# Patient Record
Sex: FEMALE | Race: WHITE | NOT HISPANIC OR LATINO | Employment: OTHER | URBAN - METROPOLITAN AREA
[De-identification: names, ages, dates, MRNs, and addresses within clinical notes are randomized per-mention and may not be internally consistent; named-entity substitution may affect disease eponyms.]

---

## 2017-01-27 ENCOUNTER — COMMUNICATION - HEALTHEAST (OUTPATIENT)
Dept: INTERNAL MEDICINE | Facility: CLINIC | Age: 77
End: 2017-01-27

## 2017-01-27 DIAGNOSIS — I10 UNSPECIFIED ESSENTIAL HYPERTENSION: ICD-10-CM

## 2017-02-20 ENCOUNTER — OFFICE VISIT - HEALTHEAST (OUTPATIENT)
Dept: INTERNAL MEDICINE | Facility: CLINIC | Age: 77
End: 2017-02-20

## 2017-02-20 DIAGNOSIS — J01.90 ACUTE NON-RECURRENT SINUSITIS, UNSPECIFIED LOCATION: ICD-10-CM

## 2017-02-20 DIAGNOSIS — I10 ESSENTIAL HYPERTENSION WITH GOAL BLOOD PRESSURE LESS THAN 130/80: ICD-10-CM

## 2017-02-20 ASSESSMENT — MIFFLIN-ST. JEOR: SCORE: 1267.86

## 2017-02-24 ENCOUNTER — OFFICE VISIT - HEALTHEAST (OUTPATIENT)
Dept: FAMILY MEDICINE | Facility: CLINIC | Age: 77
End: 2017-02-24

## 2017-02-24 ENCOUNTER — COMMUNICATION - HEALTHEAST (OUTPATIENT)
Dept: SCHEDULING | Facility: CLINIC | Age: 77
End: 2017-02-24

## 2017-02-24 DIAGNOSIS — J40 BRONCHITIS: ICD-10-CM

## 2017-02-24 DIAGNOSIS — R05.9 COUGH: ICD-10-CM

## 2017-02-24 DIAGNOSIS — R06.2 WHEEZING: ICD-10-CM

## 2017-05-25 ENCOUNTER — OFFICE VISIT - HEALTHEAST (OUTPATIENT)
Dept: INTERNAL MEDICINE | Facility: CLINIC | Age: 77
End: 2017-05-25

## 2017-05-25 DIAGNOSIS — I10 UNSPECIFIED ESSENTIAL HYPERTENSION: ICD-10-CM

## 2017-05-25 DIAGNOSIS — F51.02 INSOMNIA DUE TO STRESS: ICD-10-CM

## 2017-05-25 DIAGNOSIS — E78.5 HYPERLIPIDEMIA: ICD-10-CM

## 2017-05-25 DIAGNOSIS — Z00.00 ROUTINE GENERAL MEDICAL EXAMINATION AT A HEALTH CARE FACILITY: ICD-10-CM

## 2017-05-25 DIAGNOSIS — M81.0 OSTEOPOROSIS: ICD-10-CM

## 2017-05-25 LAB
CHOLEST SERPL-MCNC: 256 MG/DL
FASTING STATUS PATIENT QL REPORTED: YES
HDLC SERPL-MCNC: 98 MG/DL
LDLC SERPL CALC-MCNC: 145 MG/DL
TRIGL SERPL-MCNC: 65 MG/DL

## 2017-05-25 ASSESSMENT — MIFFLIN-ST. JEOR: SCORE: 1261.06

## 2017-05-26 ENCOUNTER — COMMUNICATION - HEALTHEAST (OUTPATIENT)
Dept: INTERNAL MEDICINE | Facility: CLINIC | Age: 77
End: 2017-05-26

## 2017-05-26 ENCOUNTER — COMMUNICATION - HEALTHEAST (OUTPATIENT)
Dept: SCHEDULING | Facility: CLINIC | Age: 77
End: 2017-05-26

## 2017-05-31 ENCOUNTER — HOSPITAL ENCOUNTER (OUTPATIENT)
Dept: MAMMOGRAPHY | Facility: CLINIC | Age: 77
Discharge: HOME | End: 2017-05-31
Attending: INTERNAL MEDICINE

## 2017-05-31 DIAGNOSIS — Z12.31 VISIT FOR SCREENING MAMMOGRAM: ICD-10-CM

## 2017-06-06 ENCOUNTER — COMMUNICATION - HEALTHEAST (OUTPATIENT)
Dept: INTERNAL MEDICINE | Facility: CLINIC | Age: 77
End: 2017-06-06

## 2017-06-06 DIAGNOSIS — I10 UNSPECIFIED ESSENTIAL HYPERTENSION: ICD-10-CM

## 2017-07-10 ENCOUNTER — OFFICE VISIT - HEALTHEAST (OUTPATIENT)
Dept: SLEEP MEDICINE | Facility: CLINIC | Age: 77
End: 2017-07-10

## 2017-07-10 DIAGNOSIS — R06.83 SNORING: ICD-10-CM

## 2017-07-10 DIAGNOSIS — G47.8 SLEEP DYSFUNCTION WITH SLEEP STAGE DISTURBANCE: ICD-10-CM

## 2017-07-10 DIAGNOSIS — G47.10 HYPERSOMNIA, UNSPECIFIED: ICD-10-CM

## 2017-07-10 DIAGNOSIS — R29.818 SUSPECTED SLEEP APNEA: ICD-10-CM

## 2017-07-10 ASSESSMENT — MIFFLIN-ST. JEOR: SCORE: 1280.79

## 2017-08-09 ENCOUNTER — RECORDS - HEALTHEAST (OUTPATIENT)
Dept: SLEEP MEDICINE | Facility: CLINIC | Age: 77
End: 2017-08-09

## 2017-08-09 DIAGNOSIS — G47.30 SLEEP APNEA, UNSPECIFIED: ICD-10-CM

## 2017-08-09 DIAGNOSIS — G47.8 OTHER SLEEP DISORDERS: ICD-10-CM

## 2017-08-09 DIAGNOSIS — R06.83 SNORING: ICD-10-CM

## 2017-08-09 DIAGNOSIS — G47.10 HYPERSOMNIA, UNSPECIFIED: ICD-10-CM

## 2017-08-14 ENCOUNTER — COMMUNICATION - HEALTHEAST (OUTPATIENT)
Dept: SLEEP MEDICINE | Facility: CLINIC | Age: 77
End: 2017-08-14

## 2017-08-24 ENCOUNTER — OFFICE VISIT - HEALTHEAST (OUTPATIENT)
Dept: SLEEP MEDICINE | Facility: CLINIC | Age: 77
End: 2017-08-24

## 2017-08-24 ENCOUNTER — AMBULATORY - HEALTHEAST (OUTPATIENT)
Dept: SLEEP MEDICINE | Facility: CLINIC | Age: 77
End: 2017-08-24

## 2017-08-24 DIAGNOSIS — G47.8 SLEEP DYSFUNCTION WITH SLEEP STAGE DISTURBANCE: ICD-10-CM

## 2017-08-24 DIAGNOSIS — G47.00 PERSISTENT INSOMNIA: ICD-10-CM

## 2017-08-24 DIAGNOSIS — G47.10 HYPERSOMNIA, UNSPECIFIED: ICD-10-CM

## 2017-08-24 DIAGNOSIS — G47.33 OSA (OBSTRUCTIVE SLEEP APNEA): ICD-10-CM

## 2017-08-24 ASSESSMENT — MIFFLIN-ST. JEOR: SCORE: 1281.24

## 2017-08-31 ENCOUNTER — AMBULATORY - HEALTHEAST (OUTPATIENT)
Dept: SLEEP MEDICINE | Facility: CLINIC | Age: 77
End: 2017-08-31

## 2017-09-12 ENCOUNTER — OFFICE VISIT - HEALTHEAST (OUTPATIENT)
Dept: RHEUMATOLOGY | Facility: CLINIC | Age: 77
End: 2017-09-12

## 2017-09-12 DIAGNOSIS — M25.50 POLYARTHRALGIA: ICD-10-CM

## 2017-09-12 DIAGNOSIS — M76.892 ENTHESOPATHY OF HIP REGION ON BOTH SIDES: ICD-10-CM

## 2017-09-12 DIAGNOSIS — M51.379 DEGENERATION OF LUMBAR OR LUMBOSACRAL INTERVERTEBRAL DISC: ICD-10-CM

## 2017-09-12 DIAGNOSIS — M76.891 ENTHESOPATHY OF HIP REGION ON BOTH SIDES: ICD-10-CM

## 2017-09-12 LAB
ALT SERPL W P-5'-P-CCNC: 19 U/L (ref 0–45)
CREAT SERPL-MCNC: 0.73 MG/DL (ref 0.6–1.1)
GFR SERPL CREATININE-BSD FRML MDRD: >60 ML/MIN/1.73M2

## 2017-09-12 ASSESSMENT — MIFFLIN-ST. JEOR: SCORE: 1285.33

## 2017-09-13 LAB — HCV AB SERPL QL IA: NEGATIVE

## 2017-09-14 LAB — ANA SER QL: 0.2 U

## 2017-09-25 ENCOUNTER — COMMUNICATION - HEALTHEAST (OUTPATIENT)
Dept: SLEEP MEDICINE | Facility: CLINIC | Age: 77
End: 2017-09-25

## 2017-10-16 ENCOUNTER — OFFICE VISIT - HEALTHEAST (OUTPATIENT)
Dept: SLEEP MEDICINE | Facility: CLINIC | Age: 77
End: 2017-10-16

## 2017-10-16 DIAGNOSIS — G47.8 SLEEP DYSFUNCTION WITH SLEEP STAGE DISTURBANCE: ICD-10-CM

## 2017-10-16 DIAGNOSIS — G47.33 OSA ON CPAP: ICD-10-CM

## 2017-10-16 DIAGNOSIS — R53.83 FATIGUE, UNSPECIFIED TYPE: ICD-10-CM

## 2017-10-16 ASSESSMENT — MIFFLIN-ST. JEOR: SCORE: 1290.32

## 2017-11-02 ENCOUNTER — OFFICE VISIT - HEALTHEAST (OUTPATIENT)
Dept: INTERNAL MEDICINE | Facility: CLINIC | Age: 77
End: 2017-11-02

## 2017-11-02 ENCOUNTER — COMMUNICATION - HEALTHEAST (OUTPATIENT)
Dept: INTERNAL MEDICINE | Facility: CLINIC | Age: 77
End: 2017-11-02

## 2017-11-02 DIAGNOSIS — I10 ESSENTIAL HYPERTENSION: ICD-10-CM

## 2017-11-02 DIAGNOSIS — Z01.818 PRE-OPERATIVE EXAM: ICD-10-CM

## 2017-11-02 DIAGNOSIS — M19.071 OSTEOARTHRITIS OF RIGHT FOOT: ICD-10-CM

## 2017-11-02 DIAGNOSIS — E78.5 HYPERLIPIDEMIA: ICD-10-CM

## 2017-11-02 LAB
ATRIAL RATE - MUSE: 79 BPM
DIASTOLIC BLOOD PRESSURE - MUSE: NORMAL MMHG
INTERPRETATION ECG - MUSE: NORMAL
P AXIS - MUSE: 62 DEGREES
PR INTERVAL - MUSE: 174 MS
QRS DURATION - MUSE: 92 MS
QT - MUSE: 356 MS
QTC - MUSE: 408 MS
R AXIS - MUSE: 36 DEGREES
SYSTOLIC BLOOD PRESSURE - MUSE: NORMAL MMHG
T AXIS - MUSE: 32 DEGREES
VENTRICULAR RATE- MUSE: 79 BPM

## 2017-11-02 ASSESSMENT — MIFFLIN-ST. JEOR: SCORE: 1287.14

## 2017-11-24 ENCOUNTER — RECORDS - HEALTHEAST (OUTPATIENT)
Dept: ADMINISTRATIVE | Facility: OTHER | Age: 77
End: 2017-11-24

## 2017-12-06 ENCOUNTER — COMMUNICATION - HEALTHEAST (OUTPATIENT)
Dept: INTERNAL MEDICINE | Facility: CLINIC | Age: 77
End: 2017-12-06

## 2017-12-06 ENCOUNTER — RECORDS - HEALTHEAST (OUTPATIENT)
Dept: ADMINISTRATIVE | Facility: OTHER | Age: 77
End: 2017-12-06

## 2017-12-06 DIAGNOSIS — F51.02 INSOMNIA DUE TO STRESS: ICD-10-CM

## 2017-12-07 ENCOUNTER — RECORDS - HEALTHEAST (OUTPATIENT)
Dept: BONE DENSITY | Facility: CLINIC | Age: 77
End: 2017-12-07

## 2017-12-07 ENCOUNTER — OFFICE VISIT - HEALTHEAST (OUTPATIENT)
Dept: INTERNAL MEDICINE | Facility: CLINIC | Age: 77
End: 2017-12-07

## 2017-12-07 ENCOUNTER — RECORDS - HEALTHEAST (OUTPATIENT)
Dept: ADMINISTRATIVE | Facility: OTHER | Age: 77
End: 2017-12-07

## 2017-12-07 DIAGNOSIS — M81.0 OSTEOPOROSIS: ICD-10-CM

## 2017-12-07 DIAGNOSIS — M81.0 AGE-RELATED OSTEOPOROSIS WITHOUT CURRENT PATHOLOGICAL FRACTURE: ICD-10-CM

## 2017-12-08 ENCOUNTER — RECORDS - HEALTHEAST (OUTPATIENT)
Dept: ADMINISTRATIVE | Facility: OTHER | Age: 77
End: 2017-12-08

## 2018-01-04 ENCOUNTER — RECORDS - HEALTHEAST (OUTPATIENT)
Dept: ADMINISTRATIVE | Facility: OTHER | Age: 78
End: 2018-01-04

## 2018-01-16 ENCOUNTER — COMMUNICATION - HEALTHEAST (OUTPATIENT)
Dept: INTERNAL MEDICINE | Facility: CLINIC | Age: 78
End: 2018-01-16

## 2018-01-26 ENCOUNTER — RECORDS - HEALTHEAST (OUTPATIENT)
Dept: ADMINISTRATIVE | Facility: OTHER | Age: 78
End: 2018-01-26

## 2018-02-01 ENCOUNTER — RECORDS - HEALTHEAST (OUTPATIENT)
Dept: ADMINISTRATIVE | Facility: OTHER | Age: 78
End: 2018-02-01

## 2018-02-05 ENCOUNTER — OFFICE VISIT - HEALTHEAST (OUTPATIENT)
Dept: INTERNAL MEDICINE | Facility: CLINIC | Age: 78
End: 2018-02-05

## 2018-02-05 DIAGNOSIS — I10 ESSENTIAL HYPERTENSION: ICD-10-CM

## 2018-02-05 DIAGNOSIS — Z01.818 PREOP EXAM FOR INTERNAL MEDICINE: ICD-10-CM

## 2018-02-05 DIAGNOSIS — M87.00 AVN (AVASCULAR NECROSIS OF BONE) (H): ICD-10-CM

## 2018-02-05 LAB
ANION GAP SERPL CALCULATED.3IONS-SCNC: 11 MMOL/L (ref 5–18)
BUN SERPL-MCNC: 15 MG/DL (ref 8–28)
CALCIUM SERPL-MCNC: 9.6 MG/DL (ref 8.5–10.5)
CHLORIDE BLD-SCNC: 100 MMOL/L (ref 98–107)
CO2 SERPL-SCNC: 25 MMOL/L (ref 22–31)
CREAT SERPL-MCNC: 0.66 MG/DL (ref 0.6–1.1)
ERYTHROCYTE [DISTWIDTH] IN BLOOD BY AUTOMATED COUNT: 12.8 % (ref 11–14.5)
GFR SERPL CREATININE-BSD FRML MDRD: >60 ML/MIN/1.73M2
GLUCOSE BLD-MCNC: 90 MG/DL (ref 70–125)
HCT VFR BLD AUTO: 38.8 % (ref 35–47)
HGB BLD-MCNC: 13.1 G/DL (ref 12–16)
MCH RBC QN AUTO: 29.7 PG (ref 27–34)
MCHC RBC AUTO-ENTMCNC: 33.6 G/DL (ref 32–36)
MCV RBC AUTO: 88 FL (ref 80–100)
PLATELET # BLD AUTO: 278 THOU/UL (ref 140–440)
PMV BLD AUTO: 7.7 FL (ref 7–10)
POTASSIUM BLD-SCNC: 3.9 MMOL/L (ref 3.5–5)
RBC # BLD AUTO: 4.39 MILL/UL (ref 3.8–5.4)
SODIUM SERPL-SCNC: 136 MMOL/L (ref 136–145)
WBC: 9.3 THOU/UL (ref 4–11)

## 2018-02-05 ASSESSMENT — MIFFLIN-ST. JEOR: SCORE: 1291.68

## 2018-02-07 ENCOUNTER — SURGERY - HEALTHEAST (OUTPATIENT)
Dept: SURGERY | Facility: CLINIC | Age: 78
End: 2018-02-07

## 2018-02-07 ENCOUNTER — ANESTHESIA - HEALTHEAST (OUTPATIENT)
Dept: SURGERY | Facility: CLINIC | Age: 78
End: 2018-02-07

## 2018-02-07 ASSESSMENT — MIFFLIN-ST. JEOR: SCORE: 1274.95

## 2018-02-09 ENCOUNTER — HOME CARE/HOSPICE - HEALTHEAST (OUTPATIENT)
Dept: HOME HEALTH SERVICES | Facility: HOME HEALTH | Age: 78
End: 2018-02-09

## 2018-02-12 ENCOUNTER — HOME CARE/HOSPICE - HEALTHEAST (OUTPATIENT)
Dept: HOME HEALTH SERVICES | Facility: HOME HEALTH | Age: 78
End: 2018-02-12

## 2018-02-14 ENCOUNTER — HOME CARE/HOSPICE - HEALTHEAST (OUTPATIENT)
Dept: HOME HEALTH SERVICES | Facility: HOME HEALTH | Age: 78
End: 2018-02-14

## 2018-02-16 ENCOUNTER — HOME CARE/HOSPICE - HEALTHEAST (OUTPATIENT)
Dept: HOME HEALTH SERVICES | Facility: HOME HEALTH | Age: 78
End: 2018-02-16

## 2018-02-19 ENCOUNTER — HOME CARE/HOSPICE - HEALTHEAST (OUTPATIENT)
Dept: HOME HEALTH SERVICES | Facility: HOME HEALTH | Age: 78
End: 2018-02-19

## 2018-02-22 ENCOUNTER — HOME CARE/HOSPICE - HEALTHEAST (OUTPATIENT)
Dept: HOME HEALTH SERVICES | Facility: HOME HEALTH | Age: 78
End: 2018-02-22

## 2018-02-23 ENCOUNTER — RECORDS - HEALTHEAST (OUTPATIENT)
Dept: ADMINISTRATIVE | Facility: OTHER | Age: 78
End: 2018-02-23

## 2018-02-26 ENCOUNTER — HOME CARE/HOSPICE - HEALTHEAST (OUTPATIENT)
Dept: HOME HEALTH SERVICES | Facility: HOME HEALTH | Age: 78
End: 2018-02-26

## 2018-03-01 ENCOUNTER — HOME CARE/HOSPICE - HEALTHEAST (OUTPATIENT)
Dept: HOME HEALTH SERVICES | Facility: HOME HEALTH | Age: 78
End: 2018-03-01

## 2018-03-05 ENCOUNTER — HOME CARE/HOSPICE - HEALTHEAST (OUTPATIENT)
Dept: HOME HEALTH SERVICES | Facility: HOME HEALTH | Age: 78
End: 2018-03-05

## 2018-03-08 ENCOUNTER — HOME CARE/HOSPICE - HEALTHEAST (OUTPATIENT)
Dept: HOME HEALTH SERVICES | Facility: HOME HEALTH | Age: 78
End: 2018-03-08

## 2018-03-15 ENCOUNTER — RECORDS - HEALTHEAST (OUTPATIENT)
Dept: ADMINISTRATIVE | Facility: OTHER | Age: 78
End: 2018-03-15

## 2018-03-27 ENCOUNTER — RECORDS - HEALTHEAST (OUTPATIENT)
Dept: ADMINISTRATIVE | Facility: OTHER | Age: 78
End: 2018-03-27

## 2018-03-28 ENCOUNTER — ANESTHESIA - HEALTHEAST (OUTPATIENT)
Dept: SURGERY | Facility: CLINIC | Age: 78
End: 2018-03-28

## 2018-03-28 ENCOUNTER — SURGERY - HEALTHEAST (OUTPATIENT)
Dept: SURGERY | Facility: CLINIC | Age: 78
End: 2018-03-28

## 2018-03-28 ASSESSMENT — MIFFLIN-ST. JEOR: SCORE: 1265.15

## 2018-03-30 ENCOUNTER — HOME CARE/HOSPICE - HEALTHEAST (OUTPATIENT)
Dept: HOME HEALTH SERVICES | Facility: HOME HEALTH | Age: 78
End: 2018-03-30

## 2018-04-10 ENCOUNTER — RECORDS - HEALTHEAST (OUTPATIENT)
Dept: ADMINISTRATIVE | Facility: OTHER | Age: 78
End: 2018-04-10

## 2018-05-03 ENCOUNTER — AMBULATORY - HEALTHEAST (OUTPATIENT)
Dept: INTERNAL MEDICINE | Facility: CLINIC | Age: 78
End: 2018-05-03

## 2018-05-03 DIAGNOSIS — M81.0 OSTEOPOROSIS: ICD-10-CM

## 2018-05-15 ENCOUNTER — RECORDS - HEALTHEAST (OUTPATIENT)
Dept: ADMINISTRATIVE | Facility: OTHER | Age: 78
End: 2018-05-15

## 2018-05-29 ENCOUNTER — COMMUNICATION - HEALTHEAST (OUTPATIENT)
Dept: INTERNAL MEDICINE | Facility: CLINIC | Age: 78
End: 2018-05-29

## 2018-05-30 ENCOUNTER — OFFICE VISIT - HEALTHEAST (OUTPATIENT)
Dept: INTERNAL MEDICINE | Facility: CLINIC | Age: 78
End: 2018-05-30

## 2018-05-30 DIAGNOSIS — E78.5 HYPERLIPIDEMIA: ICD-10-CM

## 2018-05-30 DIAGNOSIS — D17.1 LIPOMA OF TORSO: ICD-10-CM

## 2018-05-30 DIAGNOSIS — Z00.00 ROUTINE GENERAL MEDICAL EXAMINATION AT A HEALTH CARE FACILITY: ICD-10-CM

## 2018-05-30 DIAGNOSIS — S73.005A HIP DISLOCATION, LEFT (H): ICD-10-CM

## 2018-05-30 DIAGNOSIS — G47.33 OSA ON CPAP: ICD-10-CM

## 2018-05-30 DIAGNOSIS — I10 ESSENTIAL HYPERTENSION: ICD-10-CM

## 2018-05-30 DIAGNOSIS — Z96.642 STATUS POST LEFT HIP REPLACEMENT: ICD-10-CM

## 2018-05-30 DIAGNOSIS — M81.0 OSTEOPOROSIS: ICD-10-CM

## 2018-05-30 LAB
ALBUMIN SERPL-MCNC: 4 G/DL (ref 3.5–5)
ALBUMIN UR-MCNC: NEGATIVE MG/DL
ALP SERPL-CCNC: 73 U/L (ref 45–120)
ALT SERPL W P-5'-P-CCNC: 17 U/L (ref 0–45)
ANION GAP SERPL CALCULATED.3IONS-SCNC: 12 MMOL/L (ref 5–18)
APPEARANCE UR: CLEAR
AST SERPL W P-5'-P-CCNC: 18 U/L (ref 0–40)
BILIRUB DIRECT SERPL-MCNC: 0.2 MG/DL
BILIRUB SERPL-MCNC: 0.6 MG/DL (ref 0–1)
BILIRUB UR QL STRIP: NEGATIVE
BUN SERPL-MCNC: 16 MG/DL (ref 8–28)
CALCIUM SERPL-MCNC: 10.1 MG/DL (ref 8.5–10.5)
CHLORIDE BLD-SCNC: 100 MMOL/L (ref 98–107)
CHOLEST SERPL-MCNC: 266 MG/DL
CO2 SERPL-SCNC: 24 MMOL/L (ref 22–31)
COLOR UR AUTO: YELLOW
CREAT SERPL-MCNC: 0.71 MG/DL (ref 0.6–1.1)
ERYTHROCYTE [DISTWIDTH] IN BLOOD BY AUTOMATED COUNT: 12.5 % (ref 11–14.5)
FASTING STATUS PATIENT QL REPORTED: YES
GFR SERPL CREATININE-BSD FRML MDRD: >60 ML/MIN/1.73M2
GLUCOSE BLD-MCNC: 95 MG/DL (ref 70–125)
GLUCOSE UR STRIP-MCNC: NEGATIVE MG/DL
HCT VFR BLD AUTO: 41.6 % (ref 35–47)
HDLC SERPL-MCNC: 107 MG/DL
HGB BLD-MCNC: 13.7 G/DL (ref 12–16)
HGB UR QL STRIP: NEGATIVE
KETONES UR STRIP-MCNC: NEGATIVE MG/DL
LDLC SERPL CALC-MCNC: 146 MG/DL
LEUKOCYTE ESTERASE UR QL STRIP: NEGATIVE
MCH RBC QN AUTO: 28.9 PG (ref 27–34)
MCHC RBC AUTO-ENTMCNC: 33 G/DL (ref 32–36)
MCV RBC AUTO: 88 FL (ref 80–100)
NITRATE UR QL: NEGATIVE
PH UR STRIP: 7 [PH] (ref 5–8)
PLATELET # BLD AUTO: 241 THOU/UL (ref 140–440)
PMV BLD AUTO: 8.7 FL (ref 7–10)
POTASSIUM BLD-SCNC: 3.9 MMOL/L (ref 3.5–5)
PROT SERPL-MCNC: 7.3 G/DL (ref 6–8)
RBC # BLD AUTO: 4.75 MILL/UL (ref 3.8–5.4)
SODIUM SERPL-SCNC: 136 MMOL/L (ref 136–145)
SP GR UR STRIP: 1.01 (ref 1–1.03)
TRIGL SERPL-MCNC: 66 MG/DL
TSH SERPL DL<=0.005 MIU/L-ACNC: 1.03 UIU/ML (ref 0.3–5)
UROBILINOGEN UR STRIP-ACNC: NORMAL
WBC: 6.3 THOU/UL (ref 4–11)

## 2018-05-30 ASSESSMENT — MIFFLIN-ST. JEOR: SCORE: 1286.01

## 2018-05-31 ENCOUNTER — COMMUNICATION - HEALTHEAST (OUTPATIENT)
Dept: INTERNAL MEDICINE | Facility: CLINIC | Age: 78
End: 2018-05-31

## 2018-05-31 LAB — 25(OH)D3 SERPL-MCNC: 49.2 NG/ML (ref 30–80)

## 2018-06-14 ENCOUNTER — HOSPITAL ENCOUNTER (OUTPATIENT)
Dept: MAMMOGRAPHY | Facility: CLINIC | Age: 78
Discharge: HOME | End: 2018-06-14
Attending: INTERNAL MEDICINE

## 2018-06-14 DIAGNOSIS — Z12.31 VISIT FOR SCREENING MAMMOGRAM: ICD-10-CM

## 2018-06-15 ENCOUNTER — AMBULATORY - HEALTHEAST (OUTPATIENT)
Dept: NURSING | Facility: CLINIC | Age: 78
End: 2018-06-15

## 2018-06-26 ENCOUNTER — RECORDS - HEALTHEAST (OUTPATIENT)
Dept: ADMINISTRATIVE | Facility: OTHER | Age: 78
End: 2018-06-26

## 2018-07-17 ENCOUNTER — COMMUNICATION - HEALTHEAST (OUTPATIENT)
Dept: INTERNAL MEDICINE | Facility: CLINIC | Age: 78
End: 2018-07-17

## 2018-07-17 DIAGNOSIS — I10 ESSENTIAL HYPERTENSION: ICD-10-CM

## 2018-10-02 ENCOUNTER — OFFICE VISIT - HEALTHEAST (OUTPATIENT)
Dept: RHEUMATOLOGY | Facility: CLINIC | Age: 78
End: 2018-10-02

## 2018-10-02 ENCOUNTER — COMMUNICATION - HEALTHEAST (OUTPATIENT)
Dept: ADMINISTRATIVE | Facility: CLINIC | Age: 78
End: 2018-10-02

## 2018-10-02 DIAGNOSIS — G89.29 CHRONIC PAIN OF BOTH SHOULDERS: ICD-10-CM

## 2018-10-02 DIAGNOSIS — M25.511 CHRONIC PAIN OF BOTH SHOULDERS: ICD-10-CM

## 2018-10-02 DIAGNOSIS — M25.512 CHRONIC PAIN OF BOTH SHOULDERS: ICD-10-CM

## 2018-10-02 DIAGNOSIS — M25.50 POLYARTHRALGIA: ICD-10-CM

## 2018-10-02 LAB
C REACTIVE PROTEIN LHE: 1.9 MG/DL (ref 0–0.8)
CK SERPL-CCNC: 22 U/L (ref 30–190)
ERYTHROCYTE [SEDIMENTATION RATE] IN BLOOD BY WESTERGREN METHOD: 16 MM/HR (ref 0–20)
RHEUMATOID FACT SERPL-ACNC: <15 IU/ML (ref 0–30)
URATE SERPL-MCNC: 4.9 MG/DL (ref 2–7.5)

## 2018-10-09 ENCOUNTER — OFFICE VISIT - HEALTHEAST (OUTPATIENT)
Dept: INTERNAL MEDICINE | Facility: CLINIC | Age: 78
End: 2018-10-09

## 2018-10-09 DIAGNOSIS — M15.0 PRIMARY OSTEOARTHRITIS INVOLVING MULTIPLE JOINTS: ICD-10-CM

## 2018-10-09 DIAGNOSIS — M25.50 POLYARTHRALGIA: ICD-10-CM

## 2018-10-09 DIAGNOSIS — Z23 FLU VACCINE NEED: ICD-10-CM

## 2018-10-09 DIAGNOSIS — I10 ESSENTIAL HYPERTENSION: ICD-10-CM

## 2018-10-09 ASSESSMENT — MIFFLIN-ST. JEOR: SCORE: 1326.83

## 2018-10-11 ENCOUNTER — COMMUNICATION - HEALTHEAST (OUTPATIENT)
Dept: INTERNAL MEDICINE | Facility: CLINIC | Age: 78
End: 2018-10-11

## 2018-10-11 LAB
B BURGDOR IGG+IGM SER QL: 0.15 INDEX VALUE
TTG IGA SER-ACNC: 1 U/ML
TTG IGG SER-ACNC: <0.6 U/ML

## 2018-10-23 ENCOUNTER — OFFICE VISIT - HEALTHEAST (OUTPATIENT)
Dept: INTERNAL MEDICINE | Facility: CLINIC | Age: 78
End: 2018-10-23

## 2018-10-23 DIAGNOSIS — I10 ESSENTIAL HYPERTENSION: ICD-10-CM

## 2018-10-23 DIAGNOSIS — E78.5 HYPERLIPIDEMIA: ICD-10-CM

## 2018-10-23 DIAGNOSIS — M25.50 POLYARTHRALGIA: ICD-10-CM

## 2018-10-23 ASSESSMENT — MIFFLIN-ST. JEOR: SCORE: 1326.83

## 2018-10-25 ENCOUNTER — RECORDS - HEALTHEAST (OUTPATIENT)
Dept: ADMINISTRATIVE | Facility: OTHER | Age: 78
End: 2018-10-25

## 2018-11-01 ENCOUNTER — COMMUNICATION - HEALTHEAST (OUTPATIENT)
Dept: RHEUMATOLOGY | Facility: CLINIC | Age: 78
End: 2018-11-01

## 2018-11-12 ENCOUNTER — RECORDS - HEALTHEAST (OUTPATIENT)
Dept: ADMINISTRATIVE | Facility: OTHER | Age: 78
End: 2018-11-12

## 2018-12-18 ENCOUNTER — OFFICE VISIT - HEALTHEAST (OUTPATIENT)
Dept: INTERNAL MEDICINE | Facility: CLINIC | Age: 78
End: 2018-12-18

## 2018-12-18 DIAGNOSIS — M81.0 OSTEOPOROSIS: ICD-10-CM

## 2018-12-18 DIAGNOSIS — M25.50 POLYARTHRALGIA: ICD-10-CM

## 2018-12-19 ENCOUNTER — OFFICE VISIT - HEALTHEAST (OUTPATIENT)
Dept: INTERNAL MEDICINE | Facility: CLINIC | Age: 78
End: 2018-12-19

## 2018-12-19 DIAGNOSIS — M15.0 PRIMARY OSTEOARTHRITIS INVOLVING MULTIPLE JOINTS: ICD-10-CM

## 2018-12-19 DIAGNOSIS — I10 ESSENTIAL HYPERTENSION: ICD-10-CM

## 2018-12-19 DIAGNOSIS — F51.01 PRIMARY INSOMNIA: ICD-10-CM

## 2018-12-19 DIAGNOSIS — M35.3 PMR (POLYMYALGIA RHEUMATICA) (H): ICD-10-CM

## 2018-12-19 DIAGNOSIS — E78.2 MIXED HYPERLIPIDEMIA: ICD-10-CM

## 2018-12-19 DIAGNOSIS — M25.50 POLYARTHRALGIA: ICD-10-CM

## 2018-12-19 DIAGNOSIS — M81.0 AGE-RELATED OSTEOPOROSIS WITHOUT CURRENT PATHOLOGICAL FRACTURE: ICD-10-CM

## 2018-12-19 LAB
ALBUMIN SERPL-MCNC: 3.8 G/DL (ref 3.5–5)
ALP SERPL-CCNC: 64 U/L (ref 45–120)
ALT SERPL W P-5'-P-CCNC: 10 U/L (ref 0–45)
ANION GAP SERPL CALCULATED.3IONS-SCNC: 13 MMOL/L (ref 5–18)
AST SERPL W P-5'-P-CCNC: 16 U/L (ref 0–40)
BILIRUB DIRECT SERPL-MCNC: 0.2 MG/DL
BILIRUB SERPL-MCNC: 0.5 MG/DL (ref 0–1)
BUN SERPL-MCNC: 18 MG/DL (ref 8–28)
C REACTIVE PROTEIN LHE: 1.3 MG/DL (ref 0–0.8)
CALCIUM SERPL-MCNC: 10 MG/DL (ref 8.5–10.5)
CHLORIDE BLD-SCNC: 97 MMOL/L (ref 98–107)
CHOLEST SERPL-MCNC: 247 MG/DL
CO2 SERPL-SCNC: 26 MMOL/L (ref 22–31)
CREAT SERPL-MCNC: 0.71 MG/DL (ref 0.6–1.1)
ERYTHROCYTE [DISTWIDTH] IN BLOOD BY AUTOMATED COUNT: 11.8 % (ref 11–14.5)
ERYTHROCYTE [SEDIMENTATION RATE] IN BLOOD BY WESTERGREN METHOD: 10 MM/HR (ref 0–20)
FASTING STATUS PATIENT QL REPORTED: YES
GFR SERPL CREATININE-BSD FRML MDRD: >60 ML/MIN/1.73M2
GLUCOSE BLD-MCNC: 96 MG/DL (ref 70–125)
HCT VFR BLD AUTO: 42.5 % (ref 35–47)
HDLC SERPL-MCNC: 110 MG/DL
HGB BLD-MCNC: 14.3 G/DL (ref 12–16)
LDLC SERPL CALC-MCNC: 127 MG/DL
MCH RBC QN AUTO: 29.5 PG (ref 27–34)
MCHC RBC AUTO-ENTMCNC: 33.5 G/DL (ref 32–36)
MCV RBC AUTO: 88 FL (ref 80–100)
PLATELET # BLD AUTO: 287 THOU/UL (ref 140–440)
PMV BLD AUTO: 8.8 FL (ref 7–10)
POTASSIUM BLD-SCNC: 4.1 MMOL/L (ref 3.5–5)
PROT SERPL-MCNC: 6.9 G/DL (ref 6–8)
RBC # BLD AUTO: 4.84 MILL/UL (ref 3.8–5.4)
SODIUM SERPL-SCNC: 136 MMOL/L (ref 136–145)
TRIGL SERPL-MCNC: 52 MG/DL
WBC: 9 THOU/UL (ref 4–11)

## 2018-12-19 ASSESSMENT — MIFFLIN-ST. JEOR: SCORE: 1317.76

## 2018-12-20 ENCOUNTER — COMMUNICATION - HEALTHEAST (OUTPATIENT)
Dept: INTERNAL MEDICINE | Facility: CLINIC | Age: 78
End: 2018-12-20

## 2018-12-31 ENCOUNTER — COMMUNICATION - HEALTHEAST (OUTPATIENT)
Dept: RHEUMATOLOGY | Facility: CLINIC | Age: 78
End: 2018-12-31

## 2019-01-09 ENCOUNTER — OFFICE VISIT - HEALTHEAST (OUTPATIENT)
Dept: RHEUMATOLOGY | Facility: CLINIC | Age: 79
End: 2019-01-09

## 2019-01-09 DIAGNOSIS — M25.50 POLYARTHRALGIA: ICD-10-CM

## 2019-01-09 DIAGNOSIS — M35.3 POLYMYALGIA (H): ICD-10-CM

## 2019-01-09 DIAGNOSIS — M15.0 PRIMARY OSTEOARTHRITIS INVOLVING MULTIPLE JOINTS: ICD-10-CM

## 2019-01-16 ENCOUNTER — OFFICE VISIT - HEALTHEAST (OUTPATIENT)
Dept: INTERNAL MEDICINE | Facility: CLINIC | Age: 79
End: 2019-01-16

## 2019-01-16 DIAGNOSIS — M81.0 AGE-RELATED OSTEOPOROSIS WITHOUT CURRENT PATHOLOGICAL FRACTURE: ICD-10-CM

## 2019-02-14 ENCOUNTER — OFFICE VISIT - HEALTHEAST (OUTPATIENT)
Dept: RHEUMATOLOGY | Facility: CLINIC | Age: 79
End: 2019-02-14

## 2019-02-14 DIAGNOSIS — M25.50 POLYARTHRALGIA: ICD-10-CM

## 2019-02-14 DIAGNOSIS — M15.0 PRIMARY OSTEOARTHRITIS INVOLVING MULTIPLE JOINTS: ICD-10-CM

## 2019-02-14 DIAGNOSIS — M11.20 CHONDROCALCINOSIS: ICD-10-CM

## 2019-02-19 ENCOUNTER — COMMUNICATION - HEALTHEAST (OUTPATIENT)
Dept: ADMINISTRATIVE | Facility: CLINIC | Age: 79
End: 2019-02-19

## 2019-02-21 ENCOUNTER — COMMUNICATION - HEALTHEAST (OUTPATIENT)
Dept: ADMINISTRATIVE | Facility: CLINIC | Age: 79
End: 2019-02-21

## 2019-04-10 ENCOUNTER — COMMUNICATION - HEALTHEAST (OUTPATIENT)
Dept: RHEUMATOLOGY | Facility: CLINIC | Age: 79
End: 2019-04-10

## 2019-04-18 ENCOUNTER — COMMUNICATION - HEALTHEAST (OUTPATIENT)
Dept: INTERNAL MEDICINE | Facility: CLINIC | Age: 79
End: 2019-04-18

## 2019-04-18 DIAGNOSIS — F51.01 PRIMARY INSOMNIA: ICD-10-CM

## 2019-05-03 ENCOUNTER — COMMUNICATION - HEALTHEAST (OUTPATIENT)
Dept: RHEUMATOLOGY | Facility: CLINIC | Age: 79
End: 2019-05-03

## 2019-05-14 ENCOUNTER — OFFICE VISIT - HEALTHEAST (OUTPATIENT)
Dept: RHEUMATOLOGY | Facility: CLINIC | Age: 79
End: 2019-05-14

## 2019-05-14 DIAGNOSIS — M47.817 LUMBOSACRAL SPONDYLOSIS WITHOUT MYELOPATHY: ICD-10-CM

## 2019-05-14 DIAGNOSIS — M11.20 CHONDROCALCINOSIS: ICD-10-CM

## 2019-05-14 DIAGNOSIS — M15.0 PRIMARY OSTEOARTHRITIS INVOLVING MULTIPLE JOINTS: ICD-10-CM

## 2019-06-10 ENCOUNTER — COMMUNICATION - HEALTHEAST (OUTPATIENT)
Dept: INTERNAL MEDICINE | Facility: CLINIC | Age: 79
End: 2019-06-10

## 2019-06-10 DIAGNOSIS — I10 ESSENTIAL HYPERTENSION: ICD-10-CM

## 2019-06-21 ENCOUNTER — HOSPITAL ENCOUNTER (OUTPATIENT)
Dept: MAMMOGRAPHY | Facility: CLINIC | Age: 79
Discharge: HOME | End: 2019-06-21
Attending: INTERNAL MEDICINE

## 2019-06-21 ENCOUNTER — COMMUNICATION - HEALTHEAST (OUTPATIENT)
Dept: INTERNAL MEDICINE | Facility: CLINIC | Age: 79
End: 2019-06-21

## 2019-06-21 DIAGNOSIS — Z12.31 VISIT FOR SCREENING MAMMOGRAM: ICD-10-CM

## 2019-07-16 ENCOUNTER — OFFICE VISIT - HEALTHEAST (OUTPATIENT)
Dept: INTERNAL MEDICINE | Facility: CLINIC | Age: 79
End: 2019-07-16

## 2019-07-16 DIAGNOSIS — M81.0 AGE-RELATED OSTEOPOROSIS WITHOUT CURRENT PATHOLOGICAL FRACTURE: ICD-10-CM

## 2019-07-24 ENCOUNTER — OFFICE VISIT - HEALTHEAST (OUTPATIENT)
Dept: INTERNAL MEDICINE | Facility: CLINIC | Age: 79
End: 2019-07-24

## 2019-07-24 ENCOUNTER — OFFICE VISIT - HEALTHEAST (OUTPATIENT)
Dept: SLEEP MEDICINE | Facility: CLINIC | Age: 79
End: 2019-07-24

## 2019-07-24 DIAGNOSIS — M25.50 POLYARTHRALGIA: ICD-10-CM

## 2019-07-24 DIAGNOSIS — R20.2 NUMBNESS AND TINGLING OF FOOT: ICD-10-CM

## 2019-07-24 DIAGNOSIS — R79.1 ABNORMAL COAGULATION PROFILE: ICD-10-CM

## 2019-07-24 DIAGNOSIS — R20.0 NUMBNESS AND TINGLING OF FOOT: ICD-10-CM

## 2019-07-24 DIAGNOSIS — I10 ESSENTIAL HYPERTENSION: ICD-10-CM

## 2019-07-24 DIAGNOSIS — G47.8 SLEEP DYSFUNCTION WITH SLEEP STAGE DISTURBANCE: ICD-10-CM

## 2019-07-24 DIAGNOSIS — M81.0 AGE-RELATED OSTEOPOROSIS WITHOUT CURRENT PATHOLOGICAL FRACTURE: ICD-10-CM

## 2019-07-24 DIAGNOSIS — E78.2 MIXED HYPERLIPIDEMIA: ICD-10-CM

## 2019-07-24 DIAGNOSIS — R23.3 ABNORMAL BRUISING: ICD-10-CM

## 2019-07-24 DIAGNOSIS — M15.0 PRIMARY OSTEOARTHRITIS INVOLVING MULTIPLE JOINTS: ICD-10-CM

## 2019-07-24 DIAGNOSIS — G47.33 OBSTRUCTIVE SLEEP APNEA: ICD-10-CM

## 2019-07-24 DIAGNOSIS — Z00.00 ROUTINE GENERAL MEDICAL EXAMINATION AT A HEALTH CARE FACILITY: ICD-10-CM

## 2019-07-24 DIAGNOSIS — G47.00 INSOMNIA, PERSISTENT: ICD-10-CM

## 2019-07-24 LAB
ALBUMIN SERPL-MCNC: 4 G/DL (ref 3.5–5)
ALBUMIN UR-MCNC: NEGATIVE MG/DL
ALP SERPL-CCNC: 63 U/L (ref 45–120)
ALT SERPL W P-5'-P-CCNC: 16 U/L (ref 0–45)
ANION GAP SERPL CALCULATED.3IONS-SCNC: 10 MMOL/L (ref 5–18)
APPEARANCE UR: CLEAR
APTT PPP: 27 SECONDS (ref 24–37)
AST SERPL W P-5'-P-CCNC: 17 U/L (ref 0–40)
BILIRUB DIRECT SERPL-MCNC: 0.2 MG/DL
BILIRUB SERPL-MCNC: 0.4 MG/DL (ref 0–1)
BILIRUB UR QL STRIP: NEGATIVE
BUN SERPL-MCNC: 12 MG/DL (ref 8–28)
CALCIUM SERPL-MCNC: 9.7 MG/DL (ref 8.5–10.5)
CHLORIDE BLD-SCNC: 100 MMOL/L (ref 98–107)
CHOLEST SERPL-MCNC: 265 MG/DL
CO2 SERPL-SCNC: 26 MMOL/L (ref 22–31)
COLOR UR AUTO: YELLOW
CREAT SERPL-MCNC: 0.7 MG/DL (ref 0.6–1.1)
ERYTHROCYTE [DISTWIDTH] IN BLOOD BY AUTOMATED COUNT: 11.9 % (ref 11–14.5)
FASTING STATUS PATIENT QL REPORTED: YES
GFR SERPL CREATININE-BSD FRML MDRD: >60 ML/MIN/1.73M2
GLUCOSE BLD-MCNC: 96 MG/DL (ref 70–125)
GLUCOSE UR STRIP-MCNC: NEGATIVE MG/DL
HCT VFR BLD AUTO: 43.9 % (ref 35–47)
HDLC SERPL-MCNC: 115 MG/DL
HGB BLD-MCNC: 14.7 G/DL (ref 12–16)
HGB UR QL STRIP: NEGATIVE
INR PPP: 0.96 (ref 0.9–1.1)
KETONES UR STRIP-MCNC: NEGATIVE MG/DL
LDLC SERPL CALC-MCNC: 141 MG/DL
LEUKOCYTE ESTERASE UR QL STRIP: NEGATIVE
MCH RBC QN AUTO: 29.9 PG (ref 27–34)
MCHC RBC AUTO-ENTMCNC: 33.5 G/DL (ref 32–36)
MCV RBC AUTO: 89 FL (ref 80–100)
NITRATE UR QL: NEGATIVE
PH UR STRIP: 7 [PH] (ref 5–8)
PLATELET # BLD AUTO: 274 THOU/UL (ref 140–440)
PMV BLD AUTO: 8.8 FL (ref 7–10)
POTASSIUM BLD-SCNC: 4 MMOL/L (ref 3.5–5)
PROT SERPL-MCNC: 6.7 G/DL (ref 6–8)
RBC # BLD AUTO: 4.92 MILL/UL (ref 3.8–5.4)
SODIUM SERPL-SCNC: 136 MMOL/L (ref 136–145)
SP GR UR STRIP: 1.01 (ref 1–1.03)
TRIGL SERPL-MCNC: 43 MG/DL
TSH SERPL DL<=0.005 MIU/L-ACNC: 0.74 UIU/ML (ref 0.3–5)
UROBILINOGEN UR STRIP-ACNC: NORMAL
WBC: 7.5 THOU/UL (ref 4–11)

## 2019-07-24 ASSESSMENT — MIFFLIN-ST. JEOR
SCORE: 1304.72
SCORE: 1295.65

## 2019-07-25 ENCOUNTER — COMMUNICATION - HEALTHEAST (OUTPATIENT)
Dept: INTERNAL MEDICINE | Facility: CLINIC | Age: 79
End: 2019-07-25

## 2019-07-25 LAB — 25(OH)D3 SERPL-MCNC: 44 NG/ML (ref 30–80)

## 2019-07-29 ENCOUNTER — COMMUNICATION - HEALTHEAST (OUTPATIENT)
Dept: INTERNAL MEDICINE | Facility: CLINIC | Age: 79
End: 2019-07-29

## 2019-07-29 DIAGNOSIS — I10 ESSENTIAL HYPERTENSION: ICD-10-CM

## 2019-08-07 ENCOUNTER — OFFICE VISIT - HEALTHEAST (OUTPATIENT)
Dept: PODIATRY | Facility: CLINIC | Age: 79
End: 2019-08-07

## 2019-08-07 DIAGNOSIS — G57.53 TARSAL TUNNEL SYNDROME OF BOTH LOWER EXTREMITIES: ICD-10-CM

## 2019-08-07 DIAGNOSIS — G58.9 COMPRESSION NEUROPATHY: ICD-10-CM

## 2019-08-30 ENCOUNTER — HOSPITAL ENCOUNTER (OUTPATIENT)
Dept: PHYSICAL MEDICINE AND REHAB | Facility: CLINIC | Age: 79
Discharge: HOME | End: 2019-08-30
Attending: PODIATRIST

## 2019-08-30 DIAGNOSIS — G57.53 TARSAL TUNNEL SYNDROME OF BOTH LOWER EXTREMITIES: ICD-10-CM

## 2019-08-30 DIAGNOSIS — G58.9 COMPRESSION NEUROPATHY: ICD-10-CM

## 2019-10-08 ENCOUNTER — OFFICE VISIT - HEALTHEAST (OUTPATIENT)
Dept: PODIATRY | Facility: CLINIC | Age: 79
End: 2019-10-08

## 2019-10-08 DIAGNOSIS — G57.53 TARSAL TUNNEL SYNDROME OF BOTH LOWER EXTREMITIES: ICD-10-CM

## 2019-10-08 DIAGNOSIS — G58.9 COMPRESSION NEUROPATHY: ICD-10-CM

## 2019-10-08 ASSESSMENT — MIFFLIN-ST. JEOR: SCORE: 1286.57

## 2019-10-28 ENCOUNTER — OFFICE VISIT - HEALTHEAST (OUTPATIENT)
Dept: INTERNAL MEDICINE | Facility: CLINIC | Age: 79
End: 2019-10-28

## 2019-10-28 DIAGNOSIS — I10 ESSENTIAL HYPERTENSION: ICD-10-CM

## 2019-10-28 DIAGNOSIS — H26.492 AFTER-CATARACT OF LEFT EYE WITH VISION OBSCURED: ICD-10-CM

## 2019-10-28 DIAGNOSIS — Z01.818 PREOPERATIVE EXAMINATION: ICD-10-CM

## 2019-10-28 ASSESSMENT — MIFFLIN-ST. JEOR: SCORE: 1321.16

## 2019-10-29 ENCOUNTER — COMMUNICATION - HEALTHEAST (OUTPATIENT)
Dept: INTERNAL MEDICINE | Facility: CLINIC | Age: 79
End: 2019-10-29

## 2019-10-29 DIAGNOSIS — I10 ESSENTIAL HYPERTENSION: ICD-10-CM

## 2019-12-09 ENCOUNTER — RECORDS - HEALTHEAST (OUTPATIENT)
Dept: BONE DENSITY | Facility: CLINIC | Age: 79
End: 2019-12-09

## 2019-12-09 ENCOUNTER — RECORDS - HEALTHEAST (OUTPATIENT)
Dept: ADMINISTRATIVE | Facility: OTHER | Age: 79
End: 2019-12-09

## 2019-12-09 DIAGNOSIS — M81.0 AGE-RELATED OSTEOPOROSIS WITHOUT CURRENT PATHOLOGICAL FRACTURE: ICD-10-CM

## 2019-12-10 ENCOUNTER — COMMUNICATION - HEALTHEAST (OUTPATIENT)
Dept: INTERNAL MEDICINE | Facility: CLINIC | Age: 79
End: 2019-12-10

## 2019-12-10 DIAGNOSIS — Z92.29 PERSONAL HISTORY OF OTHER DRUG THERAPY: ICD-10-CM

## 2019-12-10 DIAGNOSIS — M81.0 AGE-RELATED OSTEOPOROSIS WITHOUT CURRENT PATHOLOGICAL FRACTURE: ICD-10-CM

## 2020-01-16 ENCOUNTER — AMBULATORY - HEALTHEAST (OUTPATIENT)
Dept: NURSING | Facility: CLINIC | Age: 80
End: 2020-01-16

## 2020-02-07 ENCOUNTER — COMMUNICATION - HEALTHEAST (OUTPATIENT)
Dept: INTERNAL MEDICINE | Facility: CLINIC | Age: 80
End: 2020-02-07

## 2020-02-07 DIAGNOSIS — F51.01 PRIMARY INSOMNIA: ICD-10-CM

## 2020-05-18 ENCOUNTER — COMMUNICATION - HEALTHEAST (OUTPATIENT)
Dept: INTERNAL MEDICINE | Facility: CLINIC | Age: 80
End: 2020-05-18

## 2020-05-18 DIAGNOSIS — I10 ESSENTIAL HYPERTENSION: ICD-10-CM

## 2020-06-02 ENCOUNTER — COMMUNICATION - HEALTHEAST (OUTPATIENT)
Dept: INTERNAL MEDICINE | Facility: CLINIC | Age: 80
End: 2020-06-02

## 2020-06-02 ENCOUNTER — OFFICE VISIT - HEALTHEAST (OUTPATIENT)
Dept: INTERNAL MEDICINE | Facility: CLINIC | Age: 80
End: 2020-06-02

## 2020-06-02 DIAGNOSIS — M79.671 RIGHT FOOT PAIN: ICD-10-CM

## 2020-06-03 ENCOUNTER — HOSPITAL ENCOUNTER (OUTPATIENT)
Dept: RADIOLOGY | Facility: CLINIC | Age: 80
Discharge: HOME | End: 2020-06-03
Attending: INTERNAL MEDICINE

## 2020-06-03 DIAGNOSIS — M79.671 RIGHT FOOT PAIN: ICD-10-CM

## 2020-06-04 ENCOUNTER — COMMUNICATION - HEALTHEAST (OUTPATIENT)
Dept: INTERNAL MEDICINE | Facility: CLINIC | Age: 80
End: 2020-06-04

## 2020-06-04 DIAGNOSIS — M79.671 RIGHT FOOT PAIN: ICD-10-CM

## 2020-06-11 ENCOUNTER — RECORDS - HEALTHEAST (OUTPATIENT)
Dept: ADMINISTRATIVE | Facility: OTHER | Age: 80
End: 2020-06-11

## 2020-06-11 ENCOUNTER — RECORDS - HEALTHEAST (OUTPATIENT)
Dept: LAB | Facility: HOSPITAL | Age: 80
End: 2020-06-11

## 2020-06-11 LAB
C REACTIVE PROTEIN LHE: 0.3 MG/DL (ref 0–0.8)
ERYTHROCYTE [DISTWIDTH] IN BLOOD BY AUTOMATED COUNT: 13 % (ref 11–14.5)
ERYTHROCYTE [SEDIMENTATION RATE] IN BLOOD BY WESTERGREN METHOD: 3 MM/HR (ref 0–20)
HCT VFR BLD AUTO: 43.5 % (ref 35–47)
HGB BLD-MCNC: 14.4 G/DL (ref 12–16)
MCH RBC QN AUTO: 29.5 PG (ref 27–34)
MCHC RBC AUTO-ENTMCNC: 33.1 G/DL (ref 32–36)
MCV RBC AUTO: 89 FL (ref 80–100)
PLATELET # BLD AUTO: 256 THOU/UL (ref 140–440)
PMV BLD AUTO: 9.8 FL (ref 8.5–12.5)
RBC # BLD AUTO: 4.88 MILL/UL (ref 3.8–5.4)
URATE SERPL-MCNC: 5.7 MG/DL (ref 2–7.5)
WBC: 10.3 THOU/UL (ref 4–11)

## 2020-06-24 ENCOUNTER — RECORDS - HEALTHEAST (OUTPATIENT)
Dept: ADMINISTRATIVE | Facility: OTHER | Age: 80
End: 2020-06-24

## 2020-07-14 ENCOUNTER — AMBULATORY - HEALTHEAST (OUTPATIENT)
Dept: INTERNAL MEDICINE | Facility: CLINIC | Age: 80
End: 2020-07-14

## 2020-07-14 DIAGNOSIS — M81.0 AGE-RELATED OSTEOPOROSIS WITHOUT CURRENT PATHOLOGICAL FRACTURE: ICD-10-CM

## 2020-07-14 DIAGNOSIS — Z92.29 PERSONAL HISTORY OF OTHER DRUG THERAPY: ICD-10-CM

## 2020-07-15 ENCOUNTER — COMMUNICATION - HEALTHEAST (OUTPATIENT)
Dept: INTERNAL MEDICINE | Facility: CLINIC | Age: 80
End: 2020-07-15

## 2020-07-16 ENCOUNTER — OFFICE VISIT - HEALTHEAST (OUTPATIENT)
Dept: INTERNAL MEDICINE | Facility: CLINIC | Age: 80
End: 2020-07-16

## 2020-07-16 DIAGNOSIS — M81.0 AGE-RELATED OSTEOPOROSIS WITHOUT CURRENT PATHOLOGICAL FRACTURE: ICD-10-CM

## 2020-07-22 ENCOUNTER — HOSPITAL ENCOUNTER (OUTPATIENT)
Dept: MAMMOGRAPHY | Facility: CLINIC | Age: 80
Discharge: HOME | End: 2020-07-22
Attending: INTERNAL MEDICINE

## 2020-07-22 DIAGNOSIS — Z12.31 VISIT FOR SCREENING MAMMOGRAM: ICD-10-CM

## 2020-07-28 ENCOUNTER — HOSPITAL ENCOUNTER (OUTPATIENT)
Dept: ULTRASOUND IMAGING | Facility: CLINIC | Age: 80
Discharge: HOME | End: 2020-07-28
Attending: INTERNAL MEDICINE

## 2020-07-28 DIAGNOSIS — N63.0 BREAST MASS: ICD-10-CM

## 2020-07-28 DIAGNOSIS — R92.8 ABNORMAL MAMMOGRAM OF LEFT BREAST: ICD-10-CM

## 2020-09-02 ENCOUNTER — COMMUNICATION - HEALTHEAST (OUTPATIENT)
Dept: INTERNAL MEDICINE | Facility: CLINIC | Age: 80
End: 2020-09-02

## 2020-09-02 DIAGNOSIS — I10 ESSENTIAL HYPERTENSION: ICD-10-CM

## 2020-09-08 RX ORDER — HYDROCHLOROTHIAZIDE 50 MG/1
TABLET ORAL
Qty: 45 TABLET | Refills: 6 | Status: SHIPPED | OUTPATIENT
Start: 2020-09-08 | End: 2021-10-01

## 2020-10-07 ENCOUNTER — COMMUNICATION - HEALTHEAST (OUTPATIENT)
Dept: INTERNAL MEDICINE | Facility: CLINIC | Age: 80
End: 2020-10-07

## 2020-10-07 DIAGNOSIS — I10 ESSENTIAL HYPERTENSION: ICD-10-CM

## 2020-10-09 RX ORDER — LISINOPRIL 20 MG/1
TABLET ORAL
Qty: 90 TABLET | Refills: 3 | Status: SHIPPED | OUTPATIENT
Start: 2020-10-09 | End: 2021-10-05

## 2020-11-10 ENCOUNTER — COMMUNICATION - HEALTHEAST (OUTPATIENT)
Dept: INTERNAL MEDICINE | Facility: CLINIC | Age: 80
End: 2020-11-10

## 2020-11-10 DIAGNOSIS — F51.01 PRIMARY INSOMNIA: ICD-10-CM

## 2021-01-05 ENCOUNTER — COMMUNICATION - HEALTHEAST (OUTPATIENT)
Dept: INTERNAL MEDICINE | Facility: CLINIC | Age: 81
End: 2021-01-05

## 2021-01-18 ENCOUNTER — AMBULATORY - HEALTHEAST (OUTPATIENT)
Dept: NURSING | Facility: CLINIC | Age: 81
End: 2021-01-18

## 2021-05-19 ENCOUNTER — COMMUNICATION - HEALTHEAST (OUTPATIENT)
Dept: INTERNAL MEDICINE | Facility: CLINIC | Age: 81
End: 2021-05-19

## 2021-05-19 DIAGNOSIS — F51.01 PRIMARY INSOMNIA: ICD-10-CM

## 2021-05-25 ENCOUNTER — OFFICE VISIT - HEALTHEAST (OUTPATIENT)
Dept: INTERNAL MEDICINE | Facility: CLINIC | Age: 81
End: 2021-05-25

## 2021-05-25 DIAGNOSIS — M81.0 AGE-RELATED OSTEOPOROSIS WITHOUT CURRENT PATHOLOGICAL FRACTURE: ICD-10-CM

## 2021-05-25 DIAGNOSIS — M79.671 RIGHT FOOT PAIN: ICD-10-CM

## 2021-05-25 DIAGNOSIS — F51.01 PRIMARY INSOMNIA: ICD-10-CM

## 2021-05-25 DIAGNOSIS — G60.9 IDIOPATHIC PERIPHERAL NEUROPATHY: ICD-10-CM

## 2021-05-25 DIAGNOSIS — K58.0 IRRITABLE BOWEL SYNDROME WITH DIARRHEA: ICD-10-CM

## 2021-05-25 LAB
C REACTIVE PROTEIN LHE: 0.2 MG/DL (ref 0–0.8)
ERYTHROCYTE [SEDIMENTATION RATE] IN BLOOD BY WESTERGREN METHOD: 4 MM/HR (ref 0–20)
URATE SERPL-MCNC: 5.5 MG/DL (ref 2–7.5)

## 2021-05-25 RX ORDER — ZOLPIDEM TARTRATE 5 MG/1
5 TABLET ORAL
Qty: 60 TABLET | Refills: 0 | Status: SHIPPED | OUTPATIENT
Start: 2021-05-25 | End: 2021-08-17

## 2021-05-25 ASSESSMENT — MIFFLIN-ST. JEOR: SCORE: 1311.63

## 2021-05-27 ENCOUNTER — COMMUNICATION - HEALTHEAST (OUTPATIENT)
Dept: INTERNAL MEDICINE | Facility: CLINIC | Age: 81
End: 2021-05-27

## 2021-05-27 NOTE — TELEPHONE ENCOUNTER
Controlled Substance Refill Request  Medication:   Requested Prescriptions     Pending Prescriptions Disp Refills     zolpidem (AMBIEN) 5 MG tablet [Pharmacy Med Name: Zolpidem Tartrate Oral Tablet 5 MG] 30 tablet 0     Sig: Take 1 tablet (5 mg total) by mouth at bedtime as needed for sleep     Date Last Fill: 12/19/18  Pharmacy: [facility]  Controlled Substance Agreement on File:   Encounter-Level CSA Scan Date:    There are no encounter-level csa scan date.       Last office visit: Last office visit pertaining to requested medication was 12/19/18

## 2021-05-28 ENCOUNTER — RECORDS - HEALTHEAST (OUTPATIENT)
Dept: ADMINISTRATIVE | Facility: CLINIC | Age: 81
End: 2021-05-28

## 2021-05-28 LAB
GLIADIN IGA SER-ACNC: 1.4 U/ML
GLIADIN IGG SER-ACNC: <0.4 U/ML
IGA SERPL-MCNC: 235 MG/DL (ref 65–400)
TTG IGA SER-ACNC: 0.9 U/ML
TTG IGG SER-ACNC: 0.8 U/ML

## 2021-05-28 NOTE — PROGRESS NOTES
ASSESSMENT AND PLAN:  Sheryl Trotter 78 y.o. female is seen here on 05/14/19 for follow-up of polyarthralgias, background of osteoarthritis, chondrocalcinosis, past history of PMR.  She found corticosteroid injection was quite helpful if I had any joint symptoms.  She also was quite happy as for as the pain controlled with duloxetine goes however she has had significant side effects.  She is inclined to discontinue.  She is prepared to try a smaller dose such as 20 mg on alternate days.  She had questions around some of the lab data and x-ray findings which we discussed at length today.  Should she not be able to tolerate duloxetine, she will then go back to her primary physician for pain management of her osteoarthritis.  Asked her to follow-up here in 3 months.          Diagnoses and all orders for this visit:    Chondrocalcinosis    Osteoarthritis of the Knee  -     DULoxetine (CYMBALTA) 20 MG capsule; Take 1 capsule (20 mg total) by mouth daily.  Dispense: 30 capsule; Refill: 1    Lumbar Spondylosis    Primary osteoarthritis involving multiple joints      HISTORY OF PRESENTING ILLNESS:  Sheryl Trotter, 78 y.o., female is here for worsening pain.  She was seen here recently with this.  She has osteoarthritis.  She has noted very significant improvement with corticosteroid injection in her knee on both sides done in February.  She has had chondrocalcinosis.  She has been having cramping and headaches with the duloxetine while it has helped her joint pains quite a lot.  She is somewhat ambivalent about using this.  In the past she has not had any significant response with Celebrex.  She had a normal sed rate, minimal elevation of C-reactive protein.  In the past she carries a diagnosis of PMR when prednisone was very effective quickly.  She has taken prednisone 10 mg for 30 days.  It produced perhaps no more than 10-20% improvement if that.  Today she noted that the worst of her pains are in the knees.  Not only  these are bothersome on activity but at rest especially at nighttime.  She feels as if there is fluid in them, feels as if these are tight.  She is not sure if there is any swelling.  The next worst areas are at the bases of the thumbs and then the shoulders.  She has stiffness in the morning lasting 20-30 minutes.  She is describing no fever weight loss blurry vision eye redness mouth ulcer nausea cough there is no rash.  She has been taking diclofenac for the symptoms.  She has no personal or family history of psoriasis ulcerative colitis Crohn's disease.  There is no family history in the immediate group of relatives with lupus or rheumatoid arthritis.  Further historical information and ADL limitations as noted in the multidimensional health assessment questionnaire attached in the EMR. ALLERGIES:Duloxetine and Gabapentin    PAST MEDICAL/ACTIVE PROBLEMS/MEDICATION/ FAMILY HISTORY/SOCIAL DATA:  The patient has a family history of  Past Medical History:   Diagnosis Date     Arthritis      AVN (avascular necrosis of bone) (H)     let hip     DDD (degenerative disc disease), lumbar      GERD (gastroesophageal reflux disease)      HLD (hyperlipidemia)      Hypertension      Insomnia      Osteoporosis      PONV (postoperative nausea and vomiting)      Sleep apnea     cpap     Social History     Tobacco Use   Smoking Status Former Smoker     Years: 4.00     Types: Cigarettes   Smokeless Tobacco Never Used   Tobacco Comment    smoking in college-social     Patient Active Problem List   Diagnosis     Osteoporosis     Hyperlipidemia     Essential hypertension     Primary insomnia     Trochanteric Bursitis     Lumbar Spondylosis     Lumbar Disc Degeneration     Headache     Osteoarthritis of the Knee     Polyarthralgia     Status post left hip replacement     Hip dislocation, left (H)     Bilateral shoulder pain     Polymyalgia (H)     Primary osteoarthritis involving multiple joints     Chondrocalcinosis     Current  Outpatient Medications   Medication Sig Dispense Refill     calcium carbonate-vitamin D3 600 mg (1,500 mg)-800 unit Chew Chew 1 tablet daily.       cholecalciferol, vitamin D3, 4,000 unit Tab Take 4,000 Units by mouth daily.        DULoxetine (CYMBALTA) 30 MG capsule TAKE ONE CAPSULE BY MOUTH ONE TIME DAILY  30 capsule 1     hydroCHLOROthiazide (HYDRODIURIL) 50 MG tablet TAKE 1/2 TABLET EVERY DAY (SUBSTITUTED FOR  HYDRODIURIL) 45 tablet 6     lisinopril (PRINIVIL,ZESTRIL) 20 MG tablet TAKE 1 TABLET EVERY DAY 90 tablet 3     METHYLCELLULOSE (CITRUCEL ORAL) Take 1 tablet by mouth daily.       zolpidem (AMBIEN) 5 MG tablet Take 1 tablet (5 mg total) by mouth at bedtime as needed for sleep  30 tablet 0     Current Facility-Administered Medications   Medication Dose Route Frequency Provider Last Rate Last Dose     denosumab 60 mg (PROLIA 60 mg/ml)  60 mg Subcutaneous Q6 Months Denita Cottrell MD   60 mg at 01/16/19 1625     DETAILED EXAMINATION  05/14/19  :  Vitals:    05/14/19 1613   BP: 122/80   Patient Site: Right Arm   Patient Position: Sitting   Cuff Size: Adult Large   Pulse: 92   Weight: 180 lb (81.6 kg)     Alert oriented. Head including the face is examined for malar rash, heliotropes, scarring, lupus pernio. Eyes examined for redness such as in episcleritis/scleritis, periorbital lesions.   Neck/ Face examined for parotid gland swelling, range of motion of neck.  Left upper and lower and right upper and lower extremities examined for tenderness, swelling, warmth of the appendicular joints, range of motion, edema, rash.  Some of the important findings included:she does not have evidence of synovitis in any of the palpable joints of the upper extremities.  No significant deformities of the digits.  No JLT effusion or warmth of the knees.        LAB / IMAGING DATA:  ALT   Date Value Ref Range Status   12/19/2018 10 0 - 45 U/L Final   05/30/2018 17 0 - 45 U/L Final   09/12/2017 19 0 - 45 U/L Final     Albumin    Date Value Ref Range Status   12/19/2018 3.8 3.5 - 5.0 g/dL Final   05/30/2018 4.0 3.5 - 5.0 g/dL Final   09/12/2017 3.7 3.5 - 5.0 g/dL Final     Creatinine   Date Value Ref Range Status   12/19/2018 0.71 0.60 - 1.10 mg/dL Final   05/30/2018 0.71 0.60 - 1.10 mg/dL Final   03/28/2018 0.63 0.60 - 1.10 mg/dL Final       WBC   Date Value Ref Range Status   12/19/2018 9.0 4.0 - 11.0 thou/uL Final   05/30/2018 6.3 4.0 - 11.0 thou/uL Final   05/01/2015 6.5 4.0 - 11.0 thou/uL Final     Hemoglobin   Date Value Ref Range Status   12/19/2018 14.3 12.0 - 16.0 g/dL Final   05/30/2018 13.7 12.0 - 16.0 g/dL Final   03/30/2018 10.5 (L) 12.0 - 16.0 g/dL Final     Platelets   Date Value Ref Range Status   12/19/2018 287 140 - 440 thou/uL Final   05/30/2018 241 140 - 440 thou/uL Final   03/28/2018 304 140 - 440 thou/uL Final       Lab Results   Component Value Date    RF <15.0 10/02/2018    SEDRATE 10 12/19/2018

## 2021-05-29 ENCOUNTER — RECORDS - HEALTHEAST (OUTPATIENT)
Dept: ADMINISTRATIVE | Facility: CLINIC | Age: 81
End: 2021-05-29

## 2021-05-29 NOTE — TELEPHONE ENCOUNTER
Refill Approved    Rx renewed per Medication Renewal Policy. Medication was last renewed on 7/19/2018.       Sonido Scott, [facility] Connection Triage/Med Refill 6/10/2019     Requested Prescriptions   Pending Prescriptions Disp Refills     lisinopril (PRINIVIL,ZESTRIL) 20 MG tablet [Pharmacy Med Name: LISINOPRIL 20 MG Tablet] 90 tablet 3     Sig: TAKE 1 TABLET EVERY DAY       [employer] Inhibitors Refill Protocol Passed - 6/10/2019  1:49 PM        Passed - PCP or prescribing provider visit in past 12 months       Last office visit with prescriber/PCP: 12/19/2018 Luis Enrique Coyle MD OR same dept: 12/19/2018 Luis Enrique Coyle MD OR same specialty: 1/16/2019 Denita Cottrell MD  Last physical: 5/30/2018 Last MTM visit: Visit date not found   Next visit within 3 mo: Visit date not found  Next physical within 3 mo: Visit date not found  Prescriber OR PCP: Luis Enrique Coyle MD  Last diagnosis associated with med order: There are no diagnoses linked to this encounter.  If protocol passes may refill for 12 months if within 3 months of last provider visit (or a total of 15 months).             Passed - Serum Potassium in past 12 months     Lab Results   Component Value Date    Potassium 4.1 12/19/2018             Passed - Blood pressure filed in past 12 months     BP Readings from Last 1 Encounters:   05/14/19 122/80             Passed - Serum Creatinine in past 12 months     Creatinine   Date Value Ref Range Status   12/19/2018 0.71 0.60 - 1.10 mg/dL Final

## 2021-05-30 ENCOUNTER — RECORDS - HEALTHEAST (OUTPATIENT)
Dept: ADMINISTRATIVE | Facility: CLINIC | Age: 81
End: 2021-05-30

## 2021-05-30 VITALS — WEIGHT: 170 LBS | HEIGHT: 66 IN | BODY MASS INDEX: 27.32 KG/M2

## 2021-05-30 VITALS — WEIGHT: 169.3 LBS | BODY MASS INDEX: 27.33 KG/M2

## 2021-05-30 NOTE — PROGRESS NOTES
Dear Dr. Coyle, Luis Enrique LARES MD  [address] [place] [address],    Thank you for the opportunity to participate in the care of Sheryl Trotter.     She is a 79 y.o. y/o female patient who comes to the sleep medicine clinic for follow up.  The patient states that during the winter seasons, she was more prone to have flareups of insomnia.  She is actually sleeping much better nowadays during the summer.  She states that the current pressure setting is comfortable.    Compliance Download data for 30 days:  Pressure setting: APAP 6-10 CWP  Residual AHI: 2 events per hour  Leak: Minimal  Compliance: 23%  Mask Tolerance: Good  Skin irritation: None    Past Medical History:   Diagnosis Date     Arthritis      AVN (avascular necrosis of bone) (H)     let hip     DDD (degenerative disc disease), lumbar      GERD (gastroesophageal reflux disease)      HLD (hyperlipidemia)      Hypertension      Insomnia      Osteoporosis      PONV (postoperative nausea and vomiting)      Sleep apnea     cpap       Past Surgical History:   Procedure Laterality Date     APPENDECTOMY       CHOLECYSTECTOMY       HIP PINNING Left 3/28/2018    Procedure: OPEN REDUCTION OF DISLOCATED LEFT TOTAL HIP;  Surgeon: Daron Rincon MD;  Location: [facility] OR;  Service:      HYSTERECTOMY  Mid 1990's     OOPHORECTOMY  Mid 1990's     UT TOTAL HIP ARTHROPLASTY Left 2/7/2018    Procedure: LEFT TOTAL HIP ARTHROPLASTY;  Surgeon: Daron Rincon MD;  Location: [facility];  Service: Orthopedics     right foot surgery         Social History     Socioeconomic History     Marital status:      Spouse name: Not on file     Number of children: Not on file     Years of education: Not on file     Highest education level: Not on file   Occupational History     Not on file   Social Needs     Financial resource strain: Not on file     Food insecurity:     Worry: Not on file     Inability: Not on file     Transportation needs:      Medical: Not on file     Non-medical: Not on file   Tobacco Use     Smoking status: Former Smoker     Years: 4.00     Types: Cigarettes     Smokeless tobacco: Never Used     Tobacco comment: smoking in college-social   Substance and Sexual Activity     Alcohol use: Yes     Alcohol/week: 2.0 oz     Types: 4 Standard drinks or equivalent per week     Frequency: 2-4 times a month     Comment: occasional glass of wine     Drug use: No     Sexual activity: Not on file   Lifestyle     Physical activity:     Days per week: Not on file     Minutes per session: Not on file     Stress: Not on file   Relationships     Social connections:     Talks on phone: Not on file     Gets together: Not on file     Attends Mandaen service: Not on file     Active member of club or organization: Not on file     Attends meetings of clubs or organizations: Not on file     Relationship status: Not on file     Intimate partner violence:     Fear of current or ex partner: Not on file     Emotionally abused: Not on file     Physically abused: Not on file     Forced sexual activity: Not on file   Other Topics Concern     Not on file   Social History Narrative    . 3 grown children, 2 daughters and 1 son. 7 grandchildren. Did speech path.  Nonsmoker. Socially drinks alcohol.           Current Outpatient Medications   Medication Sig Dispense Refill     calcium carbonate-vitamin D3 600 mg (1,500 mg)-800 unit Chew Chew 1 tablet daily.       cholecalciferol, vitamin D3, 4,000 unit Tab Take 4,000 Units by mouth daily.        hydroCHLOROthiazide (HYDRODIURIL) 50 MG tablet TAKE 1/2 TABLET EVERY DAY (SUBSTITUTED FOR  HYDRODIURIL) 45 tablet 6     lisinopril (PRINIVIL,ZESTRIL) 20 MG tablet TAKE 1 TABLET EVERY DAY 90 tablet 1     METHYLCELLULOSE (CITRUCEL ORAL) Take 1 tablet by mouth daily.       zolpidem (AMBIEN) 5 MG tablet Take 1 tablet (5 mg total) by mouth at bedtime as needed for sleep  30 tablet 0     DULoxetine (CYMBALTA) 20 MG capsule Take 1  "capsule (20 mg total) by mouth daily. 30 capsule 1     Current Facility-Administered Medications   Medication Dose Route Frequency Provider Last Rate Last Dose     denosumab 60 mg (PROLIA 60 mg/ml)  60 mg Subcutaneous Q6 Months Denita Cottrell MD   60 mg at 07/16/19 1601       Allergies   Allergen Reactions     Duloxetine Headache     Nausea, difficult sleeping ankles cramps, loose bowel      Gabapentin Anxiety       Epworths Sleepiness Scale 7/10/2017 7/24/2019   Sitting and reading 1 0   Watching TV 1 0   Sitting, inactive in a public place (e.g. a theatre or a meeting) 0 0   As a passenger in a car for an hour without a break 1 0   Lying down to rest in the afternoon when circumstances permit 3 2   Sitting and talking to someone 0 0   Sitting quietly after a lunch without alcohol 0 0   In a car, while stopped for a few minutes in traffic 0 0   Total score 6 2       Physical Exam:  /74 (Patient Site: Right Arm, Patient Position: Sitting, Cuff Size: Adult Regular)   Pulse 92   Ht 5' 5.75\" (1.67 m)   Wt 179 lb (81.2 kg)   SpO2 99%   BMI 29.11 kg/m    BMI:Body mass index is 29.11 kg/m .   GEN: NAD,   Psych: normal mood, normal affect    Labs/Studies:    I reviewed the efficacy and compliance report from his device. Data summarized on the HPI and the PAP compliance flow sheet.         Assessment and Plan:  In summary Sheryl Trotter is a 79 y.o. year old female who is here for follow-up.    1.  Obstructive sleep apnea  I will keep the patient on same pressure setting since she is comfortable at this time.  2.  Persistent Insomnia  This is resolving.  If her symptoms flares up again, I strongly recommend the patient call us back to schedule for an earlier follow-up visit.  3.  Other sleep disturbance     Patient verbalized understanding of these issues, agrees with the plan and all questions were answered today. Patient was given an opportuntity to voice any other symptoms or concerns not listed above. " Patient did not have any other symptoms or concerns.        Carlo Brown DO  Board Certified in Internal Medicine and Sleep Medicine  [facility].    (Note created with Dragon voice recognition and unintended spelling errors and word substitutions may occur)

## 2021-05-30 NOTE — PROGRESS NOTES
1. Age-related osteoporosis without current pathological fracture       Patient was educated on safety of Prolia utilizing Patient Counseling Chart for Healthcare Providers, as outlined by the Prolia REMS progam.     Return in about 6 months (around 1/16/2020) for Recheck, Prolia injection.    Patient Instructions   Prolia 7th today.  Prolia 8th in 6 months with my nurse. I will see you in 1 year.    DXA in Dec 2019 .   Phone number to schedule [phone].    Daily calcium need is 5348-5223 mg a day from the diet and supplements.  Calcium citrate is easier to digest.  Vitamin D 2000 IU daily recommended.      Chief Complaint   Patient presents with     Osteoporosis Follow Up     Osteo F/U       /71   Pulse 77   Wt 180 lb (81.6 kg)   SpO2 97%   BMI 29.05 kg/m        Did you experience any problems with previous Prolia injection? no  Any medication change in the last 6 months? no  Did you take prednisone or other immunosupressant drugs in the last 6 months   (chemo, transplant, rheum, dermatology conditions)? no  Did you have any serious infection in the last 6 months?no  Any recent hospitalizations?no  Do you plan any dental work in the next 2-3 months?no  How much calcium do you take daily from the diet and supplements?1200 mg  How much vit D do you take daily? 2000 IU  Last DXA? 12/2017, Reviewed and discussed      Patient is here today for the 7th Prolia injection. Patient tolerated previous injections well.   We discussed calcium and vit D daily needs today.   Next Prolia injection will be in 6 months.     15 minutes spent with the patient and more then 50 % of the time in counseling.  This note has been dictated using voice recognition software. Any grammatical or context distortions are unintentional and inherent to the software      Patient Active Problem List   Diagnosis     Osteoporosis     Hyperlipidemia     Essential hypertension     Primary insomnia     Trochanteric Bursitis     Lumbar  Spondylosis     Lumbar Disc Degeneration     Headache     Osteoarthritis of the Knee     Polyarthralgia     Status post left hip replacement     Hip dislocation, left (H)     Bilateral shoulder pain     Polymyalgia (H)     Primary osteoarthritis involving multiple joints     Chondrocalcinosis       Current Outpatient Medications on File Prior to Visit   Medication Sig Dispense Refill     calcium carbonate-vitamin D3 600 mg (1,500 mg)-800 unit Chew Chew 1 tablet daily.       cholecalciferol, vitamin D3, 4,000 unit Tab Take 4,000 Units by mouth daily.        hydroCHLOROthiazide (HYDRODIURIL) 50 MG tablet TAKE 1/2 TABLET EVERY DAY (SUBSTITUTED FOR  HYDRODIURIL) 45 tablet 6     lisinopril (PRINIVIL,ZESTRIL) 20 MG tablet TAKE 1 TABLET EVERY DAY 90 tablet 1     METHYLCELLULOSE (CITRUCEL ORAL) Take 1 tablet by mouth daily.       zolpidem (AMBIEN) 5 MG tablet Take 1 tablet (5 mg total) by mouth at bedtime as needed for sleep  30 tablet 0     DULoxetine (CYMBALTA) 20 MG capsule Take 1 capsule (20 mg total) by mouth daily. 30 capsule 1     Current Facility-Administered Medications on File Prior to Visit   Medication Dose Route Frequency Provider Last Rate Last Dose     denosumab 60 mg (PROLIA 60 mg/ml)  60 mg Subcutaneous Q6 Months Denita Cottrell MD   60 mg at 01/16/19 6019

## 2021-05-30 NOTE — PROGRESS NOTES
Assessment and Plan:     1. Routine general medical examination at a health care facility  Advised  her comprehensive physical exam is normal.    2. Essential hypertension  Controlled.  Continue hydrochlorothiazide and lisinopril.  Check urinalysis and basic metabolic panel.  - Urinalysis-UC if Indicated  - Basic Metabolic Panel    3. Mixed hyperlipidemia  Controlled without need for medication.  Continue low-fat diet..  Last lipids were good, , , triglyceride 52 and total cholesterol 247 on 12/19/2018.  Check TSH, liver function and lipids.  - Lipid Cascade  - Thyroid Stimulating Hormone (TSH)  - Hepatic Profile    4. Polyarthralgia  Has aches and pains due to polyarthralgia.  At one time diagnosed with PMR.  Followed by rheumatology, Dr. Moody.    5. Primary osteoarthritis involving multiple joints  Has aches and pains due to osteoarthritis involving several joints at this time involving her knees for which she underwent cortisone injection to her knees and afforded relief of her pains.    6. Abnormal bruising  Has ecchymosis and bruising on her legs and arm.  Does not take aspirin.  Check the following.  - HM2(CBC w/o Differential)  - APTT(PTT)  - INR    7. Abnormal coagulation profile   Because of her abnormal or easy  bruising I will check her APTT and INR.  - APTT(PTT)  - INR    8. Numbness and tingling of foot  Complains of numbness and tingling of her feet, localized more on the distal third of the feet.  Pinprick or monofilament test showed she does not have any sensation on the distal third of her feet both ventral and dorsal surfaces.  Will refer her to podiatry for her foot symptoms.  - Ambulatory referral to Podiatry    9. Age-related osteoporosis without current pathological fracture  Gets Prolia every 6 months.  Followed by Dr. Cottrell.  Will get a follow-up bone density in 2 years.  Aside from Prolia she takes calcium with vitamin D and extra vitamin D.  Check vitamin D.  - Vitamin  D, Total (25-Hydroxy)     The patient's current medical problems were reviewed.    The following high BMI interventions were performed this visit: encouragement to exercise and weight monitoring for her almost mild obesity, having BMI of 29.1.  The following health maintenance schedule was reviewed with the patient and provided in printed form in the after visit summary:   Health Maintenance   Topic Date Due     TD 18+ HE  11/01/2017     MEDICARE ANNUAL WELLNESS VISIT  05/30/2019     INFLUENZA VACCINE RULE BASED (1) 08/01/2019     ZOSTER VACCINES (3 of 3) 09/10/2019     DXA SCAN  12/07/2019     FALL RISK ASSESSMENT  07/16/2020     ADVANCE CARE PLANNING  05/30/2023     PNEUMOCOCCAL POLYSACCHARIDE VACCINE AGE 65 AND OVER  Completed     PNEUMOCOCCAL CONJUGATE VACCINE FOR ADULTS (PCV13 OR PREVNAR)  Completed        Subjective:   Chief Complaint: Sheryl Trotter is an 79 y.o. female here for an Annual Wellness visit.   HPI: 79-year-old female for her comprehensive physical exam under annual wellness visit.  Complains of recurring aches and pains both from polyarthralgia and osteoarthritis.  Followed by rheumatology.  Got cortisone injection to both knees which afforded relief for her knee pains.  Still gets some aches and pains but these are now much improved than before.  Has osteoporosis.  Now on Prolia every 6 months and takes daily calcium and vitamin D.  Has hypertension controlled by hydrochlorothiazide lisinopril.  Has hyperlipidemia but now controlled without need for medication.  Complains of numbness and tingling in the distal third of her feet.  Does not have diabetes.  Due for eye exam.  Denies vision and hearing problems.  Denies chest pains and shortness of breath.  Denies urinary and bowel symptoms.  Sleeps well.  Overall, doing and feeling well.    Review of Systems: Except for those mentioned in history of present illness, the rest of the review of systems are negative for all systems.    Patient Care  Team:  Luis Enrique Coyle MD as PCP - General  Willa Moody MBBS as Physician (Rheumatology)  Anshul Martinez MD as Physician (Orthopedic Surgery)  Daron Rincon MD as Physician (Orthopedic Surgery)  Babita Rojas MD as Physician (Ophthalmology)  Denita Cottrell MD as Physician (Internal Medicine)     Patient Active Problem List   Diagnosis     Osteoporosis     Hyperlipidemia     Essential hypertension     Primary insomnia     Trochanteric Bursitis     Lumbar Spondylosis     Lumbar Disc Degeneration     Headache     Osteoarthritis of the Knee     Polyarthralgia     Status post left hip replacement     Hip dislocation, left (H)     Bilateral shoulder pain     Polymyalgia (H)     Primary osteoarthritis involving multiple joints     Chondrocalcinosis     Past Medical History:   Diagnosis Date     Arthritis      AVN (avascular necrosis of bone) (H)     let hip     DDD (degenerative disc disease), lumbar      GERD (gastroesophageal reflux disease)      HLD (hyperlipidemia)      Hypertension      Insomnia      Osteoporosis      PONV (postoperative nausea and vomiting)      Sleep apnea     cpap      Past Surgical History:   Procedure Laterality Date     APPENDECTOMY       CHOLECYSTECTOMY       HIP PINNING Left 3/28/2018    Procedure: OPEN REDUCTION OF DISLOCATED LEFT TOTAL HIP;  Surgeon: Daron Rincon MD;  Location: [facility] OR;  Service:      HYSTERECTOMY  Mid 1990's     OOPHORECTOMY  Mid 1990's     RI TOTAL HIP ARTHROPLASTY Left 2/7/2018    Procedure: LEFT TOTAL HIP ARTHROPLASTY;  Surgeon: Daron Rincon MD;  Location: [facility] OR;  Service: Orthopedics     right foot surgery        Family History   Problem Relation Age of Onset     Lymphoma Father 28        Hodgkins     Colon cancer Maternal Grandmother      Vaginal cancer Maternal Aunt      Sleep apnea Son       Social History     Socioeconomic History     Marital status:      Spouse name: Not on file     Number of children: Not on file      Years of education: Not on file     Highest education level: Not on file   Occupational History     Not on file   Social Needs     Financial resource strain: Not on file     Food insecurity:     Worry: Not on file     Inability: Not on file     Transportation needs:     Medical: Not on file     Non-medical: Not on file   Tobacco Use     Smoking status: Former Smoker     Years: 4.00     Types: Cigarettes     Smokeless tobacco: Never Used     Tobacco comment: smoking in college-social   Substance and Sexual Activity     Alcohol use: Yes     Alcohol/week: 2.0 oz     Types: 4 Standard drinks or equivalent per week     Frequency: 2-4 times a month     Comment: occasional glass of wine     Drug use: No     Sexual activity: Not on file   Lifestyle     Physical activity:     Days per week: Not on file     Minutes per session: Not on file     Stress: Not on file   Relationships     Social connections:     Talks on phone: Not on file     Gets together: Not on file     Attends [employer] service: Not on file     Active member of club or organization: Not on file     Attends meetings of clubs or organizations: Not on file     Relationship status: Not on file     Intimate partner violence:     Fear of current or ex partner: Not on file     Emotionally abused: Not on file     Physically abused: Not on file     Forced sexual activity: Not on file   Other Topics Concern     Not on file   Social History Narrative    . 3 grown children, 2 daughters and 1 son. 7 grandchildren. Did speech path.  Nonsmoker. Socially drinks alcohol.        Current Outpatient Medications   Medication Sig Dispense Refill     calcium carbonate-vitamin D3 600 mg (1,500 mg)-800 unit Chew Chew 1 tablet daily.       cholecalciferol, vitamin D3, 4,000 unit Tab Take 4,000 Units by mouth daily.        hydroCHLOROthiazide (HYDRODIURIL) 50 MG tablet TAKE 1/2 TABLET EVERY DAY (SUBSTITUTED FOR  HYDRODIURIL) 45 tablet 6     lisinopril (PRINIVIL,ZESTRIL) 20 MG  "tablet TAKE 1 TABLET EVERY DAY 90 tablet 1     METHYLCELLULOSE (CITRUCEL ORAL) Take 1 tablet by mouth daily.       zolpidem (AMBIEN) 5 MG tablet Take 1 tablet (5 mg total) by mouth at bedtime as needed for sleep  30 tablet 0     DULoxetine (CYMBALTA) 20 MG capsule Take 1 capsule (20 mg total) by mouth daily. 30 capsule 1     Current Facility-Administered Medications   Medication Dose Route Frequency Provider Last Rate Last Dose     denosumab 60 mg (PROLIA 60 mg/ml)  60 mg Subcutaneous Q6 Months Denita Cottrell MD   60 mg at 07/16/19 1601      Objective:   Vital Signs:   Visit Vitals  /84 (Patient Site: Left Arm)   Pulse 72   Ht 5' 5.75\" (1.67 m)   Wt 177 lb (80.3 kg)   SpO2 100%   BMI 28.79 kg/m         VisionScreening:  No exam data present     PHYSICAL EXAM  /80   Pulse 72   Ht 5' 5.75\" (1.67 m)   Wt 177 lb (80.3 kg)   SpO2 100%   BMI 28.79 kg/m      General Appearance:    Alert, cooperative, no distress, appears stated age, pleasant   Head:    Normocephalic, without obvious abnormality, atraumatic   Eyes:    PERRL, conjunctiva/corneas clear, EOM's intact, fundi     benign, both eyes   Ears:    Normal TM's and external ear canals, both ears   Nose:   Nares normal, septum midline, mucosa normal, no drainage     or sinus tenderness   Throat:   Lips, mucosa, and tongue normal; teeth and gums normal   Neck:   Supple, symmetrical, trachea midline, no adenopathy;     thyroid:  no enlargement/tenderness/nodules; no carotid    bruit or JVD   Back:     Symmetric, no curvature, ROM normal, no CVA tenderness   Lungs:     Clear to auscultation bilaterally, respirations unlabored   Chest Wall:    No tenderness or deformity    Heart:    Regular rate and rhythm, S1 and S2 normal, no murmur, rub    or gallop   Breast Exam:    No tenderness, masses, or nipple abnormality, symmetrical   Abdomen:     Soft, non-tender, bowel sounds active all four quadrants,     no masses, no organomegaly   Genitalia:    Deferred "   Rectal:    Not done   Extremities:   Extremities normal, atraumatic, no cyanosis or edema   Pulses:   2+ and symmetric all extremities   Skin:   Skin color, texture, turgor normal, no rashes or lesions   Lymph nodes:   Cervical, supraclavicular, and axillary nodes normal   Neurologic:   CNII-XII intact, normal strength, sensation and reflexes     throughout       Assessment Results 7/24/2019   Activities of Daily Living No help needed   Instrumental Activities of Daily Living No help needed   Get Up and Go Score Less than 12 seconds   Mini Cog Total Score 5   Some recent data might be hidden     A Mini-Cog score of 0-2 suggests the possibility of dementia, score of 3-5 suggests no dementia    Identified Health Risks:     She is at risk for lack of exercise and has been provided with information to increase physical activity for the benefit of her well-being.  Patient's advanced directive was discussed and I am comfortable with the patient's wishes.

## 2021-05-30 NOTE — PATIENT INSTRUCTIONS - HE
Prolia 7th today.  Prolia 8th in 6 months with my nurse. I will see you in 1 year.    DXA in Dec 2019 .   Phone number to schedule [phone].    Daily calcium need is 4020-8773 mg a day from the diet and supplements.  Calcium citrate is easier to digest.  Vitamin D 2000 IU daily recommended.

## 2021-05-31 VITALS — WEIGHT: 176 LBS | BODY MASS INDEX: 28.28 KG/M2 | HEIGHT: 66 IN

## 2021-05-31 VITALS — HEIGHT: 66 IN | WEIGHT: 170.25 LBS | BODY MASS INDEX: 27.36 KG/M2

## 2021-05-31 VITALS — HEIGHT: 66 IN | BODY MASS INDEX: 28.08 KG/M2 | WEIGHT: 174.7 LBS

## 2021-05-31 VITALS — BODY MASS INDEX: 28.22 KG/M2 | WEIGHT: 175.6 LBS | HEIGHT: 66 IN

## 2021-05-31 VITALS — BODY MASS INDEX: 28.4 KG/M2 | HEIGHT: 66 IN | WEIGHT: 176.7 LBS

## 2021-05-31 VITALS — BODY MASS INDEX: 28.06 KG/M2 | WEIGHT: 174.6 LBS | HEIGHT: 66 IN

## 2021-05-31 VITALS — BODY MASS INDEX: 28.19 KG/M2 | WEIGHT: 172 LBS

## 2021-05-31 NOTE — PROGRESS NOTES
FOOT AND ANKLE SURGERY/PODIATRY CONSULT NOTE         ASSESSMENT:   Impression neuropathy  Tarsal tunnel      TREATMENT:  Recommend EMG and nerve conduction studies        HPI: I was asked to see Sheryl Trotter today to evaluate and treat numbness and tingling involving both feet.  The patient indicated she has had the symptoms for several months.  She feels as though the symptoms have progressed.  She cannot stand for prolonged periods of time.  She has symptoms even while resting.  She indicated that her right foot feels as though there is a tight band wrapped around her foot.  There are no factors which relieve her symptoms.  She has tried gabapentin in the past with no relief.  The patient was seen in consultation at the request of Luis Enrique Coyle MD for evaluation and treatment of bilateral foot pain.     Past Medical History:   Diagnosis Date     Arthritis      AVN (avascular necrosis of bone) (H)     let hip     DDD (degenerative disc disease), lumbar      GERD (gastroesophageal reflux disease)      HLD (hyperlipidemia)      Hypertension      Insomnia      Osteoporosis      PONV (postoperative nausea and vomiting)      Sleep apnea     cpap       Past Surgical History:   Procedure Laterality Date     APPENDECTOMY       CHOLECYSTECTOMY       FOOT FUSION Right 2017     HIP PINNING Left 3/28/2018    Procedure: OPEN REDUCTION OF DISLOCATED LEFT TOTAL HIP;  Surgeon: Daron Rincon MD;  Location: [facility];  Service:      HYSTERECTOMY  Mid 1990's     OOPHORECTOMY  Mid 1990's     MA TOTAL HIP ARTHROPLASTY Left 2/7/2018    Procedure: LEFT TOTAL HIP ARTHROPLASTY;  Surgeon: Daron Rincon MD;  Location: [facility];  Service: Orthopedics       Allergies   Allergen Reactions     Duloxetine Headache     Nausea, difficult sleeping ankles cramps, loose bowel      Gabapentin Anxiety         Current Outpatient Medications:      calcium carbonate-vitamin D3 600 mg (1,500 mg)-800 unit Chew, Chew 1 tablet  daily., Disp: , Rfl:      cholecalciferol, vitamin D3, 4,000 unit Tab, Take 4,000 Units by mouth daily. , Disp: , Rfl:      hydroCHLOROthiazide (HYDRODIURIL) 50 MG tablet, TAKE 1/2 TABLET EVERY DAY (SUBSTITUTED FOR  HYDRODIURIL), Disp: 45 tablet, Rfl: 3     lisinopril (PRINIVIL,ZESTRIL) 20 MG tablet, TAKE 1 TABLET EVERY DAY, Disp: 90 tablet, Rfl: 1     METHYLCELLULOSE (CITRUCEL ORAL), Take 1 tablet by mouth daily., Disp: , Rfl:      zolpidem (AMBIEN) 5 MG tablet, Take 1 tablet (5 mg total) by mouth at bedtime as needed for sleep , Disp: 30 tablet, Rfl: 0     DULoxetine (CYMBALTA) 20 MG capsule, Take 1 capsule (20 mg total) by mouth daily., Disp: 30 capsule, Rfl: 1    Current Facility-Administered Medications:      denosumab 60 mg (PROLIA 60 mg/ml), 60 mg, Subcutaneous, Q6 Months, Denita Cottrell MD, 60 mg at 07/16/19 1601    Family History   Problem Relation Age of Onset     Lymphoma Father 28        Hodgkins     Colon cancer Maternal Grandmother      Vaginal cancer Maternal Aunt      Sleep apnea Son        Social History     Socioeconomic History     Marital status:      Spouse name: Not on file     Number of children: Not on file     Years of education: Not on file     Highest education level: Not on file   Occupational History     Not on file   Social Needs     Financial resource strain: Not on file     Food insecurity:     Worry: Not on file     Inability: Not on file     Transportation needs:     Medical: Not on file     Non-medical: Not on file   Tobacco Use     Smoking status: Former Smoker     Years: 4.00     Types: Cigarettes     Smokeless tobacco: Never Used     Tobacco comment: smoking in college-social   Substance and Sexual Activity     Alcohol use: Yes     Alcohol/week: 2.0 oz     Types: 4 Standard drinks or equivalent per week     Frequency: 2-4 times a month     Comment: occasional glass of wine     Drug use: No     Sexual activity: Not on file   Lifestyle     Physical activity:     Days per  week: Not on file     Minutes per session: Not on file     Stress: Not on file   Relationships     Social connections:     Talks on phone: Not on file     Gets together: Not on file     Attends [employer] service: Not on file     Active member of club or organization: Not on file     Attends meetings of clubs or organizations: Not on file     Relationship status: Not on file     Intimate partner violence:     Fear of current or ex partner: Not on file     Emotionally abused: Not on file     Physically abused: Not on file     Forced sexual activity: Not on file   Other Topics Concern     Not on file   Social History Narrative    . 3 grown children, 2 daughters and 1 son. 7 grandchildren. Did speech path.  Nonsmoker. Socially drinks alcohol.         Review of Systems - Patient denies fever, chills, rash, wound, stiffness, limping, numbness, weakness, heart burn, blood in stool, chest pain with activity, calf pain when walking, shortness of breath with activity, chronic cough, easy bleeding/bruising, swelling of ankles, excessive thirst, fatigue, depression, anxiety.  Patient admits to tingling both feet.      OBJECTIVE:  Appearance: alert, well appearing, and in no distress.    Vitals:    08/07/19 1048   BP: 131/82   Pulse: 81   Resp: 16   Temp: 97.7  F (36.5  C)       BMI= There is no height or weight on file to calculate BMI.    General appearance: Patient is alert and fully cooperative with history & exam.  No sign of distress is noted during the visit.  Psychiatric: Affect is pleasant & appropriate.  Patient appears motivated to improve health.  Respiratory: Breathing is regular & unlabored while sitting.  HEENT: Hearing is intact to spoken word.  Speech is clear.  No gross evidence of visual impairment that would impact ambulation.    Vascular: Dorsalis pedis and posterior tibial pulses are palpable. There is no pedal hair growth bilaterally.  CFT < 3 sec from anterior tibial surface to distal digits  bilaterally. There is no appreciable edema noted.  Dermatologic: Turgor and texture are within normal limits. No coloration or temperature changes. No primary or secondary lesions noted.  Neurologic: All epicritic and proprioceptive sensations are   Diminished bilaterally.  Negative Tinel sign when percussing the tarsal tunnel bilaterally  Musculoskeletal: All active and passive ankle, subtalar, midtarsal, and 1st MPJ range of motion are grossly intact without pain or crepitus, with the exception of none. Manual muscle strength is within normal limits bilaterally. All dorsiflexors, plantarflexors, invertors, evertors are intact bilaterally.  Mild tenderness present to bilateral feet on palpation.  Mild tenderness to right foot with range of motion. Calf is soft/non-tender without warmth/induration    Imaging:     No results found.       TREATMENT:  The patient has been referred to neurology for EMGs and nerve conduction studies.  I have asked the patient to return to the clinic in 1 week to discuss the findings of her EMG.    Vinayak Berman; JETHRO  [facility] Surgery/Podiatry

## 2021-05-31 NOTE — PROGRESS NOTES
Patient presents at the request of Dr. Berman for bilateral lower extremity EMG.  She has numbness and tingling in the toes up to the ankles of both feet worsening over the past year.  Her feet feel cold.  Right equal to left in severity    EMG/NCS  results: Please see scanned full report.    Comment NCS: Essentially normal study  1.  Borderline bilateral lower extremity SNAPs amplitudes.  Normal for patient's age.  2.  Normal bilateral lower extremity CMAPs.  3.  Normal right radial SNAP and ulnar CMAP.    Comment EMG: Normal study  1.  Normal needle EMG bilateral lower extremities.    Interpretation: Essentially normal study: There is electrodiagnostic evidence of:    1.  Borderline bilateral sensory amplitudes.  This is normal for the patient's age is very likely normal finding.  I cannot, however, completely exclude a very mild sensory or sensorimotor peripheral polyneuropathy.      2 There is no electrodiagnostic evidence of lumbosacral radiculopathy, lumbosacral plexopathy, or focal neuropathy in the bilateral lower extremities.  Specifically, there is no electrodiagnostic evidence of tibial neuropathy at the tarsal tunnel in the bilateral lower extremities.    Note: Electrodiagnostic testing is not diagnostic for small fiber polyneuropathy.  Common causes a small fiber peripheral neuropathy include diabetes, toxins (especially alcohol), drug/medications, hypertriglyceridemia, hereditary, and idiopathic.  If clinically indicated or there are progression of symptoms, further workup can be considered including evaluation of common causes of axonal neuropathy such as B12 deficiency, thyroid disease, and diabetes.   Repeat EMG can be considered if symptoms persist or worsen over the next 6-12 months.    The testing was completed in its entirety by the physician.      It was our pleasure caring for your patient today, if there any questions or concerns please do not hesitate to contact us.

## 2021-05-31 NOTE — PATIENT INSTRUCTIONS - HE
Thank you for choosing the [facility] for your EMG testing.    The ordering provider will receive your final EMG results within the next few days.  Please follow up with your provider for the results and further treatment recommendations.

## 2021-06-01 VITALS — WEIGHT: 172.9 LBS | HEIGHT: 65 IN | BODY MASS INDEX: 28.81 KG/M2

## 2021-06-01 VITALS — BODY MASS INDEX: 27.85 KG/M2 | HEIGHT: 66 IN | WEIGHT: 173.31 LBS

## 2021-06-01 VITALS — HEIGHT: 66 IN | BODY MASS INDEX: 28.45 KG/M2 | WEIGHT: 177 LBS

## 2021-06-01 VITALS — BODY MASS INDEX: 27.97 KG/M2 | WEIGHT: 174 LBS | HEIGHT: 66 IN

## 2021-06-02 VITALS — WEIGHT: 180 LBS | BODY MASS INDEX: 29.05 KG/M2

## 2021-06-02 VITALS — WEIGHT: 183.3 LBS | BODY MASS INDEX: 29.59 KG/M2

## 2021-06-02 VITALS — BODY MASS INDEX: 29.09 KG/M2 | HEIGHT: 66 IN | WEIGHT: 181 LBS

## 2021-06-02 VITALS — BODY MASS INDEX: 29.05 KG/M2 | WEIGHT: 180 LBS

## 2021-06-02 VITALS — BODY MASS INDEX: 29.41 KG/M2 | WEIGHT: 183 LBS | HEIGHT: 66 IN

## 2021-06-02 VITALS — WEIGHT: 182.5 LBS | BODY MASS INDEX: 29.46 KG/M2

## 2021-06-02 VITALS — BODY MASS INDEX: 29.41 KG/M2 | HEIGHT: 66 IN | WEIGHT: 183 LBS

## 2021-06-02 VITALS — BODY MASS INDEX: 29.21 KG/M2 | WEIGHT: 181 LBS

## 2021-06-02 NOTE — PROGRESS NOTES
Subjective findings: The patient return to the clinic today to discuss the findings of her EMG.  I informed the patient that her EMGs indicated that she essentially had a normal study but there was some electrodiagnostic evidence of borderline bilateral sensory amplitudes.  The indicated that this could be normal for the patient's age.  Sensory neuropathy could not be excluded however.  I informed the patient that when I correlate her EMGs with her clinical findings I believe she would benefit from a nerve decompression with external neural lysis of the common peroneal nerve bilaterally as well as a tarsal tunnel release bilaterally.  I informed the patient that these procedures are performed on an outpatient basis under general anesthesia.  The patient stated she would contemplate having these procedures performed at a later date.  If she decides to move forward with the surgical treatment plan she will contact the clinic at the appropriate time.

## 2021-06-02 NOTE — TELEPHONE ENCOUNTER
Refill Approved    Rx renewed per Medication Renewal Policy. Medication was last renewed on 6/10/19  OV 10/28/19.    Jeannine Alvarez, [facility] Connection Triage/Med Refill 10/29/2019     Requested Prescriptions   Pending Prescriptions Disp Refills     lisinopril (PRINIVIL,ZESTRIL) 20 MG tablet [Pharmacy Med Name: LISINOPRIL 20 MG Tablet] 90 tablet 1     Sig: TAKE 1 TABLET EVERY DAY       [employer] Refill Protocol Passed - 10/29/2019  1:45 PM        Passed - PCP or prescribing provider visit in past 12 months       Last office visit with prescriber/PCP: 12/19/2018 Luis Enrique Coyle MD OR same dept: 12/19/2018 Luis Enrique Coyle MD OR same specialty: 7/16/2019 Denita Cottrell MD  Last physical: 10/28/2019 Last MTM visit: Visit date not found   Next visit within 3 mo: Visit date not found  Next physical within 3 mo: Visit date not found  Prescriber OR PCP: Luis Enrique Coyle MD  Last diagnosis associated with med order: 1. Essential hypertension  - lisinopril (PRINIVIL,ZESTRIL) 20 MG tablet [Pharmacy Med Name: LISINOPRIL 20 MG Tablet]; TAKE 1 TABLET EVERY DAY  Dispense: 90 tablet; Refill: 1    If protocol passes may refill for 12 months if within 3 months of last provider visit (or a total of 15 months).             Passed - Serum Potassium in past 12 months     Lab Results   Component Value Date    Potassium 4.0 07/24/2019             Passed - Blood pressure filed in past 12 months     BP Readings from Last 1 Encounters:   10/28/19 114/70             Passed - Serum Creatinine in past 12 months     Creatinine   Date Value Ref Range Status   07/24/2019 0.70 0.60 - 1.10 mg/dL Final

## 2021-06-02 NOTE — PROGRESS NOTES
Preoperative Exam    Scheduled Procedure: Yag Capsulotomy- L eye   Surgery Date:  11/11/19  Surgery Location: [facility], [place], fax [phone]    Surgeon:  Dr. Milan    Assessment/Plan:     1. Preoperative examination  Okay to go ahead with.  Her YAG capsulotomy of the left eye no contraindications.    2. 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  Describes when she reads or sees something there is some kind of film blacking her feel vision.  Was seen by her  ophthalmologist, Dr. Milan and was found she had cloudy left lens due to her previous cataract surgery more than 20 years ago.  Was advised to have YAG capsulotomy procedure.    3. Essential hypertension  Controlled.  Advised to take her lisinopril and hydrochlorothiazide with sips of water on the morning of her procedure.    Surgical Procedure Risk: Low (reported cardiac risk generally < 1%)  Have you had prior anesthesia?: Yes  Have you or any family members had a previous anesthesia reaction:  No  Do you or any family members have a history of a clotting or bleeding disorder?: No  Cardiac Risk Assessment: no increased risk for major cardiac complications    APPROVAL GIVEN to proceed with proposed procedure, without further diagnostic evaluation    Functional Status: Independent  Patient plans to recover at home with family.     Subjective:      Sheryl Trotter is a 79 y.o. female who presents for a preoperative consultation.      79-year-old female here for preoperative history and physical requested by her ophthalmologist, Dr. Milan prior to a YAG capsulotomy because of after cataract surgery blurred vision.  Has normal right eye vision.  Does not have chest pain and shortness of breath.  Does not have urinary and bowel symptoms.  Does not have dizziness and lightheadedness.  Doing well  All other systems reviewed and are negative, other  than those listed in the HPI.    Pertinent History  Do you have difficulty breathing or chest pain after walking up a flight of stairs: No  History of obstructive sleep apnea: No  Steroid use in the last 6 months: No  Frequent Aspirin/NSAID use: No  Prior Blood Transfusion: No  Prior Blood Transfusion Reaction: No  If for some reason prior to, during or after the procedure, if it is medically indicated, would you be willing to have a blood transfusion?:  There is no transfusion refusal.    Current Outpatient Medications   Medication Sig Dispense Refill     calcium carbonate-vitamin D3 600 mg (1,500 mg)-800 unit Chew Chew 1 tablet daily.       hydroCHLOROthiazide (HYDRODIURIL) 50 MG tablet TAKE 1/2 TABLET EVERY DAY (SUBSTITUTED FOR  HYDRODIURIL) 45 tablet 3     lisinopril (PRINIVIL,ZESTRIL) 20 MG tablet TAKE 1 TABLET EVERY DAY 90 tablet 1     METHYLCELLULOSE (CITRUCEL ORAL) Take 1 tablet by mouth daily.       zolpidem (AMBIEN) 5 MG tablet Take 1 tablet (5 mg total) by mouth at bedtime as needed for sleep  30 tablet 0     DULoxetine (CYMBALTA) 20 MG capsule Take 1 capsule (20 mg total) by mouth daily. 30 capsule 1     Current Facility-Administered Medications   Medication Dose Route Frequency Provider Last Rate Last Dose     denosumab 60 mg (PROLIA 60 mg/ml)  60 mg Subcutaneous Q6 Months Denita Cottrell MD   60 mg at 07/16/19 1601        Allergies   Allergen Reactions     Duloxetine Headache     Nausea, difficult sleeping ankles cramps, loose bowel      Gabapentin Anxiety       Patient Active Problem List   Diagnosis     Osteoporosis     Hyperlipidemia     Essential hypertension     Primary insomnia     Trochanteric Bursitis     Lumbar Spondylosis     Lumbar Disc Degeneration     Headache     Osteoarthritis of the Knee     Polyarthralgia     Status post left hip replacement     Hip dislocation, left (H)     Bilateral shoulder pain     Polymyalgia (H)     Primary osteoarthritis involving multiple joints      Chondrocalcinosis       Past Medical History:   Diagnosis Date     Arthritis      AVN (avascular necrosis of bone) (H)     let hip     DDD (degenerative disc disease), lumbar      GERD (gastroesophageal reflux disease)      HLD (hyperlipidemia)      Hypertension      Insomnia      Osteoporosis      PONV (postoperative nausea and vomiting)      Sleep apnea     cpap       Past Surgical History:   Procedure Laterality Date     APPENDECTOMY       CHOLECYSTECTOMY       FOOT FUSION Right 2017     HIP PINNING Left 3/28/2018    Procedure: OPEN REDUCTION OF DISLOCATED LEFT TOTAL HIP;  Surgeon: Daron Rincon MD;  Location: [facility];  Service:      HYSTERECTOMY  Mid 1990's     OOPHORECTOMY  Mid 1990's     MA TOTAL HIP ARTHROPLASTY Left 2/7/2018    Procedure: LEFT TOTAL HIP ARTHROPLASTY;  Surgeon: Daron Rincon MD;  Location: [facility];  Service: Orthopedics       Social History     Socioeconomic History     Marital status:      Spouse name: Not on file     Number of children: Not on file     Years of education: Not on file     Highest education level: Not on file   Occupational History     Not on file   Social Needs     Financial resource strain: Not on file     Food insecurity:     Worry: Not on file     Inability: Not on file     Transportation needs:     Medical: Not on file     Non-medical: Not on file   Tobacco Use     Smoking status: Former Smoker     Years: 4.00     Types: Cigarettes     Smokeless tobacco: Never Used     Tobacco comment: smoking in college-social   Substance and Sexual Activity     Alcohol use: Yes     Alcohol/week: 3.3 standard drinks     Types: 4 Standard drinks or equivalent per week     Frequency: 2-4 times a month     Comment: occasional glass of wine     Drug use: No     Sexual activity: Not on file   Lifestyle     Physical activity:     Days per week: Not on file     Minutes per session: Not on file     Stress: Not on file   Relationships     Social connections:      "Talks on phone: Not on file     Gets together: Not on file     Attends Congregational service: Not on file     Active member of club or organization: Not on file     Attends meetings of clubs or organizations: Not on file     Relationship status: Not on file     Intimate partner violence:     Fear of current or ex partner: Not on file     Emotionally abused: Not on file     Physically abused: Not on file     Forced sexual activity: Not on file   Other Topics Concern     Not on file   Social History Narrative    . 3 grown children, 2 daughters and 1 son. 7 grandchildren. Did speech path.  Nonsmoker. Socially drinks alcohol.         Patient Care Team:  Luis Enrique Coyle MD as PCP - Willa Erazo MBBS as Physician (Rheumatology)  Anshul Martinez MD as Physician (Orthopedic Surgery)  Daron Rincon MD as Physician (Orthopedic Surgery)  Babita Rojas MD as Physician (Ophthalmology)  Denita Cottrell MD as Physician (Internal Medicine)  Luis Enrique Coyle MD as Assigned PCP          Objective:     Vitals:    10/28/19 1344   BP: 114/70   Pulse: 76   SpO2: 97%   Weight: 180 lb (81.6 kg)   Height: 5' 6.5\" (1.689 m)         Physical Exam:      General Appearance:    Alert, cooperative, no distress, appears stated age, pleasant   Head:    Normocephalic, without obvious abnormality, atraumatic   Eyes:    PERRL, conjunctiva/corneas clear, EOM's intact, decreased    left eye vision compared to right eye   Ears:    Normal TM's and external ear canals, both ears   Nose:   Nares normal, septum midline, mucosa normal, no drainage     or sinus tenderness   Throat:   Lips, mucosa, and tongue normal; teeth and gums normal   Neck:   Supple, symmetrical, trachea midline, no adenopathy;     thyroid:  no enlargement/tenderness/nodules; no carotid    bruit or JVD   Back:     Symmetric, no curvature, ROM normal, no CVA tenderness   Lungs:     Clear to auscultation bilaterally, respirations unlabored   Chest Wall:    No tenderness " or deformity    Heart:    Regular rate and rhythm, S1 and S2 normal, no murmur, rub    or gallop   Abdomen:     Soft, non-tender, bowel sounds active all four quadrants,     no masses, no organomegaly   Extremities:   Extremities normal, atraumatic, no cyanosis or edema   Pulses:   2+ and symmetric all extremities   Skin:   Skin color, texture, turgor normal, no rashes or lesions   Lymph nodes:   Cervical, supraclavicular, and axillary nodes normal   Neurologic:   CNII-XII intact, normal strength, sensation and reflexes     throughout       There are no Patient Instructions on file for this visit.    EKG: Not indicated.    Labs:  No labs were ordered during this visit    Immunization History   Administered Date(s) Administered     DT (pediatric) 02/01/1997     Hep A, historic 11/01/2007, 12/04/2008     Hep B, historic 11/01/2007, 01/10/2008, 06/17/2008     IPV 11/29/2007     Influenza [id number], 01/18/2010     Influenza high dose,seasonal,PF, 65+ yrs 11/21/2016, 10/16/2017, 10/09/2018     Influenza, inj, historic,unspecified 11/01/2007, 11/19/2008     Influenza, seasonal,quad inj 6-35 mos 10/17/2013     Pneumo Conj 13-V (2010&after) 10/13/2015     Pneumo Polysac 23-V 10/03/2007     Td,adult,historic,unspecified 11/01/2007     Tdap 11/01/2007     Typhoid, Inj, Inactive 11/29/2007     ZOSTER, LIVE 11/19/2008           Electronically signed by Luis Enrique Coyle MD 10/28/19 1:46 PM

## 2021-06-03 VITALS
HEIGHT: 66 IN | BODY MASS INDEX: 28.12 KG/M2 | RESPIRATION RATE: 20 BRPM | TEMPERATURE: 97.7 F | WEIGHT: 175 LBS | SYSTOLIC BLOOD PRESSURE: 120 MMHG | DIASTOLIC BLOOD PRESSURE: 80 MMHG | HEART RATE: 76 BPM

## 2021-06-03 VITALS
DIASTOLIC BLOOD PRESSURE: 70 MMHG | SYSTOLIC BLOOD PRESSURE: 114 MMHG | HEIGHT: 67 IN | OXYGEN SATURATION: 97 % | WEIGHT: 180 LBS | BODY MASS INDEX: 28.25 KG/M2 | HEART RATE: 76 BPM

## 2021-06-03 VITALS — BODY MASS INDEX: 29.05 KG/M2 | WEIGHT: 180 LBS

## 2021-06-03 VITALS — HEIGHT: 66 IN | BODY MASS INDEX: 28.45 KG/M2 | WEIGHT: 177 LBS

## 2021-06-03 VITALS — HEIGHT: 66 IN | WEIGHT: 179 LBS | BODY MASS INDEX: 28.77 KG/M2

## 2021-06-05 NOTE — PROGRESS NOTES
"The following steps were completed to comply with the REMS program for Prolia:  1. Ordering provider has previously reviewed information in the Medication Guide and Patient Counseling Chart, including the serious risks of Prolia  and the symptoms of each risk and have been advised to seek prompt medical attention if they have signs or symptoms of any of the serious risks.  2. Provided each patient a copy of the Medication Guide and Patient Brochure.  See MAR for administration details.   Indication: Prolia  (denosumab) is a prescription medicine used to treat osteoporosis in patients who:   Are at high risk for fracture, meaning patients who have had a fracture related to osteoporosis, or who have multiple risk factors for fracture; Cannot use another osteoporosis medicine or other osteoporosis medicines did not work well.   The timeline for early/late injections would be 4 weeks early and any time after the 6 month francine. If a patient receives their injection late, then the subsequent injection would be 6 months from the date that they actually received the injection    Have the screening questions been asked prior to this administration? Yes   Prolia Injection Phone Screen      Screening questions have been asked 2-3 days prior to administration visit for Prolia. If any questions are answered with \"Yes,\" this phone encounter were will routed to ordering provider for further evaluation.     1.  When was the last injection?  7/16/19    2.  Has insurance for this injection been verified?  Yes    3.  Did you experience any new onset achiness or rashes that lasted for over a month with your previous Prolia injection?   No    4.  Do you have a fever over 101?F or a new deep cough that is unusual for you today? No    5.  Have you started any new medications in the last 6 months that you were told could affect your immune system? These may have been prescribed by oncologist, transplant, rheumatology, or dermatology. "   No    6.  In the last 6 months have you have gastric bypass or parathyroid surgery?   No    7.  Do you plan dental work requiring drilling into the bone such as implants/extractions or oral surgery in the next 2-3 months?   No    8. Do you have new insurance since the last injection? No    Patient informed if symptoms discussed above present prior to their administration appointment, they are to notify clinic immediately.     Lu Carrillo

## 2021-06-05 NOTE — TELEPHONE ENCOUNTER
Controlled Substance Refill Request  Medication Name:   Requested Prescriptions     Pending Prescriptions Disp Refills     zolpidem (AMBIEN) 5 MG tablet 30 tablet 0     Sig: Take 1 tablet (5 mg total) by mouth at bedtime as needed for sleep.   Date Last Fill: 04/18/19  [facility]ed [facility]: [id number]  Submit electronically to pharmacy  Controlled Substance Agreement on file:   Encounter-Level CSA Scan Date:    There are no encounter-level csa scan date.        Last office visit:  Unknown

## 2021-06-08 NOTE — TELEPHONE ENCOUNTER
Spoke with the patient and relayed message below from Dr. Coyle.  Patient verbalized understanding and has scheduled a video visit with Dr. Coyle for this afternoon to discuss.  She had no further questions at this time.  Beba BASHIR CMA/CMT....................12:53 PM

## 2021-06-08 NOTE — PROGRESS NOTES
"Sheryl Trotter is a 79 y.o. female who is being evaluated via a billable video visit.      The patient has been notified of following:     \"This video visit will be conducted via a call between you and your physician/provider. We have found that certain health care needs can be provided without the need for an in-person physical exam.  This service lets us provide the care you need with a video conversation.  If a prescription is necessary we can send it directly to your pharmacy.  If lab work is needed we can place an order for that and you can then stop by our lab to have the test done at a later time.    Video visits are billed at different rates depending on your insurance coverage. Please reach out to your insurance provider with any questions.    If during the course of the call the physician/provider feels a video visit is not appropriate, you will not be charged for this service.\"    Patient has given verbal consent to a Video visit? Yes    Patient would like to receive their AVS by AVS Preference: Ian.    Patient would like the video invitation sent by: Text to cell phone: [phone]    Will anyone else be joining your video visit? No        Video Start Time: 2:04 PM    Additional provider notes: Video visit with Chrissy because of right foot pains.  Started more than a month ago.  No injury or trauma.  Pains are localized on the distal third of the right foot plantar surface.  Apparently her neighbor who is a physical therapist did some therapy on her right foot which helped.  But  her pains still continue.  Today she noticed her right foot is swollen and tender to touch.  Reports she is able to wiggle or move her toes but with some pains.  Overall, she is doing and feeling well.    1. Right foot pain  Suspect possible gout as the cause of her right foot pains.  But will do x-ray, uric acid sed rate and CRP.  After which I  will refer her to orthopedics if this is not gout.  - XR Foot Right 3 or More " VWS; Future  - Uric Acid; Future  - Erythrocyte Sedimentation Rate; Future  - C-Reactive Protein(CRP); Future      Video-Visit Details    Type of service:  Video Visit    Video End Time (time video stopped): 2:16 PM  Originating Location (pt. Location): Home    Distant Location (provider location):  [facility] INTER[facility] MEDIC[facility]     Platform used for Video Visit: Karine Coyle MD

## 2021-06-08 NOTE — TELEPHONE ENCOUNTER
Called patient and advised Xray of foot was negative, and order was placed for orthopedics.     Marianna Alvarado LPN

## 2021-06-08 NOTE — TELEPHONE ENCOUNTER
Refill Approved    Rx renewed per Medication Renewal Policy. Medication was last renewed on 7/29/19.    Sheryl Vigil, [facility] Connection Triage/Med Refill 5/20/2020     Requested Prescriptions   Pending Prescriptions Disp Refills     hydroCHLOROthiazide (HYDRODIURIL) 50 MG tablet [Pharmacy Med Name: HYDROCHLOROTHIAZIDE 50 MG Tablet] 45 tablet 3     Sig: TAKE 1/2 TABLET EVERY DAY (SUBSTITUTED FOR  HYDRODIURIL)       Diuretics/Combination Diuretics Refill Protocol  Passed - 5/18/2020  2:29 PM        Passed - Visit with PCP or prescribing provider visit in past 12 months     Last office visit with prescriber/PCP: 12/19/2018 Luis Enrique Coyle MD OR same dept: Visit date not found OR same specialty: 7/16/2019 Denita Cottrell MD  Last physical: 10/28/2019 Last MTM visit: Visit date not found   Next visit within 3 mo: Visit date not found  Next physical within 3 mo: Visit date not found  Prescriber OR PCP: Luis Enrique Coyle MD  Last diagnosis associated with med order: 1. Essential hypertension  - hydroCHLOROthiazide (HYDRODIURIL) 50 MG tablet [Pharmacy Med Name: HYDROCHLOROTHIAZIDE 50 MG Tablet]; TAKE 1/2 TABLET EVERY DAY (SUBSTITUTED FOR  HYDRODIURIL)  Dispense: 45 tablet; Refill: 3    If protocol passes may refill for 12 months if within 3 months of last provider visit (or a total of 15 months).             Passed - Serum Potassium in past 12 months      Lab Results   Component Value Date    Potassium 4.0 07/24/2019             Passed - Serum Sodium in past 12 months      Lab Results   Component Value Date    Sodium 136 07/24/2019             Passed - Blood pressure on file in past 12 months     BP Readings from Last 1 Encounters:   10/28/19 114/70             Passed - Serum Creatinine in past 12 months      Creatinine   Date Value Ref Range Status   07/24/2019 0.70 0.60 - 1.10 mg/dL Final

## 2021-06-08 NOTE — TELEPHONE ENCOUNTER
"Referral Request  Type of referral: [facility] [place]   Who s requesting: Patient  Why the request: The patient states she injured her right foot while walking. The patient is a poor historian when this writer attempted to obtain information. The patient states she had a fusion on the great right toe. The patient states her foot is \"frozen\". This writer offered to transfer to scheduling but the patient declined. The patient states has a physical therapist for a neighbor who has been \" performing physical therapy \" on the foot. This writer  attempted to obtain what modalities and treatment have been obtained from the neighbor. The patient states she wants to go to a physical therapist instead of continuing to ask the neighbor for help.  Have you been seen for this request: No:  Appointment Offered:  declined  Does patient have a preference on a group/provider? [facility]  Okay to leave a detailed message?  Yes    "

## 2021-06-09 NOTE — PROGRESS NOTES
[facility] Follow Up Note    Sheryl Trotter   76 y.o. female    Date of Visit: 2/20/2017    Chief Complaint   Patient presents with     Cough     chills, body aches, chest tightness, headache, SOB, green phlegm     Subjective  Sheryl is a patient of Dr. Luis Enrique Coyle's nonsmoker with history of hypertension but otherwise well and did have the flu shot this season.    No recent travel, retired.    One week ago she had onset of malaise, mild headache, sore throat, limited day started coughing.  She felt somewhat chilled, but no fever.  Continued coughing initially nonproductive, the last few days it's become moderately productive for green sputum.  Increasing bilateral maxillary sinus pressure.  No earache, but some mild intermittent ear pressure.  She is using nasal saline irrigation.    No increasing shortness of breath.  Mild sore throat persists.  She does feel like she is getting worse over the last couple of days.    No travel or known sick contacts.    No chest pain or palpitations.  No GI symptoms or rash.  No arthritis.    She did have azithromycin Z-Clif late last year, tolerates well.  No history of arrhythmia.    Hypertension is controlled.    PMHx:  No past medical history on file.  PSHx:    Past Surgical History:   Procedure Laterality Date     HYSTERECTOMY  Mid 1990's     OOPHORECTOMY  Mid 1990's     SC APPENDECTOMY      Description: Appendectomy;  Recorded: 01/26/2011;     SC REMOVAL GALLBLADDER      Description: Cholecystectomy;  Recorded: 01/26/2011;     SC TOTAL ABDOM HYSTERECTOMY      Description: Total Abdominal Hysterectomy;  Recorded: 01/26/2011;  Comments: 1999 for heavy bleeding.     Immunizations:   Immunization History   Administered Date(s) Administered     DT (pediatric) 02/01/1997     Hep A, historic 11/01/2007, 12/04/2008     Hep B, historic 11/01/2007, 01/10/2008, 06/17/2008     Influenza [id number], 01/18/2010     Influenza, inj, historic 11/01/2007, 11/19/2008  "    Influenza, seasonal,quad inj 6-35 mos 10/17/2013     Pneumo Polysac 23-V 10/03/2007     Td, historic 11/01/2007     Tdap 11/01/2007     ZOSTER 11/19/2008       ROS A comprehensive review of systems was performed and was otherwise negative    Medications, allergies, and problem list were reviewed and updated    Exam  Visit Vitals     /76     Pulse (!) 117     Temp 98.7  F (37.1  C)     Ht 5' 6\" (1.676 m)     Wt 170 lb (77.1 kg)     SpO2 97%     BMI 27.44 kg/m2     Barking cough.  Just mild was nasal drip pharyngitis.  Tympanic membranes look okay.  Lungs are clear to auscultation with no respiratory wheezing or crackles and normal respiratory excursion.  Heart is borderline tachycardic but regular.    Assessment/Plan  1. Acute non-recurrent sinusitis, unspecified location  I suspect sinusitis transformation of a URI.  Did not sound like influenza.    Treatment with azithromycin Z-Seema, continue saline irrigation, okay for cough drops and hot tea.  She will rest at home.  Call if any worsening symptoms this week.    She was warned about sedation and constipation and nausea with codeine cough syrup.  - azithromycin (ZITHROMAX Z-SEEMA) 250 MG tablet; Take 2 tablets (500 mg) on  Day 1,  followed by 1 tablet (250 mg) once daily on Days 2 through 5.  Dispense: 6 tablet; Refill: 0  - codeine-guaiFENesin (GUAIFENESIN AC)  mg/5 mL liquid; Take 5 mL by mouth 2 (two) times a day as needed for cough.  Dispense: 240 mL; Refill: 1    2. Essential hypertension with goal blood pressure less than 130/80  Controlled, continue current medications.  Dietary intake remains good.    Return if symptoms worsen or fail to improve.   There are no Patient Instructions on file for this visit.  Matthew Alonzo MD        Current Outpatient Prescriptions   Medication Sig Dispense Refill     CALCIUM CARBONATE/VITAMIN D3 (CALCIUM 500 + D ORAL) Take 1 tablet by mouth daily. 600/800       cholecalciferol, vitamin D3, 4,000 unit Tab Use " 1,000 Units As Directed.        hydroCHLOROthiazide (HYDRODIURIL) 50 MG tablet TAKE 1/2 TABLET EVERY DAY (SUBSTITUTED FOR  HYDRODIURIL) 45 tablet 6     Lactobacillus rhamnosus GG (CULTURELLE) 10-15 Billion cell capsule Take 1 capsule by mouth daily.       lisinopril (PRINIVIL,ZESTRIL) 20 MG tablet TAKE 1 TABLET EVERY DAY 90 tablet 3     METHYLCELLULOSE (CITRUCEL ORAL) Take 1 tablet by mouth daily.       azithromycin (ZITHROMAX Z-SEEMA) 250 MG tablet Take 2 tablets (500 mg) on  Day 1,  followed by 1 tablet (250 mg) once daily on Days 2 through 5. 6 tablet 0     codeine-guaiFENesin (GUAIFENESIN AC)  mg/5 mL liquid Take 5 mL by mouth 2 (two) times a day as needed for cough. 240 mL 1     diclofenac (VOLTAREN) 75 MG EC tablet Take 1 tablet (75 mg total) by mouth daily. Can take up to twice daily 90 tablet 3     zolpidem (AMBIEN) 5 MG tablet Take 1 tablet (5 mg total) by mouth bedtime as needed for sleep. 30 tablet 0     Current Facility-Administered Medications   Medication Dose Route Frequency Provider Last Rate Last Dose     denosumab 60 mg (PROLIA 60 mg/ml)  60 mg Subcutaneous Q6 Months Valentina Mclaughlin MD   60 mg at 03/22/16 1334     No Known Allergies  Social History   Substance Use Topics     Smoking status: Never Smoker     Smokeless tobacco: Never Used      Comment: smoking in college     Alcohol use 2.0 oz/week     4 drink(s) per week

## 2021-06-09 NOTE — PROGRESS NOTES
"Sheryl Trotter is a 80 y.o. female who is being evaluated via a billable telephone visit.      The patient has been notified of following:     \"This telephone visit will be conducted via a call between you and your physician/provider. We have found that certain health care needs can be provided without the need for a physical exam.  This service lets us provide the care you need with a short phone conversation.  If a prescription is necessary we can send it directly to your pharmacy.  If lab work is needed we can place an order for that and you can then stop by our lab to have the test done at a later time.    Telephone visits are billed at different rates depending on your insurance coverage. During this emergency period, for some insurers they may be billed the same as an in-person visit.  Please reach out to your insurance provider with any questions.    If during the course of the call the physician/provider feels a telephone visit is not appropriate, you will not be charged for this service.\"    Patient has given verbal consent to a Telephone visit? Yes    What phone number would you like to be contacted at? [phone]    Patient would like to receive their AVS by AVS Preference: Mail a copy.        Assessment/Plan:  1. Age-related osteoporosis without current pathological fracture  Did you experience any problems with previous Prolia injection? no  Any medication change in the last 6 months? no  Did you take prednisone or other immunosupressant drugs in the last 6 months   (chemo, transplant, rheum, dermatology conditions)? no  Did you have any serious infection in the last 6 months?no  Any recent hospitalizations?no  Do you plan any dental work in the next 2-3 months?no  How much calcium do you take daily from the diet and supplements?1200 mg  How much vit D do you take daily? 2000 IU  Last DXA? 12/2019, Reviewed and discussed        Patient is here today for the 9th Prolia injection. Patient tolerated " previous injections well.   We discussed calcium and vit D daily needs today.   I reviewed her labs:  Component      Latest Ref Rng & Units 7/24/2019   Sodium      136 - 145 mmol/L 136   Potassium      3.5 - 5.0 mmol/L 4.0   Chloride      98 - 107 mmol/L 100   CO2      22 - 31 mmol/L 26   Anion Gap, Calculation      5 - 18 mmol/L 10   Glucose      70 - 125 mg/dL 96   Calcium      8.5 - 10.5 mg/dL 9.7   BUN      8 - 28 mg/dL 12   Creatinine      0.60 - 1.10 mg/dL 0.70   GFR MDRD Af Amer      >60 mL/min/1.73m2 >60   GFR MDRD Non Af Amer      >60 mL/min/1.73m2 >60   Vitamin D, Total (25-Hydroxy)      30.0 - 80.0 ng/mL 44.0     Next Prolia injection will be in 6 months.         Phone call duration:  8 minutes    Rhonda Yung

## 2021-06-09 NOTE — PROGRESS NOTES
ASSESSMENT:   1. Bronchitis  predniSONE (DELTASONE) 20 MG tablet    albuterol (PROVENTIL HFA;VENTOLIN HFA) 90 mcg/actuation inhaler   2. Cough  XR Chest PA and Lateral    predniSONE (DELTASONE) 20 MG tablet    albuterol (PROVENTIL HFA;VENTOLIN HFA) 90 mcg/actuation inhaler   3. Wheezing  albuterol nebulizer solution 3 mL (PROVENTIL)    XR Chest PA and Lateral    predniSONE (DELTASONE) 20 MG tablet    albuterol (PROVENTIL HFA;VENTOLIN HFA) 90 mcg/actuation inhaler     Patient is a 76-year-old female presenting for cough for the last 2 weeks.  Patient was vitally normal and in no acute distress upon presentation.  She generally appeared well, however was coughing frequently.  She is currently on day 5 of azithromycin, prescribed by her primary care provider for sinusitis.  Cough continues, however no fevers. Sinus symptoms are completely resolved. Initial lung exam significant for scattered, deep, rhonchi and wheezes and crackles throughout. After 1 albuterol neb in clinic, patient did not feel much better and lung exam was unchanged.  CXR was negative for infiltrates; more suggestive of inflammatory process. Given that she is already on antibiotics and no infiltrate identified on CXR, I don't think there is any lung infection left to treat. Sinus symptom are cleared up as well, so unlikely a refractory sinusitis causing cough.   History is not suggestive of PE or heart failure causing cough; this is clearly infectious in etiology. Unlikely Pertussis and she has already been adequately treated for Pertussis as she is on azithromycin. I do not think any further workup is indicated today.  I will treat as a bronchospastic cough with prednisone and albuterol.  At the end of the encounter, I discussed results, diagnosis, medications. Discussed red flags for immediate return to clinic/ER, as well as indications for follow up if no improvement. Patient understood and agreed to plan. Patient was stable for  "discharge.        PLAN:  Your chest x-ray did not show any pneumonia.  The azithromycin should have treated any bacteria that may have been in the lungs initially.    The timeframe and course of your illness is very characteristic for a post infectious bronchitis.  Your lung exam supports this, with lots of wheezing.    The treatment for this is albuterol to help open the airways and steroids to help decrease airway inflammation.  Please use albuterol inhaler, 2 puffs, every 4-6 hours for the next 3-5 days.  If no improvement in 2 days, may stop inhaler.  Please take prednisone once daily in the morning with food.    Do not take any additional NSAIDs on the prednisone.    Finish out the course of azithromycin.    Follow-up with your primary care doctor if no improvement in symptoms in 7-10 days.  Go to ER if developing increasing shortness of breath, coughing up blood, chest pain, fever, or any other new, concerning symptoms.          SUBJECTIVE:   Sheryl Trotter is a 76 y.o. female who presents today for evaluation of cough for the last 2 weeks. She was seen by her PCP on Monday, 2/20/17, and was prescribed a Zpack for acute sinusitis. She is now on day 5 of the azithromycin and is not having any improvement. She describes her coughing as \"jags.\" It is productive in the morning, but more dry in the afternoon. In the morning, she is producing green \"chunks.\" She feels short of breath after coughing; she feels like she should not breathe because she will cough more. She is having some improvement in sinus pressure. She had a low-grade fever to 100F on Wednesday morning but no other fevers. She has had some nausea but no vomiting. She denies hemoptysis. No chest pain. No leg swelling or leg pain.   No recent travel. No history of chronic lung disease. No PMH blood clots. She did get her influenza vaccine this year. Her  was ill with a cold last week as well but is now better.  She has been using cough drops and " Robitussin with codeine BID, but still has breakthrough coughing.    Past Medical History:  Patient Active Problem List   Diagnosis     Esophageal Reflux     Osteoporosis     Infected Nonvenomous Insect Bite     Hyperlipidemia     Essential Hypertension     Limb Pain     Insomnia     Fatigue     Left Bicipital Tendonitis     Trochanteric Bursitis     Polymyalgia Rheumatica     Lumbar Spondylosis     Lumbar Disc Degeneration     Headache     Osteoarthritis of the Knee       Surgical History:  Reviewed; Non-contributory      Family History:  Family History   Problem Relation Age of Onset     Lymphoma Father 28     Hodgkins     Colon cancer Maternal Grandmother      Vaginal cancer Maternal Aunt      Reviewed; Non-contributory      Social History:    History   Smoking Status     Never Smoker   Smokeless Tobacco     Never Used     Comment: smoking in college     Smoking: none  Alcohol use: occasionally  Other drug use: none  Occupation: retired  Lives independently with       Current Medications:  Current Outpatient Prescriptions on File Prior to Visit   Medication Sig Dispense Refill     azithromycin (ZITHROMAX Z-SEEMA) 250 MG tablet Take 2 tablets (500 mg) on  Day 1,  followed by 1 tablet (250 mg) once daily on Days 2 through 5. 6 tablet 0     CALCIUM CARBONATE/VITAMIN D3 (CALCIUM 500 + D ORAL) Take 1 tablet by mouth daily. 600/800       cholecalciferol, vitamin D3, 4,000 unit Tab Use 1,000 Units As Directed.        codeine-guaiFENesin (GUAIFENESIN AC)  mg/5 mL liquid Take 5 mL by mouth 2 (two) times a day as needed for cough. 240 mL 1     diclofenac (VOLTAREN) 75 MG EC tablet Take 1 tablet (75 mg total) by mouth daily. Can take up to twice daily 90 tablet 3     hydroCHLOROthiazide (HYDRODIURIL) 50 MG tablet TAKE 1/2 TABLET EVERY DAY (SUBSTITUTED FOR  HYDRODIURIL) 45 tablet 6     Lactobacillus rhamnosus GG (CULTURELLE) 10-15 Billion cell capsule Take 1 capsule by mouth daily.       lisinopril  (PRINIVIL,ZESTRIL) 20 MG tablet TAKE 1 TABLET EVERY DAY 90 tablet 3     METHYLCELLULOSE (CITRUCEL ORAL) Take 1 tablet by mouth daily.       zolpidem (AMBIEN) 5 MG tablet Take 1 tablet (5 mg total) by mouth bedtime as needed for sleep. 30 tablet 0     Current Facility-Administered Medications on File Prior to Visit   Medication Dose Route Frequency Provider Last Rate Last Dose     denosumab 60 mg (PROLIA 60 mg/ml)  60 mg Subcutaneous Q6 Months Valentina Mclaughlin MD   60 mg at 03/22/16 1334       Allergies:   No Known Allergies    I personally reviewed patient's past medical, surgical, social, family history and allergies.    ROS:  Review of Systems  See HPI      OBJECTIVE:   Visit Vitals     /82     Pulse 92     Temp 97.2  F (36.2  C) (Oral)     Resp 16     Wt 169 lb 4.8 oz (76.8 kg)     SpO2 99%     Breastfeeding No     BMI 27.33 kg/m2         General Appearance:  Alert, generally well-appearing older female in NAD. Afebrile. Frequent deep coughs.   Integument: Warm, dry, no diaphoresis.  HEENT:  Head: Atraumatic, normocephalic. Face nontraumatic.  Eyes: Conjunctiva clear, Lids normal.  Ears:  TMs pearly, translucent bilaterally. No canal erythema or edema.  Nose: nares patent. Mild erythema of nasal mucosa. No rhinorrhea.  Oropharynx:  No trismus. ++ posterior pharyngeal erythema. No tonsillar hypertrophy or exudate. Uvula midline. Moist mucus membranes.  Neck: Supple, no lymphadenopathy. No meningismus.  Respiratory: No distress. Frequent coughing. Deep rhonchi, inspiratory wheezes, and scattered crackles throughout.  Cardiovascular: Regular rate and rhythm, no murmur, rub or gallop. No obvious chest wall deformities. Peripheral pulses 2+ bilaterally. No peripheral edema.  Neurologic: Alert and orientated appropriately. No focal deficits.         Radiology:  I personally ordered and viewed this study. I agree with below radiology findings.    Xr Chest Pa And Lateral    Result Date: 2/24/2017  XR CHEST PA  AND LATERAL2/24/2017 5:50 PMINDICATION: CoughCOMPARISON: None.FINDINGS: Lungs are hyperinflated. No pleural fluid or pneumothorax. No definite airspace disease. No significant edema. The cardiomediastinal silhouette is normal in size.This report was electronically interpreted by: Dr. L. John Fahrner, MD ON 02/24/2017 at 18:08

## 2021-06-09 NOTE — TELEPHONE ENCOUNTER
Left voicemail for patient to return call to clinic. When patient returns call, please give them below message.     will not be in clinic tomorrow 7-16-20 so we will need to switch your appointment to a Telephone Visit and have you come in at the same time as your original appointment (12:40) for your Prolia injection (I already put her on the Nurse Schedule) If this does not work she can reschedule for when  is back in the clinic for a Face to Face.

## 2021-06-10 NOTE — PROGRESS NOTES
Assessment and Plan:   1. Routine general medical examination at a health care facility   Advised and informed her  comprehensive physical exam is normal.    2. Insomnia   Still suffers from insomnia.  Due to her lack of sleep she gets so tired easily especially the next day.  Takes zolpidem as needed.  Will refer to the sleep clinic for her poor sleep hygiene.  Reports her  notices she snores but not heavily and not all the time.  - zolpidem (AMBIEN) 5 MG tablet; Take 1 tablet (5 mg total) by mouth at bedtime as needed for sleep.  Dispense: 30 tablet; Refill: 0  - Ambulatory referral to Sleep Medicine    3. Essential Hypertension  Controlled.  Continue lisinopril.  Check urinalysis, CBC and basic metabolic panel  - lisinopril (PRINIVIL,ZESTRIL) 20 MG tablet; TAKE 1 TABLET EVERY DAY  Dispense: 90 tablet; Refill: 3  - Urinalysis-UC if Indicated  - HM2(CBC w/o Differential)  - Basic Metabolic Panel    4. Osteoarthritis, multiple joints  Has aches and pains of her back hips and knees.  Pains come and go.  Takes  diclofenac as needed.  Doing her own exercises at home prescribed by physical therapy.    5. Hyperlipidemia  Controlled without medications.  Only has occasional borderline lipids.  Check lipids, TSH and liver profile.  - Lipid Cascade  - Thyroid Stimulating Hormone (TSH)  - Hepatic Profile    6. Osteoporosis  Has osteoporosis.  Confirmed by her bone density on 11/11/2016.  Gets Prolia every 6 months.  Check vitamin D.  - Vitamin D, Total (25-Hydroxy)          The patient's current medical problems were reviewed.    The following high BMI interventions were performed this visit: weight monitoring  The following health maintenance schedule was reviewed with the patient and provided in printed form in the after visit summary:   Health Maintenance   Topic Date Due     PNEUMOCOCCAL CONJUGATE VACCINE FOR ADULTS (PCV13 OR PREVNAR)  06/18/2007     MAMMOGRAM  09/16/2016     FALL RISK ASSESSMENT  05/16/2017      TD 18+ HE  11/01/2017     INFLUENZA VACCINE RULE BASED (Season Ended) 08/01/2017     DXA SCAN  09/27/2018     COLONOSCOPY  12/03/2019     ADVANCE DIRECTIVES DISCUSSED WITH PATIENT  02/20/2022     PNEUMOCOCCAL POLYSACCHARIDE VACCINE AGE 65 AND OVER  Completed     ZOSTER VACCINE  Completed        Subjective:   Chief Complaint: Sheryl Trotter is an 76 y.o. female here for an Annual Wellness visit.   HPI: She is here for her comprehensive physical exam and her initial annual wellness visit.  Doing very well.  has hypertension controlled by lisinopril.  Has hyperlipidemia controlled without medications.  Only  showed borderline lipids in the past.  Has osteoporosis treated with Prolia every 6 months.  Has some aches and pains due to osteoarthritis.  Takes diclofenac as needed.  Vision and hearing are normal.  Denies swallowing and heartburn symptoms.  Denies chest pains and shortness of breath.  Denies urinary or bowel symptoms.  But she does not sleep well.  Has apparent insomnia.  Also wakes up a few times in the evening.   notices she snores but not heavily.  Sleeps on average 4 to 5 hours nightly.  Because of her lack of sleep she feels fatigue all the time.  Overall, however she feels fine.    Review of Systems:  Please see above.  The rest of the review of systems are negative for all systems.    Patient Care Team:  Luis Enrique Coyle MD as PCP - General  Jayjay Puckett MD as Physician (Ophthalmology)     Patient Active Problem List   Diagnosis     Esophageal Reflux     Osteoporosis     Infected Nonvenomous Insect Bite     Hyperlipidemia     Essential Hypertension     Limb Pain     Insomnia     Fatigue     Left Bicipital Tendonitis     Trochanteric Bursitis     Polymyalgia Rheumatica     Lumbar Spondylosis     Lumbar Disc Degeneration     Headache     Osteoarthritis of the Knee     No past medical history on file.   Past Surgical History:   Procedure Laterality Date     HYSTERECTOMY  Mid 1990's      OOPHORECTOMY  Mid 1990's     OH APPENDECTOMY      Description: Appendectomy;  Recorded: 01/26/2011;     OH REMOVAL GALLBLADDER      Description: Cholecystectomy;  Recorded: 01/26/2011;     OH TOTAL ABDOM HYSTERECTOMY      Description: Total Abdominal Hysterectomy;  Recorded: 01/26/2011;  Comments: 1999 for heavy bleeding.      Family History   Problem Relation Age of Onset     Lymphoma Father 28     Hodgkins     Colon cancer Maternal Grandmother      Vaginal cancer Maternal Aunt       Social History     Social History     Marital status:      Spouse name: N/A     Number of children: N/A     Years of education: N/A     Occupational History     Not on file.     Social History Main Topics     Smoking status: Never Smoker     Smokeless tobacco: Never Used      Comment: smoking in college     Alcohol use 2.0 oz/week     4 drink(s) per week     Drug use: No     Sexual activity: Not on file     Other Topics Concern     Not on file     Social History Narrative      Current Outpatient Prescriptions   Medication Sig Dispense Refill     albuterol (PROVENTIL HFA;VENTOLIN HFA) 90 mcg/actuation inhaler Inhale 2 puffs every 4 (four) hours as needed for wheezing. 1 Inhaler 0     CALCIUM CARBONATE/VITAMIN D3 (CALCIUM 500 + D ORAL) Take 1 tablet by mouth daily. 600/800       cholecalciferol, vitamin D3, 4,000 unit Tab Use 1,000 Units As Directed.        diclofenac (VOLTAREN) 75 MG EC tablet Take 1 tablet (75 mg total) by mouth daily. Can take up to twice daily 90 tablet 3     hydroCHLOROthiazide (HYDRODIURIL) 50 MG tablet TAKE 1/2 TABLET EVERY DAY (SUBSTITUTED FOR  HYDRODIURIL) 45 tablet 6     lisinopril (PRINIVIL,ZESTRIL) 20 MG tablet TAKE 1 TABLET EVERY DAY 90 tablet 3     METHYLCELLULOSE (CITRUCEL ORAL) Take 1 tablet by mouth daily.       zolpidem (AMBIEN) 5 MG tablet Take 1 tablet (5 mg total) by mouth bedtime as needed for sleep. 30 tablet 0     Lactobacillus rhamnosus GG (CULTURELLE) 10-15 Billion cell capsule Take 1  "capsule by mouth daily.       Current Facility-Administered Medications   Medication Dose Route Frequency Provider Last Rate Last Dose     denosumab 60 mg (PROLIA 60 mg/ml)  60 mg Subcutaneous Q6 Months Valentina Mclaughlin MD   60 mg at 03/22/16 1334      Objective:   Vital Signs:   Visit Vitals     /62     Pulse 64     Ht 5' 5.5\" (1.664 m)     Wt 170 lb 4 oz (77.2 kg)     BMI 27.9 kg/m2        VisionScreening:  No exam data present     PHYSICAL EXAM  EYES: Eyelids, conjunctiva, and sclera were normal. Pupils were normal. Cornea, iris, and lens were normal bilaterally.  HEAD, EARS, NOSE, MOUTH, AND THROAT: Head and face were normal. Hearing was normal to voice and the ears were normal to external exam. Nose appearance was normal and there was no discharge. Oropharynx was normal.  NECK: Neck appearance was normal. There were no neck masses and the thyroid was not enlarged.  RESPIRATORY: Breathing pattern was normal and the chest moved symmetrically.  Percussion/auscultatory percussion was normal.  Lung sounds were normal and there were no abnormal sounds.  CARDIOVASCULAR: Heart rate and rhythm were normal.  S1 and S2 were normal and there were no extra sounds or murmurs. Peripheral pulses in arms and legs were normal.  Jugular venous pressure was normal.  There was no peripheral edema.  GASTROINTESTINAL: The abdomen was normal in contour.  Bowel sounds were present.  Percussion detected no organ enlargement or tenderness.  Palpation detected no tenderness, mass, or enlarged organs.   MUSCULOSKELETAL: Skeletal configuration was normal and muscle mass was normal for age. Joint appearance was overall normal.  LYMPHATIC: There were no enlarged nodes.  SKIN/HAIR/NAILS: Skin color was normal.  There were no skin lesions.  Hair and nails were normal.  NEUROLOGIC: The patient was alert and oriented to person, place, time, and circumstance. Speech was normal. Cranial nerves were normal. Motor strength was normal for " age. The patient was normally coordinated.  PSYCHIATRIC:  Mood and affect were normal and the patient had normal recent and remote memory. The patient's judgment and insight were normal.  BREASTS: Breasts were symmetrical.  There were no masses, skin changes, and axillary lymph nodes    Assessment Results 5/25/2017   Activities of Daily Living No help needed   Instrumental Activities of Daily Living No help needed   Get Up and Go Score Less than 12 seconds   Mini Cog Total Score 5     A Mini-Cog score of 0-2 suggests the possibility of dementia, score of 3-5 suggests no dementia    Identified Health Risks:     She is at risk for lack of exercise and has been provided with information to increase physical activity for the benefit of her well-being.  She is at risk for falling and has been provided with information to reduce the risk of falling at home.  Patient's advanced directive was discussed and I am comfortable with the patient's wishes.

## 2021-06-11 NOTE — TELEPHONE ENCOUNTER
RN cannot approve Refill Request    RN can NOT refill this medication Protocol failed and NO refill given. Last office visit: 12/19/2018 Luis Enrique Coyle MD Last Physical: 10/28/2019 Last MTM visit: Visit date not found Last visit same specialty: 7/16/2019 Denita Cottrell MD.  Next visit within 3 mo: Visit date not found  Next physical within 3 mo: Visit date not found      Sheryl Vigil, [facility] Connection Triage/Med Refill 9/4/2020    Requested Prescriptions   Pending Prescriptions Disp Refills     hydroCHLOROthiazide (HYDRODIURIL) 50 MG tablet [Pharmacy Med Name: HYDROCHLOROTHIAZIDE 50 MG Tablet] 45 tablet 0     Sig: TAKE 1/2 TABLET EVERY DAY (SUBSTITUTED FOR  HYDRODIURIL)       Diuretics/Combination Diuretics Refill Protocol  Failed - 9/2/2020  4:52 PM        Failed - Serum Potassium in past 12 months      No results found for: LN-POTASSIUM          Failed - Serum Sodium in past 12 months      No results found for: LN-SODIUM          Failed - Serum Creatinine in past 12 months      Creatinine   Date Value Ref Range Status   07/24/2019 0.70 0.60 - 1.10 mg/dL Final             Passed - Visit with PCP or prescribing provider visit in past 12 months     Last office visit with prescriber/PCP: 12/19/2018 Luis Enrique Coyle MD OR same dept: Visit date not found OR same specialty: 7/16/2019 Denita Cottrell MD  Last physical: 10/28/2019 Last MTM visit: Visit date not found   Next visit within 3 mo: Visit date not found  Next physical within 3 mo: Visit date not found  Prescriber OR PCP: Luis Enrique Coyle MD  Last diagnosis associated with med order: 1. Essential hypertension  - hydroCHLOROthiazide (HYDRODIURIL) 50 MG tablet [Pharmacy Med Name: HYDROCHLOROTHIAZIDE 50 MG Tablet]; TAKE 1/2 TABLET EVERY DAY (SUBSTITUTED FOR  HYDRODIURIL)  Dispense: 45 tablet; Refill: 0    If protocol passes may refill for 12 months if within 3 months of last provider visit (or a total of 15 months).             Passed - Blood pressure on  file in past 12 months     BP Readings from Last 1 Encounters:   10/28/19 114/70

## 2021-06-11 NOTE — PROGRESS NOTES
Dear Dr. Luis Enrique Coyle Md  [address] [place] [address]    Thank you for the opportunity to participate in the care of Mrs. Sheryl Trotter.    She is a 77 y.o. female who comes to the clinic with a chief complaint of excessive daytime sleepiness that is been going on since 2007.  The patient actually underwent a sleep study on 10/29/07 from another sleep center which showed that she had an apnea hypopnea index of 2.3 events per hour.  However her respiratory disturbance index was elevated at 19.9 events per hour.  The patient's periodic limb movement index was 53 events per hour.  The patient also had poor sleep efficiency and the night of study.  Based upon my preliminary review of the patient's sleep study report, it is highly likely that if she had more supine sleep, it is possible she might have ruled in for obstructive sleep apnea.  Patient also complains of frequent nocturnal awakenings with difficulty reinitiating sleep. Her  has noticed in the past that she has had witnessed apnea followed by loud snoring.  She does admit to reading while in bed to try to initiate sleep.  She also complains of the urge to move as she is trying to fall asleep in the evenings.  She reports that some days her sleep is good and some days her sleep is bad.     Ideal Sleep-Wake Cycle(devoid of societal pressure):    Patient would try to initiate sleep at around 11-11:30 PM with a variable sleep latency. The patient would have 2-5 nocturnal awakening. Final wake up time is around 8 AM.      Past Medical History  No past medical history on file.     Past Surgical History  Past Surgical History:   Procedure Laterality Date     HYSTERECTOMY  Mid 1990's     OOPHORECTOMY  Mid 1990's     KS APPENDECTOMY      Description: Appendectomy;  Recorded: 01/26/2011;     KS REMOVAL GALLBLADDER      Description: Cholecystectomy;  Recorded: 01/26/2011;     KS TOTAL ABDOM HYSTERECTOMY      Description: Total Abdominal  Hysterectomy;  Recorded: 01/26/2011;  Comments: 1999 for heavy bleeding.        Meds  Current Outpatient Prescriptions   Medication Sig Dispense Refill     CALCIUM CARBONATE/VITAMIN D3 (CALCIUM 500 + D ORAL) Take 1 tablet by mouth daily. 600/800       cholecalciferol, vitamin D3, 4,000 unit Tab Use 1,000 Units As Directed.        diclofenac (VOLTAREN) 75 MG EC tablet Take 1 tablet (75 mg total) by mouth daily. Can take up to twice daily (Patient taking differently: Take 75 mg by mouth daily as needed. Can take up to twice daily) 90 tablet 3     hydroCHLOROthiazide (HYDRODIURIL) 50 MG tablet TAKE 1/2 TABLET EVERY DAY (SUBSTITUTED FOR  HYDRODIURIL) 45 tablet 6     lisinopril (PRINIVIL,ZESTRIL) 20 MG tablet TAKE 1 TABLET EVERY DAY 90 tablet 3     magnesium 30 mg tablet Take 400 mg by mouth 2 (two) times a day.       METHYLCELLULOSE (CITRUCEL ORAL) Take 1 tablet by mouth daily.       OMEGA-3/DHA/EPA/FISH OIL (FISH OIL-OMEGA-3 FATTY ACIDS) 300-1,000 mg capsule Take by mouth daily.       zolpidem (AMBIEN) 5 MG tablet Take 1 tablet (5 mg total) by mouth at bedtime as needed for sleep. 30 tablet 0     Current Facility-Administered Medications   Medication Dose Route Frequency Provider Last Rate Last Dose     denosumab 60 mg (PROLIA 60 mg/ml)  60 mg Subcutaneous Q6 Months Valentina Mclaughlin MD   60 mg at 03/22/16 1334        Allergies  Review of patient's allergies indicates no known allergies.     Social History  Social History     Social History     Marital status:      Spouse name: N/A     Number of children: N/A     Years of education: N/A     Occupational History     Not on file.     Social History Main Topics     Smoking status: Never Smoker     Smokeless tobacco: Never Used      Comment: smoking in college     Alcohol use 2.0 oz/week     4 drink(s) per week     Drug use: No     Sexual activity: Not on file     Other Topics Concern     Not on file     Social History Narrative        Family History  Family  History   Problem Relation Age of Onset     Lymphoma Father 28     Hodgkins     Colon cancer Maternal Grandmother      Vaginal cancer Maternal Aunt         Review of Systems:  Constitutional: Negative except as noted in HPI.   Eyes: Negative except as noted in HPI.   ENT: Negative except as noted in HPI.   Cardiovascular: Negative except as noted in HPI.   Respiratory: Negative except as noted in HPI.   Gastrointestinal: Negative except as noted in HPI.   Genitourinary: Negative except as noted in HPI.   Musculoskeletal: Negative except as noted in HPI.   Integumentary: Negative except as noted in HPI.   Neurological: Negative except as noted in HPI.   Psychiatric: Negative except as noted in HPI.   Endocrine: Negative except as noted in HPI.   Hematologic/Lymphatic: Negative except as noted in HPI.      STOP BANG 7/10/2017   Do you snore loudly (louder than talking or loud enough to be heard through closed doors)? 0   Do you often feel tired, fatigued, or sleepy during daytime? 1   Has anyone observed you stop breathing in your sleep? 1   Do you have or are you being treated for high blood pressure? 1   BMI more than 35 kg/m2 0   Age over 50 years old? 1   Neck circumference greater than 16 inches? 0   Gender male? 0   Total Score 4   Epworths Sleepiness Scale 7/10/2017   Sitting and reading 1   Watching TV 1   Sitting, inactive in a public place (e.g. a theatre or a meeting) 0   As a passenger in a car for an hour without a break 1   Lying down to rest in the afternoon when circumstances permit 3   Sitting and talking to someone 0   Sitting quietly after a lunch without alcohol 0   In a car, while stopped for a few minutes in traffic 0   Total score 6   Rooming 7/10/2017   Usual bedtime 10-11   Sleep Latency varies   Awakenings 2 or more   Wake Up Time 710   Energy Drinks 0   Coffee 2-3   Cola 0   Difficulty falling asleep Yes   Difficulty staying asleep Yes   Excessive daytime tiredness Yes   Excessive daytime  "sleepiness Yes   Dozing off while driving No   Shift Worker No   Sleep Walking? No   Sleep Talking? No   Kicking or punching? No   Restless legs symptoms Yes       Physical Exam:  /72  Pulse 80  Ht 5' 5.5\" (1.664 m)  Wt 174 lb 9.6 oz (79.2 kg)  SpO2 99%  BMI 28.61 kg/m2  BMI:Body mass index is 28.61 kg/(m^2).   GEN: NAD, appropriate for age  Head: Normocephalic.  EYES: PERRLA, EOMI  ENT: Oropharynx is clear, mallampatti class 3+ airway. Uvula is intact  Nasal mucosa is moist without erythema  Neck : Thyroid is within normal limits. Neck circ 12 inches  CV: Regular rate and rhythm, S1 & S2 positive.  LUNGS: Bilateral breathsounds heard.   ABDOMEN: Positive bowel sounds in all quadrants, soft, no rebound or guarding  MUSCULOSKELETAL: Bilateral trace leg swelling  SKIN: warm, dry, no rashes  Neurological: Alert, oriented to time, place, and person.  Psych: normal mood, normal affect     Labs/Studies:     Lab Results   Component Value Date    WBC 7.1 05/25/2017    HGB 13.8 05/25/2017    HCT 41.0 05/25/2017    MCV 90 05/25/2017     05/25/2017         Chemistry        Component Value Date/Time     05/25/2017 1138    K 3.8 05/25/2017 1138     05/25/2017 1138    CO2 30 05/25/2017 1138    BUN 20 05/25/2017 1138    CREATININE 0.75 05/25/2017 1138     05/25/2017 1138        Component Value Date/Time    CALCIUM 9.6 05/25/2017 1138    ALKPHOS 60 05/25/2017 1138    AST 18 05/25/2017 1138    ALT 18 05/25/2017 1138    BILITOT 0.5 05/25/2017 1138            Lab Results   Component Value Date    FERRITIN 207 (H) 01/31/2014     Lab Results   Component Value Date    TSH 0.67 05/25/2017         Assessment and Plan:  In summary Sheryl Trotter is a 77 y.o. year old female here for evaluation of sleep disturbance.  1.  Suspected sleep apnea   Mrs. Sheryl Trotter has high risk for obstructive sleep apnea based on the history of witnessed apnea, snoring and a crowded airway. I educated the patient on the " underlying pathophysiology of obstructive sleep apnea. We reviewed the risks associated with sleep apnea, including increased cardiovascular risk and overall death. We talked about treatments briefly. I recommend getting an split-night nocturnal polysomnography. The patient should return to the clinic to discuss results and treatment option in a patient-centered approach.  2.  Hypersomnia  3.  Snoring  4.  Other sleep disturbance    History of cranial facial surgery? No    Patient verbalized understanding of these issues, agrees with the plan and all questions were answered today. Patient was given an opportuntity to voice any other symptoms or concerns not listed above. Patient did not have any other symptoms or concerns.      Patient told to return in one week after the sleep study is interpreted. Patient instructed to stop at  to schedule appointment before leaving today.       Carlo Brown DO  Board Certified in Internal Medicine and Sleep Medicine  [facility].    (Note created with Dragon voice recognition and unintended spelling errors and word substitutions may occur)

## 2021-06-12 NOTE — TELEPHONE ENCOUNTER
RN cannot approve Refill Request    RN can NOT refill this medication Protocol failed and NO refill given. Last office visit: 12/19/2018 Luis Enrique Coyle MD Last Physical: 10/28/2019 Last MTM visit: Visit date not found Last visit same specialty: 7/16/2019 Denita Cottrell MD.  Next visit within 3 mo: Visit date not found  Next physical within 3 mo: Visit date not found      Sheryl Vigil, [facility] Triage/Med Refill 10/9/2020    Requested Prescriptions   Pending Prescriptions Disp Refills     lisinopriL (PRINIVIL,ZESTRIL) 20 MG tablet [Pharmacy Med Name: LISINOPRIL 20 MG Tablet] 90 tablet 3     Sig: TAKE 1 TABLET EVERY DAY       Ace Inhibitors Refill Protocol Failed - 10/7/2020  3:00 PM        Failed - Serum Potassium in past 12 months     No results found for: LN-POTASSIUM          Failed - Serum Creatinine in past 12 months     Creatinine   Date Value Ref Range Status   07/24/2019 0.70 0.60 - 1.10 mg/dL Final             Passed - PCP or prescribing provider visit in past 12 months       Last office visit with prescriber/PCP: 12/19/2018 Luis Enrique Coyle MD OR same dept: Visit date not found OR same specialty: 7/16/2019 Denita Cottrell MD  Last physical: 10/28/2019 Last MTM visit: Visit date not found   Next visit within 3 mo: Visit date not found  Next physical within 3 mo: Visit date not found  Prescriber OR PCP: Luis Enrique Coyle MD  Last diagnosis associated with med order: 1. Essential hypertension  - lisinopriL (PRINIVIL,ZESTRIL) 20 MG tablet [Pharmacy Med Name: LISINOPRIL 20 MG Tablet]; TAKE 1 TABLET EVERY DAY  Dispense: 90 tablet; Refill: 3    If protocol passes may refill for 12 months if within 3 months of last provider visit (or a total of 15 months).             Passed - Blood pressure filed in past 12 months     BP Readings from Last 1 Encounters:   10/28/19 114/70

## 2021-06-12 NOTE — PROGRESS NOTES
Patient was offered choice of vendor and chose [facility].  Patient Sheryl Trotter was set up at [facility] on 8/30/17. Patient received a Resmed [id number] Auto. Pressures were set at 6-16 CM H2O.   Patient s ramp is 6 cm H2O for Auto and FLEX/EPR is EPR.  Patient received a Resmed Mask name: Airfit N20  Nasal Size Medium, Heated tubing and heated humidifier.    Pacee Her

## 2021-06-12 NOTE — PROGRESS NOTES
ASSESSMENT AND PLAN:  Sheryl Trotter 77 y.o. female is seen here on 09/12/17 for evaluation of polyarthralgias, episodic, with features which raise a question of if she has an inflammatory process.  The likelihood that polymyalgia rheumatica has relapsed is a consideration would appears to be remote.  There is little to suggest GCA.  Further workup is indicated.  Today 1 of those episodes is already past.  She will return in the next few weeks if at all possible during 1 of those episodes should she get it again.  Otherwise we will meet here in the next 6-8 weeks.  Diagnoses and all orders for this visit:    Lumbar Disc Degeneration    Polyarthralgia  -     HM1(CBC and Differential)  -     Creatinine  -     ALT (SGPT)  -     Albumin  -     Rheumatoid Factor Quant  -     CCP Antibodies  -     Hepatitis C Antibody (Anti-HCV)  -     Uric Acid  -     Erythrocyte Sedimentation Rate  -     C-Reactive Protein  -     Antinuclear Antibody (JUAN JOSE) Cascade  -     HM1 (CBC with Diff)    Osteoarthritis of the Knee    Enthesopathy of hip region on both sides      HISTORY OF PRESENTING ILLNESS:  Sheryl Trotter, 77 y.o., female is here for evaluation of episodes of pain.  She reports that since July she has had 3 of these.  During this time she would have migrating pains in various joint areas including her knees hips elbows and hands.  She has not observed swelling.  After a few days this seemed to relent only to happen again.  She does not recall having had similar symptoms in the past.  She has noted intermittent shoulder pain.  She has history of polymyalgia rheumatica without associated giant cell arteritis.  She was last seen here 2-1/2 years ago.  At that point she had been off prednisone for some time.  She rated the pain as mild in the shoulders, 4.0/10, interfering with some of the day-to-day activities.  She has noted morning stiffness of no more than 5 minutes she has not noticed any any jaw claudication double vision.   She gets headaches which are unchanged.  She is describing no fever weight loss blurry vision eye redness mouth ulcer nausea cough there is no rash.  She has been taking diclofenac for the symptoms.  She has no personal or family history of psoriasis ulcerative colitis Crohn's disease.  There is no family history in the immediate group of relatives with lupus or rheumatoid arthritis.  Further historical information and ADL limitations as noted in the multidimensional health assessment questionnaire attached in the EMR. Rest of the 13 system ROS is negative.     ALLERGIES:Review of patient's allergies indicates no known allergies.    PAST MEDICAL/ACTIVE PROBLEMS/MEDICATION/ FAMILY HISTORY/SOCIAL DATA:  The patient has a family history of  No past medical history on file.  History   Smoking Status     Never Smoker   Smokeless Tobacco     Never Used     Comment: smoking in college     Patient Active Problem List   Diagnosis     Esophageal Reflux     Osteoporosis     Infected Nonvenomous Insect Bite     Hyperlipidemia     Essential Hypertension     Limb Pain     Insomnia     Fatigue     Left Bicipital Tendonitis     Trochanteric Bursitis     Polymyalgia Rheumatica     Lumbar Spondylosis     Lumbar Disc Degeneration     Headache     Osteoarthritis of the Knee     Current Outpatient Prescriptions   Medication Sig Dispense Refill     CALCIUM CARBONATE/VITAMIN D3 (CALCIUM 500 + D ORAL) Take 1 tablet by mouth daily. 600/800       cholecalciferol, vitamin D3, 4,000 unit Tab Use 1,000 Units As Directed.        diclofenac (VOLTAREN) 75 MG EC tablet Take 1 tablet (75 mg total) by mouth daily. Can take up to twice daily (Patient taking differently: Take 75 mg by mouth daily as needed. Can take up to twice daily) 90 tablet 3     hydroCHLOROthiazide (HYDRODIURIL) 50 MG tablet TAKE 1/2 TABLET EVERY DAY (SUBSTITUTED FOR  HYDRODIURIL) 45 tablet 6     lisinopril (PRINIVIL,ZESTRIL) 20 MG tablet TAKE 1 TABLET EVERY DAY 90 tablet 3      "magnesium 30 mg tablet Take 400 mg by mouth 2 (two) times a day.       METHYLCELLULOSE (CITRUCEL ORAL) Take 1 tablet by mouth daily.       OMEGA-3/DHA/EPA/FISH OIL (FISH OIL-OMEGA-3 FATTY ACIDS) 300-1,000 mg capsule Take by mouth daily.       zolpidem (AMBIEN) 5 MG tablet Take 1 tablet (5 mg total) by mouth at bedtime as needed for sleep. 30 tablet 0     Current Facility-Administered Medications   Medication Dose Route Frequency Provider Last Rate Last Dose     denosumab 60 mg (PROLIA 60 mg/ml)  60 mg Subcutaneous Q6 Months Valentina Mclaughlin MD   60 mg at 03/22/16 1334       COMPREHENSIVE EXAMINATION:  Vitals:    09/12/17 1432   BP: 110/62   Patient Site: Left Arm   Patient Position: Sitting   Cuff Size: Adult Regular   Pulse: 60   Weight: 175 lb 9.6 oz (79.7 kg)   Height: 5' 5.5\" (1.664 m)     A well appearing alert oriented female. Vital data as noted above. Her eyes without inflammation/scleromalacia. ENTwithout oral mucositis, thrush, nasal deformity, external ear redness, deformity. Her neck is without lymphadenopathy and supple. Lungs normal sounds, no pleural rub. Heart auscultation normal rate, rhythm; no pericardial rub and murmurs. Abdomen soft, non tender, no organomegaly. Skin examined for heliotrope, malar area eruption, lupus pernio, periungual erythema, sclerodactyly, papules, erythema nodosum, purpura, nail pitting, onycholysis, and obvious psoriasis lesion. Neurological examination shows normal alertness, speech, facial symmetry, tone and power in upper and lower extremities, Tinel's and Phalen's at wrist and gait. The joint examination is performed for swelling, tenderness, warmth, erythema, and range of motion in the following joints: DIPs, PIPs, MCPs, wrists, first CMC's, elbows, shoulders, hips, knees, ankles, feet; spine for range of motion and paraspinal muscles for tenderness. The salient normal / abnormal findings are appended.  She does not have synovitis in any of her palpable " appendicular joints.  She has tenderness in the trochanteric area worse on the left side.  She has early Heberden's.  She has minimal impingement of the shoulders.  Her temporal arteries are bounding, nontender, normal pulsations.    LAB / IMAGING DATA:  ALT   Date Value Ref Range Status   05/25/2017 18 0 - 45 U/L Final   05/16/2016 27 12 - 78 U/L Final   05/01/2015 23 12 - 78 U/L Final     Albumin   Date Value Ref Range Status   05/25/2017 3.8 3.5 - 5.0 g/dL Final   05/16/2016 3.7 3.5 - 5.0 g/dL Final   05/01/2015 3.9 3.5 - 5.0 g/dL Final     Creatinine   Date Value Ref Range Status   05/25/2017 0.75 0.60 - 1.10 mg/dL Final   05/16/2016 0.79 0.60 - 1.10 mg/dL Final     Comment:       New Creatinine method with new reference ranges as of 9/14/15   09/23/2015 1.04 0.60 - 1.10 mg/dL Final       WBC   Date Value Ref Range Status   05/25/2017 7.1 4.0 - 11.0 thou/uL Final   05/16/2016 5.7 4.0 - 11.0 thou/uL Final   05/01/2015 6.5 4.0 - 11.0 thou/uL Final     Hemoglobin   Date Value Ref Range Status   05/25/2017 13.8 12.0 - 16.0 g/dL Final   05/16/2016 13.5 12.0 - 16.0 g/dL Final   05/01/2015 13.7 12.0 - 16.0 g/dL Final     Platelets   Date Value Ref Range Status   05/25/2017 251 140 - 440 thou/uL Final   05/16/2016 261 140 - 440 thou/uL Final   05/01/2015 263 140 - 440 thou/uL Final       Lab Results   Component Value Date    RF <15 02/18/2014    SEDRATE 6 08/13/2014

## 2021-06-12 NOTE — PROGRESS NOTES
Dear Dr. Luis Enrique Coyle MD  [address] [place] [address],    Thank you for the opportunity to participate in the care of Sheryl Trotter.     She is a 77 y.o.  female patient who comes to the sleep medicine clinic for review of her sleep study. The study was completed on 08/09/17 which showed the the patient had mild obstructive sleep apnea with an apnea hypopnea index of 9.9 events per hour with the lowest O2 sat of 80%.  Patient also had periodic limb movement of sleep.  The patient still complaining of persistent sleep onset and maintenance insomnia.    No past medical history on file.    Past Surgical History:   Procedure Laterality Date     HYSTERECTOMY  Mid 1990's     OOPHORECTOMY  Mid 1990's     KS APPENDECTOMY      Description: Appendectomy;  Recorded: 01/26/2011;     KS REMOVAL GALLBLADDER      Description: Cholecystectomy;  Recorded: 01/26/2011;     KS TOTAL ABDOM HYSTERECTOMY      Description: Total Abdominal Hysterectomy;  Recorded: 01/26/2011;  Comments: 1999 for heavy bleeding.       Social History     Social History     Marital status:      Spouse name: N/A     Number of children: N/A     Years of education: N/A     Occupational History     Not on file.     Social History Main Topics     Smoking status: Never Smoker     Smokeless tobacco: Never Used      Comment: smoking in college     Alcohol use 2.0 oz/week     4 Standard drinks or equivalent per week     Drug use: No     Sexual activity: Not on file     Other Topics Concern     Not on file     Social History Narrative       Current Outpatient Prescriptions   Medication Sig Dispense Refill     CALCIUM CARBONATE/VITAMIN D3 (CALCIUM 500 + D ORAL) Take 1 tablet by mouth daily. 600/800       cholecalciferol, vitamin D3, 4,000 unit Tab Use 1,000 Units As Directed.        diclofenac (VOLTAREN) 75 MG EC tablet Take 1 tablet (75 mg total) by mouth daily. Can take up to twice daily (Patient taking differently: Take 75 mg by mouth  "daily as needed. Can take up to twice daily) 90 tablet 3     hydroCHLOROthiazide (HYDRODIURIL) 50 MG tablet TAKE 1/2 TABLET EVERY DAY (SUBSTITUTED FOR  HYDRODIURIL) 45 tablet 6     lisinopril (PRINIVIL,ZESTRIL) 20 MG tablet TAKE 1 TABLET EVERY DAY 90 tablet 3     magnesium 30 mg tablet Take 400 mg by mouth 2 (two) times a day.       METHYLCELLULOSE (CITRUCEL ORAL) Take 1 tablet by mouth daily.       OMEGA-3/DHA/EPA/FISH OIL (FISH OIL-OMEGA-3 FATTY ACIDS) 300-1,000 mg capsule Take by mouth daily.       zolpidem (AMBIEN) 5 MG tablet Take 1 tablet (5 mg total) by mouth at bedtime as needed for sleep. 30 tablet 0     Current Facility-Administered Medications   Medication Dose Route Frequency Provider Last Rate Last Dose     denosumab 60 mg (PROLIA 60 mg/ml)  60 mg Subcutaneous Q6 Months Valentina Mclaughlin MD   60 mg at 03/22/16 1334       No Known Allergies    Physical Exam:  Pulse 84  Ht 5' 5.5\" (1.664 m)  Wt 174 lb 11.2 oz (79.2 kg)  SpO2 98%  BMI 28.63 kg/m2  BMI:Body mass index is 28.63 kg/(m^2).   GEN: NAD,   Head: Normocephalic.  Psych: normal mood, normal affect     Labs/Studies:  - We reviewed the results of the overnight PSG as described on the HPI.     Lab Results   Component Value Date    WBC 7.1 05/25/2017    HGB 13.8 05/25/2017    HCT 41.0 05/25/2017    MCV 90 05/25/2017     05/25/2017         Chemistry        Component Value Date/Time     05/25/2017 1138    K 3.8 05/25/2017 1138     05/25/2017 1138    CO2 30 05/25/2017 1138    BUN 20 05/25/2017 1138    CREATININE 0.75 05/25/2017 1138     05/25/2017 1138        Component Value Date/Time    CALCIUM 9.6 05/25/2017 1138    ALKPHOS 60 05/25/2017 1138    AST 18 05/25/2017 1138    ALT 18 05/25/2017 1138    BILITOT 0.5 05/25/2017 1138            Lab Results   Component Value Date    FERRITIN 207 (H) 01/31/2014           Assessment and Plan:  In summary Sheryl Trotter is a 77 y.o. year old female here for review of her sleep " study.  1. Obstructive Sleep Apnea  We had an extensive conversation to review the results of her sleep study and to  her on the importance of treating sleep apnea. We discussed treatment options including oral appliance versus CPAP. Patient decided to proceed with CPAP. She will start using the device as soon as she receives it with the intention to use if for the entire night. We discussed some tips to increase PAP tolerance as well as the normal curve of adaptation. CPAP is going to provide improved respiratory function during the night but it can cause some sleep disruption that tends to improve with continuous usage. She should return to the clinic in 6 weeks to review compliance and efficacy monitoring.  2.  Periodic limb movement of sleep  Most likely will resolve after optimal pressure therapy.  3.  Sleep onset and maintenance insomnia  May consider adding melatonin the patient's regiment if the symptoms persist upon her return.  4.  Hypersomnia     Patient verbalized understanding of these issues, agrees with the plan and all questions were answered today. Patient was given an opportuntity to voice any other symptoms or concerns not listed above. Patient did not have any other symptoms or concerns.      Patient told to return in 6 weeks. Patient instructed to stop at  to schedule appointment before leaving today.    Carlo Brown DO  Board Certified in Internal Medicine and Sleep Medicine  [facility].    We spent a total of 25 minutes of face-to-face encounter and more than 50% of the encounter was used for counseling or coordination of care.    (Note created with Dragon voice recognition and unintended spelling errors and word substitutions may occur)

## 2021-06-12 NOTE — PROGRESS NOTES
Order for Durable Medical Equipment was processed and equipment ordered.   DME provider: [facility]  Date Faxed: 08/24/2017  Ordering Provider: Dr. Brown  Equipment ordered: Cpap

## 2021-06-13 NOTE — PROGRESS NOTES
Preoperative Consultation   Sheryl Trotter   77 y.o.  female    Date of visit: 11/2/2017  Physician: Luis Enrique Coyle MD    This is a preoperative consultation requested by Dr. Melo in preparation for right foot surgery due to end-stage osteoarthritis of the big toe on November 8, 2017 at [facility].       Assessment and Plan   Sheryl Trotter was seen in preoperative consultation in preparation for right foot surgery.  This is a low risk surgery and the patient has no increased risk for major cardiac complications.       1. Pre-operative exam    2. Osteoarthritis of right foot    3. Essential hypertension    4. Hyperlipidemia      Recommendations:  1.  Okay to go ahead with her surgery.  No contraindications.  2.  Will take her lisinopril and hydrochlorothiazide sips of water on the morning of her procedure.     Patient Profile   Social History     Social History Narrative    . 3 grown children, 2 daughters and 1 son. Nonsmoker. Socially drinks alcohol. Speech pathologist.         Past Medical History   Patient Active Problem List   Diagnosis     Esophageal Reflux     Osteoporosis     Infected Nonvenomous Insect Bite     Hyperlipidemia     Essential Hypertension     Limb Pain     Insomnia     Fatigue     Left Bicipital Tendonitis     Trochanteric Bursitis     Lumbar Spondylosis     Lumbar Disc Degeneration     Headache     Osteoarthritis of the Knee     Polyarthralgia       Past Surgical History  She has a past surgical history that includes pr appendectomy; pr removal gallbladder; pr total abdom hysterectomy; Hysterectomy (Mid 1990's); and Oophorectomy (Mid 1990's).     History of Present Illness   This 77 y.o. old female is here for preop history and physical requested by her podiatrist, Dr. Melo.  Will have right foot surgery for end-stage osteoarthritis in the big toe of the right foot.  Has hypertension controlled by hydrochlorothiazide and lisinopril.  Basically healthy.  Does not have  complaints.  Denies chest pains and shortness of breath.  Denies dizziness and lightheadedness.  Denies urinary and bowel symptoms.      Recent antiplatelet use: no  Personal or family history of bleeding or clotting disorders: no  Steroid use in the past year: no  Personal or family history of difficulty with anesthesia: no  Current cardiopulmonary symptoms: no    Review of Systems: A comprehensive review of systems was negative except as noted.     Medications and Allergies   Current Outpatient Prescriptions   Medication Sig Dispense Refill     CALCIUM CARBONATE/VITAMIN D3 (CALCIUM 500 + D ORAL) Take 1 tablet by mouth daily. 600/800       cholecalciferol, vitamin D3, 4,000 unit Tab Use 1,000 Units As Directed.        diclofenac (VOLTAREN) 75 MG EC tablet Take 1 tablet (75 mg total) by mouth daily. Can take up to twice daily (Patient taking differently: Take 75 mg by mouth daily as needed. Can take up to twice daily) 90 tablet 3     hydroCHLOROthiazide (HYDRODIURIL) 50 MG tablet TAKE 1/2 TABLET EVERY DAY (SUBSTITUTED FOR  HYDRODIURIL) 45 tablet 6     lisinopril (PRINIVIL,ZESTRIL) 20 MG tablet TAKE 1 TABLET EVERY DAY 90 tablet 3     magnesium 30 mg tablet Take 400 mg by mouth 2 (two) times a day.       METHYLCELLULOSE (CITRUCEL ORAL) Take 1 tablet by mouth daily.       OMEGA-3/DHA/EPA/FISH OIL (FISH OIL-OMEGA-3 FATTY ACIDS) 300-1,000 mg capsule Take by mouth daily.       zolpidem (AMBIEN) 5 MG tablet Take 1 tablet (5 mg total) by mouth at bedtime as needed for sleep. 30 tablet 0     Current Facility-Administered Medications   Medication Dose Route Frequency Provider Last Rate Last Dose     denosumab 60 mg (PROLIA 60 mg/ml)  60 mg Subcutaneous Q6 Months Valentina Mclaughlin MD   60 mg at 03/22/16 1334     No Known Allergies     Family and Social History   Family History   Problem Relation Age of Onset     Lymphoma Father 28     Hodgkins     Colon cancer Maternal Grandmother      Vaginal cancer Maternal Aunt       "Sleep apnea Son         Social History   Substance Use Topics     Smoking status: Never Smoker     Smokeless tobacco: Never Used      Comment: smoking in college     Alcohol use 2.0 oz/week     4 Standard drinks or equivalent per week        Physical Exam   General Appearance:   Alert, oriented, pleasant, comfortable and not in acute distress    /80 (Patient Site: Right Arm, Patient Position: Sitting, Cuff Size: Adult Regular)  Pulse 89  Ht 5' 5.5\" (1.664 m)  Wt 176 lb (79.8 kg)  SpO2 98%  BMI 28.84 kg/m2    EYES: Eyelids, conjunctiva, and sclera were normal. Pupils were normal. Cornea, iris, and lens were normal bilaterally.  HEAD, EARS, NOSE, MOUTH, AND THROAT: Head and face were normal. Hearing was normal to voice and the ears were normal to external exam. Nose appearance was normal and there was no discharge. Oropharynx was normal.  NECK: Neck appearance was normal. There were no neck masses and the thyroid was not enlarged.  RESPIRATORY: Breathing pattern was normal and the chest moved symmetrically.  Percussion/auscultatory percussion was normal.  Lung sounds were normal and there were no abnormal sounds.  CARDIOVASCULAR: Heart rate and rhythm were normal.  S1 and S2 were normal and there were no extra sounds or murmurs. Peripheral pulses in arms and legs were normal.  Jugular venous pressure was normal.  There was no peripheral edema.  GASTROINTESTINAL: The abdomen was normal in contour.  Bowel sounds were present.  Percussion detected no organ enlargement or tenderness.  Palpation detected no tenderness, mass, or enlarged organs.   MUSCULOSKELETAL: Skeletal configuration was normal and muscle mass was normal for age. Joint appearance was overall normal.  LYMPHATIC: There were no enlarged nodes.  SKIN/HAIR/NAILS: Skin color was normal.  There were no skin lesions.  Hair and nails were normal.  NEUROLOGIC: The patient was alert and oriented to person, place, time, and circumstance. Speech was " normal. Cranial nerves were normal. Motor strength was normal for age. The patient was normally coordinated.  PSYCHIATRIC:  Mood and affect were normal and the patient had normal recent and remote memory. The patient's judgment and insight were normal.       Additional Tests   Laboratory: 9/12/2017 shows hemoglobin 14.1, WBC 10.3, platelets 252.  No need to repeat.  Pending lab, basic metabolic panel.    Radiology:     Electrocardiogram: 11/2/2017, normal sinus rhythm normal ECG.    Total time spent with the patient today was 40 minutes of which > 50% was spent in counseling and coordination of care     Luis Enrique Coyle MD  Internal Medicine  Contact me at [phone]

## 2021-06-13 NOTE — TELEPHONE ENCOUNTER
Controlled Substance Refill Request  Medication Name:   Requested Prescriptions     Pending Prescriptions Disp Refills     zolpidem (AMBIEN) 5 MG tablet 30 tablet 0     Sig: Take 1 tablet (5 mg total) by mouth at bedtime as needed for sleep.     Date Last Fill: 02/07/2020  [facility]ed [facility]  Submit electronically to pharmacy  Controlled Substance Agreement on file:   Encounter-Level CSA Scan Date:    There are no encounter-level csa scan date.        Last office visit:  06/02/2020

## 2021-06-14 NOTE — PROGRESS NOTES
"Prolia Injection Phone Screen      Screening questions have been asked 2-3 days prior to administration visit for Prolia. If any questions are answered with \"Yes,\" this phone encounter were will routed to ordering provider for further evaluation.     1.  When was the last injection?  7/16/2020    2.  Has insurance for this injection been verified?  Yes    3.  Did you experience any new onset achiness or rashes that lasted for over a month with your previous Prolia injection?   No    4.  Do you have a fever over 101?F or a new deep cough that is unusual for you today? No    5.  Have you started any new medications in the last 6 months that you were told could affect your immune system? These may have been prescribed by oncologist, transplant, rheumatology, or dermatology.   No    6.  In the last 6 months have you have gastric bypass or parathyroid surgery?   No    7.  Do you plan dental work requiring drilling into the bone such as implants/extractions or oral surgery in the next 2-3 months?   No    8. Do you have new insurance since the last injection? No    Patient informed if symptoms discussed above present prior to their administration appointment, they are to notify clinic immediately.     Marianne Kamara     The following steps were completed to comply with the REMS program for Prolia:  1. Ordering provider has previously reviewed information in the Medication Guide and Patient Counseling Chart, including the serious risks of Prolia  and the symptoms of each risk and have been advised to seek prompt medical attention if they have signs or symptoms of any of the serious risks.  2. Provided each patient a copy of the Medication Guide and Patient Brochure.  See MAR for administration details.   Indication: Prolia  (denosumab) is a prescription medicine used to treat osteoporosis in patients who:   Are at high risk for fracture, meaning patients who have had a fracture related to osteoporosis, or who have " multiple risk factors for fracture; Cannot use another osteoporosis medicine or other osteoporosis medicines did not work well.   The timeline for early/late injections would be 4 weeks early and any time after the 6 month francine. If a patient receives their injection late, then the subsequent injection would be 6 months from the date that they actually received the injection    Have the screening questions been asked prior to this administration? Yes

## 2021-06-14 NOTE — PROGRESS NOTES
1. Osteoporosis         Return in about 6 months (around 6/7/2018) for Recheck.    Patient Instructions   Prolia 4th today.  Prolia 5th in 6 months with my nurse. I will see you in 1 year.  DXA in 2 years .   Phone number to schedule [phone].  Please avoid any extensive dental work as implants and teeth extractions for the next 1-2 months.  Daily calcium need is 7673-1958 mg a day from the diet and supplements.  Calcium citrate is easier to digest.  Vitamin D 2000 - 4000 IU daily recommended.      Chief Complaint   Patient presents with     Osteoporosis Follow Up       /80  Pulse 70  Wt 172 lb (78 kg)  BMI 28.19 kg/m2      Did you experience any problems with previous Prolia injection? no  Any medication change in the last 6 months? no  Did you take prednisone or other immunosupressant drugs in the last 6 months   (chemo, transplant, rheum, dermatology conditions)? no  Did you have any serious infection in the last 6 months?no  Any recent hospitalizations?no  Do you plan any dental work in the next 2-3 months?no  How much calcium do you take daily from the diet and supplements?1200 mg  How much vit D do you take daily? 2000 IU  Last DXA?done today, discussed and reviewed      Patient is here today for the 4th Prolia injection. Patient tolerated previous injections well, but her last Prolia dose was more than 1 year ago.   We discussed calcium and vit D daily needs today.   Next Prolia injection will be in 6 months.     15 minutes spent with the patient and more then 50 % of the time in counseling.  This note has been dictated using voice recognition software. Any grammatical or context distortions are unintentional and inherent to the software      Patient Active Problem List   Diagnosis     Esophageal Reflux     Osteoporosis     Infected Nonvenomous Insect Bite     Hyperlipidemia     Essential Hypertension     Limb Pain     Insomnia     Fatigue     Left Bicipital Tendonitis     Trochanteric Bursitis      Lumbar Spondylosis     Lumbar Disc Degeneration     Headache     Osteoarthritis of the Knee     Polyarthralgia       Current Outpatient Prescriptions on File Prior to Visit   Medication Sig Dispense Refill     CALCIUM CARBONATE/VITAMIN D3 (CALCIUM 500 + D ORAL) Take 1 tablet by mouth daily. 600/800       cholecalciferol, vitamin D3, 4,000 unit Tab Use 1,000 Units As Directed.        diclofenac (VOLTAREN) 75 MG EC tablet Take 1 tablet (75 mg total) by mouth daily. Can take up to twice daily (Patient taking differently: Take 75 mg by mouth daily as needed. Can take up to twice daily) 90 tablet 3     hydroCHLOROthiazide (HYDRODIURIL) 50 MG tablet TAKE 1/2 TABLET EVERY DAY (SUBSTITUTED FOR  HYDRODIURIL) 45 tablet 6     lisinopril (PRINIVIL,ZESTRIL) 20 MG tablet TAKE 1 TABLET EVERY DAY 90 tablet 3     OMEGA-3/DHA/EPA/FISH OIL (FISH OIL-OMEGA-3 FATTY ACIDS) 300-1,000 mg capsule Take by mouth daily.       zolpidem (AMBIEN) 5 MG tablet Take 1 tablet (5 mg total) by mouth at bedtime as needed for sleep. 30 tablet 0     magnesium 30 mg tablet Take 400 mg by mouth 2 (two) times a day.       METHYLCELLULOSE (CITRUCEL ORAL) Take 1 tablet by mouth daily.       Current Facility-Administered Medications on File Prior to Visit   Medication Dose Route Frequency Provider Last Rate Last Dose     denosumab 60 mg (PROLIA 60 mg/ml)  60 mg Subcutaneous Q6 Months Valentina Mclaughlin MD   60 mg at 03/22/16 0245

## 2021-06-15 NOTE — ANESTHESIA POSTPROCEDURE EVALUATION
Patient: Sheryl Trotter  LEFT TOTAL HIP ARTHROPLASTY  Anesthesia type: spinal    Patient location: pacu  Last vitals:   Vitals:    02/07/18 1413   BP: 120/62   Pulse: 84   Resp: 16   Temp: 36.3  C (97.4  F)   SpO2: 95%     Post vital signs: stable  Level of consciousness: awake and responds to simple questions  Post-anesthesia pain: pain controlled  Post-anesthesia nausea and vomiting: no  Pulmonary: unassisted, return to baseline  Cardiovascular: stable and blood pressure at baseline  Hydration: adequate  Anesthetic events: no    QCDR Measures:  ASA# 11 - Keely-op Cardiac Arrest: ASA11B - Patient did NOT experience unanticipated cardiac arrest  ASA# 12 - Keely-op Mortality Rate: ASA12B - Patient did NOT die  ASA# 13 - PACU Re-Intubation Rate: NA - No ETT / LMA used for case  ASA# 10 - Composite Anes Safety: ASA10A - No serious adverse event    Additional Notes:

## 2021-06-15 NOTE — ANESTHESIA CARE TRANSFER NOTE
Last vitals:   Vitals:    02/07/18 1246   BP: 119/62   Pulse: 94   Resp: 16   Temp: 36.4  C (97.5  F)   SpO2: 97%     Patient's level of consciousness is awake  Spontaneous respirations: yes  Maintains airway independently: yes  Dentition unchanged: yes  Oropharynx: oropharynx clear of all foreign objects    QCDR Measures:  ASA# 20 - Surgical Safety Checklist: WHO surgical safety checklist completed prior to induction  PQRS# 430 - Adult PONV Prevention: NA - Not adult patient, not GA or 3 or more risk factors NOT present  ASA# 8 - Peds PONV Prevention: NA - Not pediatric patient, not GA or 2 or more risk factors NOT present  PQRS# 424 - Keely-op Temp Management: 4559F - At least one body temp DOCUMENTED => 35.5C or 95.9F within required timeframe  PQRS# 426 - PACU Transfer Protocol: - Transfer of care checklist used  ASA# 14 - Acute Post-op Pain: ASA14B - Patient did NOT experience pain >= 7 out of 10

## 2021-06-15 NOTE — PROGRESS NOTES
Preoperative Exam    Scheduled Procedure:Left Total Hip Arthroplasty  Surgery Date:  2/7/18  Surgery Location: [facility], fax [phone]    Surgeon:  Dr. Saldivar    Assessment/Plan:     1. Preop exam for internal medicine  Okay to go ahead with  left hip replacement.  No absolute contraindication.    2. AVN (avascular necrosis of bone), left hip  Has AVN of the left hip.  Needing total left hip replacement.  Seen by Ortho, Dr. Rincon  and recommended total hip replacement.    3. Osteoarthritis of the left hip  Suffers from left hip pains.  In the last 4 months pains got more intense as the day goes.  Found to have end-stage osteoarthritis complicated by AVN.  Walks with a limp and uses a cane to ambulate.    4. Essential hypertension  Controlled.  Will take her hydrochlorothiazide and lisinopril the morning of surgery with sips of water.  - Basic Metabolic Panel  - HM2(CBC w/o Differential)     Surgical Procedure Risk: Low (reported cardiac risk generally < 1%).  Calculated pre-operative cardiovascular risk is 0.4% using revised cardiac risk index (Jay criteria).  Have you had prior anesthesia?: Yes  Have you or any family members had a previous anesthesia reaction:  No  Do you or any family members have a history of a clotting or bleeding disorder?: No  Cardiac Risk Assessment: no increased risk for major cardiac complications    Patient approved for surgery with general or local anesthesia.        Functional Status: Independent  Patient plans to recover at home with family.     Subjective:      Sheryl Trotter is a 77 y.o. female who presents for a preoperative consultation.  Found to have end-stage left hip osteoarthritis in the setting of left hip AVN.  Saw orthopedist, Dr. Rincon.  Was recommended to undergo total left hip replacement.  Scheduled on 2/7/2018 at [facility].  Has hypertension well controlled by combination of lisinopril and hydrochlorothiazide.  Denies chest pain  and shortness of  breath.  Denies urinary and bowel symptoms.  Does not have dizziness and lightheadedness.  Does not have any other complaints except for her persisting and increasing left hip pains.  Walks with a limp.  Ambulates using a cane.  Tolerated previous surgeries including hysterectomy, appendectomy, cholecystectomy and recent right foot surgery without untoward events or complications.    All other systems reviewed and are negative, other than those listed in the HPI.    Pertinent History  Do you have difficulty breathing or chest pain after walking up a flight of stairs: No  History of obstructive sleep apnea: Yes: CPAP machine at night  Steroid use in the last 6 months: Yes: Cortizone Shot in Hip in Jan 2018  Frequent Aspirin/NSAID use: Yes: Tylenol 3, TID   Prior Blood Transfusion: No  Prior Blood Transfusion Reaction: No  If for some reason prior to, during or after the procedure, if it is medically indicated, would you be willing to have a blood transfusion?:  There is no transfusion refusal.    Current Outpatient Prescriptions   Medication Sig Dispense Refill     acetaminophen-codeine (TYLENOL #3) 300-30 mg per tablet Take 1 tablet by mouth every 4 (four) hours as needed for pain. 60 tablet 0     CALCIUM CARBONATE/VITAMIN D3 (CALCIUM 500 + D ORAL) Take 1 tablet by mouth daily. 600/800       cholecalciferol, vitamin D3, 4,000 unit Tab Use 1,000 Units As Directed.        diclofenac (VOLTAREN) 75 MG EC tablet Take 1 tablet (75 mg total) by mouth daily. Can take up to twice daily (Patient taking differently: Take 75 mg by mouth daily as needed. Can take up to twice daily) 90 tablet 3     hydroCHLOROthiazide (HYDRODIURIL) 50 MG tablet TAKE 1/2 TABLET EVERY DAY (SUBSTITUTED FOR  HYDRODIURIL) 45 tablet 6     lisinopril (PRINIVIL,ZESTRIL) 20 MG tablet TAKE 1 TABLET EVERY DAY 90 tablet 3     METHYLCELLULOSE (CITRUCEL ORAL) Take 1 tablet by mouth daily.       zolpidem (AMBIEN) 5 MG tablet Take 1 tablet (5 mg total) by mouth  at bedtime as needed for sleep. 30 tablet 0     Current Facility-Administered Medications   Medication Dose Route Frequency Provider Last Rate Last Dose     denosumab 60 mg (PROLIA 60 mg/ml)  60 mg Subcutaneous Q6 Months Valentina Mclaughlin MD   60 mg at 03/22/16 1334        No Known Allergies    Patient Active Problem List   Diagnosis     Esophageal Reflux     Osteoporosis     Infected Nonvenomous Insect Bite     Hyperlipidemia     Essential Hypertension     Limb Pain     Insomnia     Fatigue     Left Bicipital Tendonitis     Trochanteric Bursitis     Lumbar Spondylosis     Lumbar Disc Degeneration     Headache     Osteoarthritis of the Knee     Polyarthralgia       Past Medical History:   Diagnosis Date     Hypertension      Osteoporosis        Past Surgical History:   Procedure Laterality Date     HYSTERECTOMY  Mid 1990's     OOPHORECTOMY  Mid 1990's     WA APPENDECTOMY      Description: Appendectomy;  Recorded: 01/26/2011;     WA REMOVAL GALLBLADDER      Description: Cholecystectomy;  Recorded: 01/26/2011;     WA TOTAL ABDOM HYSTERECTOMY      Description: Total Abdominal Hysterectomy;  Recorded: 01/26/2011;  Comments: 1999 for heavy bleeding.       Social History     Social History     Marital status:      Spouse name: N/A     Number of children: N/A     Years of education: N/A     Occupational History     Not on file.     Social History Main Topics     Smoking status: Never Smoker     Smokeless tobacco: Never Used      Comment: smoking in college     Alcohol use 2.0 oz/week     4 Standard drinks or equivalent per week     Drug use: No     Sexual activity: Not on file     Other Topics Concern     Not on file     Social History Narrative    . 3 grown children, 2 daughters and 1 son. Nonsmoker. Socially drinks alcohol. Speech pathologist.        Patient Care Team:  Luis Enrique Coyle MD as PCP - General  Jayjay Puckett MD as Physician (Ophthalmology)  FARA Pina as Physician  "(Rheumatology)  Daron Rincon MD as Physician (Orthopedic Surgery)          Objective:     Vitals:    02/05/18 1425   BP: 124/80   Pulse: 77   SpO2: 98%   Weight: 177 lb (80.3 kg)   Height: 5' 5.5\" (1.664 m)         Physical Exam:  Physical Examination: General appearance - alert, well appearing, and in no distress and oriented to person, place, and time  Mental status - normal mood, behavior, speech, dress, motor activity, and thought processes, affect appropriate to mood  Neck - supple, no significant adenopathy, thyroid exam: thyroid is normal in size without nodules or tenderness  Chest - clear to auscultation, no wheezes, rales or rhonchi, symmetric air entry  Heart - normal rate, regular rhythm, normal S1, S2, no murmurs, rubs, clicks or gallops  Abdomen - soft, nontender, nondistended, no masses or organomegaly  Neurological - screening mental status exam normal, motor and sensory grossly normal bilaterally  Musculoskeletal -left hip tender to palpation with limited range of motion  Extremities - peripheral pulses normal, no pedal edema, no clubbing or cyanosis  Skin - normal coloration and turgor, no rashes, no suspicious skin lesions noted    There are no Patient Instructions on file for this visit.    EKG: Normal sinus rhythm, within normal limits, 11/2/2017    Labs:  Labs pending at this time.  Results will be reviewed when available.     Immunization History   Administered Date(s) Administered     DT (pediatric) 02/01/1997     Hep A, historic 11/01/2007, 12/04/2008     Hep B, historic 11/01/2007, 01/10/2008, 06/17/2008     Influenza Z7d4-02, 01/18/2010     Influenza, inj, historic,unspecified 11/01/2007, 11/19/2008     Influenza, seasonal,quad inj 6-35 mos 10/17/2013     Pneumo Conj 13-V (2010&after) 10/13/2015     Pneumo Polysac 23-V 10/03/2007     Td,adult,historic,unspecified 11/01/2007     Tdap 11/01/2007     ZOSTER 11/19/2008           Electronically signed by Luis Enrique Coyle MD 02/05/18 2:28 " PM

## 2021-06-15 NOTE — ANESTHESIA PROCEDURE NOTES
Spinal Block    Patient location during procedure: OR  Start time: 2/7/2018 10:50 AM  End time: 2/7/2018 10:51 AM  Reason for block: at surgeon's request and primary anesthetic    Staffing:  Performing  Anesthesiologist: DEISI CLEMENS    Preanesthetic Checklist  Completed: patient identified, risks, benefits, and alternatives discussed, timeout performed, consent obtained, at patient's request, airway assessed, oxygen available, suction available, emergency drugs available and hand hygiene performed  Spinal Block  Patient position: sitting  Prep: ChloraPrep  Patient monitoring: heart rate, cardiac monitor, continuous pulse ox and blood pressure  Approach: midline  Location: L2-3  Injection technique: single-shot  Needle type: pencil-tip   Needle gauge: 24 G      Additional Notes:  No issues.  Good block

## 2021-06-15 NOTE — ANESTHESIA PREPROCEDURE EVALUATION
Anesthesia Evaluation      Patient summary reviewed   No history of anesthetic complications     Airway   Mallampati: III  Neck ROM: full   Pulmonary - normal exam    breath sounds clear to auscultation  (+) sleep apnea on CPAP, ,                          Cardiovascular - normal exam  Exercise tolerance: > or = 4 METS  (+) hypertension, ,     ECG reviewed  Rhythm: regular  Rate: normal,         Neuro/Psych    (+) neuromuscular disease,      Endo/Other    (+) arthritis,      GI/Hepatic/Renal    (+) GERD,             Dental    (+) poor dentition and chipped                       Anesthesia Plan  Planned anesthetic: spinal  SAB with isobaric bupivacaine.  Ultrasound guided nerve block per surgeon request for post op analgesia.    ASA 3     Anesthetic plan and risks discussed with: patient  Anesthesia plan special considerations: antiemetics,   Post-op plan: routine recovery

## 2021-06-16 PROBLEM — M25.511 BILATERAL SHOULDER PAIN: Status: ACTIVE | Noted: 2018-10-02

## 2021-06-16 PROBLEM — Z96.642 STATUS POST LEFT HIP REPLACEMENT: Status: ACTIVE | Noted: 2018-02-07

## 2021-06-16 PROBLEM — M35.3 POLYMYALGIA (H): Status: ACTIVE | Noted: 2019-01-09

## 2021-06-16 PROBLEM — M15.0 PRIMARY OSTEOARTHRITIS INVOLVING MULTIPLE JOINTS: Status: ACTIVE | Noted: 2019-01-09

## 2021-06-16 PROBLEM — M25.50 POLYARTHRALGIA: Status: ACTIVE | Noted: 2017-09-12

## 2021-06-16 PROBLEM — S73.005A HIP DISLOCATION, LEFT (H): Status: ACTIVE | Noted: 2018-03-28

## 2021-06-16 PROBLEM — M11.20 CHONDROCALCINOSIS: Status: ACTIVE | Noted: 2019-02-14

## 2021-06-16 PROBLEM — M25.512 BILATERAL SHOULDER PAIN: Status: ACTIVE | Noted: 2018-10-02

## 2021-06-17 NOTE — ANESTHESIA POSTPROCEDURE EVALUATION
Patient: Sheryl Trotter  OPEN REDUCTION OF DISLOCATED LEFT TOTAL HIP  Anesthesia type: spinal    Patient location: PACU  Last vitals:   Vitals:    03/28/18 1615   BP: 110/53   Pulse: 93   Resp: 15   Temp:    SpO2: 98%     Post vital signs: stable  Level of consciousness: awake, alert and oriented  Post-anesthesia pain: pain controlled  Post-anesthesia nausea and vomiting: no  Pulmonary: unassisted, return to baseline  Cardiovascular: stable and blood pressure at baseline  Hydration: adequate  Anesthetic events: no    QCDR Measures:  ASA# 11 - Keely-op Cardiac Arrest: ASA11B - Patient did NOT experience unanticipated cardiac arrest  ASA# 12 - Keely-op Mortality Rate: ASA12B - Patient did NOT die  ASA# 13 - PACU Re-Intubation Rate: NA - No ETT / LMA used for case  ASA# 10 - Composite Anes Safety: ASA10A - No serious adverse event    Additional Notes:

## 2021-06-17 NOTE — ANESTHESIA PROCEDURE NOTES
Spinal Block    Patient location during procedure: OR  Start time: 3/28/2018 1:47 PM  End time: 3/28/2018 1:51 PM  Reason for block: primary anesthetic    Staffing:  Performing  Anesthesiologist: RENETTA COBB    Preanesthetic Checklist  Completed: patient identified, risks, benefits, and alternatives discussed, timeout performed, consent obtained, airway assessed, oxygen available, suction available, emergency drugs available and hand hygiene performed  Spinal Block  Patient position: sitting  Prep: ChloraPrep and site prepped and draped  Patient monitoring: heart rate, continuous pulse ox and blood pressure  Approach: midline  Location: L2-3  Injection technique: single-shot  Needle type: pencil-tip   Needle gauge: 24 G

## 2021-06-17 NOTE — ANESTHESIA CARE TRANSFER NOTE
Last vitals:   Vitals:    03/28/18 1549   BP: 98/47   Pulse: 90   Resp: 16   Temp: 36.6  C (97.8  F)   SpO2: 100%     Patient's level of consciousness is awake  Spontaneous respirations: yes  Maintains airway independently: yes  Dentition unchanged: yes  Oropharynx: oropharynx clear of all foreign objects    QCDR Measures:  ASA# 20 - Surgical Safety Checklist: WHO surgical safety checklist completed prior to induction  PQRS# 430 - Adult PONV Prevention: 4558F - Pt received => 2 anti-emetic agents (different classes) preop & intraop  ASA# 8 - Peds PONV Prevention: NA - Not pediatric patient, not GA or 2 or more risk factors NOT present  PQRS# 424 - Keely-op Temp Management: 4559F - At least one body temp DOCUMENTED => 35.5C or 95.9F within required timeframe  PQRS# 426 - PACU Transfer Protocol: - Transfer of care checklist used  ASA# 14 - Acute Post-op Pain: ASA14B - Patient did NOT experience pain >= 7 out of 10

## 2021-06-17 NOTE — PATIENT INSTRUCTIONS - HE
Patient Instructions by Luis Enrique Coyle MD at 7/24/2019 11:20 AM     Author: Luis Enrique Coyle MD Service: -- Author Type: Physician    Filed: 7/24/2019 11:28 AM Encounter Date: 7/24/2019 Status: Signed    : Luis Enrique Coyle MD (Physician)         Patient Education     Exercise for a Healthier Heart  You may wonder how you can improve the health of your heart. If youre thinking about exercise, youre on the right track. You dont need to become an athlete, but you do need a certain amount of brisk exercise to help strengthen your heart. If you have been diagnosed with a heart condition, your doctor may recommend exercise to help stabilize your condition. To help make exercise a habit, choose safe, fun activities.       Be sure to check with your health care provider before starting an exercise program.    Why exercise?  Exercising regularly offers many healthy rewards. It can help you do all of the following:    Improve your blood cholesterol levels to help prevent further heart trouble    Lower your blood pressure to help prevent a stroke or heart attack    Control diabetes, or reduce your risk of getting this disease    Improve your heart and lung function    Reach and maintain a healthy weight    Make your muscles stronger and more limber so you can stay active    Prevent falls and fractures by slowing the loss of bone mass (osteoporosis)    Manage stress better  Exercise tips  Ease into your routine. Set small goals. Then build on them.  Exercise on most days. Aim for a total of 150 or more minutes of moderate to  vigorous intensity activity each week. Consider 40 minutes, 3 to 4 times a week. For best results, activity should last for 40 minutes on average. It is OK to work up to the 40 minute period over time. Examples of moderate-intensity activity is walking one mile in 15 minutes or 30 to 45 minutes of yard work.  Step up your daily activity level. Along with your exercise program, try being  more active throughout the day. Walk instead of drive. Do more household tasks or yard work.  Choose one or more activities you enjoy. Walking is one of the easiest things you can do. You can also try swimming, riding a bike, or taking an exercise class.  Stop exercising and call your doctor if you:    Have chest pain or feel dizzy or lightheaded    Feel burning, tightness, pressure, or heaviness in your chest, neck, shoulders, back, or arms    Have unusual shortness of breath    Have increased joint or muscle pain    Have palpitations or an irregular heartbeat      [id number] [employer]. [address] [place] [id number]. All rights reserved. This information is not intended as a substitute for professional medical care. Always follow your healthcare professional's instructions.           Advance Directive  Patients advance directive was discussed and I am comfortable with the patients wishes.  Patient Education   Personalized Prevention Plan  You are due for the preventive services outlined below.  Your care team is available to assist you in scheduling these services.  If you have already completed any of these items, please share that information with your care team to update in your medical record.  Health Maintenance   Topic Date Due   ? TD 18+ HE  11/01/2017   ? MEDICARE ANNUAL WELLNESS VISIT  05/30/2019   ? INFLUENZA VACCINE RULE BASED (1) 08/01/2019   ? ZOSTER VACCINES (3 of 3) 09/10/2019   ? DXA SCAN  12/07/2019   ? FALL RISK ASSESSMENT  07/16/2020   ? ADVANCE CARE PLANNING  05/30/2023   ? PNEUMOCOCCAL POLYSACCHARIDE VACCINE AGE 65 AND OVER  Completed   ? PNEUMOCOCCAL CONJUGATE VACCINE FOR ADULTS (PCV13 OR PREVNAR)  Completed

## 2021-06-17 NOTE — TELEPHONE ENCOUNTER
Last seen:  10/28/2019    zolpidem (AMBIEN) 5 MG tablet 30 tablet 0 11/10/2020       Pt has not been seen in over 1 year - appt scheduled for follow up- waiting on confirmation from Patient

## 2021-06-18 NOTE — PROGRESS NOTES
Assessment and Plan:     1. Routine general medical examination at a health care facility  Informed and advised her comprehensive physical exam is normal.    2. Hyperlipidemia  Controlled without need for lipid-lowering medication.  Has not been checked for her fasting lipids since 5/27/2017.  Check fasting lipids, TSH and liver function.  - Lipid Cascade  - Thyroid Stimulating Hormone (TSH)  - Hepatic Profile    3. Essential hypertension  Controlled.  Continue hydrochlorothiazide and lisinopril.  Check urinalysis, CBC and basic metabolic panel.  - Urinalysis-UC if Indicated  - HM2(CBC w/o Differential)  - Basic Metabolic Panel    4. Status post left hip replacement for avascular necrosis, 2/7/18.  Underwent left hip replacement for avascular necrosis on 2/7/2018.  Post surgery had continued left hip pains which turned out to be dislocation of the prosthetic left hip.  Underwent another surgery to correct this.    5. Hip dislocation, left s/p open reduction and repair 3/28/18  Underwent left prosthetic hip open reduction and repair for left hip dislocation on 3/20/2018.  Still continuing to get physical therapy for her left hip 2 times a week at [facility].  Also does her own left hip exercises at home.    6. Lipoma of torso  Has lipoma of the left upper back.  Reassured her this is a  benign  condition.    7. KASANDRA on CPAP  Now sleeps better with CPAP.  Takes Ambien as needed.  Sleeps about 7 hours a night.      8. Osteoporosis  Has osteoporosis.  Gets Prolia every 6 months.  Check vitamin D.  - Vitamin D, Total (25-Hydroxy)     The patient's current medical problems were reviewed.    I have had an Advance Directives discussion with the patient.  The following health maintenance schedule was reviewed with the patient and provided in printed form in the after visit summary:   Health Maintenance   Topic Date Due     TD 18+ HE  11/01/2017     FALL RISK ASSESSMENT  05/25/2018     COLONOSCOPY  12/03/2019     DXA SCAN   12/07/2019     ADVANCE DIRECTIVES DISCUSSED WITH PATIENT  05/25/2022     PNEUMOCOCCAL POLYSACCHARIDE VACCINE AGE 65 AND OVER  Completed     INFLUENZA VACCINE RULE BASED  Completed     PNEUMOCOCCAL CONJUGATE VACCINE FOR ADULTS (PCV13 OR PREVNAR)  Completed     ZOSTER VACCINE  Completed        Subjective:   Chief Complaint: Sheryl Trotter is an 77 y.o. female here for an Annual Wellness visit.   HPI: 77-year-old female here for annual wellness visit and physical exam.  Doing well.  Only complains of some left hip pains and stiffness.  Status post left hip replacement in February 2018 for avascular necrosis.  Had complication of left hip prosthetic dislocation post surgery.  Was not picked up right away so suffered left hip pains for almost 6 weeks.  Underwent another surgery in March 2018 for correction  or repair.  Still continuing to get physical therapy 2 times a week at [facility].  Also does her own left hip exercises at home.  Has hypertension controlled by combination of hydrochlorothiazide and lisinopril.  Has hyperlipidemia, diet-controlled.  Has not been checked for her fasting lipids since May 2017.  Has a lump on her left upper back which is a lipoma.  Found to have obstructive sleep apnea a year ago and now on CPAP.  Reports she is sleeping better.  There are still times she does not fall asleep readily so she takes zolpidem as needed.  Wakes up twice to void.  But able to fall back to sleep again.  Has osteoporosis.  Gets Prolia injection twice a year.  Sees her eye doctor once a year.  Scheduled to see her in a few weeks.  Hears well.  Denies chest pains and shortness of breath.  Denies urinary and bowel symptoms.  Has not done her regular walking exercise because of her recent left hip problem.  But only gained 2 pounds since her last visit.    Review of Systems: Aside from those mentioned in history of present illness, the rest of the review of systems are negative for all systems.    Patient Care  Team:  Luis Enrique Coyle MD as PCP - General  FARA Pina as Physician (Rheumatology)  Anshul Martinez MD as Physician (Orthopedic Surgery)  Daron Rincon MD as Physician (Orthopedic Surgery)  Babita Rojas MD as Physician (Ophthalmology)  Denita Cottrell MD as Physician (Internal Medicine)     Patient Active Problem List   Diagnosis     Esophageal Reflux     Osteoporosis     Hyperlipidemia     Essential hypertension     Limb Pain     Primary insomnia     Trochanteric Bursitis     Lumbar Spondylosis     Lumbar Disc Degeneration     Headache     Osteoarthritis of the Knee     Polyarthralgia     Status post left hip replacement     Hip dislocation, left     Acute blood loss as cause of postoperative anemia     Past Medical History:   Diagnosis Date     Arthritis      AVN (avascular necrosis of bone)     let hip     DDD (degenerative disc disease), lumbar      GERD (gastroesophageal reflux disease)      HLD (hyperlipidemia)      Hypertension      Insomnia      Osteoporosis      PONV (postoperative nausea and vomiting)      Sleep apnea     cpap      Past Surgical History:   Procedure Laterality Date     APPENDECTOMY       CHOLECYSTECTOMY       HIP PINNING Left 3/28/2018    Procedure: OPEN REDUCTION OF DISLOCATED LEFT TOTAL HIP;  Surgeon: Daron Rincon MD;  Location: [facility] Main OR;  Service:      HYSTERECTOMY  Mid 1990's     OOPHORECTOMY  Mid 1990's     WA TOTAL HIP ARTHROPLASTY Left 2/7/2018    Procedure: LEFT TOTAL HIP ARTHROPLASTY;  Surgeon: Daron Rincon MD;  Location: [facility] Main OR;  Service: Orthopedics     right foot surgery        Family History   Problem Relation Age of Onset     Lymphoma Father 28     Hodgkins     Colon cancer Maternal Grandmother      Vaginal cancer Maternal Aunt      Sleep apnea Son       Social History     Social History     Marital status:      Spouse name: N/A     Number of children: N/A     Years of education: N/A     Occupational History     Not on file.  "    Social History Main Topics     Smoking status: Former Smoker     Years: 4.00     Types: Cigarettes     Smokeless tobacco: Never Used      Comment: smoking in college-social     Alcohol use 2.0 oz/week     4 Standard drinks or equivalent per week      Comment: per week     Drug use: No     Sexual activity: Not on file     Other Topics Concern     Not on file     Social History Narrative    . 3 grown children, 2 daughters and 1 son. Nonsmoker. Socially drinks alcohol. Speech pathologist.       Current Outpatient Prescriptions   Medication Sig Dispense Refill     calcium carbonate-vitamin D3 600 mg (1,500 mg)-800 unit Chew Chew 1 tablet daily.       cholecalciferol, vitamin D3, 4,000 unit Tab Take 4,000 Units by mouth daily.        hydroCHLOROthiazide (HYDRODIURIL) 50 MG tablet TAKE 1/2 TABLET EVERY DAY (SUBSTITUTED FOR  HYDRODIURIL) 45 tablet 6     lisinopril (PRINIVIL,ZESTRIL) 20 MG tablet Take 20 mg by mouth daily.       METHYLCELLULOSE (CITRUCEL ORAL) Take 1 tablet by mouth daily.       zolpidem (AMBIEN) 5 MG tablet Take 5 mg by mouth at bedtime as needed for sleep.       Current Facility-Administered Medications   Medication Dose Route Frequency Provider Last Rate Last Dose     denosumab 60 mg (PROLIA 60 mg/ml)  60 mg Subcutaneous Q6 Months Valentina Mclaughlin MD   60 mg at 03/22/16 1334     denosumab 60 mg (PROLIA 60 mg/ml)  60 mg Subcutaneous Q6 Months Denita Cottrell MD          Objective:   Vital Signs:   Visit Vitals     /70     Pulse 71     Ht 5' 6\" (1.676 m)     Wt 174 lb (78.9 kg)     SpO2 99%     BMI 28.08 kg/m2        VisionScreening:  No exam data present     PHYSICAL EXAM  /70  Pulse 71  Ht 5' 6\" (1.676 m)  Wt 174 lb (78.9 kg)  SpO2 99%  BMI 28.08 kg/m2    General Appearance:    Alert, cooperative, no distress, appears stated age, pleasant   Head:    Normocephalic, without obvious abnormality, atraumatic   Eyes:    PERRL, conjunctiva/corneas clear, EOM's intact, fundi     " benign, both eyes   Ears:    Normal TM's and external ear canals, both ears   Nose:   Nares normal, septum midline, mucosa normal, no drainage     or sinus tenderness   Throat:   Lips, mucosa, and tongue normal; teeth and gums normal   Neck:   Supple, symmetrical, trachea midline, no adenopathy;     thyroid:  no enlargement/tenderness/nodules; no carotid    bruit or JVD   Back:     Symmetric, no curvature, ROM normal, no CVA tenderness   Lungs:     Clear to auscultation bilaterally, respirations unlabored   Chest Wall:    No tenderness or deformity    Heart:    Regular rate and rhythm, S1 and S2 normal, no murmur, rub    or gallop   Breast Exam:    deferred   Abdomen:     Soft, non-tender, bowel sounds active all four quadrants,     no masses, no organomegaly   Genitalia:    deferred   Rectal:    deferredl   Extremities:   Extremities normal, atraumatic, no cyanosis or edema   Pulses:   2+ and symmetric all extremities   Skin:   Skin color, texture, turgor normal, no rashes or lesions   Lymph nodes:   Cervical, supraclavicular, and axillary nodes normal   Neurologic:   CNII-XII intact, normal strength, sensation and reflexes     throughout       Assessment Results 5/30/2018   Activities of Daily Living No help needed   Instrumental Activities of Daily Living No help needed   Get Up and Go Score Less than 12 seconds   Mini Cog Total Score 5   Some recent data might be hidden     A Mini-Cog score of 0-2 suggests the possibility of dementia, score of 3-5 suggests no dementia    Identified Health Risks:     She is at risk for lack of exercise and has been provided with information to increase physical activity for the benefit of her well-being.  Patient's advanced directive was discussed and I am comfortable with the patient's wishes.

## 2021-06-18 NOTE — PROGRESS NOTES
1. Did you experience any problems with previous Prolia injection? no  2. Do you feel sick today?(fever, RS, GI,  issues)? no  3. Any medication changes in the last 6 months? no  4. Did you take prednisone or other immunosuppressant drugs in the last 6 months?(chemo, transplant, rheu, dermatology conditions)? no  5. Did you have any serious infection in the last 6 months? (pancreatitis) no  6. Any recent hospitalizations/ surgeries (especially gastric bypass, thyroid, parathyroid)? 2 surgeries   7. Do you plan any dental work?(especially implants and extractions) in the next 2-3 months? no  8. Did you have any fractures in the last year? no

## 2021-06-19 NOTE — LETTER
Letter by Luis Enrique Coyle MD at      Author: Luis Enrique Coyle MD Service: -- Author Type: --    Filed:  Encounter Date: 7/25/2019 Status: (Other)         Sheryl Trotter  [id number]  [place] [address]             July 25, 2019         Dear Ms. Trotter,    Below are the results from your recent visit:    Resulted Orders   HM2(CBC w/o Differential)   Result Value Ref Range    WBC 7.5 4.0 - 11.0 thou/uL    RBC 4.92 3.80 - 5.40 mill/uL    Hemoglobin 14.7 12.0 - 16.0 g/dL    Hematocrit 43.9 35.0 - 47.0 %    MCV 89 80 - 100 fL    MCH 29.9 27.0 - 34.0 pg    MCHC 33.5 32.0 - 36.0 g/dL    RDW 11.9 11.0 - 14.5 %    Platelets 274 140 - 440 thou/uL    MPV 8.8 7.0 - 10.0 fL   Lipid Cascade   Result Value Ref Range    Cholesterol 265 (H) <=199 mg/dL    Triglycerides 43 <=149 mg/dL    HDL Cholesterol 115 >=50 mg/dL    LDL Calculated 141 (H) <=129 mg/dL    Patient Fasting > 8hrs? Yes    Thyroid Stimulating Hormone (TSH)   Result Value Ref Range    TSH 0.74 0.30 - 5.00 uIU/mL   Urinalysis-UC if Indicated   Result Value Ref Range    Color, UA Yellow Colorless, Yellow, Straw, Light Yellow    Clarity, UA Clear Clear    Glucose, UA Negative Negative    Bilirubin, UA Negative Negative    Ketones, UA Negative Negative    Specific Gravity, UA 1.010 1.005 - 1.030    Blood, UA Negative Negative    pH, UA 7.0 5.0 - 8.0    Protein, UA Negative Negative mg/dL    Urobilinogen, UA 0.2 E.U./dL 0.2 E.U./dL, 1.0 E.U./dL    Nitrite, UA Negative Negative    Leukocytes, UA Negative Negative    Narrative    Microscopic not indicated  UC not indicated   Basic Metabolic Panel   Result Value Ref Range    Sodium 136 136 - 145 mmol/L    Potassium 4.0 3.5 - 5.0 mmol/L    Chloride 100 98 - 107 mmol/L    CO2 26 22 - 31 mmol/L    Anion Gap, Calculation 10 5 - 18 mmol/L    Glucose 96 70 - 125 mg/dL    Calcium 9.7 8.5 - 10.5 mg/dL    BUN 12 8 - 28 mg/dL    Creatinine 0.70 0.60 - 1.10 mg/dL    GFR MDRD Af Amer >60 >60 mL/min/1.73m2    GFR  MDRD Non Af Amer >60 >60 mL/min/1.73m2    Narrative    Fasting Glucose reference range is 70-99 mg/dL per  American Diabetes Association (ADA) guidelines.   Hepatic Profile   Result Value Ref Range    Bilirubin, Total 0.4 0.0 - 1.0 mg/dL    Bilirubin, Direct 0.2 <=0.5 mg/dL    Protein, Total 6.7 6.0 - 8.0 g/dL    Albumin 4.0 3.5 - 5.0 g/dL    Alkaline Phosphatase 63 45 - 120 U/L    AST 17 0 - 40 U/L    ALT 16 0 - 45 U/L   APTT(PTT)   Result Value Ref Range    PTT 27 24 - 37 seconds   INR   Result Value Ref Range    INR 0.96 0.90 - 1.10    Narrative    INR Therapeutic Ranges:  Mech. Valve 2.5-3.5  Post Surg.  2.0-3.0  DVT/PE      2.0-3.0   Vitamin D, Total (25-Hydroxy)   Result Value Ref Range    Vitamin D, Total (25-Hydroxy) 44.0 30.0 - 80.0 ng/mL    Narrative    Deficiency <10.0 ng/mL  Insufficiency 10.0-29.9 ng/mL  Sufficiency 30.0-80.0 ng/mL  Toxicity (possible) >100.0 ng/mL       Normal coagulation markers, PTT and INR, normal platelets.  Hence, your easy bruising is due to your fragile and thin skin and not due to a blood disorder.    Normal lipids and normal all other labs.  Good!.  Continue same medications.  Thank you.    Please call with questions or contact us using [employer].    Sincerely,        Electronically signed by Luis Enrique Coyle MD

## 2021-06-20 ENCOUNTER — HEALTH MAINTENANCE LETTER (OUTPATIENT)
Age: 81
End: 2021-06-20

## 2021-06-20 NOTE — PROGRESS NOTES
Office Visit - Follow Up   Sheryl Trotter   78 y.o. female    Date of Visit: 10/9/2018    Chief Complaint   Patient presents with     Follow-up     saw Dr. Moody on 10/2 wants to discuss blood work from that visit. Wants siliac blood test, and lymes test done.     Flu Vaccine        Assessment and Plan   1. Polyarthralgia  Has polyarthralgia involving shoulder thighs knees and B joints.  Also  has accompanying stiffness and weakness of her thighs and shoulders.  Followed by rheumatology.  Saw Dr. small on  10/2/2018.  Wondering if Lyme disease and celiac disease are causing her aches and pains.  Will check her Lyme titer TTG.  - Lyme Antibody Cascade  - Tissue Transglutaminase,IgA & IgG    2. Primary osteoarthritis involving multiple joints  Was worked up by rheumatology with normal sed rate, rheumatoid factor, CK with just mildly elevated CRP.  Advised she has primary osteoarthritis involving multiple joints.  Will try Celebrex daily and supplement with Tylenol 3 as needed for pain control  - celecoxib (CELEBREX) 200 MG capsule; Take 1 capsule (200 mg total) by mouth daily.  Dispense: 30 capsule; Refill: 2  - acetaminophen-codeine (TYLENOL-CODEINE #3) 300-30 mg per tablet; Take 1 tablet by mouth every 4 (four) hours as needed for pain.  Dispense: 30 tablet; Refill: 0    3. Essential hypertension  Controlled.  Continue hydrochlorothiazide and lisinopril.    4. Flu vaccine need  Flu shot was given.  - Influenza High Dose, Seasonal    Reviewed rheumatology notes and recommendation by Dr. Moody.  Reviewed also all her labs done by rheumatology.  Discussed all these with the patient.      Follow up in in 2 weeks.     History of Present Illness   This 78 y.o. old female is here for follow-up.  Complains of polyarthralgia and polyarthritis.  Followed by rheumatology.  Was worked up.  Had negative sed rate, rheumatoid factor, CK and normal uric acid.  Had mildly elevated CRP to 1.9.  Also had an x-ray of her  shoulder showing mild degenerative disease on the right shoulder and left shoulder with punctate calcification of the left shoulder suggesting tendinitis and periarthritis.  Was advised  her polyarthritis is due to osteoarthritis.  Was recommended to get a cortisone shot to her shoulder.  Unable to take oral steroid because of her previous history of avascular necrosis of the hip which was thought to be caused by oral steroid.  Has some stiffness of the joints and weakness of  her shoulder and  thighs accompanying her aches and pains.  Concerned with possibility of recurrence of her PMR which she had in the past.  Wants to be tested for celiac and Lyme diseases.  Has hypertension controlled by hydrochlorothiazide and lisinopril.    Review of Systems   A 12 point comprehensive review of systems was negative except as noted..     Medications, Allergies and Problem List   Reviewed and updated             Chief Complaint   Follow-up (saw Dr. Moody on 10/2 wants to discuss blood work from that visit. Wants siliac blood test, and lymes test done.) and Flu Vaccine       Patient Profile   Social History     Social History Narrative    . 3 grown children, 2 daughters and 1 son. 7 grandchildren. Did speech path.  Nonsmoker. Socially drinks alcohol.          Past Medical History   Patient Active Problem List   Diagnosis     Osteoporosis     Hyperlipidemia     Essential hypertension     Primary insomnia     Trochanteric Bursitis     Lumbar Spondylosis     Lumbar Disc Degeneration     Headache     Osteoarthritis of the Knee     Polyarthralgia     Status post left hip replacement     Hip dislocation, left (H)     Bilateral shoulder pain       Past Surgical History  She has a past surgical history that includes Oophorectomy (Mid 1990's); Appendectomy; Cholecystectomy; right foot surgery; pr total hip arthroplasty (Left, 2/7/2018); Hip pinning (Left, 3/28/2018); and Hysterectomy (Mid 1990's).       Medications and Allergies  "  Current Outpatient Prescriptions   Medication Sig     calcium carbonate-vitamin D3 600 mg (1,500 mg)-800 unit Chew Chew 1 tablet daily.     cholecalciferol, vitamin D3, 4,000 unit Tab Take 4,000 Units by mouth daily.      hydroCHLOROthiazide (HYDRODIURIL) 50 MG tablet TAKE 1/2 TABLET EVERY DAY (SUBSTITUTED FOR  HYDRODIURIL)     lisinopril (PRINIVIL,ZESTRIL) 20 MG tablet TAKE 1 TABLET EVERY DAY     METHYLCELLULOSE (CITRUCEL ORAL) Take 1 tablet by mouth daily.     zolpidem (AMBIEN) 5 MG tablet Take 1 tablet (5 mg total) by mouth at bedtime as needed for sleep.     acetaminophen-codeine (TYLENOL-CODEINE #3) 300-30 mg per tablet Take 1 tablet by mouth every 4 (four) hours as needed for pain.     celecoxib (CELEBREX) 200 MG capsule Take 1 capsule (200 mg total) by mouth daily.     Allergies   Allergen Reactions     Gabapentin Anxiety        Family and Social History   Family History   Problem Relation Age of Onset     Lymphoma Father 28     Hodgkins     Colon cancer Maternal Grandmother      Vaginal cancer Maternal Aunt      Sleep apnea Son         Social History   Substance Use Topics     Smoking status: Former Smoker     Years: 4.00     Types: Cigarettes     Smokeless tobacco: Never Used      Comment: smoking in college-social     Alcohol use 2.0 oz/week     4 Standard drinks or equivalent per week      Comment: occasional glass of wine        Physical Exam       Physical Exam  /80  Pulse 82  Ht 5' 6\" (1.676 m)  Wt 183 lb (83 kg)  SpO2 98%  BMI 29.54 kg/m2  General appearance: alert, appears stated age, cooperative, no distress and anxious   Head: Normocephalic, without obvious abnormality, atraumatic  Throat: lips, mucosa, and tongue normal; teeth and gums normal  Neck: no adenopathy, no carotid bruit, no JVD, supple, symmetrical, trachea midline and thyroid not enlarged, symmetric, no tenderness/mass/nodules  Lungs: clear to auscultation bilaterally  Heart: regular rate and rhythm, S1, S2 normal, no " murmur, click, rub or gallop  Abdomen: soft, non-tender; bowel sounds normal; no masses,  no organomegaly  Extremities: extremities normal, atraumatic, no cyanosis or edema  Skin: Skin color, texture, turgor normal. No rashes or lesions  Neurologic: Grossly normal  Musculoskeletal: Tender shoulder, hip joints with some limitation of range of motion     Additional Information        Luis Enrique Coyle MD  Internal Medicine  Contact me at [phone]     Additional Information   Current Outpatient Prescriptions   Medication Sig     calcium carbonate-vitamin D3 600 mg (1,500 mg)-800 unit Chew Chew 1 tablet daily.     cholecalciferol, vitamin D3, 4,000 unit Tab Take 4,000 Units by mouth daily.      hydroCHLOROthiazide (HYDRODIURIL) 50 MG tablet TAKE 1/2 TABLET EVERY DAY (SUBSTITUTED FOR  HYDRODIURIL)     lisinopril (PRINIVIL,ZESTRIL) 20 MG tablet TAKE 1 TABLET EVERY DAY     METHYLCELLULOSE (CITRUCEL ORAL) Take 1 tablet by mouth daily.     zolpidem (AMBIEN) 5 MG tablet Take 1 tablet (5 mg total) by mouth at bedtime as needed for sleep.     acetaminophen-codeine (TYLENOL-CODEINE #3) 300-30 mg per tablet Take 1 tablet by mouth every 4 (four) hours as needed for pain.     celecoxib (CELEBREX) 200 MG capsule Take 1 capsule (200 mg total) by mouth daily.     Allergies   Allergen Reactions     Gabapentin Anxiety     Social History   Substance Use Topics     Smoking status: Former Smoker     Years: 4.00     Types: Cigarettes     Smokeless tobacco: Never Used      Comment: smoking in college-social     Alcohol use 2.0 oz/week     4 Standard drinks or equivalent per week      Comment: occasional glass of wine         Time: total time spent with the patient was 40 minutes of which >50% was spent in counseling and coordination of care

## 2021-06-20 NOTE — PROGRESS NOTES
ASSESSMENT AND PLAN:  Sheryl Trotter 78 y.o. female is seen here on 10/02/18 for evaluation of bilateral shoulder, knee pain.  She has had in the past polymyalgia rheumatica.  She has been through 2 surgeries left hip placement and then a revision in February and March respectively.  Currently her main concern is pain around her knees where she is well known to have chondrocalcinosis on x-rays done in orthopedics report of which is reviewed, bilateral shoulder pain where she is concerned that she may have recurrence of polymyalgia rheumatica.  This is definitely under consideration although knee pain would not be easily explained by that.  Clinically she has more features of tendinopathy of the rotator cuff.  Given her experience with avascular necrosis where she required a hip replacement she is understandably leery of going back on prednisone even as a diagnostic modality for a short while.  We discussed the option of corticosteroid injections in the knees.  In the and the plan was for her to first have the labs drawn, and take x-rays of the shoulders and then take it from there.  Should she find knee pain to the point where she wants the injections that she will call us and will be added to the schedule otherwise return for follow-up in the next 6-8 weeks.  Diagnoses and all orders for this visit:    Polyarthralgia  -     C-Reactive Protein  -     Erythrocyte Sedimentation Rate  -     CK Total  -     Cancel: CCP Antibodies  -     Rheumatoid Factor Quant  -     Uric Acid    Osteoarthritis of the Knee  -     C-Reactive Protein  -     Erythrocyte Sedimentation Rate  -     CK Total  -     Cancel: CCP Antibodies  -     Rheumatoid Factor Quant  -     Uric Acid    Chronic pain of both shoulders  -     XR Shoulders Bilateral 2 Or More Views; Future; Expected date: 10/2/18  -     XR Shoulders Bilateral 2 Or More Views      HISTORY OF PRESENTING ILLNESS:  Sheryl Trotter, 78 y.o., female is here for worsening pain.  This  patient had polymyalgia rheumatica in the past.  More recently she had left hip arthroplasty for AVN and then had to have a revision months later for dislocation.  She reports that over the past month or so she has been hurting more in her knees.  This can keep her up at night.  This is moderately severe.  There is no swelling.  She was seen in orthopedics.  She had x-rays taken.  The thought there was that she has chondrocalcinosis.  Joint spaces are minimally affected.  Meanwhile she started hurting in the shoulders which reminded her of PMR although the symptoms are different.  She has noted stiffness which is no more than 20-30 minutes.  The knee pain wakes her up more so than the shoulder.  She had prednisone in the past and is quite leery of going back on that.  As she read up on the [facility] website.  She wonders about Actemra for polymyalgia rheumatica.  We discussed how this is indicated for [facility].  At this point it is not clear that she necessarily has relapse of PMR as well.  She has stiffness in the morning lasting 20-30 minutes.  She is describing no fever weight loss blurry vision eye redness mouth ulcer nausea cough there is no rash.  She has been taking diclofenac for the symptoms.  She has no personal or family history of psoriasis ulcerative colitis Crohn's disease.  There is no family history in the immediate group of relatives with lupus or rheumatoid arthritis.  Further historical information and ADL limitations as noted in the multidimensional health assessment questionnaire attached in the EMR. ALLERGIES:Gabapentin    PAST MEDICAL/ACTIVE PROBLEMS/MEDICATION/ FAMILY HISTORY/SOCIAL DATA:  The patient has a family history of  Past Medical History:   Diagnosis Date     Arthritis      AVN (avascular necrosis of bone) (H)     let hip     DDD (degenerative disc disease), lumbar      GERD (gastroesophageal reflux disease)      HLD (hyperlipidemia)      Hypertension      Insomnia      Osteoporosis       PONV (postoperative nausea and vomiting)      Sleep apnea     cpap     History   Smoking Status     Former Smoker     Years: 4.00     Types: Cigarettes   Smokeless Tobacco     Never Used     Comment: smoking in college-social     Patient Active Problem List   Diagnosis     Osteoporosis     Hyperlipidemia     Essential hypertension     Primary insomnia     Trochanteric Bursitis     Lumbar Spondylosis     Lumbar Disc Degeneration     Headache     Osteoarthritis of the Knee     Polyarthralgia     Status post left hip replacement     Hip dislocation, left (H)     Current Outpatient Prescriptions   Medication Sig Dispense Refill     calcium carbonate-vitamin D3 600 mg (1,500 mg)-800 unit Chew Chew 1 tablet daily.       cholecalciferol, vitamin D3, 4,000 unit Tab Take 4,000 Units by mouth daily.        hydroCHLOROthiazide (HYDRODIURIL) 50 MG tablet TAKE 1/2 TABLET EVERY DAY (SUBSTITUTED FOR  HYDRODIURIL) 45 tablet 6     lisinopril (PRINIVIL,ZESTRIL) 20 MG tablet TAKE 1 TABLET EVERY DAY 90 tablet 3     METHYLCELLULOSE (CITRUCEL ORAL) Take 1 tablet by mouth daily.       zolpidem (AMBIEN) 5 MG tablet Take 1 tablet (5 mg total) by mouth at bedtime as needed for sleep. 30 tablet 0     Current Facility-Administered Medications   Medication Dose Route Frequency Provider Last Rate Last Dose     denosumab 60 mg (PROLIA 60 mg/ml)  60 mg Subcutaneous Q6 Months Valentina Mclaughlin MD   60 mg at 03/22/16 1334     denosumab 60 mg (PROLIA 60 mg/ml)  60 mg Subcutaneous Q6 Months Denita Cottrell MD   60 mg at 06/15/18 1004     DETAILED EXAMINATION  10/02/18  :  Vitals:    10/02/18 1158   BP: 138/76   Patient Site: Right Arm   Patient Position: Sitting   Cuff Size: Adult Regular   Pulse: (!) 104   Weight: 182 lb 8 oz (82.8 kg)     Alert oriented. Head including the face is examined for malar rash, heliotropes, scarring, lupus pernio. Eyes examined for redness such as in episcleritis/scleritis, periorbital lesions.   Neck/ Face  examined for parotid gland swelling, range of motion of neck.  Left upper and lower and right upper and lower extremities examined for tenderness, swelling, warmth of the appendicular joints, range of motion, edema, rash.  Some of the important findings included: She has bilateral shoulder impingement with features suggestive of tendinopathy no crepitus.  This is more of a painful arc.  She is able to complete R OM there.  She has marked JLT of the knees bilaterally without effusion or warmth.  She does not have synovitis of the palpable joints of the upper extremity.    LAB / IMAGING DATA:  ALT   Date Value Ref Range Status   05/30/2018 17 0 - 45 U/L Final   09/12/2017 19 0 - 45 U/L Final   05/25/2017 18 0 - 45 U/L Final     Albumin   Date Value Ref Range Status   05/30/2018 4.0 3.5 - 5.0 g/dL Final   09/12/2017 3.7 3.5 - 5.0 g/dL Final   05/25/2017 3.8 3.5 - 5.0 g/dL Final     Creatinine   Date Value Ref Range Status   05/30/2018 0.71 0.60 - 1.10 mg/dL Final   03/28/2018 0.63 0.60 - 1.10 mg/dL Final   02/08/2018 0.69 0.60 - 1.10 mg/dL Final       WBC   Date Value Ref Range Status   05/30/2018 6.3 4.0 - 11.0 thou/uL Final   03/28/2018 6.6 4.0 - 11.0 thou/uL Final   05/01/2015 6.5 4.0 - 11.0 thou/uL Final     Hemoglobin   Date Value Ref Range Status   05/30/2018 13.7 12.0 - 16.0 g/dL Final   03/30/2018 10.5 (L) 12.0 - 16.0 g/dL Final   03/29/2018 10.5 (L) 12.0 - 16.0 g/dL Final     Platelets   Date Value Ref Range Status   05/30/2018 241 140 - 440 thou/uL Final   03/28/2018 304 140 - 440 thou/uL Final   02/05/2018 278 140 - 440 thou/uL Final       Lab Results   Component Value Date    RF <15.0 09/12/2017    SEDRATE 10 09/12/2017

## 2021-06-21 NOTE — PROGRESS NOTES
Office Visit - Follow Up   Sheryl Trotter   78 y.o. female    Date of Visit: 10/23/2018    Chief Complaint   Patient presents with     Follow-up     2 weeks ago started celebrex. Does think medication is helping the issue. Still waking at night with multiple pains         Assessment and Plan   1. Polyarthralgia   Complains of polyarthralgia.  Involves multiple joints.  Was advised on her last visit to take Celebrex 200 mg daily together with Tylenol with codeine as needed.  Has been taking Celebrex  in the afternoon but has not been taking Tylenol with codeine.  Does not want to follow with rheumatology, Dr. Moody.  Was worked up by rheumatology on her last visit and all markers for inflammation were negative so she does not have primary or secondary arthritis to involve multiple joints..      2. Osteoarthritis   Has osteoarthritis of right shoulder.  Wants to go back to physical therapy for her right shoulder pains which is fine.  Will go to [facility] for her physical therapy.  If she needs referral, will provide it.    3. Hyperlipidemia  Controlled without need for a medication.  But last lipids were borderline having total cholesterol 266 and LDL of 146 but high  and normal triglycerides of 66.  Will check next visit.    4. Essential hypertension  Controlled.  Continue hydrochlorothiazide and lisinopril.      Follow up in 2 months.     History of Present Illness   This 78 y.o. old female is here for follow-up for aches and pains from multiple joints due to polyarthralgia and not to arthritis.  Workups done by her rheumatologist were negative for markers of inflammation.  Was advised to take Celebrex 200 mg daily with Tylenol with codeine as needed.  But has been taking only Celebrex daily.  Pains come off and on.  Has hyperlipidemia controlled without need for medication but last lipids were borderline.  Has hypertension controlled by hydrochlorothiazide and lisinopril.  Overall, feels well.  No other  complaints.    Review of Systems   A 12 point comprehensive review of systems was negative except as noted..     Medications, Allergies and Problem List   Reviewed and updated             Chief Complaint   Follow-up (2 weeks ago started celebrex. Does think medication is helping the issue. Still waking at night with multiple pains )       Patient Profile   Social History     Social History Narrative    . 3 grown children, 2 daughters and 1 son. 7 grandchildren. Did speech path.  Nonsmoker. Socially drinks alcohol.          Past Medical History   Patient Active Problem List   Diagnosis     Osteoporosis     Hyperlipidemia     Essential hypertension     Primary insomnia     Trochanteric Bursitis     Lumbar Spondylosis     Lumbar Disc Degeneration     Headache     Osteoarthritis of the Knee     Polyarthralgia     Status post left hip replacement     Hip dislocation, left (H)     Bilateral shoulder pain       Past Surgical History  She has a past surgical history that includes Oophorectomy (Mid 1990's); Appendectomy; Cholecystectomy; right foot surgery; pr total hip arthroplasty (Left, 2/7/2018); Hip pinning (Left, 3/28/2018); and Hysterectomy (Mid 1990's).       Medications and Allergies   Current Outpatient Prescriptions   Medication Sig     acetaminophen-codeine (TYLENOL-CODEINE #3) 300-30 mg per tablet Take 1 tablet by mouth every 4 (four) hours as needed for pain.     calcium carbonate-vitamin D3 600 mg (1,500 mg)-800 unit Chew Chew 1 tablet daily.     celecoxib (CELEBREX) 200 MG capsule Take 1 capsule (200 mg total) by mouth daily.     cholecalciferol, vitamin D3, 4,000 unit Tab Take 4,000 Units by mouth daily.      hydroCHLOROthiazide (HYDRODIURIL) 50 MG tablet TAKE 1/2 TABLET EVERY DAY (SUBSTITUTED FOR  HYDRODIURIL)     lisinopril (PRINIVIL,ZESTRIL) 20 MG tablet TAKE 1 TABLET EVERY DAY     METHYLCELLULOSE (CITRUCEL ORAL) Take 1 tablet by mouth daily.     zolpidem (AMBIEN) 5 MG tablet Take 1 tablet (5 mg  "total) by mouth at bedtime as needed for sleep.     Allergies   Allergen Reactions     Gabapentin Anxiety        Family and Social History   Family History   Problem Relation Age of Onset     Lymphoma Father 28     Hodgkins     Colon cancer Maternal Grandmother      Vaginal cancer Maternal Aunt      Sleep apnea Son         Social History   Substance Use Topics     Smoking status: Former Smoker     Years: 4.00     Types: Cigarettes     Smokeless tobacco: Never Used      Comment: smoking in college-social     Alcohol use 2.0 oz/week     4 Standard drinks or equivalent per week      Comment: occasional glass of wine        Physical Exam       Physical Exam  /80  Pulse 65  Ht 5' 6\" (1.676 m)  Wt 183 lb (83 kg)  SpO2 98%  BMI 29.54 kg/m2  General appearance: alert, appears stated age, cooperative and no distress  Head: Normocephalic, without obvious abnormality, atraumatic  Throat: lips, mucosa, and tongue normal; teeth and gums normal  Neck: no adenopathy, no carotid bruit, no JVD, supple, symmetrical, trachea midline and thyroid not enlarged, symmetric, no tenderness/mass/nodules  Lungs: clear to auscultation bilaterally  Heart: regular rate and rhythm, S1, S2 normal, no murmur, click, rub or gallop  Abdomen: soft, non-tender; bowel sounds normal; no masses,  no organomegaly  Extremities: extremities normal, atraumatic, no cyanosis or edema  Skin: Skin color, texture, turgor normal. No rashes or lesions  Neurologic: Grossly normal  Musculoskeletal: Normal joint exam, no signs of inflammation     Additional Information        Luis Enrique Coyle MD  Internal Medicine  Contact me at [phone]     Additional Information   Current Outpatient Prescriptions   Medication Sig     acetaminophen-codeine (TYLENOL-CODEINE #3) 300-30 mg per tablet Take 1 tablet by mouth every 4 (four) hours as needed for pain.     calcium carbonate-vitamin D3 600 mg (1,500 mg)-800 unit Chew Chew 1 tablet daily.     celecoxib (CELEBREX) " 200 MG capsule Take 1 capsule (200 mg total) by mouth daily.     cholecalciferol, vitamin D3, 4,000 unit Tab Take 4,000 Units by mouth daily.      hydroCHLOROthiazide (HYDRODIURIL) 50 MG tablet TAKE 1/2 TABLET EVERY DAY (SUBSTITUTED FOR  HYDRODIURIL)     lisinopril (PRINIVIL,ZESTRIL) 20 MG tablet TAKE 1 TABLET EVERY DAY     METHYLCELLULOSE (CITRUCEL ORAL) Take 1 tablet by mouth daily.     zolpidem (AMBIEN) 5 MG tablet Take 1 tablet (5 mg total) by mouth at bedtime as needed for sleep.     Allergies   Allergen Reactions     Gabapentin Anxiety     Social History   Substance Use Topics     Smoking status: Former Smoker     Years: 4.00     Types: Cigarettes     Smokeless tobacco: Never Used      Comment: smoking in college-social     Alcohol use 2.0 oz/week     4 Standard drinks or equivalent per week      Comment: occasional glass of wine         Time: total time spent with the patient was 25 minutes of which >50% was spent in counseling and coordination of care

## 2021-06-22 NOTE — PROGRESS NOTES
ASSESSMENT AND PLAN:  Sheryl Trotter 78 y.o. female is seen here on 01/09/19 for evaluation of bilateral shoulder, knee pain, and several signals are suggestive of an inflammatory process even recurrence of PMR.  While she has ample evidence of osteoarthritis, many of her symptoms do not necessarily seem to be emanating from that exclusively.  We discussed the options.  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  10 mg daily for the next 30 days is recommended.  We reviewed the major side effects of the ocular metabolic bone related.  She did not take calcium and vitamin D over-the-counter.  She is going to discontinue Celebrex which has helped her minimally if at all.  We will meet here in a month.         Diagnoses and all orders for this visit:    Polyarthralgia  -     predniSONE (DELTASONE) 10 mg tablet  Dispense: 30 tablet; Refill: 0    Polymyalgia (H)  -     predniSONE (DELTASONE) 10 mg tablet  Dispense: 30 tablet; Refill: 0    Primary osteoarthritis involving multiple joints      HISTORY OF PRESENTING ILLNESS:  Sheryl Trotter, 78 y.o., female is here for worsening pain.  She was seen here recently with this.  She has osteoarthritis.  She has not had any significant response with Celebrex.  She had a normal sed rate.  In the past she carries a diagnosis of PMR when prednisone was very effective quickly.  This patient had polymyalgia rheumatica in the past.  More recently she had left hip arthroplasty for AVN and then had to have a revision months later for dislocation.  She reports that over the past month or so she has been hurting more in her knees.  This can keep her up at night.  This is moderately severe.  There is no swelling.  She was seen in orthopedics.  She had x-rays taken.  The thought there was that she has  chondrocalcinosis.  Joint spaces are minimally affected.  Meanwhile she started hurting in the shoulders which reminded her of PMR although the symptoms are different.  She has noted stiffness which is no more than 20-30 minutes.  The knee pain wakes her up more so than the shoulder.  She had prednisone in the past and is quite leery of going back on that.  As she read up on the [facility] website.  She wonders about Actemra for polymyalgia rheumatica.  We discussed how this is indicated for GCA.  At this point it is not clear that she necessarily has relapse of PMR as well.  She has stiffness in the morning lasting 20-30 minutes.  She is describing no fever weight loss blurry vision eye redness mouth ulcer nausea cough there is no rash.  She has been taking diclofenac for the symptoms.  She has no personal or family history of psoriasis ulcerative colitis Crohn's disease.  There is no family history in the immediate group of relatives with lupus or rheumatoid arthritis.  Further historical information and ADL limitations as noted in the multidimensional health assessment questionnaire attached in the EMR. ALLERGIES:Gabapentin    PAST MEDICAL/ACTIVE PROBLEMS/MEDICATION/ FAMILY HISTORY/SOCIAL DATA:  The patient has a family history of  Past Medical History:   Diagnosis Date     Arthritis      AVN (avascular necrosis of bone) (H)     let hip     DDD (degenerative disc disease), lumbar      GERD (gastroesophageal reflux disease)      HLD (hyperlipidemia)      Hypertension      Insomnia      Osteoporosis      PONV (postoperative nausea and vomiting)      Sleep apnea     cpap     Social History     Tobacco Use   Smoking Status Former Smoker     Years: 4.00     Types: Cigarettes   Smokeless Tobacco Never Used   Tobacco Comment    smoking in college-social     Patient Active Problem List   Diagnosis     Osteoporosis     Hyperlipidemia     Essential hypertension     Primary insomnia     Trochanteric Bursitis     Lumbar  Spondylosis     Lumbar Disc Degeneration     Headache     Osteoarthritis of the Knee     Polyarthralgia     Status post left hip replacement     Hip dislocation, left (H)     Bilateral shoulder pain     Current Outpatient Medications   Medication Sig Dispense Refill     acetaminophen-codeine (TYLENOL-CODEINE #3) 300-30 mg per tablet Take 1 tablet by mouth every 4 (four) hours as needed for pain. 60 tablet 0     calcium carbonate-vitamin D3 600 mg (1,500 mg)-800 unit Chew Chew 1 tablet daily.       celecoxib (CELEBREX) 200 MG capsule Take 1 capsule (200 mg total) by mouth daily. 90 capsule 2     cholecalciferol, vitamin D3, 4,000 unit Tab Take 4,000 Units by mouth daily.        hydroCHLOROthiazide (HYDRODIURIL) 50 MG tablet TAKE 1/2 TABLET EVERY DAY (SUBSTITUTED FOR  HYDRODIURIL) 45 tablet 6     lisinopril (PRINIVIL,ZESTRIL) 20 MG tablet TAKE 1 TABLET EVERY DAY 90 tablet 3     METHYLCELLULOSE (CITRUCEL ORAL) Take 1 tablet by mouth daily.       zolpidem (AMBIEN) 5 MG tablet Take 1 tablet (5 mg total) by mouth at bedtime as needed for sleep. 30 tablet 0     Current Facility-Administered Medications   Medication Dose Route Frequency Provider Last Rate Last Dose     denosumab 60 mg (PROLIA 60 mg/ml)  60 mg Subcutaneous Q6 Months Denita Cottrell MD   60 mg at 06/15/18 1004     DETAILED EXAMINATION  01/09/19  :  Vitals:    01/09/19 1212   BP: 136/82   Patient Site: Right Arm   Patient Position: Sitting   Cuff Size: Adult Regular   Pulse: 92   Weight: 181 lb (82.1 kg)     Alert oriented. Head including the face is examined for malar rash, heliotropes, scarring, lupus pernio. Eyes examined for redness such as in episcleritis/scleritis, periorbital lesions.   Neck/ Face examined for parotid gland swelling, range of motion of neck.  Left upper and lower and right upper and lower extremities examined for tenderness, swelling, warmth of the appendicular joints, range of motion, edema, rash.  Some of the important findings  included: Impingement of the shoulders as noted before,  no synovitis in the palpable joints of upper extremities, knees without effusion warmth or JLT.  LAB / IMAGING DATA:  ALT   Date Value Ref Range Status   12/19/2018 10 0 - 45 U/L Final   05/30/2018 17 0 - 45 U/L Final   09/12/2017 19 0 - 45 U/L Final     Albumin   Date Value Ref Range Status   12/19/2018 3.8 3.5 - 5.0 g/dL Final   05/30/2018 4.0 3.5 - 5.0 g/dL Final   09/12/2017 3.7 3.5 - 5.0 g/dL Final     Creatinine   Date Value Ref Range Status   12/19/2018 0.71 0.60 - 1.10 mg/dL Final   05/30/2018 0.71 0.60 - 1.10 mg/dL Final   03/28/2018 0.63 0.60 - 1.10 mg/dL Final       WBC   Date Value Ref Range Status   12/19/2018 9.0 4.0 - 11.0 thou/uL Final   05/30/2018 6.3 4.0 - 11.0 thou/uL Final   05/01/2015 6.5 4.0 - 11.0 thou/uL Final     Hemoglobin   Date Value Ref Range Status   12/19/2018 14.3 12.0 - 16.0 g/dL Final   05/30/2018 13.7 12.0 - 16.0 g/dL Final   03/30/2018 10.5 (L) 12.0 - 16.0 g/dL Final     Platelets   Date Value Ref Range Status   12/19/2018 287 140 - 440 thou/uL Final   05/30/2018 241 140 - 440 thou/uL Final   03/28/2018 304 140 - 440 thou/uL Final       Lab Results   Component Value Date    RF <15.0 10/02/2018    SEDRATE 10 12/19/2018

## 2021-06-22 NOTE — PROGRESS NOTES
Office Visit - Follow Up   Sheryl Trotter   78 y.o. female    Date of Visit: 12/19/2018    Chief Complaint   Patient presents with     Follow-up     fasting, increased muscle and joint pain all over her body and trouble sleeping at night due to pains. Stopped taking Celebrex on 12/11 , and no tylenol with Codeine since Nov. - she is concerned her pain is from Prolia injections        Assessment and Plan   1. Polyarthralgia  Complains of persisting polyarthralgia involving multiple joints.  Was seen and followed by osteoporosis clinic, Dr. Cottrell on 12/18/2018.  Complained to her of her recurring or persisting bone and joint pains.  Was advised to stop her Prolia because  this could be side effect of Prolia although she is been on Prolia since September 2015.  Did not have recurrence of bony pains and joint pains until this September 2018. Will check sed luba, CRP.  Advised to take Celebrex 200 mg daily and Tylenol with codeine every 4 hours as needed to ease her pains.  - Erythrocyte Sedimentation Rate  - C-Reactive Protein  - Ambulatory referral to Rheumatology    2. PMR (polymyalgia rheumatica) (H)  Had PMR in late 2014 for which  she was followed and treated by rheumatology, Dr. Moody.  Her PMR resolved with prednisone.  Concern now whether her aches and pains are recurrence of her PMR.  Will refer her back to rheumatology.  The meantime check her sed rate C-reactive protein and CBC.  - HM2(CBC w/o Differential)  - Erythrocyte Sedimentation Rate  - C-Reactive Protein  - Ambulatory referral to Rheumatology    3. Mixed hyperlipidemia  Has hyperlipidemia controlled without need for medication.  But  last lipids showed mild increased of LDL to 146 but very high HDL of 107 and increased total cholesterol  266, normal triglycerides of 166.  If her lipids are farther increased she may need statin treatment.  Check fasting lipids and liver function.  - Lipid Cascade  - Hepatic Profile    4. Essential  hypertension  Controlled.  Hydrochlorothiazide and lisinopril.  Check basic metabolic panel.  - Basic Metabolic Panel    5. Age-related osteoporosis without current pathological fracture  Now on Prolia every 6 months but now on hold because of her bony and joint pains thought by Dr. Cottrell to be possibly side effect of Prolia.    6. Primary osteoarthritis involving multiple joints  Also has underlying osteoarthritis aggravating her aches and pains.  Followed by orthopedics, Dr. Rincon.  - celecoxib (CELEBREX) 200 MG capsule; Take 1 capsule (200 mg total) by mouth daily.  Dispense: 90 capsule; Refill: 2  - acetaminophen-codeine (TYLENOL-CODEINE #3) 300-30 mg per tablet; Take 1 tablet by mouth every 4 (four) hours as needed for pain.  Dispense: 60 tablet; Refill: 0    7. Primary insomnia  Has primary insomnia.  Takes zolpidem which works very well.  - zolpidem (AMBIEN) 5 MG tablet; Take 1 tablet (5 mg total) by mouth at bedtime as needed for sleep.  Dispense: 30 tablet; Refill: 0    Reviewed previous rheumatology notes and recommendations.  Reviewed osteoporosis clinic notes by Dr. Mclaughlin and Dr. Cottrell.  Reviewed Dr. Cottrell notes yesterday and her recommendation.  Reviewed the timeline she started Prolia.  All these were discussed with the patient.    Follow up in 6 weeks.     History of Present Illness   This 78 y.o. old female is here for follow-up.  Still complains of persisting and recurring bony and joint pains.  Assessed to be polyarthralgia.  Also has osteoporosis on Prolia every 6 months.  Started Prolia in September 2015.  Was seen by osteoporosis clinic yesterday.  Was advised by Dr. Cottrell to stop Prolia since her aches and pains may be due to her Prolia.  But she also has history of PMR and she is concerned whether she has recurrence of her PMR.  Was diagnosed back in 2014 and treated successfully with prednisone.  Followed at one time by rheumatology, Dr. Moody.  Has hypertension controlled  by her medications.  Has hyperlipidemia.  Not on medication yet.  But now showing mild increase of her lipids but they are high HDL also.  Has insomnia.  But can sleep well with zolpidem.  No other complaints.    Review of Systems   A 12 point comprehensive review of systems was negative except as noted..     Medications, Allergies and Problem List   Reviewed and updated             Chief Complaint   Follow-up (fasting, increased muscle and joint pain all over her body and trouble sleeping at night due to pains. Stopped taking Celebrex on 12/11 , and no tylenol with Codeine since Nov. - she is concerned her pain is from Prolia injections)       Patient Profile   Social History     Social History Narrative    . 3 grown children, 2 daughters and 1 son. 7 grandchildren. Did speech path.  Nonsmoker. Socially drinks alcohol.          Past Medical History   Patient Active Problem List   Diagnosis     Osteoporosis     Hyperlipidemia     Essential hypertension     Primary insomnia     Trochanteric Bursitis     Lumbar Spondylosis     Lumbar Disc Degeneration     Headache     Osteoarthritis of the Knee     Polyarthralgia     Status post left hip replacement     Hip dislocation, left (H)     Bilateral shoulder pain       Past Surgical History  She has a past surgical history that includes Oophorectomy (Mid 1990's); Appendectomy; Cholecystectomy; right foot surgery; Hysterectomy (Mid 1990's); OPEN REDUCTION OF DISLOCATED LEFT TOTAL HIP (Left, 3/28/2018); and LEFT TOTAL HIP ARTHROPLASTY (Left, 2/7/2018).       Medications and Allergies   Current Outpatient Medications   Medication Sig     calcium carbonate-vitamin D3 600 mg (1,500 mg)-800 unit Chew Chew 1 tablet daily.     cholecalciferol, vitamin D3, 4,000 unit Tab Take 4,000 Units by mouth daily.      hydroCHLOROthiazide (HYDRODIURIL) 50 MG tablet TAKE 1/2 TABLET EVERY DAY (SUBSTITUTED FOR  HYDRODIURIL)     lisinopril (PRINIVIL,ZESTRIL) 20 MG tablet TAKE 1 TABLET EVERY  "DAY     METHYLCELLULOSE (CITRUCEL ORAL) Take 1 tablet by mouth daily.     acetaminophen-codeine (TYLENOL-CODEINE #3) 300-30 mg per tablet Take 1 tablet by mouth every 4 (four) hours as needed for pain.     celecoxib (CELEBREX) 200 MG capsule Take 1 capsule (200 mg total) by mouth daily.     zolpidem (AMBIEN) 5 MG tablet Take 1 tablet (5 mg total) by mouth at bedtime as needed for sleep.     Allergies   Allergen Reactions     Gabapentin Anxiety        Family and Social History   Family History   Problem Relation Age of Onset     Lymphoma Father 28        Hodgkins     Colon cancer Maternal Grandmother      Vaginal cancer Maternal Aunt      Sleep apnea Son         Social History     Tobacco Use     Smoking status: Former Smoker     Years: 4.00     Types: Cigarettes     Smokeless tobacco: Never Used     Tobacco comment: smoking in college-social   Substance Use Topics     Alcohol use: Yes     Alcohol/week: 2.0 oz     Types: 4 Standard drinks or equivalent per week     Comment: occasional glass of wine     Drug use: No        Physical Exam       Physical Exam  /72   Pulse 82   Ht 5' 6\" (1.676 m)   Wt 181 lb (82.1 kg)   SpO2 99%   BMI 29.21 kg/m    General appearance: alert, appears stated age, cooperative and no distress  Head: Normocephalic, without obvious abnormality, atraumatic  Throat: lips, mucosa, and tongue normal; teeth and gums normal  Neck: no adenopathy, no carotid bruit, no JVD, supple, symmetrical, trachea midline and thyroid not enlarged, symmetric, no tenderness/mass/nodules  Lungs: clear to auscultation bilaterally  Heart: regular rate and rhythm, S1, S2 normal, no murmur, click, rub or gallop  Abdomen: soft, non-tender; bowel sounds normal; no masses,  no organomegaly  Extremities: extremities normal, atraumatic, no cyanosis or edema  Skin: Skin color, texture, turgor normal. No rashes or lesions  Neurologic: Grossly normal  Meniscus skeletal: Normal joint exam, no signs of inflammation "     Additional Information        Luis Enrique Coyle MD  Internal Medicine  Contact me at [phone]     Additional Information   Current Outpatient Medications   Medication Sig     calcium carbonate-vitamin D3 600 mg (1,500 mg)-800 unit Chew Chew 1 tablet daily.     cholecalciferol, vitamin D3, 4,000 unit Tab Take 4,000 Units by mouth daily.      hydroCHLOROthiazide (HYDRODIURIL) 50 MG tablet TAKE 1/2 TABLET EVERY DAY (SUBSTITUTED FOR  HYDRODIURIL)     lisinopril (PRINIVIL,ZESTRIL) 20 MG tablet TAKE 1 TABLET EVERY DAY     METHYLCELLULOSE (CITRUCEL ORAL) Take 1 tablet by mouth daily.     acetaminophen-codeine (TYLENOL-CODEINE #3) 300-30 mg per tablet Take 1 tablet by mouth every 4 (four) hours as needed for pain.     celecoxib (CELEBREX) 200 MG capsule Take 1 capsule (200 mg total) by mouth daily.     zolpidem (AMBIEN) 5 MG tablet Take 1 tablet (5 mg total) by mouth at bedtime as needed for sleep.     Allergies   Allergen Reactions     Gabapentin Anxiety     Social History     Tobacco Use     Smoking status: Former Smoker     Years: 4.00     Types: Cigarettes     Smokeless tobacco: Never Used     Tobacco comment: smoking in college-social   Substance Use Topics     Alcohol use: Yes     Alcohol/week: 2.0 oz     Types: 4 Standard drinks or equivalent per week     Comment: occasional glass of wine     Drug use: No         Time: total time spent with the patient was 40 minutes of which >50% was spent in counseling and coordination of care

## 2021-06-22 NOTE — PROGRESS NOTES
1. Osteoporosis     2. Polyarthralgia       Patient was educated on safety of Prolia utilizing Patient Counseling Chart for Healthcare Providers, as outlined by the Prolia REMS progam.     Return in about 4 weeks (around 1/15/2019) for Recheck.    Patient Instructions   Prolia 6th in 1 month.            Chief Complaint   Patient presents with     Osteoporosis Follow Up       /62 (Patient Site: Right Arm, Patient Position: Sitting, Cuff Size: Adult Large)   Pulse 84   Wt 183 lb 4.8 oz (83.1 kg)   BMI 29.59 kg/m        Did you experience any problems with previous Prolia injection? Polyarthralgia and polymyalgia for months, saw Rheum and PCP  Any medication change in the last 6 months? no  Did you take prednisone or other immunosupressant drugs in the last 6 months   (chemo, transplant, rheum, dermatology conditions)? no  Did you have any serious infection in the last 6 months?no  Any recent hospitalizations?no  Do you plan any dental work in the next 2-3 months?no  How much calcium do you take daily from the diet and supplements?1200 mg  How much vit D do you take daily? 2000 IU  Last DXA? 12/7/17,  Reviewed and discussed      Patient is here today for the Prolia injection.  He received 5 doses of but over the last year experience persistent polyarthralgia and generalized body aches for which she saw a rheumatologist and primary care provider.  Possible side effect of Prolia can be joint and muscle pain and ache and I would like to postpone Prolia for 1 month.  Patient agrees with the plan.  We discussed calcium and vit D daily needs today.       15 minutes spent with the patient and more then 50 % of the time in counseling.  This note has been dictated using voice recognition software. Any grammatical or context distortions are unintentional and inherent to the software      Patient Active Problem List   Diagnosis     Osteoporosis     Hyperlipidemia     Essential hypertension     Primary insomnia      Trochanteric Bursitis     Lumbar Spondylosis     Lumbar Disc Degeneration     Headache     Osteoarthritis of the Knee     Polyarthralgia     Status post left hip replacement     Hip dislocation, left (H)     Bilateral shoulder pain       Current Outpatient Medications on File Prior to Visit   Medication Sig Dispense Refill     calcium carbonate-vitamin D3 600 mg (1,500 mg)-800 unit Chew Chew 1 tablet daily.       cholecalciferol, vitamin D3, 4,000 unit Tab Take 4,000 Units by mouth daily.        hydroCHLOROthiazide (HYDRODIURIL) 50 MG tablet TAKE 1/2 TABLET EVERY DAY (SUBSTITUTED FOR  HYDRODIURIL) 45 tablet 6     lisinopril (PRINIVIL,ZESTRIL) 20 MG tablet TAKE 1 TABLET EVERY DAY 90 tablet 3     METHYLCELLULOSE (CITRUCEL ORAL) Take 1 tablet by mouth daily.       zolpidem (AMBIEN) 5 MG tablet Take 1 tablet (5 mg total) by mouth at bedtime as needed for sleep. 30 tablet 0     acetaminophen-codeine (TYLENOL-CODEINE #3) 300-30 mg per tablet Take 1 tablet by mouth every 4 (four) hours as needed for pain. 30 tablet 0     celecoxib (CELEBREX) 200 MG capsule Take 1 capsule (200 mg total) by mouth daily. 30 capsule 2     Current Facility-Administered Medications on File Prior to Visit   Medication Dose Route Frequency Provider Last Rate Last Dose     denosumab 60 mg (PROLIA 60 mg/ml)  60 mg Subcutaneous Q6 Months Denita Cottrell MD   60 mg at 06/15/18 9400

## 2021-06-23 NOTE — PATIENT INSTRUCTIONS - HE
Prolia 6th today.  Prolia 7th in 6 months with me.    DXA in 12/2019 .   Phone number to schedule [phone].    Daily calcium need is 9162-3126 mg a day from the diet and supplements.  Calcium citrate is easier to digest.  Vitamin D 2000 IU daily recommended.

## 2021-06-23 NOTE — PROGRESS NOTES
1. Age-related osteoporosis without current pathological fracture  DXA Bone Density Scan     Patient was educated on safety of Prolia utilizing Patient Counseling Chart for Healthcare Providers, as outlined by the Prolia REMS progam.     Return in about 6 months (around 7/16/2019) for Prolia injection, Recheck.    Patient Instructions   Prolia 6th today.  Prolia 7th in 6 months with me.    DXA in 12/2019 .   Phone number to schedule [phone].    Daily calcium need is 2747-0966 mg a day from the diet and supplements.  Calcium citrate is easier to digest.  Vitamin D 2000 IU daily recommended.      Chief Complaint   Patient presents with     Osteoporosis Follow Up       /72 (Patient Site: Right Arm, Patient Position: Sitting, Cuff Size: Adult Regular)   Pulse 76   Wt 180 lb (81.6 kg)   BMI 29.05 kg/m        Did you experience any problems with previous Prolia injection? Generalized muscle and joint pain diagnosed as PMR, started on prednisone 10 mg daily on 1/9/19. Will stay on this dose for 30 days.    Did you have any serious infection in the last 6 months?no  Any recent hospitalizations?no  Do you plan any dental work in the next 2-3 months?no  How much calcium do you take daily from the diet and supplements?1200 mg  How much vit D do you take daily? 2000 IU  Last DXA? 12/2017, Reviewed and discussed      Patient is here today for the 6th Prolia injection. Patient tolerated previous injections well, but developed some muscle aches and pains. That was the reason why we postponed Prolia when I saw her a month ago. Generalized muscle and joint pain diagnosed as PMR, started on prednisone 10 mg daily on 1/9/19. Will stay on this dose for 30 days.     We discussed calcium and vit D daily needs today. We talked about switching to Forteo, but patient was not ready to do that.  She also has history of AVN of left hip in 1/2018. She had hip replacement done in 1/2018 and revision in March 2018. There is no  clear connection with Prolia and AVN, but considering known risk of ONJ, there are some speculations. I discussed everything with the patient. We also discussed increased small risk of infections when using Prolia and prednisone together.  Next Prolia injection will be in 6 months.     25 minutes spent with the patient and more then 50 % of the time in counseling.  This note has been dictated using voice recognition software. Any grammatical or context distortions are unintentional and inherent to the software      Patient Active Problem List   Diagnosis     Osteoporosis     Hyperlipidemia     Essential hypertension     Primary insomnia     Trochanteric Bursitis     Lumbar Spondylosis     Lumbar Disc Degeneration     Headache     Osteoarthritis of the Knee     Polyarthralgia     Status post left hip replacement     Hip dislocation, left (H)     Bilateral shoulder pain     Polymyalgia (H)     Primary osteoarthritis involving multiple joints       Current Outpatient Medications on File Prior to Visit   Medication Sig Dispense Refill     calcium carbonate-vitamin D3 600 mg (1,500 mg)-800 unit Chew Chew 1 tablet daily.       cholecalciferol, vitamin D3, 4,000 unit Tab Take 4,000 Units by mouth daily.        hydroCHLOROthiazide (HYDRODIURIL) 50 MG tablet TAKE 1/2 TABLET EVERY DAY (SUBSTITUTED FOR  HYDRODIURIL) 45 tablet 6     lisinopril (PRINIVIL,ZESTRIL) 20 MG tablet TAKE 1 TABLET EVERY DAY 90 tablet 3     METHYLCELLULOSE (CITRUCEL ORAL) Take 1 tablet by mouth daily.       predniSONE (DELTASONE) 10 mg tablet Take 10 mg by mouth daily. 30 tablet 0     zolpidem (AMBIEN) 5 MG tablet Take 1 tablet (5 mg total) by mouth at bedtime as needed for sleep. 30 tablet 0     [DISCONTINUED] acetaminophen-codeine (TYLENOL-CODEINE #3) 300-30 mg per tablet Take 1 tablet by mouth every 4 (four) hours as needed for pain. 60 tablet 0     [DISCONTINUED] celecoxib (CELEBREX) 200 MG capsule Take 1 capsule (200 mg total) by mouth daily. 90  capsule 2     Current Facility-Administered Medications on File Prior to Visit   Medication Dose Route Frequency Provider Last Rate Last Dose     denosumab 60 mg (PROLIA 60 mg/ml)  60 mg Subcutaneous Q6 Months Denita Cottrell MD   60 mg at 01/16/19 7125

## 2021-06-24 NOTE — TELEPHONE ENCOUNTER
Dr Moody recommends pt try to take Duloxetine every other day to see if she can better tolerate the side effects or if they improve. Dr Moody reviewed pts xrays and show OA changes and chondrocalcinosis changes, he will keep these in mind in future visits and how pts symptoms may change. Pt notified and states the headache started the first day she took duloxetine, pt states she is willing to try every other day to see if the side effects improve and joint pain decreases. Pt is aware it can take 4-6 weeks for duloxetine to fully start working. Pt will call if she cannot tolerate duloxetine every other day or would like to be seen sooner.

## 2021-06-24 NOTE — PROGRESS NOTES
ASSESSMENT AND PLAN:  Sheryl Trotter 78 y.o. female is seen here on 02/14/19 for follow-up of polyarthralgias, background of osteoarthritis and some signals are suggestive of inflammatory component.  She responded very little to prednisone, that she has finished the course of.  She has had PMR in the past.  The worst of her pains are in the knees.  X-rays taken today show chondrocalcinosis.  The ultrasound of the knees showed minuscule fluid not suitable for aspiration, we discussed options she would like to pursue local injection 40 mg of Kenalog injected into each of the knees after pros and cons are outlined.  We discussed duloxetine.  She is going to start this.  We will meet here in 3 months or sooner.          Diagnoses and all orders for this visit:    Osteoarthritis of the Knee  -     XR Knees Bilateral 1 Or 2 VWS  -     triamcinolone acetonide 40 mg/mL injection 40 mg (KENALOG-40)  -     triamcinolone acetonide 40 mg/mL injection 40 mg (KENALOG-40)  -     XR Knees Bilateral 1 Or 2 VWS  -     DULoxetine (CYMBALTA) 30 MG capsule  Dispense: 30 capsule; Refill: 1    Polyarthralgia    Primary osteoarthritis involving multiple joints    Chondrocalcinosis      HISTORY OF PRESENTING ILLNESS:  Sheryl Trotter, 78 y.o., female is here for worsening pain.  She was seen here recently with this.  She has osteoarthritis.  She has not had any significant response with Celebrex.  She had a normal sed rate.  In the past she carries a diagnosis of PMR when prednisone was very effective quickly.  She has taken prednisone 10 mg for 30 days.  It produced perhaps no more than 10-20% improvement if that.  Today she noted that the worst of her pains are in the knees.  Not only these are bothersome on activity but at rest especially at nighttime.  She feels as if there is fluid in them, feels as if these are tight.  She is not sure if there is any swelling.  The next worst areas are at the bases of the thumbs and then the  shoulders.  She has stiffness in the morning lasting 20-30 minutes.  She is describing no fever weight loss blurry vision eye redness mouth ulcer nausea cough there is no rash.  She has been taking diclofenac for the symptoms.  She has no personal or family history of psoriasis ulcerative colitis Crohn's disease.  There is no family history in the immediate group of relatives with lupus or rheumatoid arthritis.  Further historical information and ADL limitations as noted in the multidimensional health assessment questionnaire attached in the EMR. ALLERGIES:Gabapentin    PAST MEDICAL/ACTIVE PROBLEMS/MEDICATION/ FAMILY HISTORY/SOCIAL DATA:  The patient has a family history of  Past Medical History:   Diagnosis Date     Arthritis      AVN (avascular necrosis of bone) (H)     let hip     DDD (degenerative disc disease), lumbar      GERD (gastroesophageal reflux disease)      HLD (hyperlipidemia)      Hypertension      Insomnia      Osteoporosis      PONV (postoperative nausea and vomiting)      Sleep apnea     cpap     Social History     Tobacco Use   Smoking Status Former Smoker     Years: 4.00     Types: Cigarettes   Smokeless Tobacco Never Used   Tobacco Comment    smoking in college-social     Patient Active Problem List   Diagnosis     Osteoporosis     Hyperlipidemia     Essential hypertension     Primary insomnia     Trochanteric Bursitis     Lumbar Spondylosis     Lumbar Disc Degeneration     Headache     Osteoarthritis of the Knee     Polyarthralgia     Status post left hip replacement     Hip dislocation, left (H)     Bilateral shoulder pain     Polymyalgia (H)     Primary osteoarthritis involving multiple joints     Current Outpatient Medications   Medication Sig Dispense Refill     calcium carbonate-vitamin D3 600 mg (1,500 mg)-800 unit Chew Chew 1 tablet daily.       cholecalciferol, vitamin D3, 4,000 unit Tab Take 4,000 Units by mouth daily.        hydroCHLOROthiazide (HYDRODIURIL) 50 MG tablet TAKE 1/2  TABLET EVERY DAY (SUBSTITUTED FOR  HYDRODIURIL) 45 tablet 6     lisinopril (PRINIVIL,ZESTRIL) 20 MG tablet TAKE 1 TABLET EVERY DAY 90 tablet 3     METHYLCELLULOSE (CITRUCEL ORAL) Take 1 tablet by mouth daily.       zolpidem (AMBIEN) 5 MG tablet Take 1 tablet (5 mg total) by mouth at bedtime as needed for sleep. 30 tablet 0     Current Facility-Administered Medications   Medication Dose Route Frequency Provider Last Rate Last Dose     denosumab 60 mg (PROLIA 60 mg/ml)  60 mg Subcutaneous Q6 Months Denita Cottrell MD   60 mg at 01/16/19 1625     DETAILED EXAMINATION  02/14/19  :  Vitals:    02/14/19 1223   BP: 136/84   Patient Site: Right Arm   Patient Position: Sitting   Cuff Size: Adult Regular   Pulse: 88   Weight: 180 lb (81.6 kg)     Alert oriented. Head including the face is examined for malar rash, heliotropes, scarring, lupus pernio. Eyes examined for redness such as in episcleritis/scleritis, periorbital lesions.   Neck/ Face examined for parotid gland swelling, range of motion of neck.  Left upper and lower and right upper and lower extremities examined for tenderness, swelling, warmth of the appendicular joints, range of motion, edema, rash.  Some of the important findings included: No synovitis of the palpable joints of upper extremities.  Normal range of motion of the shoulders.  Knees are warmer JLT with no detectable effusion clinically.  The knees were examined with ultrasound.  No significant effusion obtained.  LAB / IMAGING DATA:  ALT   Date Value Ref Range Status   12/19/2018 10 0 - 45 U/L Final   05/30/2018 17 0 - 45 U/L Final   09/12/2017 19 0 - 45 U/L Final     Albumin   Date Value Ref Range Status   12/19/2018 3.8 3.5 - 5.0 g/dL Final   05/30/2018 4.0 3.5 - 5.0 g/dL Final   09/12/2017 3.7 3.5 - 5.0 g/dL Final     Creatinine   Date Value Ref Range Status   12/19/2018 0.71 0.60 - 1.10 mg/dL Final   05/30/2018 0.71 0.60 - 1.10 mg/dL Final   03/28/2018 0.63 0.60 - 1.10 mg/dL Final       WBC   Date  Value Ref Range Status   12/19/2018 9.0 4.0 - 11.0 thou/uL Final   05/30/2018 6.3 4.0 - 11.0 thou/uL Final   05/01/2015 6.5 4.0 - 11.0 thou/uL Final     Hemoglobin   Date Value Ref Range Status   12/19/2018 14.3 12.0 - 16.0 g/dL Final   05/30/2018 13.7 12.0 - 16.0 g/dL Final   03/30/2018 10.5 (L) 12.0 - 16.0 g/dL Final     Platelets   Date Value Ref Range Status   12/19/2018 287 140 - 440 thou/uL Final   05/30/2018 241 140 - 440 thou/uL Final   03/28/2018 304 140 - 440 thou/uL Final       Lab Results   Component Value Date    RF <15.0 10/02/2018    SEDRATE 10 12/19/2018

## 2021-06-24 NOTE — TELEPHONE ENCOUNTER
Patient has been on Cymbalta since Feb and she is experiencing headache and nausea.      Patient would also like to discuss knee x-ray results.

## 2021-06-25 NOTE — PROGRESS NOTES
Office Visit - Follow Up   Sheryl Trotter   80 y.o. female    Date of Visit: 5/25/2021    Chief Complaint   Patient presents with     Insomnia     ambien not working well     Numbness     and cold in her feet x 3 years, wondering if anything can be done      discuss aging process     veins in legs and ankles         Assessment and Plan   1. Primary insomnia  Unable to sleep without her zolpidem. Lately has not sleeping because she did not take her zolpidem. Afraid she would ran out of it. Will refill her zolpidem and will give her 60 tablets at a time.   - zolpidem (AMBIEN) 5 MG tablet; Take 1 tablet (5 mg total) by mouth at bedtime as needed for sleep.  Dispense: 60 tablet; Refill: 0    2. Idiopathic peripheral neuropathy  Complains of tingling with some cold feeling on the distal 3rd of her feet. Was informed in the past she has neuropathy. I think there is also a component of Raynaud's phenomenon causing her cold feet. Saw our podiatrist already. Wants to see other podiatrist. Will refer her to [facility] podiatry for second opinion.   - Ambulatory referral to Orthopedics    3. Irritable bowel syndrome with diarrhea  Has possible IBS because she gets some abdominal discomfort with diarrhea. Wants to know if she has celiac. Daughter is diagnosed with celiac. Will check her celiac antibody panel. Advised to look into diet for IBS.   - Celiac(Gluten)Antibody Panel    4. Age-related osteoporosis without current pathological fracture  Has osteoporosis. Gets Prolia every 6 months.     5. Right foot pain  Has future order by her previous podiatry for her right foot pains. Will check these as ordered.   - Uric Acid  - Erythrocyte Sedimentation Rate  - C-Reactive Protein(CRP)      Follow up in  6 months.      History of Present Illness   This 80 y.o. old female is here for follow up. Has not been seen since last year due to the pandemic. Complains of recurring insomnia. Takes zolpidem which helps. Has not been  taking it because she is afraid she will run out of this med. Complains of cold feet and tingling sensation. Saw our podiatrist and was advised some form of surgery. Did not have this done. Wants to get a second opinion from other podiatry. Complains of abdominal discomfort with diarrhea coming off and on. Wants to be tested for celiac since her daughter has celiac. Has osteoporosis and gets Prolia every 6 months.      Review of Systems   A 12 point comprehensive review of systems was negative except as noted..     Medications, Allergies and Problem List   Reviewed and updated             Chief Complaint   Insomnia (ambien not working well), Numbness (and cold in her feet x 3 years, wondering if anything can be done ), and discuss aging process (veins in legs and ankles )       Patient Profile   Social History     Social History Narrative    . 3 grown children, 2 daughters and 1 son. 7 grandchildren. Did speech path.  Nonsmoker. Socially drinks alcohol.          Past Medical History   Patient Active Problem List   Diagnosis     Osteoporosis     Hyperlipidemia     Essential hypertension     Primary insomnia     Trochanteric Bursitis     Lumbar Spondylosis     Lumbar Disc Degeneration     Headache     Osteoarthritis of the Knee     Polyarthralgia     Status post left hip replacement     Hip dislocation, left (H)     Bilateral shoulder pain     Polymyalgia (H)     Primary osteoarthritis involving multiple joints     Chondrocalcinosis       Past Surgical History  She has a past surgical history that includes Oophorectomy (Mid 1990's); Appendectomy; Cholecystectomy; pr total hip arthroplasty (Left, 2/7/2018); Hip pinning (Left, 3/28/2018); Hysterectomy (Mid 1990's); and Foot Fusion (Right, 2017).       Medications and Allergies   Current Outpatient Medications   Medication Sig     calcium carbonate-vitamin D3 600 mg (1,500 mg)-800 unit Chew Chew 1 tablet daily.     denosumab 60 mg/mL Syrg Inject 60 mg under the  "skin once.     hydroCHLOROthiazide (HYDRODIURIL) 50 MG tablet TAKE 1/2 TABLET EVERY DAY (SUBSTITUTED FOR  HYDRODIURIL)     lisinopriL (PRINIVIL,ZESTRIL) 20 MG tablet TAKE 1 TABLET EVERY DAY     melatonin 5 mg cap Take by mouth.     METHYLCELLULOSE (CITRUCEL ORAL) Take 1 tablet by mouth daily.     zolpidem (AMBIEN) 5 MG tablet Take 1 tablet (5 mg total) by mouth at bedtime as needed for sleep.     Allergies   Allergen Reactions     Duloxetine Headache     Nausea, difficult sleeping ankles cramps, loose bowel      Gabapentin Anxiety        Family and Social History   Family History   Problem Relation Age of Onset     Lymphoma Father 28        Hodgkins     Colon cancer Maternal Grandmother      Vaginal cancer Maternal Aunt      Sleep apnea Son         Social History     Tobacco Use     Smoking status: Former Smoker     Years: 4.00     Types: Cigarettes     Smokeless tobacco: Never Used     Tobacco comment: smoking in college-social   Substance Use Topics     Alcohol use: Yes     Alcohol/week: 3.3 standard drinks     Types: 4 Standard drinks or equivalent per week     Frequency: 2-4 times a month     Comment: occasional glass of wine     Drug use: No        Physical Exam       Physical Exam  /76 (Patient Site: Right Arm, Patient Position: Sitting, Cuff Size: Adult Regular)   Pulse 92   Temp 97  F (36.1  C) (Temporal)   Resp 18   Ht 5' 6.5\" (1.689 m)   Wt 179 lb (81.2 kg)   SpO2 97%   BMI 28.46 kg/m    General appearance: alert, appears stated age, cooperative and no distress  Throat: lips, mucosa, and tongue normal; teeth and gums normal  Neck: no adenopathy, no carotid bruit, no JVD, supple, symmetrical, trachea midline and thyroid not enlarged, symmetric, no tenderness/mass/nodules  Lungs: clear to auscultation bilaterally  Heart: regular rate and rhythm, S1, S2 normal, no murmur, click, rub or gallop  Abdomen: soft, non-tender; bowel sounds normal; no masses,  no organomegaly  Extremities: extremities " normal, atraumatic, no cyanosis or edema and distal 3rd of both feet feels cold than normal     Additional Information   Post Discharge Medication Reconciliation Status: discharge medications reconciled, continue medications without change      Luis Enrique Coyle MD  Internal Medicine  Contact me at [phone]     Additional Information   Current Outpatient Medications   Medication Sig     calcium carbonate-vitamin D3 600 mg (1,500 mg)-800 unit Chew Chew 1 tablet daily.     denosumab 60 mg/mL Syrg Inject 60 mg under the skin once.     hydroCHLOROthiazide (HYDRODIURIL) 50 MG tablet TAKE 1/2 TABLET EVERY DAY (SUBSTITUTED FOR  HYDRODIURIL)     lisinopriL (PRINIVIL,ZESTRIL) 20 MG tablet TAKE 1 TABLET EVERY DAY     melatonin 5 mg cap Take by mouth.     METHYLCELLULOSE (CITRUCEL ORAL) Take 1 tablet by mouth daily.     zolpidem (AMBIEN) 5 MG tablet Take 1 tablet (5 mg total) by mouth at bedtime as needed for sleep.     Allergies   Allergen Reactions     Duloxetine Headache     Nausea, difficult sleeping ankles cramps, loose bowel      Gabapentin Anxiety     Social History     Tobacco Use     Smoking status: Former Smoker     Years: 4.00     Types: Cigarettes     Smokeless tobacco: Never Used     Tobacco comment: smoking in college-social   Substance Use Topics     Alcohol use: Yes     Alcohol/week: 3.3 standard drinks     Types: 4 Standard drinks or equivalent per week     Frequency: 2-4 times a month     Comment: occasional glass of wine     Drug use: No

## 2021-07-02 ENCOUNTER — AMBULATORY - HEALTHEAST (OUTPATIENT)
Dept: INTERNAL MEDICINE | Facility: CLINIC | Age: 81
End: 2021-07-02

## 2021-07-02 DIAGNOSIS — M81.0 AGE-RELATED OSTEOPOROSIS WITHOUT CURRENT PATHOLOGICAL FRACTURE: ICD-10-CM

## 2021-07-02 DIAGNOSIS — Z92.29 PERSONAL HISTORY OF OTHER DRUG THERAPY: ICD-10-CM

## 2021-07-03 NOTE — ANESTHESIA PREPROCEDURE EVALUATION
Anesthesia Preprocedure Evaluation by Joyce Griffin MD at 3/28/2018 12:04 PM     Author: Joyce Griffin MD Service: -- Author Type: Physician    Filed: 3/28/2018 12:06 PM Date of Service: 3/28/2018 12:04 PM Status: Signed    : Joyce Griffin MD (Physician)       Anesthesia Evaluation      Patient summary reviewed   History of anesthetic complications (PONV)     Airway    Pulmonary - normal exam    breath sounds clear to auscultation  (+) sleep apnea on CPAP, , a smoker (Former)                         Cardiovascular - normal exam  Exercise tolerance: > or = 4 METS  (+) hypertension well controlled, , hypercholesterolemia,     (-) murmur  Rhythm: regular  Rate: normal,    no murmur      Neuro/Psych - negative ROS     Endo/Other    (+) arthritis,      GI/Hepatic/Renal    (+) GERD well controlled,             Dental    (+) bridge                           Anesthesia Plan  Planned anesthetic: spinal    ASA 2     Anesthetic plan and risks discussed with: patient  Anesthesia plan special considerations: antiemetics,   Post-op plan: routine recovery

## 2021-07-03 NOTE — ADDENDUM NOTE
Addendum Note by Bloch, Lisa M, CMA at 12/7/2017  5:07 PM     Author: Bloch, Lisa M, CMA Service: -- Author Type: Certified Medical Assistant    Filed: 12/7/2017  5:07 PM Encounter Date: 12/7/2017 Status: Signed    : Bloch, Lisa M, CMA (Certified Medical Assistant)    Addended by: BLOCH, LISA M on: 12/7/2017 05:07 PM        Modules accepted: Orders

## 2021-07-04 NOTE — LETTER
Letter by Luis Enrique Coyle MD at      Author: Luis Enrique Coyle MD Service: -- Author Type: --    Filed:  Encounter Date: 5/27/2021 Status: (Other)         Sheryl Trotter  [id number] [facility] [address]  [place] [address]             June 1, 2021         Dear Ms. Trotter,    Below are the results from your recent visit:    Resulted Orders   Celiac(Gluten)Antibody Panel   Result Value Ref Range    Gliadin IgA 1.4 0.0-<7.0 U/mL    Gliadin IgG <0.4 0.0-<7.0 U/mL    Tissue Transglutaminase IgG AB 0.8 0.0-<7.0 U/mL    Tissue Transglutaminase IgA AB 0.9 0.0-<7.0 U/mL    Immunoglobulin A 235 65 - 400 mg/dL    Narrative      < 7 U/mL = Negative    7-10 U/mL = Equivocal    > 10 U/mL = Positive    Positive results for the tTG and/or gliadin antibodies indicate possible celiac disease and a small intestinal biopsy my be indicated. Antibody levels decrease in patients on gluten-free diets; therefore, negative results do not exclude celiac disease. Total serum IgA is measured to identify selective IgA deficiency, which is present in up to 10% of celiac disease patients. Such patients would have negative results on IgA assays, but may have positive results on IgG antibody assays.   Uric Acid   Result Value Ref Range    Uric Acid 5.5 2.0 - 7.5 mg/dL   Erythrocyte Sedimentation Rate   Result Value Ref Range    Sed Rate 4 0 - 20 mm/hr   C-Reactive Protein(CRP)   Result Value Ref Range    CRP 0.2 0.0 - 0.8 mg/dL       Please see results     Please call with questions or contact us using [employer].    Sincerely,        Electronically signed by Luis Enrique Coyle MD

## 2021-07-04 NOTE — LETTER
Letter by Luis Enrique Coyle MD at      Author: Luis Enrique Coyle MD Service: -- Author Type: --    Filed:  Encounter Date: 5/27/2021 Status: (Other)         Sheryl Trotter  [address] [facility] [address]  [place] [facility] [address]             May 27, 2021         Dear Ms. Trotter,    Below are the results from your recent visit:    Resulted Orders   Uric Acid   Result Value Ref Range    Uric Acid 5.5 2.0 - 7.5 mg/dL   Erythrocyte Sedimentation Rate   Result Value Ref Range    Sed Rate 4 0 - 20 mm/hr   C-Reactive Protein(CRP)   Result Value Ref Range    CRP 0.2 0.0 - 0.8 mg/dL       Normal markers of inflammation. Good!     Waiting for your celiac study. Thanks.     Please call with questions or contact us using [employer].    Sincerely,        Electronically signed by Luis Enrique Coyle MD

## 2021-07-04 NOTE — LETTER
Letter by Luis Enrique Coyle MD at      Author: Luis Enrique Coyle MD Service: -- Author Type: --    Filed:  Encounter Date: 5/27/2021 Status: (Other)         Sheryl Trotter  [id number] [facility] [address]  [place] [facility] [address]             June 1, 2021         Dear Ms. Trotter,    Below are the results from your recent visit:    Resulted Orders   Celiac(Gluten)Antibody Panel   Result Value Ref Range    Gliadin IgA 1.4 0.0-<7.0 U/mL    Gliadin IgG <0.4 0.0-<7.0 U/mL    Tissue Transglutaminase IgG AB 0.8 0.0-<7.0 U/mL    Tissue Transglutaminase IgA AB 0.9 0.0-<7.0 U/mL    Immunoglobulin A 235 65 - 400 mg/dL    Narrative      < 7 U/mL = Negative    7-10 U/mL = Equivocal    > 10 U/mL = Positive    Positive results for the tTG and/or gliadin antibodies indicate possible celiac disease and a small intestinal biopsy my be indicated. Antibody levels decrease in patients on gluten-free diets; therefore, negative results do not exclude celiac disease. Total serum IgA is measured to identify selective IgA deficiency, which is present in up to 10% of celiac disease patients. Such patients would have negative results on IgA assays, but may have positive results on IgG antibody assays.   Uric Acid   Result Value Ref Range    Uric Acid 5.5 2.0 - 7.5 mg/dL   Erythrocyte Sedimentation Rate   Result Value Ref Range    Sed Rate 4 0 - 20 mm/hr   C-Reactive Protein(CRP)   Result Value Ref Range    CRP 0.2 0.0 - 0.8 mg/dL       Negative celiac study. Hence, you don't have celiac disease. Good!   Sheryl, this is your last visit with me.  It is an honor and privilege taking care of you all these years. Thank you and take care.     Please call with questions or contact us using [employer].    Sincerely,        Electronically signed by Luis Enrique Coyle MD

## 2021-07-06 VITALS
WEIGHT: 179 LBS | OXYGEN SATURATION: 97 % | TEMPERATURE: 97 F | DIASTOLIC BLOOD PRESSURE: 76 MMHG | HEART RATE: 92 BPM | SYSTOLIC BLOOD PRESSURE: 118 MMHG | BODY MASS INDEX: 28.09 KG/M2 | HEIGHT: 67 IN | RESPIRATION RATE: 18 BRPM

## 2021-07-13 ENCOUNTER — TRANSCRIBE ORDERS (OUTPATIENT)
Dept: INTERNAL MEDICINE | Facility: CLINIC | Age: 81
End: 2021-07-13

## 2021-07-13 DIAGNOSIS — Z92.29 PERSONAL HISTORY OF OTHER DRUG THERAPY: Primary | ICD-10-CM

## 2021-07-13 DIAGNOSIS — M81.0 AGE-RELATED OSTEOPOROSIS WITHOUT CURRENT PATHOLOGICAL FRACTURE: ICD-10-CM

## 2021-07-13 NOTE — PROGRESS NOTES
As part of the required manual data conversion process for integration, this encounter was created to document a CAM (Clinic Administered Medication) order. This information was copied from the [facility] patient's chart to the [facility] patient chart.     Annelise Wiggins, Bao  July 13, 2021

## 2021-07-15 ENCOUNTER — OFFICE VISIT (OUTPATIENT)
Dept: INTERNAL MEDICINE | Facility: CLINIC | Age: 81
End: 2021-07-15
Payer: MEDICARE

## 2021-07-15 VITALS
DIASTOLIC BLOOD PRESSURE: 70 MMHG | SYSTOLIC BLOOD PRESSURE: 121 MMHG | WEIGHT: 179.6 LBS | HEART RATE: 82 BPM | BODY MASS INDEX: 28.55 KG/M2 | OXYGEN SATURATION: 99 %

## 2021-07-15 DIAGNOSIS — M81.0 AGE-RELATED OSTEOPOROSIS WITHOUT CURRENT PATHOLOGICAL FRACTURE: Primary | ICD-10-CM

## 2021-07-15 LAB
CALCIUM, IONIZED MEASURED: 1.25 MMOL/L (ref 1.11–1.3)
ION CA PH 7.4: 1.21 MMOL/L (ref 1.11–1.3)
PH: 7.33 (ref 7.35–7.45)
PTH-INTACT SERPL-MCNC: 44 PG/ML (ref 10–86)

## 2021-07-15 PROCEDURE — 36415 COLL VENOUS BLD VENIPUNCTURE: CPT | Performed by: INTERNAL MEDICINE

## 2021-07-15 PROCEDURE — 82306 VITAMIN D 25 HYDROXY: CPT | Performed by: INTERNAL MEDICINE

## 2021-07-15 PROCEDURE — 83970 ASSAY OF PARATHORMONE: CPT | Performed by: INTERNAL MEDICINE

## 2021-07-15 PROCEDURE — 99213 OFFICE O/P EST LOW 20 MIN: CPT | Mod: 25 | Performed by: INTERNAL MEDICINE

## 2021-07-15 PROCEDURE — 96372 THER/PROPH/DIAG INJ SC/IM: CPT | Performed by: PHARMACIST

## 2021-07-15 PROCEDURE — 82330 ASSAY OF CALCIUM: CPT | Performed by: INTERNAL MEDICINE

## 2021-07-15 NOTE — PROGRESS NOTES
(M81.0) Age-related osteoporosis without current pathological fracture  (primary encounter diagnosis)        Patient was educated on safety of Prolia utilizing Patient Counseling Chart for Healthcare Providers, as outlined by the Prolia REMS progam.     Return in about 6 months (around 1/15/2022) for Prolia with CSS.    Patient Instructions   Prolia 11th today.  Prolia 12th in 6 months with my nurse. I will see you in 1 year.    DXA in 12/2021 .   Phone number to schedule [phone].    Daily calcium need is 8351-3107 mg a day from the diet and supplements.  Calcium citrate is easier to digest.  Vitamin D 2000 IU daily recommended.          /70   Pulse 82   Wt 81.5 kg (179 lb 9.6 oz)   SpO2 99%   BMI 28.55 kg/m        Did you experience any problems with previous Prolia injection? no  Any medication change in the last 6 months? no  Did you take prednisone or other immunosupressant drugs in the last 6 months   (chemo, transplant, rheum, dermatology conditions)? no  Did you have any serious infection in the last 6 months?no  Any recent hospitalizations?no  Do you plan any dental work in the next 2-3 months?no  How much calcium do you take daily from the diet and supplements?1200 mg  How much vit D do you take daily? 2000 IU  Last DXA? 12/2019, Reviewed and discussed      Patient is here today for the 11th Prolia injection. Patient tolerated previous injections well.   We discussed calcium and vit D daily needs today.       Next Prolia injection will be in 6 months.           This note has been dictated using voice recognition software. Any grammatical or context distortions are unintentional and inherent to the software      Patient Active Problem List   Diagnosis     Osteoporosis     Hyperlipidemia     Essential hypertension     Primary insomnia     Trochanteric Bursitis     Lumbar Spondylosis     Lumbar Disc Degeneration     Headache     Osteoarthritis of the Knee     Polyarthralgia     Status post  left hip replacement     Hip dislocation, left (H)     Bilateral shoulder pain     Polymyalgia (H)     Primary osteoarthritis involving multiple joints     Chondrocalcinosis       Current Outpatient Medications   Medication     calcium carbonate-vitamin D3 600 mg (1,500 mg)-800 unit Chew     denosumab 60 mg/mL Syrg     hydroCHLOROthiazide (HYDRODIURIL) 50 MG tablet     lisinopriL (PRINIVIL,ZESTRIL) 20 MG tablet     METHYLCELLULOSE (CITRUCEL ORAL)     zolpidem (AMBIEN) 5 MG tablet     melatonin 5 mg cap     Current Facility-Administered Medications   Medication     denosumab (PROLIA) injection 60 mg

## 2021-07-15 NOTE — PATIENT INSTRUCTIONS
Prolia 11th today.  Prolia 12th in 6 months with my nurse. I will see you in 1 year.    DXA in 12/2021 .   Phone number to schedule [phone].    Daily calcium need is 2033-2283 mg a day from the diet and supplements.  Calcium citrate is easier to digest.  Vitamin D 2000 IU daily recommended.

## 2021-07-16 LAB — DEPRECATED CALCIDIOL+CALCIFEROL SERPL-MC: 42 UG/L (ref 30–80)

## 2021-07-21 ENCOUNTER — RECORDS - HEALTHEAST (OUTPATIENT)
Dept: ADMINISTRATIVE | Facility: CLINIC | Age: 81
End: 2021-07-21

## 2021-07-22 ENCOUNTER — RECORDS - HEALTHEAST (OUTPATIENT)
Dept: INTERNAL MEDICINE | Facility: CLINIC | Age: 81
End: 2021-07-22

## 2021-07-22 DIAGNOSIS — Z12.31 OTHER SCREENING MAMMOGRAM: ICD-10-CM

## 2021-08-09 ENCOUNTER — HOSPITAL ENCOUNTER (OUTPATIENT)
Dept: MAMMOGRAPHY | Facility: CLINIC | Age: 81
Discharge: HOME | End: 2021-08-09
Attending: INTERNAL MEDICINE | Admitting: INTERNAL MEDICINE
Payer: MEDICARE

## 2021-08-09 DIAGNOSIS — Z12.31 VISIT FOR SCREENING MAMMOGRAM: ICD-10-CM

## 2021-08-09 PROCEDURE — 77063 BREAST TOMOSYNTHESIS BI: CPT

## 2021-08-17 ENCOUNTER — OFFICE VISIT (OUTPATIENT)
Dept: INTERNAL MEDICINE | Facility: CLINIC | Age: 81
End: 2021-08-17
Payer: MEDICARE

## 2021-08-17 VITALS
HEART RATE: 76 BPM | BODY MASS INDEX: 28.41 KG/M2 | OXYGEN SATURATION: 97 % | WEIGHT: 181 LBS | HEIGHT: 67 IN | SYSTOLIC BLOOD PRESSURE: 130 MMHG | DIASTOLIC BLOOD PRESSURE: 80 MMHG

## 2021-08-17 DIAGNOSIS — M81.0 AGE-RELATED OSTEOPOROSIS WITHOUT CURRENT PATHOLOGICAL FRACTURE: ICD-10-CM

## 2021-08-17 DIAGNOSIS — M35.3 POLYMYALGIA (H): ICD-10-CM

## 2021-08-17 DIAGNOSIS — G62.89 OTHER POLYNEUROPATHY: Primary | ICD-10-CM

## 2021-08-17 DIAGNOSIS — G47.09 OTHER INSOMNIA: ICD-10-CM

## 2021-08-17 DIAGNOSIS — I10 ESSENTIAL HYPERTENSION: ICD-10-CM

## 2021-08-17 DIAGNOSIS — G47.33 OSA (OBSTRUCTIVE SLEEP APNEA): ICD-10-CM

## 2021-08-17 LAB
ALBUMIN SERPL-MCNC: 4 G/DL (ref 3.5–5)
ALP SERPL-CCNC: 58 U/L (ref 45–120)
ALT SERPL W P-5'-P-CCNC: 15 U/L (ref 0–45)
ANION GAP SERPL CALCULATED.3IONS-SCNC: 12 MMOL/L (ref 5–18)
AST SERPL W P-5'-P-CCNC: 18 U/L (ref 0–40)
BASOPHILS # BLD AUTO: 0.1 10E3/UL (ref 0–0.2)
BASOPHILS NFR BLD AUTO: 1 %
BILIRUB DIRECT SERPL-MCNC: 0.2 MG/DL
BILIRUB SERPL-MCNC: 0.5 MG/DL (ref 0–1)
BUN SERPL-MCNC: 9 MG/DL (ref 8–28)
C REACTIVE PROTEIN LHE: 0.2 MG/DL (ref 0–0.8)
CALCIUM SERPL-MCNC: 10.1 MG/DL (ref 8.5–10.5)
CHLORIDE BLD-SCNC: 96 MMOL/L (ref 98–107)
CO2 SERPL-SCNC: 26 MMOL/L (ref 22–31)
CREAT SERPL-MCNC: 0.71 MG/DL (ref 0.6–1.1)
EOSINOPHIL # BLD AUTO: 0 10E3/UL (ref 0–0.7)
EOSINOPHIL NFR BLD AUTO: 0 %
ERYTHROCYTE [DISTWIDTH] IN BLOOD BY AUTOMATED COUNT: 12.7 % (ref 10–15)
ERYTHROCYTE [SEDIMENTATION RATE] IN BLOOD BY WESTERGREN METHOD: 4 MM/HR (ref 0–20)
GFR SERPL CREATININE-BSD FRML MDRD: 81 ML/MIN/1.73M2
GLUCOSE BLD-MCNC: 94 MG/DL (ref 70–125)
HCT VFR BLD AUTO: 43 % (ref 35–47)
HGB BLD-MCNC: 14.2 G/DL (ref 11.7–15.7)
IMM GRANULOCYTES # BLD: 0 10E3/UL
IMM GRANULOCYTES NFR BLD: 0 %
LYMPHOCYTES # BLD AUTO: 2.8 10E3/UL (ref 0.8–5.3)
LYMPHOCYTES NFR BLD AUTO: 36 %
MCH RBC QN AUTO: 29.3 PG (ref 26.5–33)
MCHC RBC AUTO-ENTMCNC: 33 G/DL (ref 31.5–36.5)
MCV RBC AUTO: 89 FL (ref 78–100)
MONOCYTES # BLD AUTO: 0.8 10E3/UL (ref 0–1.3)
MONOCYTES NFR BLD AUTO: 10 %
NEUTROPHILS # BLD AUTO: 4.2 10E3/UL (ref 1.6–8.3)
NEUTROPHILS NFR BLD AUTO: 54 %
PLATELET # BLD AUTO: 266 10E3/UL (ref 150–450)
POTASSIUM BLD-SCNC: 3.6 MMOL/L (ref 3.5–5)
PROT SERPL-MCNC: 7.2 G/DL (ref 6–8)
RBC # BLD AUTO: 4.84 10E6/UL (ref 3.8–5.2)
SODIUM SERPL-SCNC: 134 MMOL/L (ref 136–145)
VIT B12 SERPL-MCNC: 647 PG/ML (ref 213–816)
WBC # BLD AUTO: 7.9 10E3/UL (ref 4–11)

## 2021-08-17 PROCEDURE — 82248 BILIRUBIN DIRECT: CPT | Performed by: INTERNAL MEDICINE

## 2021-08-17 PROCEDURE — 85652 RBC SED RATE AUTOMATED: CPT | Performed by: INTERNAL MEDICINE

## 2021-08-17 PROCEDURE — 96127 BRIEF EMOTIONAL/BEHAV ASSMT: CPT | Performed by: INTERNAL MEDICINE

## 2021-08-17 PROCEDURE — 82607 VITAMIN B-12: CPT | Performed by: INTERNAL MEDICINE

## 2021-08-17 PROCEDURE — 84165 PROTEIN E-PHORESIS SERUM: CPT | Performed by: INTERNAL MEDICINE

## 2021-08-17 PROCEDURE — 82306 VITAMIN D 25 HYDROXY: CPT | Performed by: INTERNAL MEDICINE

## 2021-08-17 PROCEDURE — 85025 COMPLETE CBC W/AUTO DIFF WBC: CPT | Performed by: INTERNAL MEDICINE

## 2021-08-17 PROCEDURE — 86618 LYME DISEASE ANTIBODY: CPT | Performed by: INTERNAL MEDICINE

## 2021-08-17 PROCEDURE — 36415 COLL VENOUS BLD VENIPUNCTURE: CPT | Performed by: INTERNAL MEDICINE

## 2021-08-17 PROCEDURE — 80053 COMPREHEN METABOLIC PANEL: CPT | Performed by: INTERNAL MEDICINE

## 2021-08-17 PROCEDURE — 86141 C-REACTIVE PROTEIN HS: CPT | Performed by: INTERNAL MEDICINE

## 2021-08-17 PROCEDURE — 99215 OFFICE O/P EST HI 40 MIN: CPT | Performed by: INTERNAL MEDICINE

## 2021-08-17 RX ORDER — PREGABALIN 25 MG/1
25 CAPSULE ORAL AT BEDTIME
Qty: 30 CAPSULE | Refills: 0 | Status: SHIPPED | OUTPATIENT
Start: 2021-08-17 | End: 2021-09-13

## 2021-08-17 ASSESSMENT — MIFFLIN-ST. JEOR: SCORE: 1320.7

## 2021-08-17 NOTE — PROGRESS NOTES
Assessment & Plan     Other polyneuropathy: Patient has symptoms of neuropathy, which she has had for 5 years, which is progressive. Trouble with her balance as well because of this. Says she has had an EMG a while ago (I wasn't able to find this). She has symptoms of restless legs, and has insomnia. I am doing some lab tests today to look for possible causes. Other background, PMR 5-6 years ago, treated with prednisone, not on prednisone now. Symptoms improved. Sleep apnea, but patient stopped using the CPAP as she felt this wasn't really helping.     - pregabalin (LYRICA) 25 MG capsule  Dispense: 30 capsule; Refill: 0  - Basic metabolic panel  (Ca, Cl, CO2, Creat, Gluc, K, Na, BUN)  - Hepatic panel (Albumin, ALT, AST, Bili, Alk Phos, TP)  - CBC with platelets and differential  - Vitamin B12  - Lyme Disease Carola with reflex to WB Serum  - ESR: Erythrocyte sedimentation rate  - CRP, inflammation  - Protein electrophoresis  - Adult Neurology Referral  - Basic metabolic panel  (Ca, Cl, CO2, Creat, Gluc, K, Na, BUN)  - Hepatic panel (Albumin, ALT, AST, Bili, Alk Phos, TP)  - CBC with platelets and differential  - Vitamin B12  - Lyme Disease Carola with reflex to WB Serum  - ESR: Erythrocyte sedimentation rate  - CRP, inflammation  - Protein electrophoresis    Other insomnia, patient has had sleep issues for years.  She was on Ambien, 5 mg every night.  She says that she has had no Ambien for the last 3-4 weeks.  She had a sleep study August 10, 2017, and says that she had another sleep study after that.  She stopped using CPAP about a year ago.  She usually wakes up 3-4 times each night.  She has difficulty falling asleep, due to her legs giving her some trouble.  She wakes up during the middle of the night, and there is no specific reason as to why she woke up.  Patient tried melatonin, but had diarrhea with this and stopped it.      Polymyalgia (H), patient states that she was treated for polymyalgia rheumatica, 5-6  years ago.  She was on prednisone for a while.  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  She is also had a second surgery, for revision of this.  Avascular necrosis was due to her steroids.  Patient says that she had shoulder pain at the time and could not get in and out of chairs.  That pain and weakness and fatigue has completely resolved.  She now has tingling, and burning pain in her legs and her feet.  Was also placed on Suboxone and Cymbalta in 2018, for aches and pains after polymyalgia rheumatica.      Essential hypertension, patient's blood pressure is controlled today at 130/80.  Patient is on hydrochlorothiazide one half of a 50 mg pill.  She is on lisinopril 20 mg daily.  Last BMP was done July 24, 2019.  Potassium of 4, estimated GFR more than 60.    - Basic metabolic panel  (Ca, Cl, CO2, Creat, Gluc, K, Na, BUN)  - Basic metabolic panel  (Ca, Cl, CO2, Creat, Gluc, K, Na, BUN)    Age-related osteoporosis without current pathological fracture, patient has been on Prolia for about 5 years.  She will be having a bone density test in December.  She is on calcium with vitamin D1 tablet twice a day.  She is on vitamin D in addition to this, does not know her doses.  She has 1-1-1/2 or 2 drinks on the weekends.    - Vitamin D Deficiency  - Vitamin D Deficiency    Patient has a diagnosis of sleep apnea, however she says she stopped using CPAP, for the past year, as she felt this was not helping.  She also describes what sounds like restless leg type syndrome, which starts to happen in the evenings.  I was hoping to be able to give her something like gabapentin, however the patient has an allergy to gabapentin.  Review of the chart, she was given 100 mg of gabapentin at bedtime, February 7, 2018.  This caused anxiety she says.  She says she is sensitive to medications.  None the possibility would be using a small dose of Lyrica.  Because  "she is having such pain, and is having difficulty with sleeping at night, have decided to start her on a small dose of Lyrica, 25 mg at nighttime only.  Discussed possible side effects.  I am hoping that she tolerates this.    Patient has redness of her feet and cold toes, bilaterally.  I can feel dorsalis pedis pulses bilaterally.  She complains of tingling and dysesthesia.  This involves the lower third of her leg [vertical distance to her knees].  Patient was seen by podiatry, Dr. Leon, 2019, and he suggested surgery for burning cold tingly numb feet.  She did not think that this was the right treatment, so did not return to see him again.  Balance is off as well.    40 minutes spent on the date of the encounter doing chart review, review of outside records, review of test results, patient visit and documentation, as detailed.      BMI:   Estimated body mass index is 28.78 kg/m  as calculated from the following:    Height as of this encounter: 1.689 m (5' 6.5\").    Weight as of this encounter: 82.1 kg (181 lb).       MEDICATIONS:   Orders Placed This Encounter   Medications     pregabalin (LYRICA) 25 MG capsule     Sig: Take 1 capsule (25 mg) by mouth At Bedtime     Dispense:  30 capsule     Refill:  0     Medications Discontinued During This Encounter   Medication Reason     melatonin 5 mg cap Erroneous Entry     zolpidem (AMBIEN) 5 MG tablet           - Continue other medications without change  There are no Patient Instructions on file for this visit.    Return in about 4 weeks (around 9/14/2021), or Follow with Dr. Da Silva, 30 minutes please.    Supriya Da Silva MD  [facility]    Zulay Lowe is a 81 year old who presents for the following health issues, by herself.:    HPI   Results for DAVION ROBERTO (MRN [id number]) as of 8/17/2021 14:18   Ref. Range 7/24/2019 11:59   Sodium Latest Ref Range: 136 - 145 mmol/L 136   Potassium Latest Ref Range: 3.5 - 5.0 mmol/L " 4.0   Chloride Latest Ref Range: 98 - 107 mmol/L 100   Carbon Dioxide Latest Ref Range: 22 - 31 mmol/L 26   Urea Nitrogen Latest Ref Range: 8 - 28 mg/dL 12   Creatinine Latest Ref Range: 0.60 - 1.10 mg/dL 0.70   GFR Estimate Latest Ref Range: >60 mL/min/1.73m2 >60   GFR Estimate If Black Latest Ref Range: >60 mL/min/1.73m2 >60     Results for DAVION ROBERTO (MRN [id number]) as of 2021 14:18   Ref. Range 2019 11:59   ALT Latest Ref Range: 0 - 45 U/L 16   AST Latest Ref Range: 0 - 40 U/L 17     Results for DAVION ROBERTO (MRN [id number]) as of 2021 14:18   Ref. Range 2019 11:59   Cholesterol Latest Ref Range: <=199 mg/dL 265 (H)   Patient Fasting > 8hrs? Unknown Yes   HDL Cholesterol Latest Ref Range: >=50 mg/dL 115   LDL Cholesterol Calculated Latest Ref Range: <=129 mg/dL 141 (H)   Triglycerides Latest Ref Range: <=149 mg/dL 43       Past Medical History:   Diagnosis Date     Arthritis      AVN (avascular necrosis of bone) (H)     let hip     DDD (degenerative disc disease), lumbar      GERD (gastroesophageal reflux disease)      HLD (hyperlipidemia)      Hypertension      Insomnia      Osteoporosis      PONV (postoperative nausea and vomiting)      Sleep apnea     cpap     Past Surgical History:   Procedure Laterality Date     APPENDECTOMY       C TOTAL HIP ARTHROPLASTY Left 2018    Procedure: LEFT TOTAL HIP ARTHROPLASTY;  Surgeon: Daron Rincon MD;  Location: [facility] Main OR;  Service: Orthopedics     CHOLECYSTECTOMY       FOOT FUSION Right 2017     HIP PINNING Left 3/28/2018    Procedure: OPEN REDUCTION OF DISLOCATED LEFT TOTAL HIP;  Surgeon: Daron Rincon MD;  Location: [facility] Main OR;  Service:      HYSTERECTOMY  Mid      IR LUMBAR EPIDURAL STEROID INJECTION  2005     IR LUMBAR EPIDURAL STEROID INJECTION  2005     OOPHORECTOMY  Mid      Family History   Problem Relation Age of Onset     Lymphoma Father ( at age 27), he was a medical doctor. 28.00        " Hodgkins     Colon Cancer Maternal Grandmother      Vaginal Cancer Maternal Aunt      Sleep Apnea Son    heart disease in siblings, high blood pressure, half brother vascular issues (peripheral vascular disease), another 1/2 brother cancer  (jaw).       Social History     Tobacco Use     Smoking status: Former Smoker     Years: 4.00     Types: Cigarettes     Smokeless tobacco: Never Used     Tobacco comment: smoking in college-social   Substance Use Topics     Alcohol use: Yes     Alcohol/week: 3.3 standard drinks     Comment: Alcoholic Drinks/day: occasional glass of wine     Drug use: No     Social History     Tobacco Use     Smoking status: Former Smoker     Years: 4.00     Types: Cigarettes     Smokeless tobacco: Never Used     Tobacco comment: smoking in college-social   Substance Use Topics     Alcohol use: Yes     Alcohol/week: 3.3 standard drinks     Comment: Alcoholic Drinks/day: occasional glass of wine     Drug use: No         Review of Systems   Rest of the review of systems is negative.      Objective    /80 (BP Location: Right arm, Patient Position: Sitting)   Pulse 76   Ht 1.689 m (5' 6.5\")   Wt 82.1 kg (181 lb)   SpO2 97%   BMI 28.78 kg/m    Body mass index is 28.78 kg/m .  Physical Exam   Patient is interactive and alert.  No cervical lymphadenopathy, no thyromegaly.  Reddened skin of her feet, cold feet bilaterally, redness of her lower extremities, and numbness and tingling in changes in sensation, one third of the vertical distance to her knees bilaterally.  Normal heart sounds, no murmurs.  No peripheral edema.  No other rashes.  Neck swelling or warmth of her joints.  She does not use an assistive device.        "

## 2021-08-18 LAB
B BURGDOR IGG+IGM SER QL: 0.07
DEPRECATED CALCIDIOL+CALCIFEROL SERPL-MC: 44 UG/L (ref 30–80)

## 2021-08-18 NOTE — RESULT ENCOUNTER NOTE
Your Lyme antibody test is negative.  This is good.  Vitamin D level is normal.  Please continue with any vitamin D you are taking.  Your vitamin B-12 is normal.  Low sodium, is slightly low at 134, normal is 136-145.  This can be caused by the blood pressure medication you are taking, hydrochlorothiazide.  We can discuss this further at future visits.  Liver enzymes are normal.  Your blood count is normal.    Supriya Da Silva MD

## 2021-08-19 PROBLEM — G62.89 OTHER POLYNEUROPATHY: Status: ACTIVE | Noted: 2021-08-19

## 2021-08-19 PROBLEM — G47.33 OSA (OBSTRUCTIVE SLEEP APNEA): Status: ACTIVE | Noted: 2021-08-19

## 2021-08-19 LAB
ALBUMIN PERCENT: 64 % (ref 51–67)
ALBUMIN SERPL ELPH-MCNC: 4.4 G/DL (ref 3.2–4.7)
ALPHA 1 PERCENT: 2.3 % (ref 2–4)
ALPHA 2 PERCENT: 11.7 % (ref 5–13)
ALPHA1 GLOB SERPL ELPH-MCNC: 0.2 G/DL (ref 0.1–0.3)
ALPHA2 GLOB SERPL ELPH-MCNC: 0.8 G/DL (ref 0.4–0.9)
B-GLOBULIN SERPL ELPH-MCNC: 0.8 G/DL (ref 0.7–1.2)
BETA PERCENT: 12.1 % (ref 10–17)
GAMMA GLOB SERPL ELPH-MCNC: 0.7 G/DL (ref 0.6–1.4)
GAMMA GLOBULIN PERCENT: 9.9 % (ref 9–20)
PATH ICD:: NORMAL
PROT PATTERN SERPL ELPH-IMP: NORMAL
REVIEWING PATHOLOGIST: NORMAL
TOTAL PROTEIN SERUM FOR ELP: 6.9 G/DL (ref 6–8)

## 2021-09-13 DIAGNOSIS — G62.89 OTHER POLYNEUROPATHY: ICD-10-CM

## 2021-09-13 RX ORDER — PREGABALIN 25 MG/1
25 CAPSULE ORAL AT BEDTIME
Qty: 90 CAPSULE | Refills: 1 | Status: SHIPPED | OUTPATIENT
Start: 2021-09-13 | End: 2022-01-04

## 2021-09-13 NOTE — TELEPHONE ENCOUNTER
..Reason for Call:  Medication or medication refill:    Do you use a [facility]?  Name of the pharmacy and phone number for the current request: [id number] - [place] - [address]  [phone]    Name of the medication requested: pregabalin (LYRICA) 25 MG capsule    Other request: Patient was due to check in with Dr. Da Silva this Friday, September 17th for her pregabalin (LYRICA) 25 MG capsule medication. She will be out and is requesting refill until she can see provider on 10/12/21. Please advise.     Can we leave a detailed message on this number? YES    Phone number patient can be reached at: Home number on file [phone] (home)    Best Time: ANY    Call taken on 9/13/2021 at 2:16 PM by NIDIA Roland

## 2021-09-13 NOTE — TELEPHONE ENCOUNTER
LMTCB 9/13. Need to reschedule pt's appt on 9/17 as Dr Da Silva is out this week on a family emergency.

## 2021-09-29 DIAGNOSIS — I10 ESSENTIAL HYPERTENSION: ICD-10-CM

## 2021-10-01 RX ORDER — HYDROCHLOROTHIAZIDE 50 MG/1
TABLET ORAL
Qty: 45 TABLET | Refills: 6 | Status: SHIPPED | OUTPATIENT
Start: 2021-10-01 | End: 2022-10-12

## 2021-10-01 NOTE — TELEPHONE ENCOUNTER
"Routing refill request to provider for review/approval because:  Labs out of range:  Sodium    Last Written Prescription Date:  9/8/2020  Last Fill Quantity: 45,  # refills: 6   Last office visit provider:  8/17/21     Requested Prescriptions   Pending Prescriptions Disp Refills     hydrochlorothiazide (HYDRODIURIL) 50 MG tablet [Pharmacy Med Name: HYDROCHLOROTHIAZIDE 50 MG Tablet] 45 tablet 6     Sig: TAKE 1/2 TABLET EVERY DAY (SUBSTITUTED FOR  HYDRODIURIL)       Diuretics (Including Combos) Protocol Failed - 9/29/2021  4:26 PM        Failed - Normal serum sodium on file in past 12 months     Recent Labs   Lab Test 08/17/21  1450   *              Passed - Blood pressure under 140/90 in past 12 months     BP Readings from Last 3 Encounters:   08/17/21 130/80   07/15/21 121/70   05/25/21 118/76                 Passed - Recent (12 mo) or future (30 days) visit within the authorizing provider's specialty     Patient has had an office visit with the authorizing provider or a provider within the authorizing providers department within the previous 12 mos or has a future within next 30 days. See \"Patient Info\" tab in inbasket, or \"Choose Columns\" in Meds & Orders section of the refill encounter.              Passed - Medication is active on med list        Passed - Patient is age 18 or older        Passed - No active pregancy on record        Passed - Normal serum creatinine on file in past 12 months     Recent Labs   Lab Test 08/17/21  1450   CR 0.71              Passed - Normal serum potassium on file in past 12 months     Recent Labs   Lab Test 08/17/21  1450   POTASSIUM 3.6                    Passed - No positive pregnancy test in past 12 months             Mia De La Cruz RN 10/01/21 7:55 AM  "

## 2021-10-04 DIAGNOSIS — I10 ESSENTIAL HYPERTENSION: ICD-10-CM

## 2021-10-05 RX ORDER — LISINOPRIL 20 MG/1
TABLET ORAL
Qty: 90 TABLET | Refills: 3 | Status: SHIPPED | OUTPATIENT
Start: 2021-10-05 | End: 2022-08-23

## 2021-10-05 NOTE — TELEPHONE ENCOUNTER
"Last Written Prescription Date:  10/9/20  Last Fill Quantity: 90,  # refills: 3   Last office visit provider:  8/17/21     Requested Prescriptions   Pending Prescriptions Disp Refills     lisinopril (ZESTRIL) 20 MG tablet [Pharmacy Med Name: LISINOPRIL 20 MG Tablet] 90 tablet 3     Sig: TAKE 1 TABLET EVERY DAY       ACE Inhibitors (Including Combos) Protocol Passed - 10/4/2021  8:10 PM        Passed - Blood pressure under 140/90 in past 12 months     BP Readings from Last 3 Encounters:   08/17/21 130/80   07/15/21 121/70   05/25/21 118/76                 Passed - Recent (12 mo) or future (30 days) visit within the authorizing provider's specialty     Patient has had an office visit with the authorizing provider or a provider within the authorizing providers department within the previous 12 mos or has a future within next 30 days. See \"Patient Info\" tab in inbasket, or \"Choose Columns\" in Meds & Orders section of the refill encounter.              Passed - Medication is active on med list        Passed - Patient is age 18 or older        Passed - No active pregnancy on record        Passed - Normal serum creatinine on file in past 12 months     Recent Labs   Lab Test 08/17/21  1450   CR 0.71       Ok to refill medication if creatinine is low          Passed - Normal serum potassium on file in past 12 months     Recent Labs   Lab Test 08/17/21  1450   POTASSIUM 3.6             Passed - No positive pregnancy test within past 12 months             Yumiko Underwood RN 10/05/21 12:26 PM  "

## 2021-10-11 ENCOUNTER — OFFICE VISIT (OUTPATIENT)
Dept: NEUROLOGY | Facility: CLINIC | Age: 81
End: 2021-10-11
Payer: MEDICARE

## 2021-10-11 ENCOUNTER — HEALTH MAINTENANCE LETTER (OUTPATIENT)
Age: 81
End: 2021-10-11

## 2021-10-11 ENCOUNTER — LAB (OUTPATIENT)
Dept: LAB | Facility: CLINIC | Age: 81
End: 2021-10-11
Payer: MEDICARE

## 2021-10-11 VITALS
RESPIRATION RATE: 16 BRPM | OXYGEN SATURATION: 97 % | HEART RATE: 84 BPM | SYSTOLIC BLOOD PRESSURE: 142 MMHG | DIASTOLIC BLOOD PRESSURE: 69 MMHG

## 2021-10-11 DIAGNOSIS — M12.9 ARTHROPATHY, UNSPECIFIED: ICD-10-CM

## 2021-10-11 DIAGNOSIS — R79.9 ABNORMAL FINDING OF BLOOD CHEMISTRY, UNSPECIFIED: ICD-10-CM

## 2021-10-11 DIAGNOSIS — G57.93 UNSPECIFIED MONONEUROPATHY OF BILATERAL LOWER LIMBS: ICD-10-CM

## 2021-10-11 DIAGNOSIS — G62.89 OTHER POLYNEUROPATHY: ICD-10-CM

## 2021-10-11 LAB
FERRITIN SERPL-MCNC: 80 NG/ML (ref 10–130)
HBA1C MFR BLD: 5.3 %
IRON SATN MFR SERPL: 27 % (ref 15–46)
IRON SERPL-MCNC: 100 UG/DL (ref 35–180)
TIBC SERPL-MCNC: 377 UG/DL (ref 240–430)
TSH SERPL DL<=0.005 MIU/L-ACNC: 1.3 UIU/ML (ref 0.3–5)

## 2021-10-11 PROCEDURE — 83036 HEMOGLOBIN GLYCOSYLATED A1C: CPT

## 2021-10-11 PROCEDURE — 84446 ASSAY OF VITAMIN E: CPT

## 2021-10-11 PROCEDURE — 99204 OFFICE O/P NEW MOD 45 MIN: CPT | Performed by: PSYCHIATRY & NEUROLOGY

## 2021-10-11 PROCEDURE — 83550 IRON BINDING TEST: CPT

## 2021-10-11 PROCEDURE — 36415 COLL VENOUS BLD VENIPUNCTURE: CPT

## 2021-10-11 PROCEDURE — 86334 IMMUNOFIX E-PHORESIS SERUM: CPT | Mod: TC

## 2021-10-11 PROCEDURE — 86255 FLUORESCENT ANTIBODY SCREEN: CPT

## 2021-10-11 PROCEDURE — 83090 ASSAY OF HOMOCYSTEINE: CPT

## 2021-10-11 PROCEDURE — 82728 ASSAY OF FERRITIN: CPT

## 2021-10-11 PROCEDURE — 84443 ASSAY THYROID STIM HORMONE: CPT

## 2021-10-11 PROCEDURE — 84207 ASSAY OF VITAMIN B-6: CPT

## 2021-10-11 ASSESSMENT — PAIN SCALES - GENERAL: PAINLEVEL: NO PAIN (0)

## 2021-10-11 NOTE — PROGRESS NOTES
[facility] Neurology Consultation    Sheryl Trotter MRN# [id number]   Age: 81 year old YOB: 1940     Requesting physician: Supriya Wallace     Reason for Consultation: neuropathy      History of Presenting Symptoms:   Sheryl Trotter is a 81 year old female who presents today for evaluation of neuropathy.  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  During a visit with her PCP, 5/25/2021, she reported a history of coldness in her feet for 3 years and indicated had an EMG on 8/30/2019 which showed an essentially normal study.    Today, the patient confirms that she has b/l leg coldness leading to discomfort in the day and at night.  There is no pain, no throbbing, and no electric sensation.  She feels as if her feet have been in the cold (like ice skating for prolonged period).  There is no specific weakness in her feet.  She is worried about her balance, and finds that turning quickly is difficult as well as going up or down stairs.  She has no tremor to speak of, no dysphagia, no diplopia.  She has noted that since initial onset (5+ years ago) she now has more flashes (pulsation of buzzing) in her legs (feet mostly)  There is no urinary of fecal incontinence.  She doesn't report any recent or prior infections (viral, bacterial) of the spine, brain, or from an arboreal source.     Social History:   Former smoker for 4 years, quit in college.  3 drinks per week.     Medications:   Lisinopril  Lyrica 25 mg at bedtime  hydrochlorothiazide     Physical Exam:   Vitals: BP (!) 142/69   Pulse 84   Resp 16   SpO2 97%    General: Seated comfortably in no acute  distress.  HEENT: Neck supple with normal range of motion. No paracervical muscle tenderness or tightness.   Skin: No rashes, but blanchable skin in MM and great toes.  Neurologic:     Mental Status: Fully alert, attentive and oriented. Speech clear and fluent, no paraphasic errors.      Cranial Nerves: Visual fields intact to threat in all quadrants. EOMI with normal smooth pursuit. Facial sensation intact/symmetric. Facial movements symmetric. Hearing not formally tested but intact to conversation. . No dysarthria.      Motor: No tremors or other abnormal movements observed. Muscle tone normal throughout. No pronator drift. Normal/symmetric rapid finger tapping. Strength 4/5 with SA on right, HF 4+/5 b/l, and otherwise 5/5 in all other motor testing b/l and symmetrically.     Deep Tendon Reflexes: 2+/symmetric throughout upper extremities, but 1+ in patellar, 2+ in achilles b/l.. Toes downgoing bilaterally.     Sensory: Intact/symmetric temperature, and proprioception b/l in upper and lower extremities. Poor vibration 7-8 seconds in great toe, MM, and Knee (all same duration) but fingers with 18+ seconds b/l.  Pinprick and light touch differentiation difficult in both distal toes and mid leg, as well as fingertip (multiple errors). Negative Romberg.      Coordination: Finger-nose-finger without dysmetria.      Gait: Normal, steady casual gait. Able to walk on toes, heels easily.  Tandem walking is slow but without consistent fall in either direction.          Data: Pertinent prior to visit   Labs:   8/17/2021 - 5/25/2021  SPEP, CRP, ESR, Vitamin D, Lyme, B12, CBC, Hepatic panel, BMP, PTH, Celiac all negative    10/9/2018- 5/30/2018  CK, Rh factor, Uric acid, Lyme, TTG, ESR, TSH - normal  CRP - elevated    9/12/2017:  JUAN JOSE - normal    Procedures:  EMG through PM&R on 8/30/2019 with Dr. Izquierdo:  Comment NCS: Essentially normal study  1.  Borderline bilateral lower extremity SNAPs amplitudes.  Normal for patient's  age.  2.  Normal bilateral lower extremity CMAPs.  3.  Normal right radial SNAP and ulnar CMAP.  Comment EMG: Normal study  1.  Normal needle EMG bilateral lower extremities.  Interpretation: Essentially normal study: There is electrodiagnostic evidence of:  1.  Borderline bilateral sensory amplitudes.  This is normal for the patient's age is very likely normal finding.  I cannot, however, completely exclude a very mild sensory or sensorimotor peripheral polyneuropathy.   2 There is no electrodiagnostic evidence of lumbosacral radiculopathy, lumbosacral plexopathy, or focal neuropathy in the bilateral lower extremities.  Specifically, there is no electrodiagnostic evidence of tibial neuropathy at the tarsal tunnel in the bilateral lower extremities.  Note: Electrodiagnostic testing is not diagnostic for small fiber polyneuropathy.  Common causes a small fiber peripheral neuropathy include diabetes, toxins (especially alcohol), drug/medications, hypertriglyceridemia, hereditary, and idiopathic.  If clinically indicated or there are progression of symptoms, further workup can be considered including evaluation of common causes of axonal neuropathy such as B12 deficiency, thyroid disease, and diabetes.   Repeat EMG can be considered if symptoms persist or worsen over the next 6-12 months.             Assessment and Plan:   Assessment:  Distal symmetric polyneuropathy    The patient's clinical description of dysesthesias (coldness) and paresthesias (tingling) in a b/l distal distribution of her feet and fingers is concerning for a polyneuropathy, mostly of small fibers.  I suspect she has developed small fiber neuropathy relating to her rheumatological condition given her ongoing dermatological findings as well as rheumatological changes in the joints.  However, other conditions have yet to be completely ruled out and further serum testing can be done (below).  I am not concerned for a return of her DDD related issues  of the past given her clinical presentation and wouldn't repeat imaging or have a repeat EMG at this time.  She can continue on Lyrica, but may have improved symptom control at a modified dosing (75 mg BID).  Also, given her reports of restlessness at night, and response to Lyrica involving improved sleep, I think further testing for common exacerbations for RLS can be done on top of her small fiber neuropathy testing.        Plan:  - Immunofixation, ANCA, Homocysteine, B6, Vitamin E, TSH,  A1c  - Follow up in 3 months, video, with discussions about increasing Lyrica should it be considered at that time    NISA Khanna D.O.   of Neurology    Total time  today (50 min) in this patient encounter was spent on pre-charting, counseling and/or coordination of care. We reviewed diagnostic results, impressions, and discussed other possible tests if symptoms do not improve. We discussed the implications of the diagnosis, as well as risks and benefits of management options. We reviewed treatment instructions and our scheduled follow-up as specified in the discharge plan. We also discussed the importance of compliance with the chosen course of treatment. The patient is in agreement with this plan and has no further questions.

## 2021-10-11 NOTE — PATIENT INSTRUCTIONS
I suspect you have a small fiber nerve injury which has been going on now for some years, and is likely related to your underlying rheumatological disorder which is well treated.  I will order serum tests to look for other causes of the same type of nerve injury, but if they return negative, then symptomatic treatment with Lyrica is likely your best option for improving your symptoms.    - Immunofixation, ANCA, Homocysteine, B6, Vitamin E, TSH,  A1c

## 2021-10-11 NOTE — LETTER
10/11/2021         RE: Sheryl Trotter  [address]  [facility] [address]        Dear Colleague,    Thank you for referring your patient, Sheryl Trotter, to the [facility]. Please see a copy of my visit note below.    [facility] Neurology Consultation    Sheryl Trotter MRN# [id number]   Age: 81 year old YOB: 1940     Requesting physician: Supriya Wallace     Reason for Consultation: neuropathy      History of Presenting Symptoms:   Sheryl Trotter is a 81 year old female who presents today for evaluation of neuropathy.  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  During a visit with her PCP, 5/25/2021, she reported a history of coldness in her feet for 3 years and indicated had an EMG on 8/30/2019 which showed an essentially normal study.    Today, the patient confirms that she has b/l leg coldness leading to discomfort in the day and at night.  There is no pain, no throbbing, and no electric sensation.  She feels as if her feet have been in the cold (like ice skating for prolonged period).  There is no specific weakness in her feet.  She is worried about her balance, and finds that turning quickly is difficult as well as going up or down stairs.  She has no tremor to speak of, no dysphagia, no diplopia.  She has noted that since initial onset (5+ years ago) she now has more flashes (pulsation of buzzing) in her legs (feet mostly)  There is no urinary of fecal incontinence.  She doesn't report any recent or prior infections (viral, bacterial) of the spine, brain, or from an arboreal source.     Social  History:   Former smoker for 4 years, quit in college.  3 drinks per week.     Medications:   Lisinopril  Lyrica 25 mg at bedtime  hydrochlorothiazide     Physical Exam:   Vitals: BP (!) 142/69   Pulse 84   Resp 16   SpO2 97%    General: Seated comfortably in no acute distress.  HEENT: Neck supple with normal range of motion. No paracervical muscle tenderness or tightness.   Skin: No rashes, but blanchable skin in MM and great toes.  Neurologic:     Mental Status: Fully alert, attentive and oriented. Speech clear and fluent, no paraphasic errors.      Cranial Nerves: Visual fields intact to threat in all quadrants. EOMI with normal smooth pursuit. Facial sensation intact/symmetric. Facial movements symmetric. Hearing not formally tested but intact to conversation. . No dysarthria.      Motor: No tremors or other abnormal movements observed. Muscle tone normal throughout. No pronator drift. Normal/symmetric rapid finger tapping. Strength 4/5 with SA on right, HF 4+/5 b/l, and otherwise 5/5 in all other motor testing b/l and symmetrically.     Deep Tendon Reflexes: 2+/symmetric throughout upper extremities, but 1+ in patellar, 2+ in achilles b/l.. Toes downgoing bilaterally.     Sensory: Intact/symmetric temperature, and proprioception b/l in upper and lower extremities. Poor vibration 7-8 seconds in great toe, MM, and Knee (all same duration) but fingers with 18+ seconds b/l.  Pinprick and light touch differentiation difficult in both distal toes and mid leg, as well as fingertip (multiple errors). Negative Romberg.      Coordination: Finger-nose-finger without dysmetria.      Gait: Normal, steady casual gait. Able to walk on toes, heels easily.  Tandem walking is slow but without consistent fall in either direction.          Data: Pertinent prior to visit   Labs:   8/17/2021 - 5/25/2021  SPEP, CRP, ESR, Vitamin D, Lyme, B12, CBC, Hepatic panel, BMP, PTH, Celiac all negative    10/9/2018- 5/30/2018  CK, Rh factor,  Uric acid, Lyme, TTG, ESR, TSH - normal  CRP - elevated    9/12/2017:  JUAN JOSE - normal    Procedures:  EMG through [facility] on 8/30/2019 with Dr. Izquierdo:  Comment NCS: Essentially normal study  1.  Borderline bilateral lower extremity SNAPs amplitudes.  Normal for patient's age.  2.  Normal bilateral lower extremity CMAPs.  3.  Normal right radial SNAP and ulnar CMAP.  Comment EMG: Normal study  1.  Normal needle EMG bilateral lower extremities.  Interpretation: Essentially normal study: There is electrodiagnostic evidence of:  1.  Borderline bilateral sensory amplitudes.  This is normal for the patient's age is very likely normal finding.  I cannot, however, completely exclude a very mild sensory or sensorimotor peripheral polyneuropathy.   2 There is no electrodiagnostic evidence of lumbosacral radiculopathy, lumbosacral plexopathy, or focal neuropathy in the bilateral lower extremities.  Specifically, there is no electrodiagnostic evidence of tibial neuropathy at the tarsal tunnel in the bilateral lower extremities.  Note: Electrodiagnostic testing is not diagnostic for small fiber polyneuropathy.  Common causes a small fiber peripheral neuropathy include diabetes, toxins (especially alcohol), drug/medications, hypertriglyceridemia, hereditary, and idiopathic.  If clinically indicated or there are progression of symptoms, further workup can be considered including evaluation of common causes of axonal neuropathy such as B12 deficiency, thyroid disease, and diabetes.   Repeat EMG can be considered if symptoms persist or worsen over the next 6-12 months.             Assessment and Plan:   Assessment:  Distal symmetric polyneuropathy    The patient's clinical description of dysesthesias (coldness) and paresthesias (tingling) in a b/l distal distribution of her feet and fingers is concerning for a polyneuropathy, mostly of small fibers.  I suspect she has developed small fiber neuropathy relating to her rheumatological  condition given her ongoing dermatological findings as well as rheumatological changes in the joints.  However, other conditions have yet to be completely ruled out and further serum testing can be done (below).  I am not concerned for a return of her DDD related issues of the past given her clinical presentation and wouldn't repeat imaging or have a repeat EMG at this time.  She can continue on Lyrica, but may have improved symptom control at a modified dosing (75 mg BID).  Also, given her reports of restlessness at night, and response to Lyrica involving improved sleep, I think further testing for common exacerbations for RLS can be done on top of her small fiber neuropathy testing.        Plan:  - Immunofixation, ANCA, Homocysteine, B6, Vitamin E, TSH,  A1c  - Follow up in 3 months, video, with discussions about increasing Lyrica should it be considered at that time    NISA Khanna D.O.   of Neurology    Total time  today (50 min) in this patient encounter was spent on pre-charting, counseling and/or coordination of care. We reviewed diagnostic results, impressions, and discussed other possible tests if symptoms do not improve. We discussed the implications of the diagnosis, as well as risks and benefits of management options. We reviewed treatment instructions and our scheduled follow-up as specified in the discharge plan. We also discussed the importance of compliance with the chosen course of treatment. The patient is in agreement with this plan and has no further questions.        Again, thank you for allowing me to participate in the care of your patient.        Sincerely,        Cholo Khanna, DO

## 2021-10-12 LAB
ANCA AB PATTERN SER IF-IMP: NORMAL
C-ANCA TITR SER IF: NORMAL {TITER}
FASTING STATUS PATIENT QL REPORTED: NO
HOMOCYSTEINE LHE: 10 UMOL/L (ref 0–13)

## 2021-10-13 LAB
PATH ICD:: NORMAL
PROT PATTERN SERPL IFE-IMP: NORMAL
REVIEWING PATHOLOGIST: NORMAL

## 2021-10-13 PROCEDURE — 86334 IMMUNOFIX E-PHORESIS SERUM: CPT | Mod: 26 | Performed by: PATHOLOGY

## 2021-10-14 LAB
A-TOCOPHEROL VIT E SERPL-MCNC: 14.8 MG/L
BETA+GAMMA TOCOPHEROL SERPL-MCNC: 1.6 MG/L
PYRIDOXAL PHOS SERPL-SCNC: 42.2 NMOL/L

## 2021-10-26 ENCOUNTER — TELEPHONE (OUTPATIENT)
Dept: NURSING | Facility: CLINIC | Age: 81
End: 2021-10-26

## 2021-10-26 NOTE — TELEPHONE ENCOUNTER
Patient calling about covid 19 PCR testing.  Writer gave patient [place] options and community options.  Patient verbalized understanding and agrees with plan.     Paris Stroud RN  [facility] Nurse Advisor  9:36 AM 10/26/2021

## 2021-12-13 ENCOUNTER — ANCILLARY PROCEDURE (OUTPATIENT)
Dept: BONE DENSITY | Facility: CLINIC | Age: 81
End: 2021-12-13
Attending: INTERNAL MEDICINE
Payer: MEDICARE

## 2021-12-13 DIAGNOSIS — M81.0 AGE-RELATED OSTEOPOROSIS WITHOUT CURRENT PATHOLOGICAL FRACTURE: ICD-10-CM

## 2021-12-13 PROCEDURE — 77080 DXA BONE DENSITY AXIAL: CPT | Mod: TC | Performed by: RADIOLOGY

## 2022-01-04 ENCOUNTER — OFFICE VISIT (OUTPATIENT)
Dept: INTERNAL MEDICINE | Facility: CLINIC | Age: 82
End: 2022-01-04
Payer: MEDICARE

## 2022-01-04 VITALS
SYSTOLIC BLOOD PRESSURE: 138 MMHG | HEART RATE: 70 BPM | BODY MASS INDEX: 28.41 KG/M2 | DIASTOLIC BLOOD PRESSURE: 80 MMHG | HEIGHT: 67 IN | OXYGEN SATURATION: 99 % | WEIGHT: 181 LBS

## 2022-01-04 DIAGNOSIS — G62.9 SMALL FIBER NEUROPATHY: ICD-10-CM

## 2022-01-04 DIAGNOSIS — E87.1 HYPONATREMIA: ICD-10-CM

## 2022-01-04 DIAGNOSIS — G62.89 OTHER POLYNEUROPATHY: ICD-10-CM

## 2022-01-04 DIAGNOSIS — F51.01 PRIMARY INSOMNIA: ICD-10-CM

## 2022-01-04 DIAGNOSIS — M81.0 AGE-RELATED OSTEOPOROSIS WITHOUT CURRENT PATHOLOGICAL FRACTURE: ICD-10-CM

## 2022-01-04 DIAGNOSIS — G47.33 OSA (OBSTRUCTIVE SLEEP APNEA): ICD-10-CM

## 2022-01-04 DIAGNOSIS — G25.81 RESTLESS LEGS SYNDROME: ICD-10-CM

## 2022-01-04 DIAGNOSIS — I10 ESSENTIAL HYPERTENSION: ICD-10-CM

## 2022-01-04 DIAGNOSIS — G47.09 OTHER INSOMNIA: Primary | ICD-10-CM

## 2022-01-04 LAB
ANION GAP SERPL CALCULATED.3IONS-SCNC: 11 MMOL/L (ref 5–18)
BUN SERPL-MCNC: 16 MG/DL (ref 8–28)
CALCIUM SERPL-MCNC: 9.4 MG/DL (ref 8.5–10.5)
CHLORIDE BLD-SCNC: 100 MMOL/L (ref 98–107)
CO2 SERPL-SCNC: 25 MMOL/L (ref 22–31)
CREAT SERPL-MCNC: 0.79 MG/DL (ref 0.6–1.1)
GFR SERPL CREATININE-BSD FRML MDRD: 76 ML/MIN/1.73M2
GLUCOSE BLD-MCNC: 94 MG/DL (ref 70–125)
POTASSIUM BLD-SCNC: 3.9 MMOL/L (ref 3.5–5)
SODIUM SERPL-SCNC: 136 MMOL/L (ref 136–145)

## 2022-01-04 PROCEDURE — 36415 COLL VENOUS BLD VENIPUNCTURE: CPT | Performed by: INTERNAL MEDICINE

## 2022-01-04 PROCEDURE — 80048 BASIC METABOLIC PNL TOTAL CA: CPT | Performed by: INTERNAL MEDICINE

## 2022-01-04 PROCEDURE — 99214 OFFICE O/P EST MOD 30 MIN: CPT | Performed by: INTERNAL MEDICINE

## 2022-01-04 PROCEDURE — 82306 VITAMIN D 25 HYDROXY: CPT | Performed by: INTERNAL MEDICINE

## 2022-01-04 RX ORDER — PREGABALIN 25 MG/1
CAPSULE ORAL
Qty: 90 CAPSULE | Refills: 1 | Status: SHIPPED | OUTPATIENT
Start: 2022-01-04 | End: 2022-01-10

## 2022-01-04 RX ORDER — VITAMIN B COMPLEX
25 TABLET ORAL DAILY
Qty: 90 TABLET | Refills: 3 | COMMUNITY
Start: 2022-01-04 | End: 2023-11-29

## 2022-01-04 ASSESSMENT — MIFFLIN-ST. JEOR: SCORE: 1320.7

## 2022-01-04 NOTE — PROGRESS NOTES
Assessment & Plan     Other insomnia  - SLEEP EVALUATION & MANAGEMENT REFERRAL - ADULT:   Patient had a sleep study in 2017, which showed sleep apnea. Stopped CPAP, a year and a half ago, as she wasn't sleeping and it wasn't positive experience. She has likely a small fiber neuropathy, saw neurology through [facility]. Has issues with restless legs as well. I am increasing her Lyrica to 50 mg and asking her to take this earlier on in the night.  Lyrica 25 mg daily was started August 17, 2021, and she has tolerated this.    Other polyneuropathy, small fiber neuropathy  - SLEEP EVALUATION & MANAGEMENT REFERRAL - ADULT -    KASANDRA (obstructive sleep apnea), stopped using CPAP, year and a half ago as above.  - SLEEP EVALUATION & MANAGEMENT REFERRAL - ADULT -    Primary insomnia  -Referral to sleep clinic.  - SLEEP EVALUATION & MANAGEMENT REFERRAL - ADULT -    Small fiber neuropathy    Restless legs syndrome  - Also symptoms of restless leg syndrome.  I discussed increasing her Lyrica to 50 mg, to see if this would be helpful for the symptom.    Hyponatremia  -Sodium 134, August 17, 2021, will recheck BMP today.  Normal kidney function.  - Basic metabolic panel  (Ca, Cl, CO2, Creat, Gluc, K, Na, BUN)  - Basic metabolic panel  (Ca, Cl, CO2, Creat, Gluc, K, Na, BUN)    Essential hypertension  - Blood pressure is controlled today 138/80.  Patient is on hydrochlorothiazide 25 mg daily.  Lisinopril 20 mg daily.  - Basic metabolic panel  (Ca, Cl, CO2, Creat, Gluc, K, Na, BUN)  - Basic metabolic panel  (Ca, Cl, CO2, Creat, Gluc, K, Na, BUN)    Age-related osteoporosis without current pathological fracture  -Patient is on vitamin D 1000 units daily.  She is also on Prolia, every 6 months.  - Vitamin D Deficiency  - Vitamin D3 (CHOLECALCIFEROL) 25 mcg (1000 units) tablet  Dispense: 90 tablet; Refill: 3  - Vitamin D Deficiency    Patient was seen by neurology, Dr. Collazo October 11, 2021.  EMG was done.  Noted that  "electrodiagnostic testing is not diagnostic for small fiber polyneuropathy.  Common causes of this include toxins [alcohol], diabetes, hypertriglyceridemia, hereditary and idiopathic.  Other things to consider, vitamin B12 deficiency, thyroid disease.  Repeat EMG can be done if symptoms worsen over the next 6-12 months.  He did additional testing, including immunofixation, ANCA, homocystine, B6, vitamin D, TSH and A1c.  Continues with Lyrica at that time 25 mg daily.    Patient has a history of polymyalgia rheumatica, as below.    I was concerned about peripheral arterial disease, when I saw her in August 2021, as documented below.       BMI:   Estimated body mass index is 28.78 kg/m  as calculated from the following:    Height as of this encounter: 1.689 m (5' 6.5\").    Weight as of this encounter: 82.1 kg (181 lb).     MEDICATIONS:   Orders Placed This Encounter   Medications     : pregabalin (LYRICA) 25 MG capsule     Sig: Take two 25 mg capsules at 7 /8 pm, every evening.     Dispense:  90 capsule     Refill:  1     I am increasing her Lyrica to 50 mg in the evening     Vitamin D3 (CHOLECALCIFEROL) 25 mcg (1000 units) tablet     Sig: Take 1 tablet (25 mcg) by mouth daily     Dispense:  90 tablet     Refill:  3     Medications Discontinued During This Encounter   Medication Reason     pregabalin (LYRICA) 25 MG capsule Reorder          - Continue other medications without change  There are no Patient Instructions on file for this visit.    Return in about 3 months (around 4/4/2022) for Follow up, with me.    Supriya Da Silva MD  [facility]    Zulay Lowe is a 81 year old who presents for the following health issues     HPI   Reason for today's visit, says review diagnosis and med check.  I last saw the patient, August 17, 2021.    \"Other polyneuropathy: Patient has symptoms of neuropathy, which she has had for 5 years, which is progressive. Trouble with her balance as " well because of this. Says she has had an EMG a while ago (I wasn't able to find this). She has symptoms of restless legs, and has insomnia. I am doing some lab tests today to look for possible causes. Other background, PMR 5-6 years ago, treated with prednisone, not on prednisone now. Symptoms improved. Sleep apnea, but patient stopped using the CPAP as she felt this wasn't really helping.      Other insomnia, patient has had sleep issues for years.  She was on Ambien, 5 mg every night.  She says that she has had no Ambien for the last 3-4 weeks.  She had a sleep study August 10, 2017, and says that she had another sleep study after that.  She stopped using CPAP about a year ago.  She usually wakes up 3-4 times each night.  She has difficulty falling asleep, due to her legs giving her some trouble.  She wakes up during the middle of the night, and there is no specific reason as to why she woke up.  Patient tried melatonin, but had diarrhea with this and stopped it.     Polymyalgia (H), patient states that she was treated for polymyalgia rheumatica, 5-6 years ago.  She was on prednisone for a while.  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  She is also had a second surgery, for revision of this.  Avascular necrosis was due to her steroids.  Patient says that she had shoulder pain at the time and could not get in and out of chairs.  That pain and weakness and fatigue has completely resolved.  She now has tingling, and burning pain in her legs and her feet.  Was also placed on Suboxone and Cymbalta in 2018, for aches and pains after polymyalgia rheumatica.     Essential hypertension, patient's blood pressure is controlled today at 130/80.  Patient is on hydrochlorothiazide one half of a 50 mg pill.  She is on lisinopril 20 mg daily.  Last BMP was done July 24, 2019.  Potassium of 4, estimated GFR more than 60.     Age-related osteoporosis without  "current pathological fracture, patient has been on Prolia for about 5 years.  She will be having a bone density test in December.  She is on calcium with vitamin D1 tablet twice a day.  She is on vitamin D in addition to this, does not know her doses.  She has 1-1-1/2 or 2 drinks on the weekends. Dr. Cottrell.     Patient has a diagnosis of sleep apnea, however she says she stopped using CPAP, for the past year, as she felt this was not helping.  She also describes what sounds like restless leg type syndrome, which starts to happen in the evenings.  I was hoping to be able to give her something like gabapentin, however the patient has an allergy to gabapentin.  Review of the chart, she was given 100 mg of gabapentin at bedtime, February 7, 2018.  This caused anxiety she says.  She says she is sensitive to medications.  None the possibility would be using a small dose of Lyrica.  Because she is having such pain, and is having difficulty with sleeping at night, have decided to start her on a small dose of Lyrica, 25 mg at nighttime only.  Discussed possible side effects.  I am hoping that she tolerates this.     Patient has redness of her feet and cold toes, bilaterally.  I can feel dorsalis pedis pulses bilaterally.  She complains of tingling and dysesthesia.  This involves the lower third of her leg [vertical distance to her knees].  Patient was seen by podiatry, Dr. Leon, 2019, and he suggested surgery for burning cold tingly numb feet.  She did not think that this was the right treatment, so did not return to see him again.  Balance is off as well.\"    Results for DAVION ROBERTO (MRN [id number]) as of 1/4/2022 10:17   Ref. Range 8/17/2021 14:50   Sodium Latest Ref Range: 136 - 145 mmol/L 134 (L) (patient is on hydrochlorothiazide 25 mg a day)   Potassium Latest Ref Range: 3.5 - 5.0 mmol/L 3.6   Chloride Latest Ref Range: 98 - 107 mmol/L 96 (L)   Carbon Dioxide Latest Ref Range: 22 - 31 mmol/L 26   Urea Nitrogen " "Latest Ref Range: 8 - 28 mg/dL 9   Creatinine Latest Ref Range: 0.60 - 1.10 mg/dL 0.71   GFR Estimate Latest Ref Range: >60 mL/min/1.73m2 81   Calcium Latest Ref Range: 8.5 - 10.5 mg/dL 10.1   Anion Gap Latest Ref Range: 5 - 18 mmol/L 12   Albumin Latest Ref Range: 3.5 - 5.0 g/dL 4.0   Protein Total Latest Ref Range: 6.0 - 8.0 g/dL 7.2   Bilirubin Total Latest Ref Range: 0.0 - 1.0 mg/dL 0.5   Alkaline Phosphatase Latest Ref Range: 45 - 120 U/L 58   ALT Latest Ref Range: 0 - 45 U/L 15   AST Latest Ref Range: 0 - 40 U/L 18      Results for DAVION ROBERTO (MRN [id number]) as of 1/4/2022 10:17   Ref. Range 8/17/2021 14:50   WBC Latest Ref Range: 4.0 - 11.0 10e3/uL 7.9   Hemoglobin Latest Ref Range: 11.7 - 15.7 g/dL 14.2   Hematocrit Latest Ref Range: 35.0 - 47.0 % 43.0   Platelet Count Latest Ref Range: 150 - 450 10e3/uL 266   RBC Count Latest Ref Range: 3.80 - 5.20 10e6/uL 4.84   MCV Latest Ref Range: 78 - 100 fL 89       Review of Systems   Rest the review of systems is negative.      Objective    /80 (BP Location: Right arm, Patient Position: Sitting)   Pulse 70   Ht 1.689 m (5' 6.5\")   Wt 82.1 kg (181 lb)   SpO2 99%   BMI 28.78 kg/m    Body mass index is 28.78 kg/m .  Physical Exam   Patient is interactive and alert, able to tell me her story.  She is not distressed.    "

## 2022-01-05 LAB — DEPRECATED CALCIDIOL+CALCIFEROL SERPL-MC: 38 UG/L (ref 30–80)

## 2022-01-08 ENCOUNTER — MYC MEDICAL ADVICE (OUTPATIENT)
Dept: INTERNAL MEDICINE | Facility: CLINIC | Age: 82
End: 2022-01-08
Payer: MEDICARE

## 2022-01-11 ENCOUNTER — VIRTUAL VISIT (OUTPATIENT)
Dept: NEUROLOGY | Facility: CLINIC | Age: 82
End: 2022-01-11
Payer: MEDICARE

## 2022-01-11 ENCOUNTER — TELEPHONE (OUTPATIENT)
Dept: INTERNAL MEDICINE | Facility: CLINIC | Age: 82
End: 2022-01-11
Payer: MEDICARE

## 2022-01-11 DIAGNOSIS — G62.89 OTHER POLYNEUROPATHY: Primary | ICD-10-CM

## 2022-01-11 PROCEDURE — 99213 OFFICE O/P EST LOW 20 MIN: CPT | Mod: 95 | Performed by: PSYCHIATRY & NEUROLOGY

## 2022-01-11 RX ORDER — NORTRIPTYLINE HCL 10 MG
CAPSULE ORAL
Qty: 162 CAPSULE | Refills: 0 | Status: SHIPPED | OUTPATIENT
Start: 2022-01-11 | End: 2022-03-03

## 2022-01-11 NOTE — PROGRESS NOTES
[facility] Neurology Follow Up Visit    Sheryl Trotter MRN# [id number]   Age: 81 year old YOB: 1940     Brief history of symptoms: The patient was initially seen in neurologic consultation on 10/11/2021 for evaluation of neuropathy. Please see the comprehensive neurologic consultation notes from those dates in the Epic records for details.     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  She was to continue to try medication for her restlessness at night and sensory symptoms presumed from possible small fiber involvement.  Lyrica was to be considered at an increased dosing structure, and other labs to test for small fiber neuropathy were to be resulted.    Immunofixation, ANCA, Homocysteine, B6, Vitamin E, TSH,  A1c were all normal on 10/11/2021.    The patient's Lyrica was increased to 50 mg at bedtime with her IM provider on 1/4/2022.    Today, the patient reports that she had itchiness in her neck when on Lyrica followed by swollen lips/itching in her lips. She discontinued the medication this 01/2022.  She also describes having issues with Cymbalta in the past (took in 2019 and reports having upset stomach for 3 months).  She took gabapentin in 2019 and reported worsened anxiety.         Assessment and Plan:   Assessment:  Small fiber neuropathy    The patient's hasn't tolerated Cymbalta, gabapentin, Lyrica due to varied side-effects.  She may consider starting nortriptyline for small fiber related pain/paresthesias of the legs.  We discussed potential side effects and that she can communicate to me through [employer] if issues of dizziness, passing out, rashes, confusion, or odd heart rate occur.       Plan:  Nortriptyline 10 mg at bedtime for 7 days, then   - 20 mg at bedtime for 7 days, then   - 30 mg at bedtime for 7 days, then   - 40 mg at bedtime for 14-21 days, contact me through  [employer] about side-effects or response    Follow up in Neurology clinic in 3-4 months or earlier as needed should new concerns arise.    NISA Khanna D.O.   of Neurology    Total time today (27 min) in this patient encounter was spent on pre-charting, counseling and/or coordination of care.

## 2022-01-11 NOTE — LETTER
1/11/2022       RE: Sheryl Trotter  [address]  [facility] [facility] [address]     Dear Colleague,    Thank you for referring your patient, Sheryl Trotter, to the [facility] NEUROLOGY CLINIC [place] at [facility]. Please see a copy of my visit note below.    [facility] Neurology Follow Up Visit    Sheryl Trotter MRN# [id number]   Age: 81 year old YOB: 1940     Brief history of symptoms: The patient was initially seen in neurologic consultation on 10/11/2021 for evaluation of neuropathy. Please see the comprehensive neurologic consultation notes from those dates in the Epic records for details.     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  She was to continue to try medication for her restlessness at night and sensory symptoms presumed from possible small fiber involvement.  Lyrica was to be considered at an increased dosing structure, and other labs to test for small fiber neuropathy were to be resulted.    Immunofixation, ANCA, Homocysteine, B6, Vitamin E, TSH,  A1c were all normal on 10/11/2021.    The patient's Lyrica was increased to 50 mg at bedtime with her IM provider on 1/4/2022.    Today, the patient reports that she had itchiness in her neck when on Lyrica followed by swollen lips/itching in her lips. She discontinued the medication this 01/2022.  She also describes having issues with Cymbalta in the past (took in 2019 and reports having upset stomach for 3 months).  She took gabapentin in 2019 and reported worsened anxiety.         Assessment and Plan:   Assessment:  Small fiber neuropathy    The patient's hasn't tolerated Cymbalta, gabapentin, Lyrica due to varied side-effects.  She may consider starting nortriptyline for small fiber related pain/paresthesias of the legs.  We discussed potential side effects  Left message to call back. Double book at 10 am spot on 11-15-17 if able please.   and that she can communicate to me through [employer] if issues of dizziness, passing out, rashes, confusion, or odd heart rate occur.       Plan:  Nortriptyline 10 mg at bedtime for 7 days, then   - 20 mg at bedtime for 7 days, then   - 30 mg at bedtime for 7 days, then   - 40 mg at bedtime for 14-21 days, contact me through [employer] about side-effects or response    Follow up in Neurology clinic in 3-4 months or earlier as needed should new concerns arise.    NISA Khanna D.O.   of Neurology    Total time today (27 min) in this patient encounter was spent on pre-charting, counseling and/or coordination of care.

## 2022-01-11 NOTE — PROGRESS NOTES
Chrissy is a 81 year old who is being evaluated via a billable video visit.      How would you like to obtain your AVS? MyChart  If the video visit is dropped, the invitation should be resent by: Send to e-mail at: [email or web address]  Will anyone else be joining your video visit? No      Video Start Time: 1000  Video-Visit Details    Type of service:  Video Visit    Video End Time:10:23 AM    Originating Location (pt. Location): Home    Distant Location (provider location):  [facility] NEUROLOGY [facility]     Platform used for Video Visit: PATHEOS

## 2022-01-11 NOTE — PATIENT INSTRUCTIONS
Nortriptyline 10 mg at bedtime for 7 days, then   - 20 mg at bedtime for 7 days, then   - 30 mg at bedtime for 7 days, then   - 40 mg at bedtime for 14-21 days, contact me through [employer] about side-effects or response

## 2022-01-11 NOTE — TELEPHONE ENCOUNTER
Patient has appointment for Prolia on 1/17/21. Please verify if we are ok to give this injection or reschedule appointment.

## 2022-01-17 ENCOUNTER — ALLIED HEALTH/NURSE VISIT (OUTPATIENT)
Dept: FAMILY MEDICINE | Facility: CLINIC | Age: 82
End: 2022-01-17
Payer: MEDICARE

## 2022-01-17 DIAGNOSIS — M81.0 OSTEOPOROSIS: Primary | ICD-10-CM

## 2022-01-17 PROCEDURE — 99207 PR NO CHARGE NURSE ONLY: CPT

## 2022-01-17 PROCEDURE — 96372 THER/PROPH/DIAG INJ SC/IM: CPT | Performed by: INTERNAL MEDICINE

## 2022-01-17 NOTE — PROGRESS NOTES
"Prolia Injection Phone Screen      Screening questions have been asked 2-3 days prior to administration visit for Prolia. If any questions are answered with \"Yes,\" this phone encounter were will routed to ordering provider for further evaluation.     1.  When was the last injection?  7/15/21    2.  Has insurance for this injection been verified?  Yes    3.  Did you experience any new onset achiness or rashes that lasted for over a month with your previous Prolia injection?   No    4.  Do you have a fever over 101?F or a new deep cough that is unusual for you today? No    5.  Have you started any new medications in the last 6 months that you were told could affect your immune system? These may have been prescribed by oncologist, transplant, rheumatology, or dermatology.   No    6.  In the last 6 months have you have gastric bypass or parathyroid surgery?   No    7.  Do you plan dental work requiring drilling into the bone such as implants/extractions or oral surgery in the next 2-3 months?   No    8. Do you have new insurance since the last injection? No    9. Have you received the Covid-19 vaccine? Yes  If No - Proceed with Prolia injection  If Yes - Date of vaccination 10/29/2021, 3/24/2021, 3/3/2021. Will need to wait until 2 weeks after 2nd dose of Covid-19 vaccine before administering Prolia       Patient informed if symptoms discussed above present prior to their administration appointment, they are to notify clinic immediately.     Mary Vides            "

## 2022-03-03 DIAGNOSIS — G62.89 OTHER POLYNEUROPATHY: ICD-10-CM

## 2022-03-03 RX ORDER — NORTRIPTYLINE HYDROCHLORIDE 50 MG/1
50 CAPSULE ORAL AT BEDTIME
Qty: 90 CAPSULE | Refills: 1 | Status: SHIPPED | OUTPATIENT
Start: 2022-03-03 | End: 2022-04-11

## 2022-03-03 NOTE — TELEPHONE ENCOUNTER
Rx Authorization:    Requested Medication/ Dose nortriptyline (PAMELOR) 10 MG capsule    Date last refill ordered: 1/11/22    Quantity ordered: 162    # refills: 0    Date of last clinic visit with ordering provider: 1/11/22    Date of next clinic visit with ordering provider:4/11/22     All pertinent protocol data (lab date/result):     Include pertinent information from patients message:

## 2022-03-28 NOTE — PROGRESS NOTES
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 Coyle  Her laboratory work is all current  We will get a lumbar spine x-ray today  Consultation requested from the interdisciplinary spine center    Neuroclaudication symptoms of lumbar spinal stenosis  Known Lumbar DDD (ESTEBAN 2005, for reported lumbar stenosis and spondylolisthesis and sciatica  Characteristic Pain Low back, both thighs, both calves  Worse when standing  Difficulty climbing stairs. Most comfortable seated.  MRI 2012 had degenerating discs    Distal symmetric polyneuropathy, small fiber peripheral neuropathy, also restless leg syndrome  Bilateral leg coldness leading to discomfort in the day and at night.   Lyrica used briefly, as of April 202 on nortriptyline, which seems to help  10/11/2021  Neurology: Cholo Khanna, DO  EMG through PM&R on 8/30/2019 with Dr. Izquierdo:  EMG on 8/30/2019 which showed an essentially normal study.  Comment NCS: Essentially normal study  Comment EMG: Normal study  1.  Normal needle EMG bilateral lower extremities.  Interpretation: Essentially normal study:   There is electrodiagnostic evidence of:  1.  Borderline bilateral sensory amplitudes.  This is normal for the patient's age is very likely normal finding.  I cannot, however, completely exclude a very mild sensory or sensorimotor peripheral polyneuropathy.   2 There is no electrodiagnostic evidence of lumbosacral radiculopathy, lumbosacral plexopathy, or focal neuropathy in the bilateral lower extremities.  Specifically, there is  no electrodiagnostic evidence of tibial neuropathy at the tarsal tunnel in the bilateral lower extremities.    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    Insomnia, KASANDRA is currently untreated  She stopped CPAP because not tolerated  sleep study in 2017, which showed sleep apnea. Stopped CPAP, a year and a half ago, as she wasn't sleeping and it wasn't positive experience.     Essential hypertension  Hydrochlorothiazide one half of a 50 mg pill. Lisinopril 20 mg daily.  She has a home blood pressure machine, but has not been using it regularly.  I recommend that she start recording the blood pressure numbers in a notebook, and 1 day bring the machine to clinic so that we can validate it against a manual reading to make sure it is accurate, then she can believe the numbers.    I told her that target blood pressure is 120/80 at rest and seated position measured on the right upper arm.  I reminded her that healthy diet particular less sodium, also regular exercise, and weight control will help with blood pressure, as well as treating sleep apnea    BP Readings from Last 6 Encounters:   04/01/22 (!) 142/80   01/04/22 138/80   10/11/21 (!) 142/69   08/17/21 130/80   07/15/21 121/70   05/25/21 118/76     Hyperlipidemia, mildly elevated LDL, very generous HDL which is favorable  Lipid profile July 24, 2019 with total cholesterol 265, triglycerides 43, LDL bad cholesterol modestly elevated 141, but with a very generous level of the HDL good cholesterol 115  10-  Hemoglobin A1C <=5.6 % 5.3      Gastroesophageal reflux, uses Tums intermittently    Constipation that she has noticed since starting nortriptyline for neuropathy and restless legs  I told her that constipation is a well-known side effect of  nortriptyline because of its anticholinergic effects.  To combat constipation, I recommended she stay well-hydrated, continue with the Citrucel every day, and consider adding a capful of MiraLAX powder every day which is an osmotic laxative and is safe to use every day.  It adds water content of stools.    Osteopenia, on Prolia (started approximately 2017) last injection January 2022, next injection approximately July 2022  Bone density scan December 15, 2019  1. The spine bone density L1-L4 (L3) with T-score 0.0 and significant improvement of 6.2 % compared to 2017.  2. Femoral bone densities show right femoral neck T- score -1.8 and significant improvement of 8.9% compared to 2017.  3. Trabecular bone score indicates moderate trabecular bone architecture.   79 y.o. female with LOW BONE DENSITY (OSTEOPENIA), stable on the current treatment.    On Prolia for about 5 years, injection January 2022, next injection would be July 2022  Takes her calcium and vitamin D    Overweight with body mass index of 28.  She has been less physically active because of her lumbar spine issues.  She does water aerobics which is excellent.    Menopause, status post complete hysterectomy  Had complete hysterectomy at age in her 50s, has not needed to get Pap smears anymore  BILATERAL FULL FIELD DIGITAL SCREENING MAMMOGRAM WITH TOMOSYNTHESIS  Performed on: 8/9/21    She recalls a colonoscopy that was done approximately at age 72    Received third shot of Pfizer COVID-19 vaccine October 29, 2021, so she could get a booster anytime  Has received both pneumococcal vaccines  And recomment shingles vaccine  Could consider getting a tetanus booster since it looks like her last one was in 2007, she should get that at a community pharmacy since it is paid under the Medicare prescription drug benefit    --------------------------------------------------------------------------------------------------------------------------  History of Present  Illness  This 81 year old old      Establish care visit for this very pleasant 81-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 Coyle  Her laboratory work is all current  We will get a lumbar spine x-ray today  Consultation requested from the interdisciplinary spine center    Neuroclaudication symptoms of lumbar spinal stenosis  Known Lumbar DDD (ESTEBAN 2005, for reported lumbar stenosis and spondylolisthesis and sciatica  Characteristic Pain Low back, both thighs, both calves  Worse when standing  Difficulty climbing stairs. Most comfortable seated.  MRI 2012 had degenerating discs    Distal symmetric polyneuropathy, small fiber peripheral neuropathy, also restless leg syndrome  Bilateral leg coldness leading to discomfort in the day and at night.   Lyrica used briefly, as of April 202 on nortriptyline, which seems to help  10/11/2021  Neurology: Cholo Khanna, DO  EMG through PM&R on 8/30/2019 with Dr. Izquierdo:  EMG on 8/30/2019 which showed an essentially normal study.  Comment NCS: Essentially normal study  Comment EMG: Normal study  1.  Normal needle EMG bilateral lower extremities.  Interpretation: Essentially normal study:   There is electrodiagnostic evidence of:  1.  Borderline bilateral sensory amplitudes.  This is normal for the patient's age is very likely normal finding.  I cannot, however, completely exclude a very mild sensory or sensorimotor peripheral polyneuropathy.   2 There is no electrodiagnostic evidence of lumbosacral radiculopathy, lumbosacral plexopathy, or focal neuropathy in the bilateral lower extremities.  Specifically, there is no electrodiagnostic evidence of tibial neuropathy at the tarsal tunnel in the bilateral lower extremities.    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    Insomnia, KASANDRA is currently untreated  She stopped CPAP because not tolerated  sleep study in 2017, which showed sleep apnea. Stopped CPAP, a year and a half ago, as she wasn't sleeping and it wasn't positive experience.   I told her she might want to look into the Inspire system to treat sleep apnea with an implanted electrode that stimulates the pharyngeal muscles, synchronized to her respiratory muscles.    Essential hypertension  Hydrochlorothiazide one half of a 50 mg pill. Lisinopril 20 mg daily.  She has a home blood pressure machine, but has not been using it regularly.  I recommend that she start recording the blood pressure numbers in a notebook, and 1 day bring the machine to clinic so that we can validate it against a manual reading to make sure it is accurate, then she can believe the numbers.    I told her that target blood pressure is 120/80 at rest and seated position measured on the right upper arm.  I reminded her that healthy diet particular less sodium, also regular exercise, and weight control will help with blood pressure, as well as treating sleep apnea    BP Readings from Last 6 Encounters:   04/01/22 (!) 142/80   01/04/22 138/80   10/11/21 (!) 142/69   08/17/21 130/80   07/15/21 121/70   05/25/21 118/76     Hyperlipidemia, mildly elevated LDL, very generous HDL which is favorable  Lipid profile July 24, 2019 with total cholesterol 265, triglycerides 43, LDL bad cholesterol modestly elevated 141, but with a very generous level of the HDL good cholesterol 115  10-  Hemoglobin A1C <=5.6 % 5.3      Gastroesophageal reflux, uses Tums intermittently    Constipation that she has noticed since starting nortriptyline for neuropathy and restless legs  I told her that constipation is a well-known side effect of nortriptyline because of its anticholinergic effects.  To combat constipation, I recommended she stay well-hydrated,  continue with the Citrucel every day, and consider adding a capful of MiraLAX powder every day which is an osmotic laxative and is safe to use every day.  It adds water content of stools.    Osteopenia, on Prolia (started approximately 2017) last injection January 2022, next injection approximately July 2022  Bone density scan December 15, 2019  1. The spine bone density L1-L4 (L3) with T-score 0.0 and significant improvement of 6.2 % compared to 2017.  2. Femoral bone densities show right femoral neck T- score -1.8 and significant improvement of 8.9% compared to 2017.  3. Trabecular bone score indicates moderate trabecular bone architecture.   79 y.o. female with LOW BONE DENSITY (OSTEOPENIA), stable on the current treatment.    On Prolia for about 5 years, injection January 2022, next injection would be July 2022  Takes her calcium and vitamin D    Overweight with body mass index of 28.  She has been less physically active because of her lumbar spine issues.  She does water aerobics which is excellent.    Menopause, status post complete hysterectomy  Had complete hysterectomy at age in her 50s, has not needed to get Pap smears anymore  BILATERAL FULL FIELD DIGITAL SCREENING MAMMOGRAM WITH TOMOSYNTHESIS  Performed on: 8/9/21    She recalls a colonoscopy that was done approximately at age 72    Received third shot of Pfizer COVID-19 vaccine October 29, 2021, so she could get a booster anytime  Has received both pneumococcal vaccines  And recomment shingles vaccine  Could consider getting a tetanus booster since it looks like her last one was in 2007, she should get that at a community pharmacy since it is paid under the Medicare prescription drug benefit      Wt Readings from Last 3 Encounters:   04/01/22 81.2 kg (179 lb)   01/04/22 82.1 kg (181 lb)   08/17/21 82.1 kg (181 lb)     BP Readings from Last 3 Encounters:   04/01/22 (!) 142/80   01/04/22 138/80   10/11/21 (!) 142/69       Lab Results   Component Value  Date    WBC 7.9 08/17/2021    HGB 14.2 08/17/2021    HCT 43.0 08/17/2021     08/17/2021    CHOL 265 (H) 07/24/2019    TRIG 43 07/24/2019     07/24/2019    ALT 15 08/17/2021    AST 18 08/17/2021     01/04/2022    BUN 16 01/04/2022    CO2 25 01/04/2022    TSH 1.30 10/11/2021    INR 0.96 07/24/2019        Review of Systems: A comprehensive review of systems was negative except as noted.  ---------------------------------------------------------------------------------------------------------------------------    Medications, Allergies, Social, and Problem List   Current Outpatient Medications   Medication Sig Dispense Refill     calcium carbonate-vitamin D3 600 mg (1,500 mg)-800 unit Chew [CALCIUM CARBONATE-VITAMIN D3 600 MG (1,500 MG)-800 UNIT CHEW] Chew 1 tablet daily.       denosumab 60 mg/mL Syrg [DENOSUMAB 60 MG/ML SYRG] Inject 60 mg under the skin once.       fluocinonide (LIDEX) 0.05 % external ointment use 1 Application three times a day topically for up to 10 days       hydrochlorothiazide (HYDRODIURIL) 50 MG tablet TAKE 1/2 TABLET EVERY DAY (SUBSTITUTED FOR  HYDRODIURIL) 45 tablet 6     lisinopril (ZESTRIL) 20 MG tablet TAKE 1 TABLET EVERY DAY 90 tablet 3     METHYLCELLULOSE (CITRUCEL ORAL) [METHYLCELLULOSE (CITRUCEL ORAL)] Take 1 tablet by mouth daily.       nortriptyline (PAMELOR) 50 MG capsule Take 1 capsule (50 mg) by mouth At Bedtime 90 capsule 1     Vitamin D3 (CHOLECALCIFEROL) 25 mcg (1000 units) tablet Take 1 tablet (25 mcg) by mouth daily 90 tablet 3     Allergies   Allergen Reactions     Duloxetine Headache     Nausea, difficult sleeping ankles cramps, loose bowel      Lyrica [Pregabalin] Angioedema and Itching     Gabapentin Anxiety     Social History     Tobacco Use     Smoking status: Former Smoker     Years: 4.00     Types: Cigarettes     Smokeless tobacco: Never Used     Tobacco comment: smoking in college-social   Substance Use Topics     Alcohol use: Yes     Alcohol/week:  "3.3 standard drinks     Comment: Alcoholic Drinks/day: occasional glass of wine     Drug use: No     Patient Active Problem List   Diagnosis     Osteoporosis     Hyperlipidemia     Essential hypertension     Primary insomnia     Trochanteric Bursitis     Lumbar Spondylosis     Lumbar Disc Degeneration     Headache     Osteoarthritis of the Knee     Polyarthralgia     Status post left hip replacement     Hip dislocation, left (H)     Bilateral shoulder pain     Polymyalgia (H)     Primary osteoarthritis involving multiple joints     Chondrocalcinosis     KASANDRA (obstructive sleep apnea)     Other polyneuropathy        Reviewed, reconciled and updated       Physical Exam   General Appearance:       BP (!) 142/80 (BP Location: Right arm, Patient Position: Sitting)   Pulse 108   Temp 97.5  F (36.4  C)   Ht 1.689 m (5' 6.5\")   Wt 81.2 kg (179 lb)   SpO2 100%   BMI 28.46 kg/m      General: Alert, in no distress  Skin: No significant lesion seen.  Eyes/nose/throat: Eyes without scleral icterus, eye movements normal, pupils equal and reactive, oropharynx clear, ears with normal TM's  MSK: Neck with good ROM  Lymphatic: Neck without adenopathy or masses  Endocrine: Thyroid with no nodules to palpation  Pulm: Lungs clear to auscultation bilaterally  Cardiac: Heart with regular rate and rhythm, no murmur or gallop  GI: Abdomen soft, nontender. No palpable enlargement of liver or spleen  MSK: Extremities no tenderness or edema  + Slightly forward stooped posture, suggestive of a neuro claudication influenced gait  Neuro: Moves all extremities, without focal weakness  Psych: Alert, normal mental status. Normal affect and speech       Additional Information   I spent 40 minutes on this encounter, including reviewing interval history since last visit, examining the patient, explaining and counseling the issues enumerated in the Assessment and Plan (patient given a copy), ordering indicated tests, ordering prescriptions, " ordering referrals.       RACHAEL PEDERSEN MD, MD

## 2022-04-01 ENCOUNTER — OFFICE VISIT (OUTPATIENT)
Dept: INTERNAL MEDICINE | Facility: CLINIC | Age: 82
End: 2022-04-01
Payer: MEDICARE

## 2022-04-01 VITALS
HEART RATE: 108 BPM | TEMPERATURE: 97.5 F | SYSTOLIC BLOOD PRESSURE: 142 MMHG | OXYGEN SATURATION: 100 % | BODY MASS INDEX: 28.09 KG/M2 | HEIGHT: 67 IN | WEIGHT: 179 LBS | DIASTOLIC BLOOD PRESSURE: 80 MMHG

## 2022-04-01 DIAGNOSIS — M48.062 SPINAL STENOSIS OF LUMBAR REGION WITH NEUROGENIC CLAUDICATION: Primary | ICD-10-CM

## 2022-04-01 DIAGNOSIS — M35.3 POLYMYALGIA (H): ICD-10-CM

## 2022-04-01 DIAGNOSIS — M51.379 DEGENERATION OF LUMBAR OR LUMBOSACRAL INTERVERTEBRAL DISC: ICD-10-CM

## 2022-04-01 DIAGNOSIS — G47.33 OSA (OBSTRUCTIVE SLEEP APNEA): ICD-10-CM

## 2022-04-01 DIAGNOSIS — G62.89 OTHER POLYNEUROPATHY: ICD-10-CM

## 2022-04-01 DIAGNOSIS — I10 ESSENTIAL HYPERTENSION: ICD-10-CM

## 2022-04-01 PROCEDURE — 99215 OFFICE O/P EST HI 40 MIN: CPT | Performed by: INTERNAL MEDICINE

## 2022-04-01 RX ORDER — FLUOCINONIDE 0.5 MG/G
OINTMENT TOPICAL
COMMUNITY
Start: 2022-01-19 | End: 2023-05-30

## 2022-04-01 NOTE — PATIENT INSTRUCTIONS
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 Coyle  Her laboratory work is all current  We will get a lumbar spine x-ray today  Consultation requested from the interdisciplinary spine center    Neuroclaudication symptoms of lumbar spinal stenosis  Known Lumbar DDD (ESTEBAN 2005, for reported lumbar stenosis and spondylolisthesis and sciatica  Characteristic Pain Low back, both thighs, both calves  Worse when standing  Difficulty climbing stairs. Most comfortable seated.  MRI 2012 had degenerating discs    Distal symmetric polyneuropathy, small fiber peripheral neuropathy, also restless leg syndrome  Bilateral leg coldness leading to discomfort in the day and at night.   Lyrica used briefly, as of April 202 on nortriptyline, which seems to help  10/11/2021  Neurology: Cholo Khanna, DO  EMG through PM&R on 8/30/2019 with Dr. Izquierdo:  EMG on 8/30/2019 which showed an essentially normal study.  Comment NCS: Essentially normal study  Comment EMG: Normal study  1.  Normal needle EMG bilateral lower extremities.  Interpretation: Essentially normal study:   There is electrodiagnostic evidence of:  1.  Borderline bilateral sensory amplitudes.  This is normal for the patient's age is very likely normal finding.  I cannot, however, completely exclude a very mild sensory or sensorimotor peripheral polyneuropathy.   2 There is no electrodiagnostic evidence of lumbosacral radiculopathy, lumbosacral plexopathy, or focal neuropathy in the bilateral lower extremities.  Specifically, there is no electrodiagnostic evidence of tibial neuropathy at the tarsal tunnel in the bilateral lower extremities.    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    Insomnia, KASANDRA is currently untreated  She stopped CPAP because not tolerated  sleep study in 2017, which showed sleep apnea. Stopped CPAP, a year and a half ago, as she wasn't sleeping and it wasn't positive experience.     Essential hypertension  Hydrochlorothiazide one half of a 50 mg pill. Lisinopril 20 mg daily.  She has a home blood pressure machine, but has not been using it regularly.  I recommend that she start recording the blood pressure numbers in a notebook, and 1 day bring the machine to clinic so that we can validate it against a manual reading to make sure it is accurate, then she can believe the numbers.    I told her that target blood pressure is 120/80 at rest and seated position measured on the right upper arm.  I reminded her that healthy diet particular less sodium, also regular exercise, and weight control will help with blood pressure, as well as treating sleep apnea    BP Readings from Last 6 Encounters:   04/01/22 (!) 142/80   01/04/22 138/80   10/11/21 (!) 142/69   08/17/21 130/80   07/15/21 121/70   05/25/21 118/76     Hyperlipidemia, mildly elevated LDL, very generous HDL which is favorable  Lipid profile July 24, 2019 with total cholesterol 265, triglycerides 43, LDL bad cholesterol modestly elevated 141, but with a very generous level of the HDL good cholesterol 115  10-  Hemoglobin A1C <=5.6 % 5.3      Gastroesophageal reflux, uses Tums intermittently    Constipation that she has noticed since starting nortriptyline for neuropathy and restless legs  I told her that constipation is a well-known side effect of nortriptyline because of its anticholinergic effects.  To combat constipation, I recommended she stay well-hydrated, continue with the Citrucel every day, and consider adding a capful of MiraLAX powder every day which is an osmotic laxative and is safe to use every day.  It adds water content of stools.    Osteopenia, on Prolia (started  approximately 2017) last injection January 2022, next injection approximately July 2022  Bone density scan December 15, 2019  1. The spine bone density L1-L4 (L3) with T-score 0.0 and significant improvement of 6.2 % compared to 2017.  2. Femoral bone densities show right femoral neck T- score -1.8 and significant improvement of 8.9% compared to 2017.  3. Trabecular bone score indicates moderate trabecular bone architecture.   79 y.o. female with LOW BONE DENSITY (OSTEOPENIA), stable on the current treatment.    On Prolia for about 5 years, injection January 2022, next injection would be July 2022  Takes her calcium and vitamin D    Overweight with body mass index of 28.  She has been less physically active because of her lumbar spine issues.  She does water aerobics which is excellent.    Menopause, status post complete hysterectomy  Had complete hysterectomy at age in her 50s, has not needed to get Pap smears anymore  BILATERAL FULL FIELD DIGITAL SCREENING MAMMOGRAM WITH TOMOSYNTHESIS  Performed on: 8/9/21    She recalls a colonoscopy that was done approximately at age 72    Received third shot of Pfizer COVID-19 vaccine October 29, 2021, so she could get a booster anytime  Has received both pneumococcal vaccines  And recomment shingles vaccine  Could consider getting a tetanus booster since it looks like her last one was in 2007, she should get that at a community pharmacy since it is paid under the Medicare prescription drug benefit

## 2022-04-06 ENCOUNTER — OFFICE VISIT (OUTPATIENT)
Dept: PHYSICAL MEDICINE AND REHAB | Facility: CLINIC | Age: 82
End: 2022-04-06
Attending: INTERNAL MEDICINE
Payer: MEDICARE

## 2022-04-06 VITALS — DIASTOLIC BLOOD PRESSURE: 88 MMHG | HEART RATE: 98 BPM | SYSTOLIC BLOOD PRESSURE: 150 MMHG | OXYGEN SATURATION: 98 %

## 2022-04-06 DIAGNOSIS — M79.18 MYOFASCIAL PAIN: ICD-10-CM

## 2022-04-06 DIAGNOSIS — M48.062 SPINAL STENOSIS OF LUMBAR REGION WITH NEUROGENIC CLAUDICATION: Primary | ICD-10-CM

## 2022-04-06 PROCEDURE — 99204 OFFICE O/P NEW MOD 45 MIN: CPT | Performed by: PAIN MEDICINE

## 2022-04-06 RX ORDER — LORAZEPAM 1 MG/1
TABLET ORAL
Qty: 1 TABLET | Refills: 0 | Status: SHIPPED | OUTPATIENT
Start: 2022-04-06 | End: 2022-06-10

## 2022-04-06 ASSESSMENT — PAIN SCALES - GENERAL: PAINLEVEL: MODERATE PAIN (4)

## 2022-04-06 NOTE — PROGRESS NOTES
ASSESSMENT: Sheryl Trotter is a 81 year old female who presents for consultation at the request of [facility] PCP Supriya Da Silva, with a past medical history significant for headache, polyarthralgia, bilateral shoulder pain, polymyalgia, polyneuropathy, spinal stenosis with neurogenic claudication, sleep apnea, GERD, hyperlipidemia, hypertension, degenerative disc disease of the lumbar spine, avascular necrosis, osteoporosis,  trochanteric bursitis, lumbar spondylosis, osteoarthritis, history of left hip dislocation, primary osteoarthritis of multiple joints, chondrocalcinosis of the knee, insomnia, status post left hip replacement who presents today for new patient evaluation of low back pain and bilateral lower extremity pain:    -Overall patient's physical exam is reassuring that she has normal strength and reflexes in her lower extremities.  Her pain is likely secondary to lumbar spinal stenosis with neurogenic claudication.  Her last MRI is in 2012.  I have ordered repeat MRI to further evaluate as pain is changed from constant right lower extremity pain to bilateral lower extremity pain with standing and walking.    Patient is neurologically intact on exam. No myelopathic or red flag symptoms.     Oswestry (MANDA) Questionnaire    OSWESTRY DISABILITY INDEX 4/6/2022   Count 10   Sum 19   Oswestry Score (%) 38   Some recent data might be hidden     Diagnoses and all orders for this visit:  Spinal stenosis of lumbar region with neurogenic claudication  -     Spine Referral  -     MR Lumbar Spine w/o Contrast; Future  -     Physical Therapy Referral; Future  -     LORazepam (ATIVAN) 1 MG tablet; Please take 1 hour prior to MRI.  You will need a .  Myofascial pain  -     MR Lumbar Spine w/o Contrast; Future  -     Physical Therapy Referral; Future  -     LORazepam (ATIVAN) 1 MG tablet; Please take 1 hour prior to MRI.  You will need a .    PLAN:  Reviewed spine anatomy and disease process.  Discussed diagnosis and treatment options with the patient today. A shared decision making model was used.  The patient's values and choices were respected. The following represents what was discussed and decided upon by the provider and the patient.      -DIAGNOSTIC TESTS:  Images were personally reviewed and interpreted and explained to patient today using spine model.   --X-ray of the lumbar spine dated 4/1/2022 is personally viewed images interpreted and discussed with patient.  There is a chronic grade 1 anterolisthesis of L4 on L5 and L3 on L4.  There is significant facet arthropathy from L3-S1.  There are no compression fractures.  --EMG nerve conduction study on 8/30/2019 of bilateral lower extremities is normal.  --I ordered an MRI of the lumbar spine.  Once have reviewed this I will send her my chart note.  -MRI of the lumbar spine dated-1/26/2012 is personally viewed images interpreted and discussed with patient.  This was done at [facility].  There is mild central and severe bilateral lateral recess stenosis L4-5 with advanced bilateral facet arthropathy and a 6 mm spondylolisthesis of L4 and L5.  There is moderate up-and-down foraminal stenosis on the right at L4-5.  There are degenerative changes throughout the lumbar spine worse at L5-S1 and L2-3.    -PHYSICAL THERAPY: I ordered physical therapy at [facility] per patient's request as she has been there before.  I like her to have a home-going exercise program that she can do on a daily basis.    Discussed the importance of core strengthening, ROM, stretching exercises with the patient and how each of these entities is important in decreasing pain.  Explained to the patient that the purpose of physical therapy is to teach the patient a home exercise program.  These exercises need to be performed every day in order to decrease pain and prevent future occurrences of pain.        -MEDICATIONS: No changes to medications at this time.  -  Discussed multiple  medication options today with patient. Discussed risks, side effects, and proper use of medications. Patient verbalized understanding.    -INTERVENTIONS: No interventions at this time.  Discussed risks and benefits of injections with patient today.    -PATIENT EDUCATION: We discussed pain management in a multimodal fashion including physical therapy, medication management, possible future injections.    -FOLLOW-UP:   The patient will follow up in 4 weeks.    Advised patient to call the [facility] if symptoms worsen or you have problems controlling bladder and bowel function.   ______________________________________________________________________    SUBJECTIVE:  HPI:  Sheryl Trotter  Is a 81 year old female who presents today for new patient evaluation of low back pain and bilateral lower extremity pain.  Patient was seen by her primary care provider on 4/1/2022 with continued low back and bilateral lower extremity pain.  She is referred to the spine center for further evaluation.  X-ray had been obtained.  Patient notes that she has had pain in her back and legs since the fall 2021.  Of note she did have low back and right lower extremity pain in 2012 with a series of epidural steroid injections that were helpful.  She also had physical therapy at that time.  Up until this point that had been improved.  Pain is no different than it was then and is brought on with standing and walking improved with sitting and laying down.  She notes that pain is achy goes down the posterior buttock thigh and calf.  She can walk for a few moments without having to sit down.  In the past she got to [facility] for physical therapy tends to be very helpful.  She had severe anxiety with gabapentin.  She notes that the Lyrica allergy is actually not true as she has another dermatologic reason for the angioedema and it was not Lyrica.  She denies any bowel or bladder changes, fevers, chills, unintentional weight loss.  Her pain  today is 4/10 is worst is 8/10 best is 2/10.      -Treatment to Date: X-ray of lumbar spine dated 4/1/2022.  EMG nerve conduction study of bilateral lower extremities on 8/30/2019.  MRI lumbar spine dated 1/26/2012.  Left hip injection done on 1/5/2018.  Left hip injection done on 1/18/2018.  Epidural steroid injections done in 2012.  Physical therapy in the past.    -Medications:    Current Outpatient Medications   Medication     LORazepam (ATIVAN) 1 MG tablet     calcium carbonate-vitamin D3 600 mg (1,500 mg)-800 unit Chew     denosumab 60 mg/mL Syrg     fluocinonide (LIDEX) 0.05 % external ointment     hydrochlorothiazide (HYDRODIURIL) 50 MG tablet     lisinopril (ZESTRIL) 20 MG tablet     METHYLCELLULOSE (CITRUCEL ORAL)     nortriptyline (PAMELOR) 50 MG capsule     Vitamin D3 (CHOLECALCIFEROL) 25 mcg (1000 units) tablet     No current facility-administered medications for this visit.       Allergies   Allergen Reactions     Duloxetine Headache     Nausea, difficult sleeping ankles cramps, loose bowel      Lyrica [Pregabalin] Angioedema and Itching     Gabapentin Anxiety       Past Medical History:   Diagnosis Date     Arthritis      AVN (avascular necrosis of bone) (H)     let hip     DDD (degenerative disc disease), lumbar      GERD (gastroesophageal reflux disease)      HLD (hyperlipidemia)      Hypertension      Insomnia      Osteoporosis      PONV (postoperative nausea and vomiting)      Sleep apnea     cpap        Patient Active Problem List   Diagnosis     Osteoporosis     Hyperlipidemia     Essential hypertension     Primary insomnia     Trochanteric Bursitis     Lumbar Spondylosis     Lumbar Disc Degeneration     Headache     Osteoarthritis of the Knee     Polyarthralgia     Status post left hip replacement     Hip dislocation, left (H)     Bilateral shoulder pain     Polymyalgia (H)     Primary osteoarthritis involving multiple joints     Chondrocalcinosis     KASANDRA (obstructive sleep apnea)     Other  polyneuropathy     Spinal stenosis of lumbar region with neurogenic claudication       Past Surgical History:   Procedure Laterality Date     APPENDECTOMY       CHOLECYSTECTOMY       FOOT FUSION Right 2017     HIP PINNING Left 3/28/2018    Procedure: OPEN REDUCTION OF DISLOCATED LEFT TOTAL HIP;  Surgeon: Daron Rincon MD;  Location: [facility];  Service:      HYSTERECTOMY  Mid 1990's     IR LUMBAR EPIDURAL STEROID INJECTION  2/18/2005     IR LUMBAR EPIDURAL STEROID INJECTION  12/19/2005     OOPHORECTOMY  Mid 1990's     ZZC TOTAL HIP ARTHROPLASTY Left 2/7/2018    Procedure: LEFT TOTAL HIP ARTHROPLASTY;  Surgeon: Daron Rincon MD;  Location: [facility];  Service: Orthopedics       Family History   Problem Relation Age of Onset     Lymphoma Father 28.00        Hodgkins     Colon Cancer Maternal Grandmother      Vaginal Cancer Maternal Aunt      Sleep Apnea Son        Reviewed past medical, surgical, and family history with patient found on new patient intake packet located in EMR Media tab.     SOCIAL HX: She denies smoking or using recreational drugs.  She very rarely drinks alcohol.    ROS:  Specifically negative for bowel/bladder dysfunction, balance changes, headache, dizziness, foot drop, fevers, chills, appetite changes, nausea/vomiting, unexplained weight loss. Otherwise 13 systems reviewed are negative. Please see the patient's intake questionnaire from today for details.    OBJECTIVE:  BP (!) 169/80 (BP Location: Right arm, Patient Position: Sitting)   Pulse 98   SpO2 98%     PHYSICAL EXAMINATION:    --CONSTITUTIONAL:  Vital signs as above.  No acute distress.  The patient is well nourished and well groomed.  --PSYCHIATRIC:  Appropriate mood and affect. The patient is awake, alert, oriented to person, place, time and answering questions appropriately with clear speech.    --SKIN:  Skin over the face, bilateral lower extremities, and posterior torso is clean, dry, intact without rashes.     --RESPIRATORY: Normal rhythm and effort. No abnormal accessory muscle breathing patterns noted.   --STANDING EXAMINATION:  Normal lumbar lordosis noted, no lateral shift.  --MUSCULOSKELETAL: Lumbar spine inspection reveals no evidence of deformity. Range of motion is mildly limited in lumbar flexion, extension, lateral rotation. No tenderness to palpation lumbar spine. Straight leg raising in the supine position is negative to radicular pain.   --SACROILIAC JOINT: Negative distraction.  Negative Donnie's with reproduction of pain to affected extremity. One Finger point test negative.  --GROSS MOTOR: Gait is non-antalgic. Easily arises from a seated position. Toe walking and heel walking are normal without significant difficulty.    --LOWER EXTREMITY MOTOR TESTING:  Plantar flexion left 5/5, right 5/5   Dorsiflexion left 5/5, right 5/5   Great toe MTP extension left 5/5, right 5/5  Knee flexion left 5/5, right 5/5  Knee extension left 5/5, right 5/5   Hip flexion left 5/5, right 5/5  Hip abduction left 5/5, right 5/5  Hip adduction left 5/5, right 5/5   --HIPS: Full range of motion bilaterally.  Stinchfield test is negative bilaterally.  --NEUROLOGICAL:  2/4 patellar and achilles reflexes bilaterally.  Sensation to light touch is intact in the bilateral L4, L5, and S1 dermatomes. Babinski is negative. No clonus.   --VASCULAR:  2/4 dorsalis pedis  pulses bilaterally.  Bilateral lower extremities are warm.  There is no pitting edema of the bilateral lower extremities.    RESULTS: Prior medical records from [facility] primary care provider were reviewed today.    Imaging: Lumbar spine Imaging was personally reviewed and interpreted today. The images were shown to the patient and the findings were explained using a spine model.      XR Lumbar Spine 2/3 Views    Result Date: 4/1/2022  EXAM: XR LUMBAR SPINE 2-3 VIEWS LOCATION: [facility] DATE/TIME: 4/1/2022 2:59 PM INDICATION: Low back pain  lumbar spinal stenosis symptoms COMPARISON: None. TECHNIQUE: CR Lumbar Spine.     IMPRESSION: There is chronic grade 1 anterolisthesis of L3 over L4 and L4 over L5. There is significant facet hypertrophy at L3-L4 L4-L5 and L5-S1 most pronounced at L5-S1. Vertebral body height is normal. Slight decrease in disc space height with endplate sclerosis at L2-L3. No acute findings. No compression fractures.

## 2022-04-06 NOTE — LETTER
4/6/2022         RE: Sheryl Trotter  [address]  [facility] [address]        Dear Colleague,    Thank you for referring your patient, Sheryl Trotter, to the [facility]UR[facility]ERY. Please see a copy of my visit note below.    ASSESSMENT: Sheryl Trotter is a 81 year old female who presents for consultation at the request of [facility] PCP Supriya Da Silva, with a past medical history significant for headache, polyarthralgia, bilateral shoulder pain, polymyalgia, polyneuropathy, spinal stenosis with neurogenic claudication, sleep apnea, GERD, hyperlipidemia, hypertension, degenerative disc disease of the lumbar spine, avascular necrosis, osteoporosis,  trochanteric bursitis, lumbar spondylosis, osteoarthritis, history of left hip dislocation, primary osteoarthritis of multiple joints, chondrocalcinosis of the knee, insomnia, status post left hip replacement who presents today for new patient evaluation of low back pain and bilateral lower extremity pain:    -Overall patient's physical exam is reassuring that she has normal strength and reflexes in her lower extremities.  Her pain is likely secondary to lumbar spinal stenosis with neurogenic claudication.  Her last MRI is in 2012.  I have ordered repeat MRI to further evaluate as pain is changed from constant right lower extremity pain to bilateral lower extremity pain with standing and walking.    Patient is neurologically intact on exam. No myelopathic or red flag symptoms.     Oswestry (MANDA) Questionnaire    OSWESTRY DISABILITY INDEX 4/6/2022   Count 10   Sum 19   Oswestry Score (%) 38   Some recent data might be hidden     Diagnoses and all orders for this visit:  Spinal stenosis of lumbar region with neurogenic claudication  -     Spine Referral  -     MR Lumbar Spine w/o Contrast; Future  -     Physical Therapy Referral; Future  -     LORazepam (ATIVAN) 1 MG tablet; Please take 1 hour prior to MRI.  You will need  a .  Myofascial pain  -     MR Lumbar Spine w/o Contrast; Future  -     Physical Therapy Referral; Future  -     LORazepam (ATIVAN) 1 MG tablet; Please take 1 hour prior to MRI.  You will need a .    PLAN:  Reviewed spine anatomy and disease process. Discussed diagnosis and treatment options with the patient today. A shared decision making model was used.  The patient's values and choices were respected. The following represents what was discussed and decided upon by the provider and the patient.      -DIAGNOSTIC TESTS:  Images were personally reviewed and interpreted and explained to patient today using spine model.   --X-ray of the lumbar spine dated 4/1/2022 is personally viewed images interpreted and discussed with patient.  There is a chronic grade 1 anterolisthesis of L4 on L5 and L3 on L4.  There is significant facet arthropathy from L3-S1.  There are no compression fractures.  --EMG nerve conduction study on 8/30/2019 of bilateral lower extremities is normal.  --I ordered an MRI of the lumbar spine.  Once have reviewed this I will send her my chart note.  -MRI of the lumbar spine dated-1/26/2012 is personally viewed images interpreted and discussed with patient.  This was done at [facility], [place].  There is mild central and severe bilateral lateral recess stenosis L4-5 with advanced bilateral facet arthropathy and a 6 mm spondylolisthesis of L4 and L5.  There is moderate up-and-down foraminal stenosis on the right at L4-5.  There are degenerative changes throughout the lumbar spine worse at L5-S1 and L2-3.    -PHYSICAL THERAPY: I ordered physical therapy at [facility] per patient's request as she has been there before.  I like her to have a home-going exercise program that she can do on a daily basis.    Discussed the importance of core strengthening, ROM, stretching exercises with the patient and how each of these entities is important in decreasing pain.  Explained to the patient that the purpose of  physical therapy is to teach the patient a home exercise program.  These exercises need to be performed every day in order to decrease pain and prevent future occurrences of pain.        -MEDICATIONS: No changes to medications at this time.  -  Discussed multiple medication options today with patient. Discussed risks, side effects, and proper use of medications. Patient verbalized understanding.    -INTERVENTIONS: No interventions at this time.  Discussed risks and benefits of injections with patient today.    -PATIENT EDUCATION: We discussed pain management in a multimodal fashion including physical therapy, medication management, possible future injections.    -FOLLOW-UP:   The patient will follow up in 4 weeks.    Advised patient to call the Spine Center if symptoms worsen or you have problems controlling bladder and bowel function.   ______________________________________________________________________    SUBJECTIVE:  HPI:  Sheryl Trotter  Is a 81 year old female who presents today for new patient evaluation of low back pain and bilateral lower extremity pain.  Patient was seen by her primary care provider on 4/1/2022 with continued low back and bilateral lower extremity pain.  She is referred to the spine center for further evaluation.  X-ray had been obtained.  Patient notes that she has had pain in her back and legs since the fall 2021.  Of note she did have low back and right lower extremity pain in 2012 with a series of epidural steroid injections that were helpful.  She also had physical therapy at that time.  Up until this point that had been improved.  Pain is no different than it was then and is brought on with standing and walking improved with sitting and laying down.  She notes that pain is achy goes down the posterior buttock thigh and calf.  She can walk for a few moments without having to sit down.  In the past she got to [facility] for physical therapy tends to be very helpful.  She had  severe anxiety with gabapentin.  She notes that the Lyrica allergy is actually not true as she has another dermatologic reason for the angioedema and it was not Lyrica.  She denies any bowel or bladder changes, fevers, chills, unintentional weight loss.  Her pain today is 4/10 is worst is 8/10 best is 2/10.      -Treatment to Date: X-ray of lumbar spine dated 4/1/2022.  EMG nerve conduction study of bilateral lower extremities on 8/30/2019.  MRI lumbar spine dated 1/26/2012.  Left hip injection done on 1/5/2018.  Left hip injection done on 1/18/2018.  Epidural steroid injections done in 2012.  Physical therapy in the past.    -Medications:    Current Outpatient Medications   Medication     LORazepam (ATIVAN) 1 MG tablet     calcium carbonate-vitamin D3 600 mg (1,500 mg)-800 unit Chew     denosumab 60 mg/mL Syrg     fluocinonide (LIDEX) 0.05 % external ointment     hydrochlorothiazide (HYDRODIURIL) 50 MG tablet     lisinopril (ZESTRIL) 20 MG tablet     METHYLCELLULOSE (CITRUCEL ORAL)     nortriptyline (PAMELOR) 50 MG capsule     Vitamin D3 (CHOLECALCIFEROL) 25 mcg (1000 units) tablet     No current facility-administered medications for this visit.       Allergies   Allergen Reactions     Duloxetine Headache     Nausea, difficult sleeping ankles cramps, loose bowel      Lyrica [Pregabalin] Angioedema and Itching     Gabapentin Anxiety       Past Medical History:   Diagnosis Date     Arthritis      AVN (avascular necrosis of bone) (H)     let hip     DDD (degenerative disc disease), lumbar      GERD (gastroesophageal reflux disease)      HLD (hyperlipidemia)      Hypertension      Insomnia      Osteoporosis      PONV (postoperative nausea and vomiting)      Sleep apnea     cpap        Patient Active Problem List   Diagnosis     Osteoporosis     Hyperlipidemia     Essential hypertension     Primary insomnia     Trochanteric Bursitis     Lumbar Spondylosis     Lumbar Disc Degeneration     Headache     Osteoarthritis of  the Knee     Polyarthralgia     Status post left hip replacement     Hip dislocation, left (H)     Bilateral shoulder pain     Polymyalgia (H)     Primary osteoarthritis involving multiple joints     Chondrocalcinosis     KASANDRA (obstructive sleep apnea)     Other polyneuropathy     Spinal stenosis of lumbar region with neurogenic claudication       Past Surgical History:   Procedure Laterality Date     APPENDECTOMY       CHOLECYSTECTOMY       FOOT FUSION Right 2017     HIP PINNING Left 3/28/2018    Procedure: OPEN REDUCTION OF DISLOCATED LEFT TOTAL HIP;  Surgeon: Daron Rincon MD;  Location: [facility] OR;  Service:      HYSTERECTOMY  Mid 1990's     IR LUMBAR EPIDURAL STEROID INJECTION  2/18/2005     IR LUMBAR EPIDURAL STEROID INJECTION  12/19/2005     OOPHORECTOMY  Mid 1990's     ZZC TOTAL HIP ARTHROPLASTY Left 2/7/2018    Procedure: LEFT TOTAL HIP ARTHROPLASTY;  Surgeon: Daron Rincon MD;  Location: [facility] OR;  Service: Orthopedics       Family History   Problem Relation Age of Onset     Lymphoma Father 28.00        Hodgkins     Colon Cancer Maternal Grandmother      Vaginal Cancer Maternal Aunt      Sleep Apnea Son        Reviewed past medical, surgical, and family history with patient found on new patient intake packet located in EMR Media tab.     SOCIAL HX: She denies smoking or using recreational drugs.  She very rarely drinks alcohol.    ROS:  Specifically negative for bowel/bladder dysfunction, balance changes, headache, dizziness, foot drop, fevers, chills, appetite changes, nausea/vomiting, unexplained weight loss. Otherwise 13 systems reviewed are negative. Please see the patient's intake questionnaire from today for details.    OBJECTIVE:  BP (!) 169/80 (BP Location: Right arm, Patient Position: Sitting)   Pulse 98   SpO2 98%     PHYSICAL EXAMINATION:    --CONSTITUTIONAL:  Vital signs as above.  No acute distress.  The patient is well nourished and well groomed.  --PSYCHIATRIC:   Appropriate mood and affect. The patient is awake, alert, oriented to person, place, time and answering questions appropriately with clear speech.    --SKIN:  Skin over the face, bilateral lower extremities, and posterior torso is clean, dry, intact without rashes.    --RESPIRATORY: Normal rhythm and effort. No abnormal accessory muscle breathing patterns noted.   --STANDING EXAMINATION:  Normal lumbar lordosis noted, no lateral shift.  --MUSCULOSKELETAL: Lumbar spine inspection reveals no evidence of deformity. Range of motion is mildly limited in lumbar flexion, extension, lateral rotation. No tenderness to palpation lumbar spine. Straight leg raising in the supine position is negative to radicular pain.   --SACROILIAC JOINT: Negative distraction.  Negative Donnie's with reproduction of pain to affected extremity. One Finger point test negative.  --GROSS MOTOR: Gait is non-antalgic. Easily arises from a seated position. Toe walking and heel walking are normal without significant difficulty.    --LOWER EXTREMITY MOTOR TESTING:  Plantar flexion left 5/5, right 5/5   Dorsiflexion left 5/5, right 5/5   Great toe MTP extension left 5/5, right 5/5  Knee flexion left 5/5, right 5/5  Knee extension left 5/5, right 5/5   Hip flexion left 5/5, right 5/5  Hip abduction left 5/5, right 5/5  Hip adduction left 5/5, right 5/5   --HIPS: Full range of motion bilaterally.  Stinchfield test is negative bilaterally.  --NEUROLOGICAL:  2/4 patellar and achilles reflexes bilaterally.  Sensation to light touch is intact in the bilateral L4, L5, and S1 dermatomes. Babinski is negative. No clonus.   --VASCULAR:  2/4 dorsalis pedis  pulses bilaterally.  Bilateral lower extremities are warm.  There is no pitting edema of the bilateral lower extremities.    RESULTS: Prior medical records from [facility] primary care provider were reviewed today.    Imaging: Lumbar spine Imaging was personally reviewed and interpreted today. The images  were shown to the patient and the findings were explained using a spine model.      XR Lumbar Spine 2/3 Views    Result Date: 4/1/2022  EXAM: XR LUMBAR SPINE 2-3 VIEWS LOCATION: [facility] DATE/TIME: 4/1/2022 2:59 PM INDICATION: Low back pain lumbar spinal stenosis symptoms COMPARISON: None. TECHNIQUE: CR Lumbar Spine.     IMPRESSION: There is chronic grade 1 anterolisthesis of L3 over L4 and L4 over L5. There is significant facet hypertrophy at L3-L4 L4-L5 and L5-S1 most pronounced at L5-S1. Vertebral body height is normal. Slight decrease in disc space height with endplate sclerosis at L2-L3. No acute findings. No compression fractures.        Again, thank you for allowing me to participate in the care of your patient.        Sincerely,        Pollo Hutchison, DO

## 2022-04-06 NOTE — PATIENT INSTRUCTIONS
I have ordered physical therapy at [facility] in [place] for you.  We will fax this order for you.    I also ordered an MRI of your lumbar spine.  Once have seen the results I will send you a MyChart note.    ~Please call Nurse Navigation line [phone] with any questions or concerns about your treatment plan, if symptoms worsen and you would like to be seen urgently, or if you have problems controlling bladder and bowel function.

## 2022-04-07 ENCOUNTER — TRANSFERRED RECORDS (OUTPATIENT)
Dept: HEALTH INFORMATION MANAGEMENT | Facility: CLINIC | Age: 82
End: 2022-04-07
Payer: MEDICARE

## 2022-04-11 ENCOUNTER — OFFICE VISIT (OUTPATIENT)
Dept: NEUROLOGY | Facility: CLINIC | Age: 82
End: 2022-04-11
Payer: MEDICARE

## 2022-04-11 VITALS — SYSTOLIC BLOOD PRESSURE: 162 MMHG | DIASTOLIC BLOOD PRESSURE: 97 MMHG | HEART RATE: 100 BPM

## 2022-04-11 DIAGNOSIS — G62.89 OTHER POLYNEUROPATHY: ICD-10-CM

## 2022-04-11 PROCEDURE — 99213 OFFICE O/P EST LOW 20 MIN: CPT | Performed by: PSYCHIATRY & NEUROLOGY

## 2022-04-11 RX ORDER — PREGABALIN 75 MG/1
CAPSULE ORAL
Qty: 150 CAPSULE | Refills: 0 | Status: SHIPPED | OUTPATIENT
Start: 2022-04-11 | End: 2022-06-11

## 2022-04-11 RX ORDER — NORTRIPTYLINE HCL 10 MG
CAPSULE ORAL
Qty: 40 CAPSULE | Refills: 0 | Status: SHIPPED | OUTPATIENT
Start: 2022-04-11 | End: 2022-06-10

## 2022-04-11 NOTE — NURSING NOTE
Chief Complaint   Patient presents with     Follow Up     Pt does not have an allergy to Lyrica. Nortriptyline causing constipation. Long term concerns about medications. Periodic restless legs.

## 2022-04-11 NOTE — PATIENT INSTRUCTIONS
Nortriptyline 40 mg at bedtime for 4 days, then   - 30 mg at bedtime for 4 days, then   - 20 mg at bedtime for 4 days, then   - 10 mg at bedtime for 4 days, then   - stop    Lyrica 75 mg once at night for 7 days, then   - 75 mg twice daily for 7 days, then   - 75 mg in the AM, and 150 mg at night for 7 days, then   - 150 mg twice daily

## 2022-04-11 NOTE — PROGRESS NOTES
[facility] Neurology Follow Up Visit    Sheryl Trotter MRN# [id number]   Age: 81 year old YOB: 1940     Brief history of symptoms: The patient was initially seen in neurologic consultation on 10/11/2021 for evaluation of neuropathy. Please see the comprehensive neurologic consultation notes from those dates in the Epic records for details.  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  She was to complete investigation into common causes (serum studies) and follow up to discuss symptomatic management with Lyrica.    Interval history:   TSH, B6, Homocysteine, Ferritin, Iron and iron binding, Vitamin E, ANCA, Immunofixation all negative 10/11/2021    Today, the patient notes that she went to a dermatologist and was told she didn't necessarily have an allergy to Lyrica.  She is having significant constipation with nortriptyline 50 mg, and did note some tiny symptoms at lowe dosing.  She is taking daily miralax and citracil, which helps to some degree. The nortriptyline is helpful in reducing freezing cold sensation, but still has some tingling/thickness sensation and restless legs.     Physical Exam:   Vitals: BP (!) 162/97 (BP Location: Right arm, Patient Position: Sitting, Cuff Size: Adult Regular)   Pulse 100    General: Seated comfortably in no acute distress.  Neurologic:     Mental Status: Fully alert, attentive and oriented. Speech clear and fluent, no paraphasic errors.     Cranial Nerves: EOMI with normal smooth pursuit. Facial movements symmetric. Hearing not formally tested but intact to conversation.  No dysarthria.     Motor: No tremors or other abnormal movements observed.      Coordination: Finger-nose-finger without dysmetria.     Gait: Normal, steady casual gait.         Assessment and Plan:   Assessment:  Polyneuropathy    The constipation, and possibly fluctuations in blood  pressure could be secondary to nortriptyline.  Even though it has been helpful, given her lack of allergy to Lyrica, I feel that she could trial Lyrica again as it may be less symptomatic than nortriptyline.  If Lyrica isn't helpful, then we can always switch back to nortriptyline at a lower dose or an alternative TCA or SNRI.     Plan:  Nortriptyline 40 mg at bedtime for 4 days, then   - 30 mg at bedtime for 4 days, then   - 20 mg at bedtime for 4 days, then   - 10 mg at bedtime for 4 days, then   - stop    Lyrica 75 mg once at night for 7 days, then   - 75 mg twice daily for 7 days, then   - 75 mg in the AM, and 150 mg at night for 7 days, then   - 150 mg twice daily    Follow up in Neurology clinic in 2-3 months (video) or earlier as needed should new concerns arise.    NISA Khanna D.O.   of Neurology    Total time today (27 min) in this patient encounter was spent on pre-charting, counseling and/or coordination of care.

## 2022-04-11 NOTE — LETTER
4/11/2022         RE: Sheryl Trotter  [address]  [place] [facility] [address]        Dear Colleague,    Thank you for referring your patient, Sheryl Trotter, to the [facility]. Please see a copy of my visit note below.    [facility] Neurology Follow Up Visit    Sheryl Trotter MRN# [id number]   Age: 81 year old YOB: 1940     Brief history of symptoms: The patient was initially seen in neurologic consultation on 10/11/2021 for evaluation of neuropathy. Please see the comprehensive neurologic consultation notes from those dates in the Epic records for details.  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  She was to complete investigation into common causes (serum studies) and follow up to discuss symptomatic management with Lyrica.    Interval history:   TSH, B6, Homocysteine, Ferritin, Iron and iron binding, Vitamin E, ANCA, Immunofixation all negative 10/11/2021    Today, the patient notes that she went to a dermatologist and was told she didn't necessarily have an allergy to Lyrica.  She is having significant constipation with nortriptyline 50 mg, and did note some tiny symptoms at lowe dosing.  She is taking daily miralax and citracil, which helps to some degree. The nortriptyline is helpful in reducing freezing cold sensation, but still has some tingling/thickness sensation and restless legs.     Physical Exam:   Vitals: BP (!) 162/97 (BP Location: Right arm, Patient Position: Sitting, Cuff Size: Adult Regular)   Pulse 100    General: Seated comfortably in no acute distress.  Neurologic:     Mental Status: Fully alert, attentive and oriented. Speech clear and fluent, no paraphasic errors.     Cranial Nerves: EOMI with normal smooth pursuit. Facial movements symmetric. Hearing not formally tested but intact to conversation.  No dysarthria.      Motor: No tremors or other abnormal movements observed.      Coordination: Finger-nose-finger without dysmetria.     Gait: Normal, steady casual gait.         Assessment and Plan:   Assessment:  Polyneuropathy    The constipation, and possibly fluctuations in blood pressure could be secondary to nortriptyline.  Even though it has been helpful, given her lack of allergy to Lyrica, I feel that she could trial Lyrica again as it may be less symptomatic than nortriptyline.  If Lyrica isn't helpful, then we can always switch back to nortriptyline at a lower dose or an alternative TCA or SNRI.     Plan:  Nortriptyline 40 mg at bedtime for 4 days, then   - 30 mg at bedtime for 4 days, then   - 20 mg at bedtime for 4 days, then   - 10 mg at bedtime for 4 days, then   - stop    Lyrica 75 mg once at night for 7 days, then   - 75 mg twice daily for 7 days, then   - 75 mg in the AM, and 150 mg at night for 7 days, then   - 150 mg twice daily    Follow up in Neurology clinic in 2-3 months (video) or earlier as needed should new concerns arise.    NISA Khanna D.O.   of Neurology    Total time today (27 min) in this patient encounter was spent on pre-charting, counseling and/or coordination of care.         Again, thank you for allowing me to participate in the care of your patient.        Sincerely,        Cholo Khanna, DO

## 2022-04-29 ENCOUNTER — HOSPITAL ENCOUNTER (OUTPATIENT)
Dept: MRI IMAGING | Facility: CLINIC | Age: 82
Discharge: HOME | End: 2022-04-29
Attending: PAIN MEDICINE | Admitting: PAIN MEDICINE
Payer: MEDICARE

## 2022-04-29 DIAGNOSIS — M79.18 MYOFASCIAL PAIN: ICD-10-CM

## 2022-04-29 DIAGNOSIS — M48.062 SPINAL STENOSIS OF LUMBAR REGION WITH NEUROGENIC CLAUDICATION: ICD-10-CM

## 2022-04-29 PROCEDURE — G1004 CDSM NDSC: HCPCS

## 2022-05-13 ENCOUNTER — OFFICE VISIT (OUTPATIENT)
Dept: PHYSICAL MEDICINE AND REHAB | Facility: CLINIC | Age: 82
End: 2022-05-13
Payer: MEDICARE

## 2022-05-13 VITALS — OXYGEN SATURATION: 100 % | DIASTOLIC BLOOD PRESSURE: 70 MMHG | SYSTOLIC BLOOD PRESSURE: 157 MMHG | HEART RATE: 83 BPM

## 2022-05-13 DIAGNOSIS — M48.062 SPINAL STENOSIS OF LUMBAR REGION WITH NEUROGENIC CLAUDICATION: Primary | ICD-10-CM

## 2022-05-13 DIAGNOSIS — M79.18 MYOFASCIAL PAIN: ICD-10-CM

## 2022-05-13 DIAGNOSIS — M47.817 LUMBOSACRAL SPONDYLOSIS WITHOUT MYELOPATHY: ICD-10-CM

## 2022-05-13 PROCEDURE — 99214 OFFICE O/P EST MOD 30 MIN: CPT | Performed by: PAIN MEDICINE

## 2022-05-13 ASSESSMENT — PAIN SCALES - GENERAL: PAINLEVEL: NO PAIN (1)

## 2022-05-13 NOTE — PROGRESS NOTES
Assessment:     Diagnoses and all orders for this visit:  Spinal stenosis of lumbar region with neurogenic claudication  Myofascial pain  Lumbosacral spondylosis without myelopathy     Sheryl Trotter is a 81 year old y.o. female with past medical history significant for  headache, polyarthralgia, bilateral shoulder pain, polymyalgia, polyneuropathy, spinal stenosis with neurogenic claudication, sleep apnea, GERD, hyperlipidemia, hypertension, degenerative disc disease of the lumbar spine, avascular necrosis, osteoporosis,  trochanteric bursitis, lumbar spondylosis, osteoarthritis, history of left hip dislocation, primary osteoarthritis of multiple joints, chondrocalcinosis of the knee, insomnia, status post left hip replacement who presents today for follow-up regarding low back and bilateral lower extremity pain:    -Patient has seen some improvement in her pain with physical therapy.  Her pain is likely secondary to lumbar spinal stenosis with neurogenic claudication.     Plan:     A shared decision making plan was used. The patient's values and choices were respected. Prior medical records from our last visit on 4/6/2022 were reviewed today. The following represents what was discussed and decided upon by the provider and the patient.        -DIAGNOSTIC TESTS: Images were personally reviewed and interpreted.   --X-ray of the lumbar spine dated 4/1/2022 is personally viewed images interpreted and discussed with patient.  There is a chronic grade 1 anterolisthesis of L4 on L5 and L3 on L4.  There is significant facet arthropathy from L3-S1.  There are no compression fractures.  --EMG nerve conduction study on 8/30/2019 of bilateral lower extremities is normal.  --MRI of the lumbar spine dated 1/26/2012 is personally viewed images interpreted and discussed with patient.  This was done at [facility].  There is mild central and severe bilateral lateral recess stenosis L4-5 with advanced bilateral facet arthropathy and a 6  mm spondylolisthesis of L4 and L5.  There is moderate up-and-down foraminal stenosis on the right at L4-5.  There are degenerative changes throughout the lumbar spine worse at L5-S1 and L2-3.  -- MRI of lumbar spine dated 4/29/2022 is personally viewed images interpreted and discussed with the patient.  There is severe spinal canal stenosis at L3-4 and L4-5 with severe facet arthropathy at the same level.  There is a grade 1 anterolisthesis at L3 on L4 and L4 and L5.  There is mild bilateral facet arthropathy at L5-S1.    -INTERVENTIONS: No interventions at this time.  Could consider bilateral L4-5 transforaminal epidural steroid injections in the future.    -MEDICATIONS: Recommend she continue with pregabalin.  -  Discussed side effects of medications and proper use. Patient verbalized understanding.    -PHYSICAL THERAPY: Recommend she continue with physical therapy.  Discussed the importance of core strengthening, ROM, stretching exercises with the patient and how each of these entities is important in decreasing pain.  Explained to the patient that the purpose of physical therapy is to teach the patient a home exercise program.  These exercises need to be performed every day in order to decrease pain and prevent future occurrences of pain.        -PATIENT EDUCATION: We discussed pain management in a multimodal fashion including physical therapy, medication management, possible future injections.    -FOLLOW UP: Patient will follow up in 4 weeks.  Advised to contact clinic if symptoms worsen or change.    Subjective:     Sheryl Trotter is a 81 year old female who presents today for follow-up regarding bilateral lower extremity pain.  Patient notes that she has pain with walking or standing for more than 10 minutes.  If she sits down or lays down pain is improved.  Her pain today is 1/10 is worst is 7/10.  Currently as she has been doing physical therapy her pain has not been getting over 5/10.  She notes that she  is very pleased with physical therapy and finds it has been helpful.  She like to continue with this.  She is taking pregabalin 75 mg in the morning and 150 mg at night for her neuropathy.  We discussed that this may be helpful for her legs as well.  She denies any bowel or bladder changes, fevers, chills, unintentional weight loss.    -Treatment to Date: X-ray of lumbar spine dated 4/1/2022.  EMG nerve conduction study of bilateral lower extremities on 8/30/2019.  MRI lumbar spine dated 1/26/2012.  Left hip injection done on 1/5/2018.  Left hip injection done on 1/18/2018.  Epidural steroid injections done in 2012.  Physical therapy in the past.  MRI of the lumbar spine dated 4/29/2022.    Patient Active Problem List   Diagnosis     Osteoporosis     Hyperlipidemia     Essential hypertension     Primary insomnia     Trochanteric Bursitis     Lumbar Spondylosis     Lumbar Disc Degeneration     Headache     Osteoarthritis of the Knee     Polyarthralgia     Status post left hip replacement     Hip dislocation, left (H)     Bilateral shoulder pain     Polymyalgia (H)     Primary osteoarthritis involving multiple joints     Chondrocalcinosis     KASANDRA (obstructive sleep apnea)     Other polyneuropathy     Spinal stenosis of lumbar region with neurogenic claudication       Current Outpatient Medications   Medication     pregabalin (LYRICA) 75 MG capsule     calcium carbonate-vitamin D3 600 mg (1,500 mg)-800 unit Chew     denosumab 60 mg/mL Syrg     fluocinonide (LIDEX) 0.05 % external ointment     hydrochlorothiazide (HYDRODIURIL) 50 MG tablet     lisinopril (ZESTRIL) 20 MG tablet     LORazepam (ATIVAN) 1 MG tablet     METHYLCELLULOSE (CITRUCEL ORAL)     nortriptyline (PAMELOR) 10 MG capsule     Vitamin D3 (CHOLECALCIFEROL) 25 mcg (1000 units) tablet     No current facility-administered medications for this visit.       Allergies   Allergen Reactions     Duloxetine Headache     Nausea, difficult sleeping ankles cramps,  loose bowel      Gabapentin Anxiety       Past Medical History:   Diagnosis Date     Arthritis      AVN (avascular necrosis of bone) (H)     let hip     DDD (degenerative disc disease), lumbar      GERD (gastroesophageal reflux disease)      HLD (hyperlipidemia)      Hypertension      Insomnia      Osteoporosis      PONV (postoperative nausea and vomiting)      Sleep apnea     cpap        Review of Systems  ROS:  Specifically negative for bowel/bladder dysfunction, balance changes, headache, dizziness, foot drop, fevers, chills, appetite changes, nausea/vomiting, unexplained weight loss. Otherwise 13 systems reviewed are negative. Please see the patient's intake questionnaire from today for details.    Reviewed Social, Family, Past Medical and Past Surgical history with patient, no significant changes noted since prior visit.     Objective:     BP (!) 157/70 (BP Location: Right arm, Patient Position: Sitting)   Pulse 83   SpO2 100%     PHYSICAL EXAMINATION:    --CONSTITUTIONAL: Well developed, well nourished, healthy appearing individual.  --PSYCHIATRIC: Appropriate mood and affect. No difficulty interacting due to temper, social withdrawal, or memory issues.   --RESPIRATORY: Normal rhythm and effort. No abnormal accessory muscle breathing patterns noted.   --MUSCULOSKELETAL:  Normal lumbar lordosis noted, no lateral shift.  --GROSS MOTOR: Easily arises from a seated position. Gait is non-antalgic  --LUMBAR SPINE:  Inspection reveals no evidence of deformity. Range of motion is mildly limited in lumbar flexion, extension, lateral rotation. No tenderness to palpation lumbar spine. Straight leg raising in the seated position is negative to radicular pain.   --SACROILIAC JOINT:  One Finger point test negative.  --LOWER EXTREMITY MOTOR TESTING:  Plantar flexion left 5/5, right 5/5   Dorsiflexion left 5/5, right 5/5   Great toe MTP extension left 5/5, right 5/5  Knee flexion left 5/5, right 5/5  Knee extension left  5/5, right 5/5   Hip flexion left 5/5, right 5/5  Hip abduction left 5/5, right 5/5  Hip adduction left 5/5, right 5/5   --NEUROLOGIC: Sensation to light touch is intact in the bilateral L4, L5, and S1 dermatomes.    RESULTS:   Imaging: Lumbar spine imaging was reviewed today. The images were shown to the patient and the findings were explained using a spine model.    MR Lumbar Spine w/o Contrast    Result Date: 4/29/2022  EXAM: MR LUMBAR SPINE W/O CONTRAST LOCATION: [facility] DATE/TIME: 4/29/2022 7:53 AM INDICATION: Low back pain, > 6 wks COMPARISON: None. TECHNIQUE: Routine Lumbar Spine MRI without IV contrast. FINDINGS: Nomenclature is based on 5 lumbar type vertebral bodies. Normal vertebral body heights and marrow signal. There is a 2.5 x 2 cm hemangioma in the mid aspect of L3 vertebral body. Grade 1 anterolisthesis of L3 over L4 and L4 over L5. Normal distal spinal cord and cauda equina with conus medullaris at L1. No extraspinal abnormality. Unremarkable visualized bony pelvis. T12-L1: Normal disc height, mild desiccation. No herniation. Normal facets. No spinal canal or neural foraminal stenosis. L1-L2: Normal disc height, mild desiccation. No herniation. Normal facets. No spinal canal or neural foraminal stenosis. L2-L3: Slightly decreased height. Mild disc bulge. Mild bilateral facet hypertrophy. Mild central spinal canal stenosis. Mild bilateral foraminal stenosis. L3-L4: Normal disc height, degenerative signal. Diffuse disc bulge. Severe bilateral facet hypertrophy and ligamentum flavum hypertrophy. Severe central spinal stenosis. Mild bilateral neural foraminal stenosis. L4-L5: Normal disc height, degenerative signal. Diffuse disc bulge. Severe bilateral facet hypertrophy and ligamentum flavum hypertrophy. Severe central spinal stenosis. Mild bilateral neural foraminal stenosis. L5-S1: Normal disc height, mild desiccation. No herniation. Mild bilateral facet hypertrophy. No  spinal canal or neural foraminal stenosis.     IMPRESSION: 1.  Severe spinal stenosis at L3-L4 and L4-L5 secondary to severe facet hypertrophy and disc bulge. There is grade 1 anterolisthesis at both levels.

## 2022-05-13 NOTE — PATIENT INSTRUCTIONS
Recommend that you continue with physical therapy at this time.    We could consider epidural steroid injections in the future should pain continue or worsen.    ~Please call Nurse Navigation line [phone] with any questions or concerns about your treatment plan, if symptoms worsen and you would like to be seen urgently, or if you have problems controlling bladder and bowel function.

## 2022-05-13 NOTE — LETTER
5/13/2022         RE: Sheryl Trotter  [address]  [place] [address]        Dear Colleague,    Thank you for referring your patient, Sheryl Trotter, to the [facility]. Please see a copy of my visit note below.      Assessment:     Diagnoses and all orders for this visit:  Spinal stenosis of lumbar region with neurogenic claudication  Myofascial pain  Lumbosacral spondylosis without myelopathy     Sheryl Trotter is a 81 year old y.o. female with past medical history significant for  headache, polyarthralgia, bilateral shoulder pain, polymyalgia, polyneuropathy, spinal stenosis with neurogenic claudication, sleep apnea, GERD, hyperlipidemia, hypertension, degenerative disc disease of the lumbar spine, avascular necrosis, osteoporosis,  trochanteric bursitis, lumbar spondylosis, osteoarthritis, history of left hip dislocation, primary osteoarthritis of multiple joints, chondrocalcinosis of the knee, insomnia, status post left hip replacement who presents today for follow-up regarding low back and bilateral lower extremity pain:    -Patient has seen some improvement in her pain with physical therapy.  Her pain is likely secondary to lumbar spinal stenosis with neurogenic claudication.     Plan:     A shared decision making plan was used. The patient's values and choices were respected. Prior medical records from our last visit on 4/6/2022 were reviewed today. The following represents what was discussed and decided upon by the provider and the patient.        -DIAGNOSTIC TESTS: Images were personally reviewed and interpreted.   --X-ray of the lumbar spine dated 4/1/2022 is personally viewed images interpreted and discussed with patient.  There is a chronic grade 1 anterolisthesis of L4 on L5 and L3 on L4.  There is significant facet arthropathy from L3-S1.  There are no compression fractures.  --EMG nerve conduction study on 8/30/2019 of bilateral lower extremities is  normal.  --MRI of the lumbar spine dated 1/26/2012 is personally viewed images interpreted and discussed with patient.  This was done at [facility].  There is mild central and severe bilateral lateral recess stenosis L4-5 with advanced bilateral facet arthropathy and a 6 mm spondylolisthesis of L4 and L5.  There is moderate up-and-down foraminal stenosis on the right at L4-5.  There are degenerative changes throughout the lumbar spine worse at L5-S1 and L2-3.  -- MRI of lumbar spine dated 4/29/2022 is personally viewed images interpreted and discussed with the patient.  There is severe spinal canal stenosis at L3-4 and L4-5 with severe facet arthropathy at the same level.  There is a grade 1 anterolisthesis at L3 on L4 and L4 and L5.  There is mild bilateral facet arthropathy at L5-S1.    -INTERVENTIONS: No interventions at this time.  Could consider bilateral L4-5 transforaminal epidural steroid injections in the future.    -MEDICATIONS: Recommend she continue with pregabalin.  -  Discussed side effects of medications and proper use. Patient verbalized understanding.    -PHYSICAL THERAPY: Recommend she continue with physical therapy.  Discussed the importance of core strengthening, ROM, stretching exercises with the patient and how each of these entities is important in decreasing pain.  Explained to the patient that the purpose of physical therapy is to teach the patient a home exercise program.  These exercises need to be performed every day in order to decrease pain and prevent future occurrences of pain.        -PATIENT EDUCATION: We discussed pain management in a multimodal fashion including physical therapy, medication management, possible future injections.    -FOLLOW UP: Patient will follow up in 4 weeks.  Advised to contact clinic if symptoms worsen or change.    Subjective:     Sheryl Trotter is a 81 year old female who presents today for follow-up regarding bilateral lower extremity pain.  Patient notes that  she has pain with walking or standing for more than 10 minutes.  If she sits down or lays down pain is improved.  Her pain today is 1/10 is worst is 7/10.  Currently as she has been doing physical therapy her pain has not been getting over 5/10.  She notes that she is very pleased with physical therapy and finds it has been helpful.  She like to continue with this.  She is taking pregabalin 75 mg in the morning and 150 mg at night for her neuropathy.  We discussed that this may be helpful for her legs as well.  She denies any bowel or bladder changes, fevers, chills, unintentional weight loss.    -Treatment to Date: X-ray of lumbar spine dated 4/1/2022.  EMG nerve conduction study of bilateral lower extremities on 8/30/2019.  MRI lumbar spine dated 1/26/2012.  Left hip injection done on 1/5/2018.  Left hip injection done on 1/18/2018.  Epidural steroid injections done in 2012.  Physical therapy in the past.  MRI of the lumbar spine dated 4/29/2022.    Patient Active Problem List   Diagnosis     Osteoporosis     Hyperlipidemia     Essential hypertension     Primary insomnia     Trochanteric Bursitis     Lumbar Spondylosis     Lumbar Disc Degeneration     Headache     Osteoarthritis of the Knee     Polyarthralgia     Status post left hip replacement     Hip dislocation, left (H)     Bilateral shoulder pain     Polymyalgia (H)     Primary osteoarthritis involving multiple joints     Chondrocalcinosis     KASANDRA (obstructive sleep apnea)     Other polyneuropathy     Spinal stenosis of lumbar region with neurogenic claudication       Current Outpatient Medications   Medication     pregabalin (LYRICA) 75 MG capsule     calcium carbonate-vitamin D3 600 mg (1,500 mg)-800 unit Chew     denosumab 60 mg/mL Syrg     fluocinonide (LIDEX) 0.05 % external ointment     hydrochlorothiazide (HYDRODIURIL) 50 MG tablet     lisinopril (ZESTRIL) 20 MG tablet     LORazepam (ATIVAN) 1 MG tablet     METHYLCELLULOSE (CITRUCEL ORAL)      nortriptyline (PAMELOR) 10 MG capsule     Vitamin D3 (CHOLECALCIFEROL) 25 mcg (1000 units) tablet     No current facility-administered medications for this visit.       Allergies   Allergen Reactions     Duloxetine Headache     Nausea, difficult sleeping ankles cramps, loose bowel      Gabapentin Anxiety       Past Medical History:   Diagnosis Date     Arthritis      AVN (avascular necrosis of bone) (H)     let hip     DDD (degenerative disc disease), lumbar      GERD (gastroesophageal reflux disease)      HLD (hyperlipidemia)      Hypertension      Insomnia      Osteoporosis      PONV (postoperative nausea and vomiting)      Sleep apnea     cpap        Review of Systems  ROS:  Specifically negative for bowel/bladder dysfunction, balance changes, headache, dizziness, foot drop, fevers, chills, appetite changes, nausea/vomiting, unexplained weight loss. Otherwise 13 systems reviewed are negative. Please see the patient's intake questionnaire from today for details.    Reviewed Social, Family, Past Medical and Past Surgical history with patient, no significant changes noted since prior visit.     Objective:     BP (!) 157/70 (BP Location: Right arm, Patient Position: Sitting)   Pulse 83   SpO2 100%     PHYSICAL EXAMINATION:    --CONSTITUTIONAL: Well developed, well nourished, healthy appearing individual.  --PSYCHIATRIC: Appropriate mood and affect. No difficulty interacting due to temper, social withdrawal, or memory issues.   --RESPIRATORY: Normal rhythm and effort. No abnormal accessory muscle breathing patterns noted.   --MUSCULOSKELETAL:  Normal lumbar lordosis noted, no lateral shift.  --GROSS MOTOR: Easily arises from a seated position. Gait is non-antalgic  --LUMBAR SPINE:  Inspection reveals no evidence of deformity. Range of motion is mildly limited in lumbar flexion, extension, lateral rotation. No tenderness to palpation lumbar spine. Straight leg raising in the seated position is negative to radicular  pain.   --SACROILIAC JOINT:  One Finger point test negative.  --LOWER EXTREMITY MOTOR TESTING:  Plantar flexion left 5/5, right 5/5   Dorsiflexion left 5/5, right 5/5   Great toe MTP extension left 5/5, right 5/5  Knee flexion left 5/5, right 5/5  Knee extension left 5/5, right 5/5   Hip flexion left 5/5, right 5/5  Hip abduction left 5/5, right 5/5  Hip adduction left 5/5, right 5/5   --NEUROLOGIC: Sensation to light touch is intact in the bilateral L4, L5, and S1 dermatomes.    RESULTS:   Imaging: Lumbar spine imaging was reviewed today. The images were shown to the patient and the findings were explained using a spine model.    MR Lumbar Spine w/o Contrast    Result Date: 4/29/2022  EXAM: MR LUMBAR SPINE W/O CONTRAST LOCATION: [facility] DATE/TIME: 4/29/2022 7:53 AM INDICATION: Low back pain, > 6 wks COMPARISON: None. TECHNIQUE: Routine Lumbar Spine MRI without IV contrast. FINDINGS: Nomenclature is based on 5 lumbar type vertebral bodies. Normal vertebral body heights and marrow signal. There is a 2.5 x 2 cm hemangioma in the mid aspect of L3 vertebral body. Grade 1 anterolisthesis of L3 over L4 and L4 over L5. Normal distal spinal cord and cauda equina with conus medullaris at L1. No extraspinal abnormality. Unremarkable visualized bony pelvis. T12-L1: Normal disc height, mild desiccation. No herniation. Normal facets. No spinal canal or neural foraminal stenosis. L1-L2: Normal disc height, mild desiccation. No herniation. Normal facets. No spinal canal or neural foraminal stenosis. L2-L3: Slightly decreased height. Mild disc bulge. Mild bilateral facet hypertrophy. Mild central spinal canal stenosis. Mild bilateral foraminal stenosis. L3-L4: Normal disc height, degenerative signal. Diffuse disc bulge. Severe bilateral facet hypertrophy and ligamentum flavum hypertrophy. Severe central spinal stenosis. Mild bilateral neural foraminal stenosis. L4-L5: Normal disc height, degenerative  signal. Diffuse disc bulge. Severe bilateral facet hypertrophy and ligamentum flavum hypertrophy. Severe central spinal stenosis. Mild bilateral neural foraminal stenosis. L5-S1: Normal disc height, mild desiccation. No herniation. Mild bilateral facet hypertrophy. No spinal canal or neural foraminal stenosis.     IMPRESSION: 1.  Severe spinal stenosis at L3-L4 and L4-L5 secondary to severe facet hypertrophy and disc bulge. There is grade 1 anterolisthesis at both levels.                              Again, thank you for allowing me to participate in the care of your patient.        Sincerely,        Pollo Hutchison, DO

## 2022-05-18 ENCOUNTER — TELEPHONE (OUTPATIENT)
Dept: PHYSICAL MEDICINE AND REHAB | Facility: CLINIC | Age: 82
End: 2022-05-18
Payer: MEDICARE

## 2022-05-18 DIAGNOSIS — M48.062 SPINAL STENOSIS OF LUMBAR REGION WITH NEUROGENIC CLAUDICATION: Primary | ICD-10-CM

## 2022-05-18 NOTE — TELEPHONE ENCOUNTER
PSP:  Dr. Hutchison   Last clinic visit: 5/13/22   Reason for call: wanting to schedule the injections she was offered during last week's appointment  Clinical information: Chart reviewed. Injection order placed for bilateral L4-L5 TFESIs. Injection requirements reviewed in full with patient. Discussed steroids have immunosuppressant properties which could potentially put patient at a higher risk for a worsened course of any infection including COVID. Stated understanding. Transferred to scheduling to make appointment.

## 2022-05-24 ENCOUNTER — RADIOLOGY INJECTION OFFICE VISIT (OUTPATIENT)
Dept: PHYSICAL MEDICINE AND REHAB | Facility: CLINIC | Age: 82
End: 2022-05-24
Attending: PAIN MEDICINE
Payer: MEDICARE

## 2022-05-24 VITALS
DIASTOLIC BLOOD PRESSURE: 74 MMHG | OXYGEN SATURATION: 98 % | HEART RATE: 72 BPM | TEMPERATURE: 98 F | RESPIRATION RATE: 16 BRPM | SYSTOLIC BLOOD PRESSURE: 120 MMHG

## 2022-05-24 DIAGNOSIS — M48.062 SPINAL STENOSIS OF LUMBAR REGION WITH NEUROGENIC CLAUDICATION: ICD-10-CM

## 2022-05-24 PROCEDURE — 64483 NJX AA&/STRD TFRM EPI L/S 1: CPT | Mod: 50 | Performed by: PAIN MEDICINE

## 2022-05-24 RX ORDER — DEXAMETHASONE SODIUM PHOSPHATE 10 MG/ML
INJECTION, SOLUTION INTRAMUSCULAR; INTRAVENOUS
Status: COMPLETED | OUTPATIENT
Start: 2022-05-24 | End: 2022-05-24

## 2022-05-24 RX ORDER — LIDOCAINE HYDROCHLORIDE 10 MG/ML
INJECTION, SOLUTION EPIDURAL; INFILTRATION; INTRACAUDAL; PERINEURAL
Status: COMPLETED | OUTPATIENT
Start: 2022-05-24 | End: 2022-05-24

## 2022-05-24 RX ADMIN — DEXAMETHASONE SODIUM PHOSPHATE 10 MG: 10 INJECTION, SOLUTION INTRAMUSCULAR; INTRAVENOUS at 10:29

## 2022-05-24 RX ADMIN — LIDOCAINE HYDROCHLORIDE 4 ML: 10 INJECTION, SOLUTION EPIDURAL; INFILTRATION; INTRACAUDAL; PERINEURAL at 10:28

## 2022-05-24 ASSESSMENT — PAIN SCALES - GENERAL
PAINLEVEL: NO PAIN (1)
PAINLEVEL: MODERATE PAIN (5)

## 2022-05-24 NOTE — PATIENT INSTRUCTIONS
Follow-up visit with Dr. Hutchison in 2 weeks to discuss injection outcome and determine care plan going forward.       DISCHARGE INSTRUCTIONS    During office hours (8:00 a.m.- 4:00 p.m.) questions or concerns may be answered  by calling [facility] Navigation Nurses at  [phone].  Messages received after hours will be returned the following business day.      In the case of an emergency, please dial 911 or seek assistance at the nearest Emergency Room/Urgent Care facility.     All Patients:    You may experience an increase in your symptoms for the first 2 days (It may take anywhere between 2 days- 2 weeks for the steroid to have maximum effect).    You may use ice on the injection site, as frequently as 20 minutes each hour if needed.    You may take your pain medicine.    You may continue taking your regular medication after your injection. If you have had a Medial Branch Block you may resume pain medication once your pain diary is completed.    You may shower. No swimming, tub bath or hot tub for 48 hours.  You may remove your bandaid/bandage as soon as you are home.    You may resume light activities, as tolerated.    Resume your usual diet as tolerated.    It is strongly advised that you do not drive for 1-3 hours post injection.    If you have had oral sedation:  Do not drive for 8 hours post injection.      If you have had IV sedation:  Do not drive for 24 hours post injection.  Do not operate hazardous machinery or make important personal/business decisions for 24 hours.      POSSIBLE STEROID SIDE EFFECTS (If steroid/cortisone was used for your procedure)    -If you experience these symptoms, it should only last for a short period    Swelling of the legs              Skin redness (flushing)     Mouth (oral) irritation   Blood sugar (glucose) levels            Sweats                    Mood changes  Headache  Sleeplessness  Weakened immune system for up to 14 days, which could increase the risk of  manuel the COVID-19 virus and/or experiencing more severe symptoms of the disease, if exposed.  Decreased effectiveness of the flu vaccine if given within 2 weeks of the steroid.         POSSIBLE PROCEDURE SIDE EFFECTS  -Call the Spine Center if you are concerned  Increased Pain           Increased numbness/tingling      Nausea/Vomiting          Bruising/bleeding at site      Redness or swelling                                              Difficulty walking      Weakness           Fever greater than 100.5    *In the event of a severe headache after an epidural steroid injection that is relieved by lying down, please call the [facility] to speak with a clinical staff member*

## 2022-05-26 ENCOUNTER — TRANSFERRED RECORDS (OUTPATIENT)
Dept: HEALTH INFORMATION MANAGEMENT | Facility: CLINIC | Age: 82
End: 2022-05-26
Payer: MEDICARE

## 2022-05-27 ENCOUNTER — MYC MEDICAL ADVICE (OUTPATIENT)
Dept: PHYSICAL MEDICINE AND REHAB | Facility: CLINIC | Age: 82
End: 2022-05-27
Payer: MEDICARE

## 2022-05-31 DIAGNOSIS — M25.562 CHRONIC PAIN OF LEFT KNEE: Primary | ICD-10-CM

## 2022-05-31 DIAGNOSIS — G89.29 CHRONIC PAIN OF LEFT KNEE: Primary | ICD-10-CM

## 2022-05-31 NOTE — TELEPHONE ENCOUNTER
Call received from pt. She inquires if her left knee could be evaluated at her follow-up appointment on 6/10 as well. She reports her back is doing well after her Bilateral L4-5 TFESIs on 5/24/2021.     Will add left knee to appointment note and will send message to Dr. Hutchison as an FYI.

## 2022-06-09 ENCOUNTER — TELEPHONE (OUTPATIENT)
Dept: NEUROLOGY | Facility: CLINIC | Age: 82
End: 2022-06-09
Payer: MEDICARE

## 2022-06-09 DIAGNOSIS — G62.89 OTHER POLYNEUROPATHY: ICD-10-CM

## 2022-06-09 NOTE — TELEPHONE ENCOUNTER
Rx Authorization:    Requested Medication/ Dose: Lyrica 75MG caps    Date last refill ordered: 4/11/22    Quantity ordered: 150 caps    # refills:     Date of last clinic visit with ordering provider: 4/11/22    Date of next clinic visit with ordering provider: F/U 2 months    All pertinent protocol data (lab date/result):     Include pertinent information from patients message:

## 2022-06-09 NOTE — TELEPHONE ENCOUNTER
[facility] Call Center    Phone Message    May a detailed message be left on voicemail: yes     Reason for Call: Medication Refill Request    Has the patient contacted the pharmacy for the refill? Yes   Name of medication being requested: pregabalin (LYRICA) 75 MG capsule  Provider who prescribed the medication: Clemencia  Pharmacy: [id number] - [place], [place] - [address]  Date medication is needed:ASAP      Action Taken: Message routed to:  [facility] ([facility] neurology    Travel Screening: Not Applicable

## 2022-06-10 ENCOUNTER — OFFICE VISIT (OUTPATIENT)
Dept: PHYSICAL MEDICINE AND REHAB | Facility: CLINIC | Age: 82
End: 2022-06-10

## 2022-06-10 VITALS
SYSTOLIC BLOOD PRESSURE: 163 MMHG | HEIGHT: 67 IN | WEIGHT: 179 LBS | HEART RATE: 79 BPM | DIASTOLIC BLOOD PRESSURE: 74 MMHG | BODY MASS INDEX: 28.09 KG/M2

## 2022-06-10 DIAGNOSIS — M47.817 LUMBOSACRAL SPONDYLOSIS WITHOUT MYELOPATHY: ICD-10-CM

## 2022-06-10 DIAGNOSIS — M48.062 SPINAL STENOSIS OF LUMBAR REGION WITH NEUROGENIC CLAUDICATION: ICD-10-CM

## 2022-06-10 DIAGNOSIS — M79.18 MYOFASCIAL PAIN: ICD-10-CM

## 2022-06-10 DIAGNOSIS — M25.562 CHRONIC PAIN OF LEFT KNEE: Primary | ICD-10-CM

## 2022-06-10 DIAGNOSIS — G89.29 CHRONIC PAIN OF LEFT KNEE: Primary | ICD-10-CM

## 2022-06-10 PROCEDURE — 99214 OFFICE O/P EST MOD 30 MIN: CPT | Performed by: PAIN MEDICINE

## 2022-06-10 RX ORDER — PREGABALIN 25 MG/1
25 CAPSULE ORAL EVERY MORNING
COMMUNITY
End: 2022-06-27

## 2022-06-10 ASSESSMENT — PAIN SCALES - GENERAL: PAINLEVEL: MILD PAIN (2)

## 2022-06-10 NOTE — PROGRESS NOTES
Assessment:     Diagnoses and all orders for this visit:  Chronic pain of left knee  -     PAIN US Large Joint Injection Unilateral; Standing  Spinal stenosis of lumbar region with neurogenic claudication  Myofascial pain  Lumbosacral spondylosis without myelopathy     Sheryl Trotter is a 81 year old y.o. female with past medical history significant for headache, polyarthralgia, bilateral shoulder pain, polymyalgia, polyneuropathy, spinal stenosis with neurogenic claudication, sleep apnea, GERD, hyperlipidemia, hypertension, degenerative disc disease of the lumbar spine, avascular necrosis, osteoporosis,  trochanteric bursitis, lumbar spondylosis, osteoarthritis, history of left hip dislocation, primary osteoarthritis of multiple joints, chondrocalcinosis of the knee, insomnia, status post left hip replacement who presents today for follow-up regarding low back and bilateral lower extremity pain:    -Patient received 75% relief with epidural steroid injections.  She continues to have severe left knee pain.  This likely secondary to osteoarthritis.     Plan:     A shared decision making plan was used. The patient's values and choices were respected. Prior medical records from our last visit on 5/13/2022 were reviewed today. The following represents what was discussed and decided upon by the provider and the patient.        -DIAGNOSTIC TESTS: Images were personally reviewed and interpreted.   --X-ray of the lumbar spine dated 4/1/2022 is personally viewed images interpreted and discussed with patient.  There is a chronic grade 1 anterolisthesis of L4 on L5 and L3 on L4.  There is significant facet arthropathy from L3-S1.  There are no compression fractures.  --EMG nerve conduction study on 8/30/2019 of bilateral lower extremities is normal.  --MRI of the lumbar spine dated 1/26/2012 is personally viewed images interpreted and discussed with patient.  This was done at [facility].  There is mild central and severe  bilateral lateral recess stenosis L4-5 with advanced bilateral facet arthropathy and a 6 mm spondylolisthesis of L4 and L5.  There is moderate up-and-down foraminal stenosis on the right at L4-5.  There are degenerative changes throughout the lumbar spine worse at L5-S1 and L2-3.  -- MRI of lumbar spine dated 4/29/2022 is personally viewed images interpreted and discussed with the patient.  There is severe spinal canal stenosis at L3-4 and L4-5 with severe facet arthropathy at the same level.  There is a grade 1 anterolisthesis at L3 on L4 and L4 and L5.  There is mild bilateral facet arthropathy at L5-S1.  --X-rays of bilateral knees dated 2/14/2019 is personally viewed images interpreted and discussed with patient.  There is mild degenerative arthritis.    -INTERVENTIONS: I have ordered a left knee Euflexxa injections series of 3 under ultrasound guidance.    -MEDICATIONS: No changes to medications.  -  Discussed side effects of medications and proper use. Patient verbalized understanding.    -PHYSICAL THERAPY: Recommended she continue with physical therapy for both her back and left knee.  Discussed the importance of core strengthening, ROM, stretching exercises with the patient and how each of these entities is important in decreasing pain.  Explained to the patient that the purpose of physical therapy is to teach the patient a home exercise program.  These exercises need to be performed every day in order to decrease pain and prevent future occurrences of pain.        -PATIENT EDUCATION: We discussed pain management in a multimodal fashion including physical therapy, medication management, possible future injections.    -FOLLOW UP: Patient will follow up 4 weeks after Euflexxa injection series of 3.  Advised to contact clinic if symptoms worsen or change.    Subjective:     Sheryl Trotter is a 81 year old female who presents today for follow-up regarding bilateral lower extremity pain as well as left knee pain.   Patient notes that she is received about 70% relief with her bilateral  L4-5 transforaminal epidural steroid injections.  She has quite severe left knee pain.  She had a steroid injection about 13 days ago into the left knee at [facility].  This helped take care of some of the sharp pain, however she continues to have considerable anterior medial and lateral joint pain.  Her pain is worse with standing and walking and improved with sitting down.  Pain today is 2/10 is worst is 7/10.  She is taking Lyrica and finds that this is somewhat helpful.  She denies any bowel or bladder changes, fevers, chills, unintentional weight loss.    Sees me-Treatment to Date: X-ray of lumbar spine dated 4/1/2022.  EMG nerve conduction study of bilateral lower extremities on 8/30/2019.  MRI lumbar spine dated 1/26/2012.  Left hip injection done on 1/5/2018.  Left hip injection done on 1/18/2018.  Epidural steroid injections done in 2012.  Physical therapy in the past.  MRI of the lumbar spine dated 4/29/2022.  Bilateral L4-5 transforaminal epidural steroid injections done on 5/24/2022.  X-ray of bilateral knees dated 2/14/2019.    Patient Active Problem List   Diagnosis     Osteoporosis     Hyperlipidemia     Essential hypertension     Primary insomnia     Trochanteric Bursitis     Lumbar Spondylosis     Lumbar Disc Degeneration     Headache     Osteoarthritis of the Knee     Polyarthralgia     Status post left hip replacement     Hip dislocation, left (H)     Bilateral shoulder pain     Polymyalgia (H)     Primary osteoarthritis involving multiple joints     Chondrocalcinosis     KASANDRA (obstructive sleep apnea)     Other polyneuropathy     Spinal stenosis of lumbar region with neurogenic claudication       Current Outpatient Medications   Medication     pregabalin (LYRICA) 25 MG capsule     pregabalin (LYRICA) 75 MG capsule     calcium carbonate-vitamin D3 600 mg (1,500 mg)-800 unit Chew     denosumab 60 mg/mL Syrg     fluocinonide  "(LIDEX) 0.05 % external ointment     hydrochlorothiazide (HYDRODIURIL) 50 MG tablet     lisinopril (ZESTRIL) 20 MG tablet     METHYLCELLULOSE (CITRUCEL ORAL)     Vitamin D3 (CHOLECALCIFEROL) 25 mcg (1000 units) tablet     No current facility-administered medications for this visit.       Allergies   Allergen Reactions     Duloxetine Headache     Nausea, difficult sleeping ankles cramps, loose bowel      Gabapentin Anxiety       Past Medical History:   Diagnosis Date     Arthritis      AVN (avascular necrosis of bone) (H)     let hip     DDD (degenerative disc disease), lumbar      GERD (gastroesophageal reflux disease)      HLD (hyperlipidemia)      Hypertension      Insomnia      Osteoporosis      PONV (postoperative nausea and vomiting)      Sleep apnea     cpap        Review of Systems  ROS:  Specifically negative for bowel/bladder dysfunction, balance changes, headache, dizziness, foot drop, fevers, chills, appetite changes, nausea/vomiting, unexplained weight loss. Otherwise 13 systems reviewed are negative. Please see the patient's intake questionnaire from today for details.    Reviewed Social, Family, Past Medical and Past Surgical history with patient, no significant changes noted since prior visit.     Objective:     BP (!) 163/74 (BP Location: Left arm, Patient Position: Sitting, Cuff Size: Adult Regular)   Pulse 79   Ht 5' 6.5\" (1.689 m)   Wt 179 lb (81.2 kg)   BMI 28.46 kg/m      PHYSICAL EXAMINATION:    --CONSTITUTIONAL: Well developed, well nourished, healthy appearing individual.  --PSYCHIATRIC: Appropriate mood and affect. No difficulty interacting due to temper, social withdrawal, or memory issues.  --RESPIRATORY: Normal rhythm and effort. No abnormal accessory muscle breathing patterns noted.   --MUSCULOSKELETAL:  Normal lumbar lordosis noted, no lateral shift.  --GROSS MOTOR: Easily arises from a seated position. Gait is non-antalgic  --LUMBAR SPINE:  Inspection reveals no evidence of " deformity. Range of motion is mildly limited in lumbar flexion, extension, lateral rotation.  Tenderness palpation over the low back. Straight leg raising in the seated position is negative to radicular pain.   --SACROILIAC JOINT:  One Finger point test negative.  --LOWER EXTREMITY MOTOR TESTING:  Plantar flexion left 5/5, right 5/5   Dorsiflexion left 5/5, right 5/5   Great toe MTP extension left 5/5, right 5/5  Knee flexion left 5/5, right 5/5  Knee extension left 5/5, right 5/5   Hip flexion left 5/5, right 5/5  Hip abduction left 5/5, right 5/5  Hip adduction left 5/5, right 5/5   -- Knees: Full range of motion in flexion extension.  Tenderness palpation of the anterior medial joint line of the left knee  --NEUROLOGIC:  Sensation to light touch is intact in the bilateral L4, L5, and S1 dermatomes.    RESULTS:   Imaging: Lumbar spine imaging was reviewed today.     PAIN Transforaminal ESTEBAN Inj Lumbosacral Bilateral    Result Date: 5/24/2022  LUMBAR EPIDURAL STEROID INJECTION TRANSFORAMINAL APPROACH WITH FLUOROSCOPIC GUIDANCE Performed on: 5/24/22 Pre Procedure Diagnosis:  LBP, Lumbar radiculitis Post Procedure Diagnosis:  Same Procedure Performed:  Lumbar Transforaminal Epidural Steroid Injection with Fluoroscopic Guidance Clinical Scenario:  As per office notes Anesthesia/Fluids:  As per intra-procedure documentation Vital Signs:  As per intra-procedure documentation Level Injected: L4-5 Side Injected: Bilateral CC: Sheryl Trotter  is a 81 year-old female who presents today for a bilateral L4-5 transforminal epidural steroid injections as ordered by Dr. Hutchison.  The patient complains of low back and lower extremity pain.  The patient's MRI is reviewed and shows L4-5 foraminal narrowing.  The patient would like to proceed with the injection today. The patient denies any symptoms of an active infection and denies taking antibiotics. The patient denies taking any prescription blood thinning medications. The  "patient denies any allergies to iodine or iodine contrast.  The patient has had other conservative treatment but wishes to pursue spine injections.  The procedure of lumbar transforaminal epidural steroid injection was discussed in detail, using a spine model to demonstrate.  The patient had the opportunity to directly ask the physician questions regarding the procedure and the questions were answered prior to the consent form being presented.  The risks of the procedure, including but not limited to:  Back pain/soreness, infection, bleeding, permanent, nerve damage/injury, epidural hematoma (with the need for surgical evaluation), paralysis, allergic reaction,  worsening of back/leg pain or no change in pain. The patient elected to proceed and signed informed consent.   The patient was placed in a prone position on the fluoroscopy table.  A procedure pause was performed to verify the patient's identity, site, and side of the procedure.    The lower back was prepped and draped in usual fashion.  After anesthetizing the skin with 1% lidocaine, a 5\", 22 gauge needle was introduced at the rightL4 pedicle under fluoroscopic guidance.  After aspiration was negative for blood or CSF, 1 mL of Omnipaque-300 was injected.  An epidural flow pattern without vascular uptake was seen.   Subsequently, 1 mL of 1% lidocaine was injected on the right side.  After waiting 2 minutes the patient sensation and muscle strength was tested and found to have no substantial change in strength or sensation.  Then 1 mL 10 mg of dexamethasone was slowly injected on the right side.  The exact same procedure was repeated on the left side with with a test dose of 1 mL of 1% lidocaine, followed by a 1 mL 10 mg of dexamethasone was injected epidural flow pattern was seen. PRE-PROCEDURE PAIN SCORE: 5/10 POST-PROCEDURE PAIN SCORE:  1/10  The patient tolerated the procedure well.  After a short period of observation the patient was discharged under " their own power.   The patient was instructed to call the [facility] if any questions/concerns arise after the procedure.  Patient will follow up with Dr. Hutchison in 2 to 4 weeks.

## 2022-06-11 ENCOUNTER — TELEPHONE (OUTPATIENT)
Dept: NEUROLOGY | Facility: CLINIC | Age: 82
End: 2022-06-11
Payer: MEDICARE

## 2022-06-11 RX ORDER — PREGABALIN 75 MG/1
CAPSULE ORAL
Qty: 150 CAPSULE | Refills: 0 | Status: SHIPPED | OUTPATIENT
Start: 2022-06-11 | End: 2022-06-13

## 2022-06-11 NOTE — TELEPHONE ENCOUNTER
Patient called re: Prescription refill request, from yesterday, late afternoon.  Lyrica, prescribed by Dr. Khanna for neuropathy.  Original sig was a dose escalation to 2 caps (150 mg) bid;  As of last [facility] message with Dr. ABDI, she was taking 75 mg in the AM, and 150 mg in the PM.  She is now at 150 mg bid.  I confirmed her preferred pharmacy.  Pending refill request approved and sent electronically to the specified pharmacy.  
Unknown if ever smoked

## 2022-06-13 DIAGNOSIS — G62.89 OTHER POLYNEUROPATHY: ICD-10-CM

## 2022-06-13 RX ORDER — PREGABALIN 75 MG/1
CAPSULE ORAL
Qty: 270 CAPSULE | Refills: 3 | Status: SHIPPED | OUTPATIENT
Start: 2022-06-13 | End: 2022-08-12

## 2022-06-21 ENCOUNTER — TELEPHONE (OUTPATIENT)
Dept: PHYSICAL MEDICINE AND REHAB | Facility: CLINIC | Age: 82
End: 2022-06-21

## 2022-06-21 NOTE — TELEPHONE ENCOUNTER
I would recommend she continue with Tylenol and she can get a neoprene knee brace from the pharmacy or [employer].  If her ankle is swollen and this is a new issue, she should see her primary care provider or go to urgent care to ensure that she does not have a DVT/blood clot.  If the swelling is a chronic issue for her, I would recommend getting some compression stockings and using a knee brace until she can have the injection with Dr. Hutchison.

## 2022-06-21 NOTE — TELEPHONE ENCOUNTER
"PSP:  Dr. Hutchison  Last clinic visit:  6/10/2022  Reason for call: Left knee pain  Clinical information:  Call received from pt. Pt is scheduled for left knee Euflexxa injections with Dr. Hutchison on 7/1, however, she inquires if there is anything else she can do to help her knee pain at this time.   Pt endorses a \"sharp\" pain on the inside of her left knee. Her ankle/foot are swollen.   She has tried elevating her leg, ice (which she thinks might help the most), Voltaren gel, Tylenol 650 mg PRN and CBD oil. Pt states nighttime is the worst time for her.   Advice given to patient: Pt aware that [facility] is out of clinic this week so will be covering provider addressing message. Pharmacy is [facility] - [place] should any medication be prescribed. Call mobile # back first and ok to leave detailed message.   Provider to address: Please advise        "

## 2022-06-27 ENCOUNTER — OFFICE VISIT (OUTPATIENT)
Dept: NEUROLOGY | Facility: CLINIC | Age: 82
End: 2022-06-27
Payer: MEDICARE

## 2022-06-27 VITALS — DIASTOLIC BLOOD PRESSURE: 93 MMHG | SYSTOLIC BLOOD PRESSURE: 148 MMHG

## 2022-06-27 DIAGNOSIS — G62.89 OTHER POLYNEUROPATHY: Primary | ICD-10-CM

## 2022-06-27 PROCEDURE — 99214 OFFICE O/P EST MOD 30 MIN: CPT | Performed by: PSYCHIATRY & NEUROLOGY

## 2022-06-27 RX ORDER — PREGABALIN 150 MG/1
150 CAPSULE ORAL AT BEDTIME
COMMUNITY
Start: 2022-06-11 | End: 2022-10-05

## 2022-06-27 RX ORDER — PREGABALIN 75 MG/1
CAPSULE ORAL
Qty: 180 CAPSULE | Refills: 3 | Status: SHIPPED | OUTPATIENT
Start: 2022-06-27 | End: 2022-10-03

## 2022-06-27 RX ORDER — SENNOSIDES 8.6 MG
650 CAPSULE ORAL 2 TIMES DAILY PRN
Status: ON HOLD | COMMUNITY
End: 2023-06-21

## 2022-06-27 NOTE — LETTER
6/27/2022         RE: Sheryl Trotter  [id number]  [place] [address]        Dear Colleague,    Thank you for referring your patient, Sheryl Trotter, to the [facility]. Please see a copy of my visit note below.    [facility] Neurology Follow Up Visit    Sheryl Trotter MRN# [id number]   Age: 82 year old YOB: 1940     Brief history of symptoms: The patient was initially seen in neurologic consultation on 10/11/2021 and most recently 4/11/2022 for evaluation of neuropathy. Please see the comprehensive neurologic consultation notes from those dates in the Epic records for details.     The patient had constipation with prior trial of nortriptyline for her neuropathic pain.  She was to try lyrica for both paresthesias and restless leg syndrome.    Interval history: The patient is taking 75 mg of Lyrica in the AM, and 150 mg in the PM.  The extreme sensations of paresthesias/pain are much improved, but she still has a coldness/sparkling to her feet that is constant through the day.  She feels she is sleeping well at night, but isn't quite rested.  She has an ongoing lack of energy and a foggy feeling.  She feels that she gets foggy towards 3 pm and indicates being tired.  To further complicate issues, she is getting treatments for both b/l knee pain (injections) and lumbar radiculopathy (ESTEBAN).  She reports using CPAP in the past, but didn't tolerate it in the long term.       Physical Exam:   Vitals: BP (!) 148/93 (BP Location: Right arm, Patient Position: Sitting)    General: Seated comfortably in no acute distress.  HEENT: Neck supple with normal range of motion.   Neurologic:     Mental Status: Fully alert, attentive and oriented. Speech clear and fluent, no paraphasic errors.     Cranial Nerves: EOMI with normal smooth pursuit. Facial movements symmetric. Hearing not formally tested but intact to conversation.  No dysarthria.     Motor: No tremors or other  abnormal movements observed.     DTR: 2+ at patellar b/l, 1+ at achilles b/l.     Sensory: Negative Romberg. Reduced vibration sensation at toes and ankles b/l (4-5 seconds respectively at MMD, absent at toes).         Assessment and Plan:   Assessment:  Distal symmetric polyneuropathy, sensory  RLS    The patient is having adequate response to lyrica but before considering lowering the dose due to possible side-effects I would want her to be seen with sleep medicine.  Her symptoms of not feeling rested, and maximum tiredness at 3pm don't necessarily line up well with what is usually the fatigue and cognitive issues se\en with lyrica by a timing perspective.  Given she has a history of KASANDRA and isn't using CPAP, I think treating this condition if possible is the next best step.  If this is not helpful, then reducing the medication and trying duloxetine may be an option for paresthesias.     Plan:  Lyrica 75/150  Sleep referral    Follow up in Neurology clinic in 3 months (virtually) or earlier as needed should new concerns arise.    NISA Khanna D.O.   of Neurology    Total time today (32 min) in this patient encounter was spent on pre-charting, counseling and/or coordination of care.         Again, thank you for allowing me to participate in the care of your patient.        Sincerely,        Cholo Khanna, DO

## 2022-06-27 NOTE — PROGRESS NOTES
[facility] Neurology Follow Up Visit    Sheryl Trotter MRN# [id number]   Age: 82 year old YOB: 1940     Brief history of symptoms: The patient was initially seen in neurologic consultation on 10/11/2021 and most recently 4/11/2022 for evaluation of neuropathy. Please see the comprehensive neurologic consultation notes from those dates in the Epic records for details.     The patient had constipation with prior trial of nortriptyline for her neuropathic pain.  She was to try lyrica for both paresthesias and restless leg syndrome.    Interval history: The patient is taking 75 mg of Lyrica in the AM, and 150 mg in the PM.  The extreme sensations of paresthesias/pain are much improved, but she still has a coldness/sparkling to her feet that is constant through the day.  She feels she is sleeping well at night, but isn't quite rested.  She has an ongoing lack of energy and a foggy feeling.  She feels that she gets foggy towards 3 pm and indicates being tired.  To further complicate issues, she is getting treatments for both b/l knee pain (injections) and lumbar radiculopathy (ESTEBAN).  She reports using CPAP in the past, but didn't tolerate it in the long term.       Physical Exam:   Vitals: BP (!) 148/93 (BP Location: Right arm, Patient Position: Sitting)    General: Seated comfortably in no acute distress.  HEENT: Neck supple with normal range of motion.   Neurologic:     Mental Status: Fully alert, attentive and oriented. Speech clear and fluent, no paraphasic errors.     Cranial Nerves: EOMI with normal smooth pursuit. Facial movements symmetric. Hearing not formally tested but intact to conversation.  No dysarthria.     Motor: No tremors or other abnormal movements observed.     DTR: 2+ at patellar b/l, 1+ at achilles b/l.     Sensory: Negative Romberg. Reduced vibration sensation at toes and ankles b/l (4-5 seconds respectively at MMD, absent at toes).         Assessment and Plan:   Assessment:  Distal  symmetric polyneuropathy, sensory  RLS    The patient is having adequate response to lyrica but before considering lowering the dose due to possible side-effects I would want her to be seen with sleep medicine.  Her symptoms of not feeling rested, and maximum tiredness at 3pm don't necessarily line up well with what is usually the fatigue and cognitive issues se\en with lyrica by a timing perspective.  Given she has a history of KASANDRA and isn't using CPAP, I think treating this condition if possible is the next best step.  If this is not helpful, then reducing the medication and trying duloxetine may be an option for paresthesias.     Plan:  Lyrica 75/150  Sleep referral    Follow up in Neurology clinic in 3 months (virtually) or earlier as needed should new concerns arise.    NISA Khanna D.O.   of Neurology    Total time today (32 min) in this patient encounter was spent on pre-charting, counseling and/or coordination of care.

## 2022-06-27 NOTE — NURSING NOTE
Chief Complaint   Patient presents with     Follow Up       CHER Gonzalez on 6/27/2022 at 7:03 AM

## 2022-07-01 ENCOUNTER — RADIOLOGY INJECTION OFFICE VISIT (OUTPATIENT)
Dept: PHYSICAL MEDICINE AND REHAB | Facility: CLINIC | Age: 82
End: 2022-07-01
Attending: PAIN MEDICINE
Payer: MEDICARE

## 2022-07-01 VITALS
SYSTOLIC BLOOD PRESSURE: 140 MMHG | DIASTOLIC BLOOD PRESSURE: 67 MMHG | OXYGEN SATURATION: 92 % | TEMPERATURE: 97.9 F | HEART RATE: 76 BPM

## 2022-07-01 DIAGNOSIS — M25.511 ACUTE PAIN OF RIGHT SHOULDER: Primary | ICD-10-CM

## 2022-07-01 DIAGNOSIS — M25.562 CHRONIC PAIN OF LEFT KNEE: ICD-10-CM

## 2022-07-01 DIAGNOSIS — G89.29 CHRONIC PAIN OF LEFT KNEE: ICD-10-CM

## 2022-07-01 PROCEDURE — 20611 DRAIN/INJ JOINT/BURSA W/US: CPT | Mod: LT | Performed by: PAIN MEDICINE

## 2022-07-01 RX ORDER — HYALURONATE SODIUM 10 MG/ML
SYRINGE (ML) INTRAARTICULAR
Status: COMPLETED | OUTPATIENT
Start: 2022-07-01 | End: 2022-07-01

## 2022-07-01 RX ORDER — LIDOCAINE HYDROCHLORIDE 10 MG/ML
INJECTION, SOLUTION EPIDURAL; INFILTRATION; INTRACAUDAL; PERINEURAL
Status: COMPLETED | OUTPATIENT
Start: 2022-07-01 | End: 2022-07-01

## 2022-07-01 RX ADMIN — LIDOCAINE HYDROCHLORIDE 1 ML: 10 INJECTION, SOLUTION EPIDURAL; INFILTRATION; INTRACAUDAL; PERINEURAL at 13:18

## 2022-07-01 RX ADMIN — Medication 20 MG: at 13:19

## 2022-07-01 ASSESSMENT — PAIN SCALES - GENERAL: PAINLEVEL: MILD PAIN (3)

## 2022-07-01 NOTE — PATIENT INSTRUCTIONS
DISCHARGE INSTRUCTIONS    During office hours (8:00 a.m.- 4:00 p.m.) questions or concerns may be answered  by calling [facility] Navigation Nurses at  [phone].  Messages received after hours will be returned the following business day.      In the case of an emergency, please dial 911 or seek assistance at the nearest Emergency Room/Urgent Care facility.     All Patients:    You may experience an increase in your symptoms for the first 2 days (It may take 2-3 weeks for the medication to have maximum effect).    You may use ice on the injection site, as frequently as 20 minutes each hour if needed.    You may take your pain medicine.    You may continue taking your regular medication after your injection.    You may shower. No swimming, tub bath or hot tub for 48 hours.  You may remove your bandaid/bandage as soon as you are home.    You may resume light activities, as tolerated.    Resume your usual diet as tolerated.      POSSIBLE EUFLEXXA SIDE EFFECTS    -If you experience these symptoms, it should only last for a short period    Swelling at injection site  Swelling of the knee joint              Bruising under the skin     Decreased appetite  Headache  Itching  Nausea  Diarrhea  Irritation to the stomach or intestines         POSSIBLE PROCEDURE SIDE EFFECTS  -Call the Spine Center if you are concerned  Increased Pain           Increased numbness/tingling      Nausea/Vomiting          Bruising/bleeding at site      Redness or swelling                                              Difficulty walking      Weakness           Fever greater than 100.5

## 2022-07-05 ENCOUNTER — HOSPITAL ENCOUNTER (OUTPATIENT)
Dept: RADIOLOGY | Facility: HOSPITAL | Age: 82
Discharge: HOME | End: 2022-07-05
Attending: PAIN MEDICINE | Admitting: PAIN MEDICINE
Payer: MEDICARE

## 2022-07-05 DIAGNOSIS — M25.511 ACUTE PAIN OF RIGHT SHOULDER: ICD-10-CM

## 2022-07-05 PROCEDURE — 73030 X-RAY EXAM OF SHOULDER: CPT | Mod: RT

## 2022-07-08 ENCOUNTER — RADIOLOGY INJECTION OFFICE VISIT (OUTPATIENT)
Dept: PHYSICAL MEDICINE AND REHAB | Facility: CLINIC | Age: 82
End: 2022-07-08
Attending: PAIN MEDICINE
Payer: MEDICARE

## 2022-07-08 VITALS
SYSTOLIC BLOOD PRESSURE: 154 MMHG | TEMPERATURE: 98.1 F | DIASTOLIC BLOOD PRESSURE: 70 MMHG | OXYGEN SATURATION: 95 % | HEART RATE: 89 BPM

## 2022-07-08 DIAGNOSIS — M79.89 SWELLING OF LIMB: Primary | ICD-10-CM

## 2022-07-08 DIAGNOSIS — M25.562 CHRONIC PAIN OF LEFT KNEE: ICD-10-CM

## 2022-07-08 DIAGNOSIS — G89.29 CHRONIC PAIN OF LEFT KNEE: ICD-10-CM

## 2022-07-08 PROCEDURE — 20611 DRAIN/INJ JOINT/BURSA W/US: CPT | Mod: LT | Performed by: PAIN MEDICINE

## 2022-07-08 RX ORDER — HYALURONATE SODIUM 10 MG/ML
SYRINGE (ML) INTRAARTICULAR
Status: COMPLETED | OUTPATIENT
Start: 2022-07-08 | End: 2022-07-08

## 2022-07-08 RX ORDER — LIDOCAINE HYDROCHLORIDE 10 MG/ML
INJECTION, SOLUTION EPIDURAL; INFILTRATION; INTRACAUDAL; PERINEURAL
Status: COMPLETED | OUTPATIENT
Start: 2022-07-08 | End: 2022-07-08

## 2022-07-08 RX ADMIN — LIDOCAINE HYDROCHLORIDE 1 ML: 10 INJECTION, SOLUTION EPIDURAL; INFILTRATION; INTRACAUDAL; PERINEURAL at 09:43

## 2022-07-08 RX ADMIN — Medication 20 MG: at 09:44

## 2022-07-08 ASSESSMENT — PAIN SCALES - GENERAL: PAINLEVEL: MILD PAIN (3)

## 2022-07-08 NOTE — PATIENT INSTRUCTIONS
[facility] [phone].  Please call to set up your US of your left leg.  DISCHARGE INSTRUCTIONS    During office hours (8:00 a.m.- 4:00 p.m.) questions or concerns may be answered  by calling [facility] Navigation Nurses at  [phone].  Messages received after hours will be returned the following business day.      In the case of an emergency, please dial 911 or seek assistance at the nearest Emergency Room/Urgent Care facility.     All Patients:    You may experience an increase in your symptoms for the first 2 days (It may take 2-3 weeks for the medication to have maximum effect).    You may use ice on the injection site, as frequently as 20 minutes each hour if needed.    You may take your pain medicine.    You may continue taking your regular medication after your injection.    You may shower. No swimming, tub bath or hot tub for 48 hours.  You may remove your bandaid/bandage as soon as you are home.    You may resume light activities, as tolerated.    Resume your usual diet as tolerated.      POSSIBLE EUFLEXXA SIDE EFFECTS    -If you experience these symptoms, it should only last for a short period    Swelling at injection site  Swelling of the knee joint              Bruising under the skin     Decreased appetite  Headache  Itching  Nausea  Diarrhea  Irritation to the stomach or intestines         POSSIBLE PROCEDURE SIDE EFFECTS  -Call the Spine Center if you are concerned  Increased Pain           Increased numbness/tingling      Nausea/Vomiting          Bruising/bleeding at site      Redness or swelling                                              Difficulty walking      Weakness           Fever greater than 100.5

## 2022-07-11 ENCOUNTER — HOSPITAL ENCOUNTER (OUTPATIENT)
Dept: ULTRASOUND IMAGING | Facility: CLINIC | Age: 82
Discharge: HOME | End: 2022-07-11
Attending: PAIN MEDICINE | Admitting: PAIN MEDICINE
Payer: MEDICARE

## 2022-07-11 DIAGNOSIS — M79.89 SWELLING OF LIMB: ICD-10-CM

## 2022-07-11 PROCEDURE — 93971 EXTREMITY STUDY: CPT | Mod: LT

## 2022-07-14 ENCOUNTER — TRANSFERRED RECORDS (OUTPATIENT)
Dept: HEALTH INFORMATION MANAGEMENT | Facility: CLINIC | Age: 82
End: 2022-07-14

## 2022-07-15 ENCOUNTER — RADIOLOGY INJECTION OFFICE VISIT (OUTPATIENT)
Dept: PHYSICAL MEDICINE AND REHAB | Facility: CLINIC | Age: 82
End: 2022-07-15
Attending: PAIN MEDICINE
Payer: MEDICARE

## 2022-07-15 VITALS
TEMPERATURE: 97.9 F | HEART RATE: 89 BPM | OXYGEN SATURATION: 97 % | DIASTOLIC BLOOD PRESSURE: 70 MMHG | SYSTOLIC BLOOD PRESSURE: 137 MMHG

## 2022-07-15 DIAGNOSIS — G89.29 CHRONIC PAIN OF LEFT KNEE: ICD-10-CM

## 2022-07-15 DIAGNOSIS — M25.562 CHRONIC PAIN OF LEFT KNEE: ICD-10-CM

## 2022-07-15 DIAGNOSIS — M25.511 ACUTE PAIN OF RIGHT SHOULDER: Primary | ICD-10-CM

## 2022-07-15 PROCEDURE — 20611 DRAIN/INJ JOINT/BURSA W/US: CPT | Performed by: PAIN MEDICINE

## 2022-07-15 RX ORDER — LIDOCAINE HYDROCHLORIDE 10 MG/ML
INJECTION, SOLUTION EPIDURAL; INFILTRATION; INTRACAUDAL; PERINEURAL
Status: COMPLETED | OUTPATIENT
Start: 2022-07-15 | End: 2022-07-15

## 2022-07-15 RX ORDER — HYALURONATE SODIUM 10 MG/ML
SYRINGE (ML) INTRAARTICULAR
Status: COMPLETED | OUTPATIENT
Start: 2022-07-15 | End: 2022-07-15

## 2022-07-15 RX ADMIN — Medication 20 MG: at 13:17

## 2022-07-15 RX ADMIN — LIDOCAINE HYDROCHLORIDE 1 ML: 10 INJECTION, SOLUTION EPIDURAL; INFILTRATION; INTRACAUDAL; PERINEURAL at 13:17

## 2022-07-15 ASSESSMENT — PAIN SCALES - GENERAL
PAINLEVEL: MILD PAIN (3)
PAINLEVEL: NO PAIN (0)

## 2022-07-15 NOTE — PATIENT INSTRUCTIONS
DISCHARGE INSTRUCTIONS    During office hours (8:00 a.m.- 4:00 p.m.) questions or concerns may be answered  by calling [facility] Navigation Nurses at  [phone].  Messages received after hours will be returned the following business day.      In the case of an emergency, please dial 911 or seek assistance at the nearest Emergency Room/Urgent Care facility.     All Patients:    You may experience an increase in your symptoms for the first 2 days (It may take 2-3 weeks for the medication to have maximum effect).    You may use ice on the injection site, as frequently as 20 minutes each hour if needed.    You may take your pain medicine.    You may continue taking your regular medication after your injection.    You may shower. No swimming, tub bath or hot tub for 48 hours.  You may remove your bandaid/bandage as soon as you are home.    You may resume light activities, as tolerated.    Resume your usual diet as tolerated.      POSSIBLE EUFLEXXA SIDE EFFECTS    -If you experience these symptoms, it should only last for a short period    Swelling at injection site  Swelling of the knee joint              Bruising under the skin     Decreased appetite  Headache  Itching  Nausea  Diarrhea  Irritation to the stomach or intestines         POSSIBLE PROCEDURE SIDE EFFECTS  -Call the Spine Center if you are concerned  Increased Pain           Increased numbness/tingling      Nausea/Vomiting          Bruising/bleeding at site      Redness or swelling                                              Difficulty walking      Weakness           Fever greater than 100.5

## 2022-07-17 ENCOUNTER — HEALTH MAINTENANCE LETTER (OUTPATIENT)
Age: 82
End: 2022-07-17

## 2022-07-18 ENCOUNTER — MYC MEDICAL ADVICE (OUTPATIENT)
Dept: PHYSICAL MEDICINE AND REHAB | Facility: CLINIC | Age: 82
End: 2022-07-18

## 2022-08-02 ENCOUNTER — TRANSFERRED RECORDS (OUTPATIENT)
Dept: PHYSICAL MEDICINE AND REHAB | Facility: CLINIC | Age: 82
End: 2022-08-02

## 2022-08-12 ENCOUNTER — OFFICE VISIT (OUTPATIENT)
Dept: PHYSICAL MEDICINE AND REHAB | Facility: CLINIC | Age: 82
End: 2022-08-12
Payer: MEDICARE

## 2022-08-12 VITALS
DIASTOLIC BLOOD PRESSURE: 71 MMHG | BODY MASS INDEX: 28.09 KG/M2 | WEIGHT: 179 LBS | HEIGHT: 67 IN | HEART RATE: 74 BPM | SYSTOLIC BLOOD PRESSURE: 150 MMHG

## 2022-08-12 DIAGNOSIS — M48.062 SPINAL STENOSIS OF LUMBAR REGION WITH NEUROGENIC CLAUDICATION: Primary | ICD-10-CM

## 2022-08-12 DIAGNOSIS — M47.817 LUMBOSACRAL SPONDYLOSIS WITHOUT MYELOPATHY: ICD-10-CM

## 2022-08-12 DIAGNOSIS — M79.18 MYOFASCIAL PAIN: ICD-10-CM

## 2022-08-12 DIAGNOSIS — G89.29 CHRONIC PAIN OF LEFT KNEE: ICD-10-CM

## 2022-08-12 DIAGNOSIS — M25.562 CHRONIC PAIN OF LEFT KNEE: ICD-10-CM

## 2022-08-12 PROCEDURE — 99214 OFFICE O/P EST MOD 30 MIN: CPT | Performed by: PAIN MEDICINE

## 2022-08-12 ASSESSMENT — PAIN SCALES - GENERAL: PAINLEVEL: MILD PAIN (2)

## 2022-08-12 NOTE — PATIENT INSTRUCTIONS
I ordered an injection for you.  You will need a .    ~Please call Nurse Navigation line [phone] with any questions or concerns about your treatment plan, if symptoms worsen and you would like to be seen urgently, or if you have problems controlling bladder and bowel function.

## 2022-08-12 NOTE — PROGRESS NOTES
Assessment:     Diagnoses and all orders for this visit:  Spinal stenosis of lumbar region with neurogenic claudication  -     PAIN Interlaminar Epidural Steroid Injection Lumbar/Sacral; Future  Chronic pain of left knee  Myofascial pain  Lumbosacral spondylosis without myelopathy     Sheryl Trotter is a 82 year old y.o. female with past medical history significant for headache, polyarthralgia, bilateral shoulder pain, polymyalgia, polyneuropathy, spinal stenosis with neurogenic claudication, sleep apnea, GERD, hyperlipidemia, hypertension, degenerative disc disease of the lumbar spine, avascular necrosis, osteoporosis,  trochanteric bursitis, lumbar spondylosis, osteoarthritis, history of left hip dislocation, primary osteoarthritis of multiple joints, chondrocalcinosis of the knee, insomnia, status post left hip replacement who presents today for follow-up regarding left knee pain:    -Patient received 50% relief with Euflexxa injections for left knee pain.  Lower extremity pain and back pain is worsening likely secondary to lumbar spinal stenosis with neurogenic claudication.  She did receive 75% relief with bilateral L4-5 transforaminal epidural steroid injections for 8 weeks.     Plan:     A shared decision making plan was used. The patient's values and choices were respected. Prior medical records from our last visit on 6/10/2022 were reviewed today. The following represents what was discussed and decided upon by the provider and the patient.        -DIAGNOSTIC TESTS: Images were personally reviewed and interpreted.   --X-ray of the lumbar spine dated 4/1/2022 is personally viewed images interpreted and discussed with patient.  There is a chronic grade 1 anterolisthesis of L4 on L5 and L3 on L4.  There is significant facet arthropathy from L3-S1.  There are no compression fractures.  --EMG nerve conduction study on 8/30/2019 of bilateral lower extremities is normal.  --MRI of the lumbar spine dated 1/26/2012  is personally viewed images interpreted and discussed with patient.  This was done at [facility].  There is mild central and severe bilateral lateral recess stenosis L4-5 with advanced bilateral facet arthropathy and a 6 mm spondylolisthesis of L4 and L5.  There is moderate up-and-down foraminal stenosis on the right at L4-5.  There are degenerative changes throughout the lumbar spine worse at L5-S1 and L2-3.  -- MRI of lumbar spine dated 4/29/2022 is personally viewed images interpreted and discussed with the patient.  There is severe spinal canal stenosis at L3-4 and L4-5 with severe facet arthropathy at the same level.  There is a grade 1 anterolisthesis at L3 on L4 and L4 and L5.  There is mild bilateral facet arthropathy at L5-S1.  --X-rays of bilateral knees dated 2/14/2019 is personally viewed images interpreted and discussed with patient.  There is mild degenerative arthritis.    -INTERVENTIONS: Recommend L5-S1 interlaminar epidural steroid injection.  Patient will need a .    -MEDICATIONS: No changes to medications.  -  Discussed side effects of medications and proper use. Patient verbalized understanding.    -PHYSICAL THERAPY: Recommend that she continue with physical therapy and home exercises.    Discussed the importance of core strengthening, ROM, stretching exercises with the patient and how each of these entities is important in decreasing pain.  Explained to the patient that the purpose of physical therapy is to teach the patient a home exercise program.  These exercises need to be performed every day in order to decrease pain and prevent future occurrences of pain.        -PATIENT EDUCATION: We discussed pain management in a multimodal fashion including physical therapy, medication management, possible future injections.    -FOLLOW UP: Patient will follow up 2 weeks after injection.  Advised to contact clinic if symptoms worsen or change.    Subjective:     Sheryl Trotter is a 82 year old female  who presents today for follow-up regarding left knee pain.  Patient notes that she received roughly 50% relief with Euflexxa injections.  She notes that the Baker's cyst continues to be very bothersome and painful.  Her back and leg pain is returning.  She noted 75% relief with injections for roughly 8 weeks and then pain returned.  She notes that she has been taking no medications for pain.  She wonders what should be done with her back.  Her back pain and leg pain is worse with standing walking and improved with sitting.    -Treatment to Date: X-ray of lumbar spine dated 4/1/2022.  EMG nerve conduction study of bilateral lower extremities on 8/30/2019.  MRI lumbar spine dated 1/26/2012.  Left hip injection done on 1/5/2018.  Left hip injection done on 1/18/2018.  Epidural steroid injections done in 2012.  Physical therapy in the past.  MRI of the lumbar spine dated 4/29/2022.  Bilateral L4-5 transforaminal epidural steroid injections done on 5/24/2022.  X-ray of bilateral knees dated 2/14/2019.  Series of 3 Euflexxa left knee joint injections done under ultrasound guidance last done on 7/15/2022.    Patient Active Problem List   Diagnosis     Osteoporosis     Hyperlipidemia     Essential hypertension     Primary insomnia     Trochanteric Bursitis     Lumbar Spondylosis     Lumbar Disc Degeneration     Headache     Osteoarthritis of the Knee     Polyarthralgia     Status post left hip replacement     Hip dislocation, left (H)     Bilateral shoulder pain     Polymyalgia (H)     Primary osteoarthritis involving multiple joints     Chondrocalcinosis     KASANDRA (obstructive sleep apnea)     Other polyneuropathy     Spinal stenosis of lumbar region with neurogenic claudication       Current Outpatient Medications   Medication     pregabalin (LYRICA) 75 MG capsule     acetaminophen (TYLENOL) 650 MG CR tablet     calcium carbonate-vitamin D3 600 mg (1,500 mg)-800 unit Chew     denosumab 60 mg/mL Syrg     fluocinonide (LIDEX)  "0.05 % external ointment     hydrochlorothiazide (HYDRODIURIL) 50 MG tablet     lisinopril (ZESTRIL) 20 MG tablet     METHYLCELLULOSE (CITRUCEL ORAL)     pregabalin (LYRICA) 150 MG capsule     Vitamin D3 (CHOLECALCIFEROL) 25 mcg (1000 units) tablet     No current facility-administered medications for this visit.       Allergies   Allergen Reactions     Duloxetine Headache     Nausea, difficult sleeping ankles cramps, loose bowel      Gabapentin Anxiety       Past Medical History:   Diagnosis Date     Arthritis      AVN (avascular necrosis of bone) (H)     let hip     DDD (degenerative disc disease), lumbar      GERD (gastroesophageal reflux disease)      HLD (hyperlipidemia)      Hypertension      Insomnia      Osteoporosis      PONV (postoperative nausea and vomiting)      Sleep apnea     cpap        Review of Systems  ROS:  Specifically negative for bowel/bladder dysfunction, balance changes, headache, dizziness, foot drop, fevers, chills, appetite changes, nausea/vomiting, unexplained weight loss. Otherwise 13 systems reviewed are negative. Please see the patient's intake questionnaire from today for details.    Reviewed Social, Family, Past Medical and Past Surgical history with patient, no significant changes noted since prior visit.     Objective:     BP (!) 150/71 (BP Location: Left arm, Patient Position: Sitting, Cuff Size: Adult Regular)   Pulse 74   Ht 5' 6.5\" (1.689 m)   Wt 179 lb (81.2 kg)   BMI 28.46 kg/m      PHYSICAL EXAMINATION:    --CONSTITUTIONAL: Well developed, well nourished, healthy appearing individual.  --PSYCHIATRIC: Appropriate mood and affect. No difficulty interacting due to temper, social withdrawal, or memory issues.  --RESPIRATORY: Normal rhythm and effort. No abnormal accessory muscle breathing patterns noted.   --MUSCULOSKELETAL:  Normal lumbar lordosis noted, no lateral shift.  --GROSS MOTOR: Easily arises from a seated position. Gait is non-antalgic  --LUMBAR SPINE:  Inspection " reveals no evidence of deformity. Range of motion is mildly limited in lumbar flexion, extension, lateral rotation.  Tenderness palpation across the low back.  --SACROILIAC JOINT:  One Finger point test negative.  --LOWER EXTREMITY MOTOR TESTING:  Plantar flexion left 5/5, right 5/5   Dorsiflexion left 5/5, right 5/5   Great toe MTP extension left 5/5, right 5/5  Knee flexion left 5/5, right 5/5  Knee extension left 5/5, right 5/5   Hip flexion left 5/5, right 5/5  Hip abduction left 5/5, right 5/5  Hip adduction left 5/5, right 5/5   --NEUROLOGIC:  Sensation to light touch is intact in the bilateral L4, L5, and S1 dermatomes.    RESULTS:   Imaging: Lumbar spine imaging was reviewed today. The images were shown to the patient and the findings were explained using a spine model.    PAIN US Large Joint Injection Unilateral    Result Date: 7/15/2022  Procedure: Ultrasound guided left knee injection Euflexxa 3 of 3 Pre Procedure Diagnosis:  Left knee osteoarthritis Post Procedure Diagnosis:  Same Procedure Performed:  Left knee injection with Ultrasound Guidance Clinical Scenario:  As per office notes Vital Signs:  As per rooming/preprocedure documentation Side Injected: Left After discussing the risks, benefits, and alternatives to the procedure, the patient expressed understanding and wished to proceed.  The patient was brought to the procedure suite and placed in the supine position.  A procedural pause was conducted to verify:  correct patient identity, procedure to be performed and as applicable, correct side and site, correct patient position, and availability of implants, special equipment or special requirements.  A simple surgical tray was used.  Prior to the procedure, the left knee was examined with a 12 MHz linear transducer to visualize the left knee and determine the optimal needle path. Following this, the area was prepared with a ChloraPrep scrub, then re-examined using the same transducer, a sterile  ultrasound transducer cover, and sterile ultrasound transducer gel.  Local anesthesia was obtained with 1 cc of 1% lidocaine. Thereafter, using ultrasound guidance, a 2-inch 25-gauge needle was advanced into the left knee joint in the suprapatellar recess of the joint space. After visualization of the tip and negative aspiration for blood, a mixture of Euflexxa was injected into the left knee joint. Following the injection, the needle was withdrawn.  The patient tolerated the procedure well and there were no apparent complications.  After an appropriate amount of observation, the patient was dismissed from the clinic in good condition under their own power. Pre-Pain Score: 3/10 Post-Pain Score: 0/10 The patient will follow up with Dr. Hutchison in 4 weeks.

## 2022-08-12 NOTE — LETTER
8/12/2022         RE: Sheryl Trotter  [address]  [facility] [address]        Dear Colleague,    Thank you for referring your patient, Sheryl Trotter, to the [facility]. Please see a copy of my visit note below.      Assessment:     Diagnoses and all orders for this visit:  Spinal stenosis of lumbar region with neurogenic claudication  -     PAIN Interlaminar Epidural Steroid Injection Lumbar/Sacral; Future  Chronic pain of left knee  Myofascial pain  Lumbosacral spondylosis without myelopathy     Sheryl Trotter is a 82 year old y.o. female with past medical history significant for headache, polyarthralgia, bilateral shoulder pain, polymyalgia, polyneuropathy, spinal stenosis with neurogenic claudication, sleep apnea, GERD, hyperlipidemia, hypertension, degenerative disc disease of the lumbar spine, avascular necrosis, osteoporosis,  trochanteric bursitis, lumbar spondylosis, osteoarthritis, history of left hip dislocation, primary osteoarthritis of multiple joints, chondrocalcinosis of the knee, insomnia, status post left hip replacement who presents today for follow-up regarding left knee pain:    -Patient received 50% relief with Euflexxa injections for left knee pain.  Lower extremity pain and back pain is worsening likely secondary to lumbar spinal stenosis with neurogenic claudication.  She did receive 75% relief with bilateral L4-5 transforaminal epidural steroid injections for 8 weeks.     Plan:     A shared decision making plan was used. The patient's values and choices were respected. Prior medical records from our last visit on 6/10/2022 were reviewed today. The following represents what was discussed and decided upon by the provider and the patient.        -DIAGNOSTIC TESTS: Images were personally reviewed and interpreted.   --X-ray of the lumbar spine dated 4/1/2022 is personally viewed images interpreted and discussed with patient.  There is a chronic  grade 1 anterolisthesis of L4 on L5 and L3 on L4.  There is significant facet arthropathy from L3-S1.  There are no compression fractures.  --EMG nerve conduction study on 8/30/2019 of bilateral lower extremities is normal.  --MRI of the lumbar spine dated 1/26/2012 is personally viewed images interpreted and discussed with patient.  This was done at [facility].  There is mild central and severe bilateral lateral recess stenosis L4-5 with advanced bilateral facet arthropathy and a 6 mm spondylolisthesis of L4 and L5.  There is moderate up-and-down foraminal stenosis on the right at L4-5.  There are degenerative changes throughout the lumbar spine worse at L5-S1 and L2-3.  -- MRI of lumbar spine dated 4/29/2022 is personally viewed images interpreted and discussed with the patient.  There is severe spinal canal stenosis at L3-4 and L4-5 with severe facet arthropathy at the same level.  There is a grade 1 anterolisthesis at L3 on L4 and L4 and L5.  There is mild bilateral facet arthropathy at L5-S1.  --X-rays of bilateral knees dated 2/14/2019 is personally viewed images interpreted and discussed with patient.  There is mild degenerative arthritis.    -INTERVENTIONS: Recommend L5-S1 interlaminar epidural steroid injection.  Patient will need a .    -MEDICATIONS: No changes to medications.  -  Discussed side effects of medications and proper use. Patient verbalized understanding.    -PHYSICAL THERAPY: Recommend that she continue with physical therapy and home exercises.    Discussed the importance of core strengthening, ROM, stretching exercises with the patient and how each of these entities is important in decreasing pain.  Explained to the patient that the purpose of physical therapy is to teach the patient a home exercise program.  These exercises need to be performed every day in order to decrease pain and prevent future occurrences of pain.        -PATIENT EDUCATION: We discussed pain management in a multimodal  fashion including physical therapy, medication management, possible future injections.    -FOLLOW UP: Patient will follow up 2 weeks after injection.  Advised to contact clinic if symptoms worsen or change.    Subjective:     Sheryl Trotter is a 82 year old female who presents today for follow-up regarding left knee pain.  Patient notes that she received roughly 50% relief with Euflexxa injections.  She notes that the Baker's cyst continues to be very bothersome and painful.  Her back and leg pain is returning.  She noted 75% relief with injections for roughly 8 weeks and then pain returned.  She notes that she has been taking no medications for pain.  She wonders what should be done with her back.  Her back pain and leg pain is worse with standing walking and improved with sitting.    -Treatment to Date: X-ray of lumbar spine dated 4/1/2022.  EMG nerve conduction study of bilateral lower extremities on 8/30/2019.  MRI lumbar spine dated 1/26/2012.  Left hip injection done on 1/5/2018.  Left hip injection done on 1/18/2018.  Epidural steroid injections done in 2012.  Physical therapy in the past.  MRI of the lumbar spine dated 4/29/2022.  Bilateral L4-5 transforaminal epidural steroid injections done on 5/24/2022.  X-ray of bilateral knees dated 2/14/2019.  Series of 3 Euflexxa left knee joint injections done under ultrasound guidance last done on 7/15/2022.    Patient Active Problem List   Diagnosis     Osteoporosis     Hyperlipidemia     Essential hypertension     Primary insomnia     Trochanteric Bursitis     Lumbar Spondylosis     Lumbar Disc Degeneration     Headache     Osteoarthritis of the Knee     Polyarthralgia     Status post left hip replacement     Hip dislocation, left (H)     Bilateral shoulder pain     Polymyalgia (H)     Primary osteoarthritis involving multiple joints     Chondrocalcinosis     KASANDRA (obstructive sleep apnea)     Other polyneuropathy     Spinal stenosis of lumbar region with  "neurogenic claudication       Current Outpatient Medications   Medication     pregabalin (LYRICA) 75 MG capsule     acetaminophen (TYLENOL) 650 MG CR tablet     calcium carbonate-vitamin D3 600 mg (1,500 mg)-800 unit Chew     denosumab 60 mg/mL Syrg     fluocinonide (LIDEX) 0.05 % external ointment     hydrochlorothiazide (HYDRODIURIL) 50 MG tablet     lisinopril (ZESTRIL) 20 MG tablet     METHYLCELLULOSE (CITRUCEL ORAL)     pregabalin (LYRICA) 150 MG capsule     Vitamin D3 (CHOLECALCIFEROL) 25 mcg (1000 units) tablet     No current facility-administered medications for this visit.       Allergies   Allergen Reactions     Duloxetine Headache     Nausea, difficult sleeping ankles cramps, loose bowel      Gabapentin Anxiety       Past Medical History:   Diagnosis Date     Arthritis      AVN (avascular necrosis of bone) (H)     let hip     DDD (degenerative disc disease), lumbar      GERD (gastroesophageal reflux disease)      HLD (hyperlipidemia)      Hypertension      Insomnia      Osteoporosis      PONV (postoperative nausea and vomiting)      Sleep apnea     cpap        Review of Systems  ROS:  Specifically negative for bowel/bladder dysfunction, balance changes, headache, dizziness, foot drop, fevers, chills, appetite changes, nausea/vomiting, unexplained weight loss. Otherwise 13 systems reviewed are negative. Please see the patient's intake questionnaire from today for details.    Reviewed Social, Family, Past Medical and Past Surgical history with patient, no significant changes noted since prior visit.     Objective:     BP (!) 150/71 (BP Location: Left arm, Patient Position: Sitting, Cuff Size: Adult Regular)   Pulse 74   Ht 5' 6.5\" (1.689 m)   Wt 179 lb (81.2 kg)   BMI 28.46 kg/m      PHYSICAL EXAMINATION:    --CONSTITUTIONAL: Well developed, well nourished, healthy appearing individual.  --PSYCHIATRIC: Appropriate mood and affect. No difficulty interacting due to temper, social withdrawal, or memory " issues.  --RESPIRATORY: Normal rhythm and effort. No abnormal accessory muscle breathing patterns noted.   --MUSCULOSKELETAL:  Normal lumbar lordosis noted, no lateral shift.  --GROSS MOTOR: Easily arises from a seated position. Gait is non-antalgic  --LUMBAR SPINE:  Inspection reveals no evidence of deformity. Range of motion is mildly limited in lumbar flexion, extension, lateral rotation.  Tenderness palpation across the low back.  --SACROILIAC JOINT:  One Finger point test negative.  --LOWER EXTREMITY MOTOR TESTING:  Plantar flexion left 5/5, right 5/5   Dorsiflexion left 5/5, right 5/5   Great toe MTP extension left 5/5, right 5/5  Knee flexion left 5/5, right 5/5  Knee extension left 5/5, right 5/5   Hip flexion left 5/5, right 5/5  Hip abduction left 5/5, right 5/5  Hip adduction left 5/5, right 5/5   --NEUROLOGIC:  Sensation to light touch is intact in the bilateral L4, L5, and S1 dermatomes.    RESULTS:   Imaging: Lumbar spine imaging was reviewed today. The images were shown to the patient and the findings were explained using a spine model.    PAIN US Large Joint Injection Unilateral    Result Date: 7/15/2022  Procedure: Ultrasound guided left knee injection Euflexxa 3 of 3 Pre Procedure Diagnosis:  Left knee osteoarthritis Post Procedure Diagnosis:  Same Procedure Performed:  Left knee injection with Ultrasound Guidance Clinical Scenario:  As per office notes Vital Signs:  As per rooming/preprocedure documentation Side Injected: Left After discussing the risks, benefits, and alternatives to the procedure, the patient expressed understanding and wished to proceed.  The patient was brought to the procedure suite and placed in the supine position.  A procedural pause was conducted to verify:  correct patient identity, procedure to be performed and as applicable, correct side and site, correct patient position, and availability of implants, special equipment or special requirements.  A simple surgical tray  was used.  Prior to the procedure, the left knee was examined with a 12 MHz linear transducer to visualize the left knee and determine the optimal needle path. Following this, the area was prepared with a ChloraPrep scrub, then re-examined using the same transducer, a sterile ultrasound transducer cover, and sterile ultrasound transducer gel.  Local anesthesia was obtained with 1 cc of 1% lidocaine. Thereafter, using ultrasound guidance, a 2-inch 25-gauge needle was advanced into the left knee joint in the suprapatellar recess of the joint space. After visualization of the tip and negative aspiration for blood, a mixture of Euflexxa was injected into the left knee joint. Following the injection, the needle was withdrawn.  The patient tolerated the procedure well and there were no apparent complications.  After an appropriate amount of observation, the patient was dismissed from the clinic in good condition under their own power. Pre-Pain Score: 3/10 Post-Pain Score: 0/10 The patient will follow up with Dr. Hutchison in 4 weeks.                               Again, thank you for allowing me to participate in the care of your patient.        Sincerely,        Pollo Hutchison, DO

## 2022-08-22 DIAGNOSIS — I10 ESSENTIAL HYPERTENSION: ICD-10-CM

## 2022-08-23 RX ORDER — LISINOPRIL 20 MG/1
TABLET ORAL
Qty: 90 TABLET | Refills: 3 | Status: SHIPPED | OUTPATIENT
Start: 2022-08-23 | End: 2023-08-23

## 2022-08-23 NOTE — TELEPHONE ENCOUNTER
"Routing refill request to provider for review/approval because:  Blood pressure is out of range    Last Written Prescription Date:  10/5/21  Last Fill Quantity: 90,  # refills: 3   Last office visit provider:  4/1/22     Requested Prescriptions   Pending Prescriptions Disp Refills     lisinopril (ZESTRIL) 20 MG tablet [Pharmacy Med Name: LISINOPRIL 20 MG Tablet] 90 tablet 3     Sig: TAKE 1 TABLET EVERY DAY       ACE Inhibitors (Including Combos) Protocol Failed - 8/22/2022  5:39 PM        Failed - Blood pressure under 140/90 in past 12 months     BP Readings from Last 3 Encounters:   08/12/22 (!) 150/71   07/15/22 137/70   07/08/22 (!) 154/70                 Passed - Recent (12 mo) or future (30 days) visit within the authorizing provider's specialty     Patient has had an office visit with the authorizing provider or a provider within the authorizing providers department within the previous 12 mos or has a future within next 30 days. See \"Patient Info\" tab in inbasket, or \"Choose Columns\" in Meds & Orders section of the refill encounter.              Passed - Medication is active on med list        Passed - Patient is age 18 or older        Passed - No active pregnancy on record        Passed - Normal serum creatinine on file in past 12 months     Recent Labs   Lab Test 01/04/22  1050   CR 0.79       Ok to refill medication if creatinine is low          Passed - Normal serum potassium on file in past 12 months     Recent Labs   Lab Test 01/04/22  1050   POTASSIUM 3.9             Passed - No positive pregnancy test within past 12 months             Nell Dockery RN 08/23/22 2:32 PM  "

## 2022-08-29 ENCOUNTER — RADIOLOGY INJECTION OFFICE VISIT (OUTPATIENT)
Dept: PHYSICAL MEDICINE AND REHAB | Facility: CLINIC | Age: 82
End: 2022-08-29
Attending: PAIN MEDICINE
Payer: MEDICARE

## 2022-08-29 VITALS
OXYGEN SATURATION: 98 % | DIASTOLIC BLOOD PRESSURE: 68 MMHG | TEMPERATURE: 97.5 F | SYSTOLIC BLOOD PRESSURE: 114 MMHG | HEART RATE: 72 BPM

## 2022-08-29 DIAGNOSIS — M48.062 SPINAL STENOSIS OF LUMBAR REGION WITH NEUROGENIC CLAUDICATION: ICD-10-CM

## 2022-08-29 PROCEDURE — 62323 NJX INTERLAMINAR LMBR/SAC: CPT | Performed by: PAIN MEDICINE

## 2022-08-29 RX ORDER — METHYLPREDNISOLONE ACETATE 40 MG/ML
INJECTION, SUSPENSION INTRA-ARTICULAR; INTRALESIONAL; INTRAMUSCULAR; SOFT TISSUE
Status: COMPLETED | OUTPATIENT
Start: 2022-08-29 | End: 2022-08-29

## 2022-08-29 RX ORDER — LIDOCAINE HYDROCHLORIDE 10 MG/ML
INJECTION, SOLUTION EPIDURAL; INFILTRATION; INTRACAUDAL; PERINEURAL
Status: COMPLETED | OUTPATIENT
Start: 2022-08-29 | End: 2022-08-29

## 2022-08-29 RX ADMIN — METHYLPREDNISOLONE ACETATE 40 MG: 40 INJECTION, SUSPENSION INTRA-ARTICULAR; INTRALESIONAL; INTRAMUSCULAR; SOFT TISSUE at 13:28

## 2022-08-29 RX ADMIN — LIDOCAINE HYDROCHLORIDE 2 ML: 10 INJECTION, SOLUTION EPIDURAL; INFILTRATION; INTRACAUDAL; PERINEURAL at 13:28

## 2022-08-29 ASSESSMENT — PAIN SCALES - GENERAL
PAINLEVEL: MILD PAIN (3)
PAINLEVEL: NO PAIN (1)

## 2022-08-29 NOTE — PATIENT INSTRUCTIONS
Follow-up visit in 2 weeks with Dr. Hutchison to discuss injection outcome and determine care plan going forward.     DISCHARGE INSTRUCTIONS    During office hours (8:00 a.m.- 4:00 p.m.) questions or concerns may be answered  by calling [facility] Navigation Nurses at  [phone].  Messages received after hours will be returned the following business day.      In the case of an emergency, please dial 911 or seek assistance at the nearest Emergency Room/Urgent Care facility.     All Patients:    You may experience an increase in your symptoms for the first 2 days (It may take anywhere between 2 days- 2 weeks for the steroid to have maximum effect).    You may use ice on the injection site, as frequently as 20 minutes each hour if needed.    You may take your pain medicine.    You may continue taking your regular medication after your injection. If you have had a Medial Branch Block you may resume pain medication once your pain diary is completed.    You may shower. No swimming, tub bath or hot tub for 48 hours.  You may remove your bandaid/bandage as soon as you are home.    You may resume light activities, as tolerated.    Resume your usual diet as tolerated.    It is strongly advised that you do not drive for 1-3 hours post injection.    If you have had oral sedation:  Do not drive for 8 hours post injection.      If you have had IV sedation:  Do not drive for 24 hours post injection.  Do not operate hazardous machinery or make important personal/business decisions for 24 hours.      POSSIBLE STEROID SIDE EFFECTS (If steroid/cortisone was used for your procedure)    -If you experience these symptoms, it should only last for a short period    Swelling of the legs              Skin redness (flushing)     Mouth (oral) irritation   Blood sugar (glucose) levels            Sweats                    Mood changes  Headache  Sleeplessness  Weakened immune system for up to 14 days, which could increase the risk of  manuel the COVID-19 virus and/or experiencing more severe symptoms of the disease, if exposed.  Decreased effectiveness of the flu vaccine if given within 2 weeks of the steroid.         POSSIBLE PROCEDURE SIDE EFFECTS  -Call the Spine Center if you are concerned  Increased Pain           Increased numbness/tingling      Nausea/Vomiting          Bruising/bleeding at site      Redness or swelling                                              Difficulty walking      Weakness           Fever greater than 100.5    *In the event of a severe headache after an epidural steroid injection that is relieved by lying down, please call the [facility] to speak with a clinical staff member*

## 2022-09-02 ENCOUNTER — HOSPITAL ENCOUNTER (OUTPATIENT)
Dept: MAMMOGRAPHY | Facility: CLINIC | Age: 82
Discharge: HOME | End: 2022-09-02
Attending: INTERNAL MEDICINE | Admitting: INTERNAL MEDICINE
Payer: MEDICARE

## 2022-09-02 DIAGNOSIS — Z12.31 VISIT FOR SCREENING MAMMOGRAM: ICD-10-CM

## 2022-09-02 PROCEDURE — 77067 SCR MAMMO BI INCL CAD: CPT

## 2022-09-20 ENCOUNTER — OFFICE VISIT (OUTPATIENT)
Dept: PHYSICAL MEDICINE AND REHAB | Facility: CLINIC | Age: 82
End: 2022-09-20
Payer: MEDICARE

## 2022-09-20 VITALS — DIASTOLIC BLOOD PRESSURE: 69 MMHG | HEART RATE: 73 BPM | OXYGEN SATURATION: 97 % | SYSTOLIC BLOOD PRESSURE: 141 MMHG

## 2022-09-20 DIAGNOSIS — M79.18 MYOFASCIAL PAIN: ICD-10-CM

## 2022-09-20 DIAGNOSIS — G89.29 CHRONIC PAIN OF LEFT KNEE: ICD-10-CM

## 2022-09-20 DIAGNOSIS — M25.562 CHRONIC PAIN OF LEFT KNEE: ICD-10-CM

## 2022-09-20 DIAGNOSIS — M48.062 SPINAL STENOSIS OF LUMBAR REGION WITH NEUROGENIC CLAUDICATION: Primary | ICD-10-CM

## 2022-09-20 DIAGNOSIS — M47.817 LUMBOSACRAL SPONDYLOSIS WITHOUT MYELOPATHY: ICD-10-CM

## 2022-09-20 PROCEDURE — 99213 OFFICE O/P EST LOW 20 MIN: CPT | Performed by: PAIN MEDICINE

## 2022-09-20 ASSESSMENT — PAIN SCALES - GENERAL: PAINLEVEL: NO PAIN (1)

## 2022-09-20 NOTE — PATIENT INSTRUCTIONS
We discussed that you can have Euflexxa injections roughly every 6 months if needed.    We also discussed that you can have steroid injections roughly every 3 months, however fewer injections is always better.    Recommend you continue with your physical therapy exercises on a consistent basis.    ~Please call Nurse Navigation line [phone] with any questions or concerns about your treatment plan, if symptoms worsen and you would like to be seen urgently, or if you have problems controlling bladder and bowel function.

## 2022-09-20 NOTE — PROGRESS NOTES
Assessment:     Diagnoses and all orders for this visit:  Spinal stenosis of lumbar region with neurogenic claudication  Chronic pain of left knee  Myofascial pain  Lumbosacral spondylosis without myelopathy     Sheryl Trotter is a 82 year old y.o. female with past medical history significant for headache, polyarthralgia, bilateral shoulder pain, polymyalgia, polyneuropathy, spinal stenosis with neurogenic claudication, sleep apnea, GERD, hyperlipidemia, hypertension, degenerative disc disease of the lumbar spine, avascular necrosis, osteoporosis,  trochanteric bursitis, lumbar spondylosis, osteoarthritis, history of left hip dislocation, primary osteoarthritis of multiple joints, chondrocalcinosis of the knee, insomnia, status post left hip replacement who presents today for follow-up regarding low back pain and knee pain:    -Patient has received at least 50% relief with epidural steroid injection.  Especially on the right side is feeling better, however left is also feeling better than she was.  Left knee continues to be improved with Euflexxa injection.  She is having greater than 50% relief with this as well.     Plan:     A shared decision making plan was used. The patient's values and choices were respected. Prior medical records from our last visit on 8/12/2022 were reviewed today. The following represents what was discussed and decided upon by the provider and the patient.        -DIAGNOSTIC TESTS: Images were personally reviewed and interpreted.   --X-ray of the lumbar spine dated 4/1/2022 is personally viewed images interpreted and discussed with patient.  There is a chronic grade 1 anterolisthesis of L4 on L5 and L3 on L4.  There is significant facet arthropathy from L3-S1.  There are no compression fractures.  --EMG nerve conduction study on 8/30/2019 of bilateral lower extremities is normal.  --MRI of the lumbar spine dated 1/26/2012 is personally viewed images interpreted and discussed with patient.   This was done at [facility].  There is mild central and severe bilateral lateral recess stenosis L4-5 with advanced bilateral facet arthropathy and a 6 mm spondylolisthesis of L4 and L5.  There is moderate up-and-down foraminal stenosis on the right at L4-5.  There are degenerative changes throughout the lumbar spine worse at L5-S1 and L2-3.  -- MRI of lumbar spine dated 4/29/2022 is personally viewed images interpreted and discussed with the patient.  There is severe spinal canal stenosis at L3-4 and L4-5 with severe facet arthropathy at the same level.  There is a grade 1 anterolisthesis at L3 on L4 and L4 and L5.  There is mild bilateral facet arthropathy at L5-S1.  --X-rays of bilateral knees dated 2/14/2019 is personally viewed images interpreted and discussed with patient.  There is mild degenerative arthritis.    -INTERVENTIONS: No interventions at this time.  Could consider repeat L5-S1 interlaminar epidural steroid injection should she continue to have good relief for at least 3 months.  We discussed Euflexxa injections can be repeated on the left knee at 6 months if needed.    -MEDICATIONS: No changes to medications.  -  Discussed side effects of medications and proper use. Patient verbalized understanding.    -PHYSICAL THERAPY: I recommend that she continue with home exercises on a consistent basis.    -PATIENT EDUCATION: We discussed pain management in a multimodal fashion including physical therapy, medication management, possible future injections.    -FOLLOW UP: The patient will follow up as needed.  Advised to contact clinic if symptoms worsen or change.    Subjective:     Sheryl Trotter is a 82 year old female who presents today for follow-up regarding low back and bilateral lower extremity pain.  Patient notes that the epidural steroid injection was very helpful and she is feeling much better especially on the right side.  She will occasionally have left-sided flank pain but it is much better.  Left  knee pain also continues to be better after Euflexxa injections.  She will sometimes have a twinge in the medial aspect of her left knee and a sharp pain, however its not long-lasting and she is feeling much better.  Physical therapy and stretching are also very helpful for pain as are water aerobics.  Her pain today is 1/10 at its worst is 7/10 as best as 1/10.  When she looks back at how she was doing before the injection she reports she is doing much better and feels pleased with how things are going.  She denies any bowel or bladder changes, fevers, chills, unintentional weight loss.    -Treatment to Date: X-ray of lumbar spine dated 4/1/2022.  EMG nerve conduction study of bilateral lower extremities on 8/30/2019.  MRI lumbar spine dated 1/26/2012.  Left hip injection done on 1/5/2018.  Left hip injection done on 1/18/2018.  Epidural steroid injections done in 2012.  Physical therapy in the past.  MRI of the lumbar spine dated 4/29/2022.  Bilateral L4-5 transforaminal epidural steroid injections done on 5/24/2022.  X-ray of bilateral knees dated 2/14/2019.  Series of 3 Euflexxa left knee joint injections done under ultrasound guidance last done on 7/15/2022.  L5-S1 interlaminar epidural steroid injection done on 8/29/2022.    Patient Active Problem List   Diagnosis     Osteoporosis     Hyperlipidemia     Essential hypertension     Primary insomnia     Trochanteric Bursitis     Lumbar Spondylosis     Lumbar Disc Degeneration     Headache     Osteoarthritis of the Knee     Polyarthralgia     Status post left hip replacement     Hip dislocation, left (H)     Bilateral shoulder pain     Polymyalgia (H)     Primary osteoarthritis involving multiple joints     Chondrocalcinosis     KASANDRA (obstructive sleep apnea)     Other polyneuropathy     Spinal stenosis of lumbar region with neurogenic claudication       Current Outpatient Medications   Medication     acetaminophen (TYLENOL) 650 MG CR tablet     calcium  carbonate-vitamin D3 600 mg (1,500 mg)-800 unit Chew     denosumab 60 mg/mL Syrg     fluocinonide (LIDEX) 0.05 % external ointment     hydrochlorothiazide (HYDRODIURIL) 50 MG tablet     lisinopril (ZESTRIL) 20 MG tablet     METHYLCELLULOSE (CITRUCEL ORAL)     pregabalin (LYRICA) 150 MG capsule     pregabalin (LYRICA) 75 MG capsule     Vitamin D3 (CHOLECALCIFEROL) 25 mcg (1000 units) tablet     No current facility-administered medications for this visit.       Allergies   Allergen Reactions     Duloxetine Headache     Nausea, difficult sleeping ankles cramps, loose bowel      Gabapentin Anxiety       Past Medical History:   Diagnosis Date     Arthritis      AVN (avascular necrosis of bone) (H)     let hip     DDD (degenerative disc disease), lumbar      GERD (gastroesophageal reflux disease)      HLD (hyperlipidemia)      Hypertension      Insomnia      Osteoporosis      PONV (postoperative nausea and vomiting)      Sleep apnea     cpap        Review of Systems  ROS:  Specifically negative for bowel/bladder dysfunction, balance changes, headache, dizziness, foot drop, fevers, chills, appetite changes, nausea/vomiting, unexplained weight loss. Otherwise 13 systems reviewed are negative. Please see the patient's intake questionnaire from today for details.    Reviewed Social, Family, Past Medical and Past Surgical history with patient, no significant changes noted since prior visit.     Objective:     BP (!) 141/69   Pulse 73   SpO2 97%     PHYSICAL EXAMINATION:    --CONSTITUTIONAL: Well developed, well nourished, healthy appearing individual.  --PSYCHIATRIC: Appropriate mood and affect. No difficulty interacting due to temper, social withdrawal, or memory issues.  --RESPIRATORY: Normal rhythm and effort. No abnormal accessory muscle breathing patterns noted.   --MUSCULOSKELETAL:  Normal lumbar lordosis noted, no lateral shift.  --GROSS MOTOR: Easily arises from a seated position. Gait is non-antalgic  --LUMBAR  SPINE:  Inspection reveals no evidence of deformity. Range of motion is mildly limited in lumbar flexion, extension, lateral rotation. No tenderness to palpation lumbar spine. Straight leg raising in the seated position is negative to radicular pain.  --SACROILIAC JOINT:  One Finger point test negative.  --LOWER EXTREMITY MOTOR TESTING:  Plantar flexion left 5/5, right 5/5   Dorsiflexion left 5/5, right 5/5   Great toe MTP extension left 5/5, right 5/5  Knee flexion left 5/5, right 5/5  Knee extension left 5/5, right 5/5   Hip flexion left 5/5, right 5/5  Hip abduction left 5/5, right 5/5  Hip adduction left 5/5, right 5/5    --NEUROLOGIC:  Sensation to light touch is intact in the bilateral L4, L5, and S1 dermatomes.    RESULTS:   Imaging: Lumbar spine and knee imaging was reviewed today. The images were shown to the patient and the findings were explained using a spine model.    MA Screen Bilateral w/Emmanuel    Result Date: 9/6/2022  BILATERAL FULL FIELD DIGITAL SCREENING MAMMOGRAM WITH TOMOSYNTHESIS Performed on: 9/2/22 Compared to: 08/09/2021, 07/22/2020, 06/21/2019, 06/14/2018, 05/31/2017, 09/16/2014, and 02/04/2013 Technique:  This study was evaluated with the assistance of Computer-Aided Detection.  Breast Tomosynthesis was used in interpretation. Findings: The breasts have scattered areas of fibroglandular density.  There is no radiographic evidence of malignancy. IMPRESSION: ACR BI-RADS Category 1: Negative RECOMMENDED FOLLOW-UP: Annual routine screening mammogram The results and recommendations of this examination will be communicated to the patient.     PAIN Interlaminar Epidural Steroid Injection Lumbar/Sacral    Result Date: 8/29/2022  LUMBAR INTERLAMINAR EPIDURAL STEROID INJECTION WITH FLUOROSCOPIC GUIDANCE Performed on: 8/29/22 Pre Procedure Diagnosis:  LOW BACK PAIN, Lumbar radiculitis Post Procedure Diagnosis:  Same Procedure Performed:  Lumar Interlaminar Epidural Steroid Injection with Fluoroscopic  "Guidance Clinical Scenario:  As per office notes Anesthesia/Fluids:  As per intra-procedure documentation Vital Signs:  As per intra-procedure documentation Level Injected:   L5-S1 CC: Sheryl Trotter is a 82 year-old female who presents today for a L5-S1 interlaminar epidural steroid injection.  The patient complains of low back and lower extremity pain.  Advanced imaging of the lumbar spine is personally reviewed today.    The procedure of epidural steroid injection was discussed in detail along with the attendant risks, including but not limited to:  back pain, subdural puncture with a subsequent headache (possible need for epidural blood patch), nerve injury, infection, bleeding, epidural hematoma (with need for surgical evacuation), allergic reaction,  worsening of pain along with risk of stroke, paralysis and death.  The patient decided to proceed. The patient denies any signs/symptoms of an active infection (and denies taking any antibiotics).  The patient denies taking any prescription blood thinning medications.  The patient also denies any allergies to iodine/iodine contrast dye.  The patient was placed in a prone position. A procedural pause was performed to verify the patient's name and  as well as the site of the location.  The low back was prepped and draped in usual sterile fashion.  After anesthetizing the skin, a 3.5\",20 guage Touhy needle was introduced in the midline at the L5-S1 interspace under fluoroscopic guidance and via loss of resistance technique.  After entering the epidural space and aspiration was negative for blood or CSF,  1 ml Omnipaque-300 was injected slowly into the epidural space.  Fluoroscopy confirmed epidural flow.  Subsequently, 40 mgs of Depomedrol in 1.0 ml of 1% Lidocaine was slowly injected into the patient's epidural space.  The needle was flushed with a small amount of Omnipaque before it was withdrawn. Radiographs were obtained for documentation purposes. " Pre-procedure pain score:  3/10 Post-procedure pain score: 1/10 The patient tolerated the procedure well.  The patient was instructed to call if any questions/concerns arise after the procedure.  After a short period of observation the patient was discharged.  Patient will follow up with Dr. Hutchison in 2 to 4 weeks.

## 2022-09-20 NOTE — LETTER
9/20/2022         RE: Sheryl Trotter  [address]  [facility] [address]        Dear Colleague,    Thank you for referring your patient, Sheryl Trotter, to the [facility] SPINE AND NEUROSURGERY. Please see a copy of my visit note below.      Assessment:     Diagnoses and all orders for this visit:  Spinal stenosis of lumbar region with neurogenic claudication  Chronic pain of left knee  Myofascial pain  Lumbosacral spondylosis without myelopathy     Sheryl Trotter is a 82 year old y.o. female with past medical history significant for headache, polyarthralgia, bilateral shoulder pain, polymyalgia, polyneuropathy, spinal stenosis with neurogenic claudication, sleep apnea, GERD, hyperlipidemia, hypertension, degenerative disc disease of the lumbar spine, avascular necrosis, osteoporosis,  trochanteric bursitis, lumbar spondylosis, osteoarthritis, history of left hip dislocation, primary osteoarthritis of multiple joints, chondrocalcinosis of the knee, insomnia, status post left hip replacement who presents today for follow-up regarding low back pain and knee pain:    -Patient has received at least 50% relief with epidural steroid injection.  Especially on the right side is feeling better, however left is also feeling better than she was.  Left knee continues to be improved with Euflexxa injection.  She is having greater than 50% relief with this as well.     Plan:     A shared decision making plan was used. The patient's values and choices were respected. Prior medical records from our last visit on 8/12/2022 were reviewed today. The following represents what was discussed and decided upon by the provider and the patient.        -DIAGNOSTIC TESTS: Images were personally reviewed and interpreted.   --X-ray of the lumbar spine dated 4/1/2022 is personally viewed images interpreted and discussed with patient.  There is a chronic grade 1 anterolisthesis of L4 on L5 and L3 on L4.  There is  significant facet arthropathy from L3-S1.  There are no compression fractures.  --EMG nerve conduction study on 8/30/2019 of bilateral lower extremities is normal.  --MRI of the lumbar spine dated 1/26/2012 is personally viewed images interpreted and discussed with patient.  This was done at [facility].  There is mild central and severe bilateral lateral recess stenosis L4-5 with advanced bilateral facet arthropathy and a 6 mm spondylolisthesis of L4 and L5.  There is moderate up-and-down foraminal stenosis on the right at L4-5.  There are degenerative changes throughout the lumbar spine worse at L5-S1 and L2-3.  -- MRI of lumbar spine dated 4/29/2022 is personally viewed images interpreted and discussed with the patient.  There is severe spinal canal stenosis at L3-4 and L4-5 with severe facet arthropathy at the same level.  There is a grade 1 anterolisthesis at L3 on L4 and L4 and L5.  There is mild bilateral facet arthropathy at L5-S1.  --X-rays of bilateral knees dated 2/14/2019 is personally viewed images interpreted and discussed with patient.  There is mild degenerative arthritis.    -INTERVENTIONS: No interventions at this time.  Could consider repeat L5-S1 interlaminar epidural steroid injection should she continue to have good relief for at least 3 months.  We discussed Euflexxa injections can be repeated on the left knee at 6 months if needed.    -MEDICATIONS: No changes to medications.  -  Discussed side effects of medications and proper use. Patient verbalized understanding.    -PHYSICAL THERAPY: I recommend that she continue with home exercises on a consistent basis.    -PATIENT EDUCATION: We discussed pain management in a multimodal fashion including physical therapy, medication management, possible future injections.    -FOLLOW UP: The patient will follow up as needed.  Advised to contact clinic if symptoms worsen or change.    Subjective:     Sheryl Trotter is a 82 year old female who presents today for  follow-up regarding low back and bilateral lower extremity pain.  Patient notes that the epidural steroid injection was very helpful and she is feeling much better especially on the right side.  She will occasionally have left-sided flank pain but it is much better.  Left knee pain also continues to be better after Euflexxa injections.  She will sometimes have a twinge in the medial aspect of her left knee and a sharp pain, however its not long-lasting and she is feeling much better.  Physical therapy and stretching are also very helpful for pain as are water aerobics.  Her pain today is 1/10 at its worst is 7/10 as best as 1/10.  When she looks back at how she was doing before the injection she reports she is doing much better and feels pleased with how things are going.  She denies any bowel or bladder changes, fevers, chills, unintentional weight loss.    -Treatment to Date: X-ray of lumbar spine dated 4/1/2022.  EMG nerve conduction study of bilateral lower extremities on 8/30/2019.  MRI lumbar spine dated 1/26/2012.  Left hip injection done on 1/5/2018.  Left hip injection done on 1/18/2018.  Epidural steroid injections done in 2012.  Physical therapy in the past.  MRI of the lumbar spine dated 4/29/2022.  Bilateral L4-5 transforaminal epidural steroid injections done on 5/24/2022.  X-ray of bilateral knees dated 2/14/2019.  Series of 3 Euflexxa left knee joint injections done under ultrasound guidance last done on 7/15/2022.  L5-S1 interlaminar epidural steroid injection done on 8/29/2022.    Patient Active Problem List   Diagnosis     Osteoporosis     Hyperlipidemia     Essential hypertension     Primary insomnia     Trochanteric Bursitis     Lumbar Spondylosis     Lumbar Disc Degeneration     Headache     Osteoarthritis of the Knee     Polyarthralgia     Status post left hip replacement     Hip dislocation, left (H)     Bilateral shoulder pain     Polymyalgia (H)     Primary osteoarthritis involving multiple  joints     Chondrocalcinosis     KASANDRA (obstructive sleep apnea)     Other polyneuropathy     Spinal stenosis of lumbar region with neurogenic claudication       Current Outpatient Medications   Medication     acetaminophen (TYLENOL) 650 MG CR tablet     calcium carbonate-vitamin D3 600 mg (1,500 mg)-800 unit Chew     denosumab 60 mg/mL Syrg     fluocinonide (LIDEX) 0.05 % external ointment     hydrochlorothiazide (HYDRODIURIL) 50 MG tablet     lisinopril (ZESTRIL) 20 MG tablet     METHYLCELLULOSE (CITRUCEL ORAL)     pregabalin (LYRICA) 150 MG capsule     pregabalin (LYRICA) 75 MG capsule     Vitamin D3 (CHOLECALCIFEROL) 25 mcg (1000 units) tablet     No current facility-administered medications for this visit.       Allergies   Allergen Reactions     Duloxetine Headache     Nausea, difficult sleeping ankles cramps, loose bowel      Gabapentin Anxiety       Past Medical History:   Diagnosis Date     Arthritis      AVN (avascular necrosis of bone) (H)     let hip     DDD (degenerative disc disease), lumbar      GERD (gastroesophageal reflux disease)      HLD (hyperlipidemia)      Hypertension      Insomnia      Osteoporosis      PONV (postoperative nausea and vomiting)      Sleep apnea     cpap        Review of Systems  ROS:  Specifically negative for bowel/bladder dysfunction, balance changes, headache, dizziness, foot drop, fevers, chills, appetite changes, nausea/vomiting, unexplained weight loss. Otherwise 13 systems reviewed are negative. Please see the patient's intake questionnaire from today for details.    Reviewed Social, Family, Past Medical and Past Surgical history with patient, no significant changes noted since prior visit.     Objective:     BP (!) 141/69   Pulse 73   SpO2 97%     PHYSICAL EXAMINATION:    --CONSTITUTIONAL: Well developed, well nourished, healthy appearing individual.  --PSYCHIATRIC: Appropriate mood and affect. No difficulty interacting due to temper, social withdrawal, or memory  issues.  --RESPIRATORY: Normal rhythm and effort. No abnormal accessory muscle breathing patterns noted.   --MUSCULOSKELETAL:  Normal lumbar lordosis noted, no lateral shift.  --GROSS MOTOR: Easily arises from a seated position. Gait is non-antalgic  --LUMBAR SPINE:  Inspection reveals no evidence of deformity. Range of motion is mildly limited in lumbar flexion, extension, lateral rotation. No tenderness to palpation lumbar spine. Straight leg raising in the seated position is negative to radicular pain.  --SACROILIAC JOINT:  One Finger point test negative.  --LOWER EXTREMITY MOTOR TESTING:  Plantar flexion left 5/5, right 5/5   Dorsiflexion left 5/5, right 5/5   Great toe MTP extension left 5/5, right 5/5  Knee flexion left 5/5, right 5/5  Knee extension left 5/5, right 5/5   Hip flexion left 5/5, right 5/5  Hip abduction left 5/5, right 5/5  Hip adduction left 5/5, right 5/5    --NEUROLOGIC:  Sensation to light touch is intact in the bilateral L4, L5, and S1 dermatomes.    RESULTS:   Imaging: Lumbar spine and knee imaging was reviewed today. The images were shown to the patient and the findings were explained using a spine model.    MA Screen Bilateral w/Emmanuel    Result Date: 9/6/2022  BILATERAL FULL FIELD DIGITAL SCREENING MAMMOGRAM WITH TOMOSYNTHESIS Performed on: 9/2/22 Compared to: 08/09/2021, 07/22/2020, 06/21/2019, 06/14/2018, 05/31/2017, 09/16/2014, and 02/04/2013 Technique:  This study was evaluated with the assistance of Computer-Aided Detection.  Breast Tomosynthesis was used in interpretation. Findings: The breasts have scattered areas of fibroglandular density.  There is no radiographic evidence of malignancy. IMPRESSION: ACR BI-RADS Category 1: Negative RECOMMENDED FOLLOW-UP: Annual routine screening mammogram The results and recommendations of this examination will be communicated to the patient.     PAIN Interlaminar Epidural Steroid Injection Lumbar/Sacral    Result Date: 8/29/2022  LUMBAR  "INTERLAMINAR EPIDURAL STEROID INJECTION WITH FLUOROSCOPIC GUIDANCE Performed on: 22 Pre Procedure Diagnosis:  LOW BACK PAIN, Lumbar radiculitis Post Procedure Diagnosis:  Same Procedure Performed:  Lumar Interlaminar Epidural Steroid Injection with Fluoroscopic Guidance Clinical Scenario:  As per office notes Anesthesia/Fluids:  As per intra-procedure documentation Vital Signs:  As per intra-procedure documentation Level Injected:   L5-S1 CC: Sheryl Trotter is a 82 year-old female who presents today for a L5-S1 interlaminar epidural steroid injection.  The patient complains of low back and lower extremity pain.  Advanced imaging of the lumbar spine is personally reviewed today.    The procedure of epidural steroid injection was discussed in detail along with the attendant risks, including but not limited to:  back pain, subdural puncture with a subsequent headache (possible need for epidural blood patch), nerve injury, infection, bleeding, epidural hematoma (with need for surgical evacuation), allergic reaction,  worsening of pain along with risk of stroke, paralysis and death.  The patient decided to proceed. The patient denies any signs/symptoms of an active infection (and denies taking any antibiotics).  The patient denies taking any prescription blood thinning medications.  The patient also denies any allergies to iodine/iodine contrast dye.  The patient was placed in a prone position. A procedural pause was performed to verify the patient's name and  as well as the site of the location.  The low back was prepped and draped in usual sterile fashion.  After anesthetizing the skin, a 3.5\",20 guage Touhy needle was introduced in the midline at the L5-S1 interspace under fluoroscopic guidance and via loss of resistance technique.  After entering the epidural space and aspiration was negative for blood or CSF,  1 ml Omnipaque-300 was injected slowly into the epidural space.  Fluoroscopy confirmed epidural " flow.  Subsequently, 40 mgs of Depomedrol in 1.0 ml of 1% Lidocaine was slowly injected into the patient's epidural space.  The needle was flushed with a small amount of Omnipaque before it was withdrawn. Radiographs were obtained for documentation purposes. Pre-procedure pain score:  3/10 Post-procedure pain score: 1/10 The patient tolerated the procedure well.  The patient was instructed to call if any questions/concerns arise after the procedure.  After a short period of observation the patient was discharged.  Patient will follow up with Dr. Hutchison in 2 to 4 weeks.                               Again, thank you for allowing me to participate in the care of your patient.        Sincerely,        Pollo Hutchison, DO

## 2022-09-25 ENCOUNTER — HEALTH MAINTENANCE LETTER (OUTPATIENT)
Age: 82
End: 2022-09-25

## 2022-10-03 ENCOUNTER — OFFICE VISIT (OUTPATIENT)
Dept: NEUROLOGY | Facility: CLINIC | Age: 82
End: 2022-10-03
Payer: MEDICARE

## 2022-10-03 VITALS — SYSTOLIC BLOOD PRESSURE: 139 MMHG | DIASTOLIC BLOOD PRESSURE: 90 MMHG | HEART RATE: 88 BPM

## 2022-10-03 DIAGNOSIS — G62.89 OTHER POLYNEUROPATHY: ICD-10-CM

## 2022-10-03 PROCEDURE — 99214 OFFICE O/P EST MOD 30 MIN: CPT | Performed by: PSYCHIATRY & NEUROLOGY

## 2022-10-03 RX ORDER — PREGABALIN 75 MG/1
150 CAPSULE ORAL 2 TIMES DAILY
Qty: 180 CAPSULE | Refills: 3 | Status: SHIPPED | OUTPATIENT
Start: 2022-10-03 | End: 2022-10-03

## 2022-10-03 RX ORDER — PREGABALIN 75 MG/1
150 CAPSULE ORAL 2 TIMES DAILY
Qty: 180 CAPSULE | Refills: 3 | Status: SHIPPED | OUTPATIENT
Start: 2022-10-03 | End: 2023-02-14

## 2022-10-03 NOTE — PROGRESS NOTES
[facility] Neurology Follow Up Visit    Sheryl Trotter MRN# [id number]   Age: 82 year old YOB: 1940     Brief history of symptoms: The patient was initially seen in neurologic consultation on 10/11/2021, and most recently 6/27/2022 for evaluation of peripheral neuropathy. Please see the comprehensive neurologic consultation notes from those dates in the Epic records for details.     The patient was trying medications for symptom relief of her paresthesias.  Lyrica 75/150 was helpful to reduce paresthesias to some degree but she also had a foggy feeling with use.  She was having symptoms consistent with KASANDRA, and it was thought that perhaps her cognitive concerns/fogginess was less likely related to Lyrica and more consistent with KASANDRA related cognitive issues.  She was referred to sleep medicine.    Interval history:   - Seen with PM&R for her chronic lumbar pain (spondylosis, stenosis, DDD) which was improving with ESTEBAN (R>L) at L5/S1.   - Had a bakers cyst on her left resolve itself    Today, the patient reports that her foggy sensation and restless leg are minimal at this time.  She is tolerating the 75/150 dose of lyrica relating to RLS and paresthesias.       Physical Exam:   Vitals: BP (!) 139/90 (BP Location: Right arm, Patient Position: Sitting, Cuff Size: Adult Regular)   Pulse 88    General: Seated comfortably in no acute distress.  HEENT: Neck supple with normal range of motion.   Skin: No rashes  Neurologic:     Mental Status: Fully alert, attentive and oriented. Speech clear and fluent, no paraphasic errors.     Cranial Nerves: EOMI with normal smooth pursuit. Facial movements symmetric. Hearing not formally tested but intact to conversation.  No dysarthria.     Motor: No tremors or other abnormal movements observed.  (R/L): HF 5/5, HE 5/5, KF 5/4+, KE 5/5, DF 5/4+, PF 5/5, Eversion and inversion both 5/5.       DTR: 2+ in UE and LE b/l.     Sensory: Negative Romberg.      Gait: Normal, steady  casual gait.         Assessment and Plan:   Assessment:  Neuropathy, idiopathic distal and symmetric  Lower back pain with sciatica and lumbar stenosis (central canal)    The patient's tolerating lyrica to a better degree and I am hopefull that slow titration into the 300-400 mg a day dose will provide benefits to both her RLS and neuropathy.  If she has worsened fatigue/swelling with higher dosing, then consideration towards adding a TCA or SNRI for treatment of neuropathy can be made.  The same logic is applied towards her RLS in regards to ropinirole or pramipexole.    I do not necessarily note a weakness unrelated to local pain or joint issues on her exam today, which is beneficial in regards to physical deficits staying stable relating to her lumbar disease and possible radiculopathy.  Given her EMG in 2019 was without radicular findings, I wouldn't see a reason to repeat the test at this moment. However, the patient indicates having less improvement from her ESTEBAN than in the pass, so if this trend continued she may need repeat imgaing or EMG to define whether a surgery would be helpful.     Plan:  - Lyrica 150 BID  - Continue with plans to obtain sleep study  - Could consider duloxetine for neuropathy pending response to lyrica increase  - Could trial RLS mdication like pramipexole if sympoms continue to break through even on 300 BID dose, or could increase lyrica to 300/375    Follow up in Neurology clinic in 4 months or earlier as needed should new concerns arise.    NISA Khanna D.O.   of Neurology    Total time today (31 min) in this patient encounter was spent on pre-charting, counseling and/or coordination of care.

## 2022-10-03 NOTE — PATIENT INSTRUCTIONS
For you neuropathy:    - Increase  Lyrica to 150 mg twice daily   - Continue with plans to obtain sleep study  - Could consider duloxetine for neuropathy pending response to lyrica increase  - Could trial RLS medication like pramipexole if sympoms continue to break through even on 300 BID dose, or could increase lyrica to 300/375

## 2022-10-03 NOTE — LETTER
10/3/2022         RE: Sheryl Trotter  [address]  [facility] [address]        Dear Colleague,    Thank you for referring your patient, Sheryl Trotter, to the [facility]. Please see a copy of my visit note below.    [facility] Neurology Follow Up Visit    Sheryl Trotter MRN# [id number]   Age: 82 year old YOB: 1940     Brief history of symptoms: The patient was initially seen in neurologic consultation on 10/11/2021, and most recently 6/27/2022 for evaluation of peripheral neuropathy. Please see the comprehensive neurologic consultation notes from those dates in the Epic records for details.     The patient was trying medications for symptom relief of her paresthesias.  Lyrica 75/150 was helpful to reduce paresthesias to some degree but she also had a foggy feeling with use.  She was having symptoms consistent with KASANDRA, and it was thought that perhaps her cognitive concerns/fogginess was less likely related to Lyrica and more consistent with KASANDRA related cognitive issues.  She was referred to sleep medicine.    Interval history:   - Seen with PM&R for her chronic lumbar pain (spondylosis, stenosis, DDD) which was improving with ESTEBAN (R>L) at L5/S1.   - Had a bakers cyst on her left resolve itself    Today, the patient reports that her foggy sensation and restless leg are minimal at this time.  She is tolerating the 75/150 dose of lyrica relating to RLS and paresthesias.       Physical Exam:   Vitals: BP (!) 139/90 (BP Location: Right arm, Patient Position: Sitting, Cuff Size: Adult Regular)   Pulse 88    General: Seated comfortably in no acute distress.  HEENT: Neck supple with normal range of motion.   Skin: No rashes  Neurologic:     Mental Status: Fully alert, attentive and oriented. Speech clear and fluent, no paraphasic errors.     Cranial Nerves: EOMI with normal smooth pursuit. Facial movements symmetric. Hearing not formally tested but intact to  conversation.  No dysarthria.     Motor: No tremors or other abnormal movements observed.  (R/L): HF 5/5, HE 5/5, KF 5/4+, KE 5/5, DF 5/4+, PF 5/5, Eversion and inversion both 5/5.       DTR: 2+ in UE and LE b/l.     Sensory: Negative Romberg.      Gait: Normal, steady casual gait.         Assessment and Plan:   Assessment:  Neuropathy, idiopathic distal and symmetric  Lower back pain with sciatica and lumbar stenosis (central canal)    The patient's tolerating lyrica to a better degree and I am hopefull that slow titration into the 300-400 mg a day dose will provide benefits to both her RLS and neuropathy.  If she has worsened fatigue/swelling with higher dosing, then consideration towards adding a TCA or SNRI for treatment of neuropathy can be made.  The same logic is applied towards her RLS in regards to ropinirole or pramipexole.    I do not necessarily note a weakness unrelated to local pain or joint issues on her exam today, which is beneficial in regards to physical deficits staying stable relating to her lumbar disease and possible radiculopathy.  Given her EMG in 2019 was without radicular findings, I wouldn't see a reason to repeat the test at this moment. However, the patient indicates having less improvement from her ESTEBAN than in the pass, so if this trend continued she may need repeat imgaing or EMG to define whether a surgery would be helpful.     Plan:  - Lyrica 150 BID  - Continue with plans to obtain sleep study  - Could consider duloxetine for neuropathy pending response to lyrica increase  - Could trial RLS mdication like pramipexole if sympoms continue to break through even on 300 BID dose, or could increase lyrica to 300/375    Follow up in Neurology clinic in 4 months or earlier as needed should new concerns arise.    NISA Khanna D.O.   of Neurology    Total time today (31 min) in this patient encounter was spent on pre-charting, counseling and/or coordination of care.          Again, thank you for allowing me to participate in the care of your patient.        Sincerely,        Cholo Khanna, DO

## 2022-10-05 ENCOUNTER — OFFICE VISIT (OUTPATIENT)
Dept: INTERNAL MEDICINE | Facility: CLINIC | Age: 82
End: 2022-10-05
Payer: MEDICARE

## 2022-10-05 VITALS
OXYGEN SATURATION: 98 % | HEIGHT: 67 IN | SYSTOLIC BLOOD PRESSURE: 124 MMHG | WEIGHT: 183 LBS | BODY MASS INDEX: 28.72 KG/M2 | HEART RATE: 79 BPM | DIASTOLIC BLOOD PRESSURE: 82 MMHG

## 2022-10-05 DIAGNOSIS — G62.89 OTHER POLYNEUROPATHY: ICD-10-CM

## 2022-10-05 DIAGNOSIS — M48.062 SPINAL STENOSIS OF LUMBAR REGION WITH NEUROGENIC CLAUDICATION: Primary | ICD-10-CM

## 2022-10-05 PROCEDURE — 99213 OFFICE O/P EST LOW 20 MIN: CPT | Performed by: INTERNAL MEDICINE

## 2022-10-05 NOTE — PATIENT INSTRUCTIONS
Follow multiple issues, overall she was doing well, but she is been under some stress recently because her  Virgil, who is also my patient, has had number of rather complex medical problems, and Chrissy is Virgil's primary caregiver.    Chrissy is working with the neurology and spine clinics, continuing with physical therapy, and I encouraged her to be more physically active.    She is supposed to get a Prolia injection in January 2023.    Would like to see her back in about 6 months, and we will plan to have that be a annual wellness visit.    Chrissy plans to get her seasonal flu shot at [employer] along with her  Virgil    Neuroclaudication symptoms of lumbar spinal stenosis  Lumbosacral spondylosis without myelopathy  Chronic pain of left knee  Myofascial pain  Known Lumbar DDD (ESTEBAN 2005, for reported lumbar stenosis and spondylolisthesis and sciatica  Characteristic Pain Low back, both thighs, both calves. Worse when standing.  Difficulty climbing stairs. Most comfortable seated.  MRI 2012 had degenerating discs    Lyrica 75/150 was helpful to reduce paresthesias to some degree but she also had a foggy feeling with use.  She   Could consider duloxetine for neuropathy pending response to lyrica increase  Could trial RLS mdication like pramipexole if sympoms continue to break through even on 300 BID dose, or could increase lyrica to 300/375    Result Date: 8/29/2022  LUMBAR INTERLAMINAR EPIDURAL STEROID INJECTION WITH FLUOROSCOPIC     Distal symmetric polyneuropathy, small fiber peripheral neuropathy, also restless leg syndrome  Bilateral leg coldness leading to discomfort in the day and at night.   Lyrica used briefly, as of April 202 on nortriptyline, which seems to help  10/11/2021  Neurology: Cholo Khanna, DO  EMG through PM&R on 8/30/2019 with Dr. Izquierdo:  EMG on 8/30/2019 which showed an essentially normal study.  Comment NCS: Essentially normal study  Comment EMG: Normal study  1.  Normal needle  EMG bilateral lower extremities.  Interpretation: Essentially normal study:   There is electrodiagnostic evidence of:  1.  Borderline bilateral sensory amplitudes.  This is normal for the patient's age is very likely normal finding.  I cannot, however, completely exclude a very mild sensory or sensorimotor peripheral polyneuropathy.   2 There is no electrodiagnostic evidence of lumbosacral radiculopathy, lumbosacral plexopathy, or focal neuropathy in the bilateral lower extremities.  Specifically, there is no electrodiagnostic evidence of tibial neuropathy at the tarsal tunnel in the bilateral lower extremities    Left knee osteoarthritis improved with Euflexxa injection.    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     Insomnia, KASANDRA is currently untreated  She stopped CPAP because not tolerated  sleep study in 2017, which showed sleep apnea. Stopped CPAP, a year and a half ago, as she wasn't sleeping and it wasn't positive experience.     Essential hypertension  Hydrochlorothiazide one half of a 50 mg pill. Lisinopril 20 mg daily.  She has a home blood pressure machine, but has not been using it regularly.  I recommend that she start recording the blood pressure numbers in a notebook, and 1 day bring the machine to clinic so that we can validate it against a manual reading to make sure it is accurate, then she can believe the numbers.     I told her that target blood pressure is 120/80 at rest and seated position measured on the right upper arm.  I reminded her that healthy diet particular less sodium, also regular exercise, and weight control will help with blood pressure, as well as treating sleep apnea    BP Readings from Last 6 Encounters:   10/05/22 124/82   10/03/22 (!) 139/90   09/20/22 (!)  141/69   08/29/22 114/68   08/12/22 (!) 150/71   07/15/22 137/70     Hyperlipidemia, mildly elevated LDL, very generous HDL which is favorable  Lipid profile July 24, 2019 with total cholesterol 265, triglycerides 43, LDL bad cholesterol modestly elevated 141, but with a very generous level of the HDL good cholesterol 115  10-  Hemoglobin A1C <=5.6 % 5.3      Gastroesophageal reflux, uses Tums intermittently    Constipation that she has noticed since starting nortriptyline for neuropathy and restless legs  I told her that constipation is a well-known side effect of nortriptyline because of its anticholinergic effects.  To combat constipation, I recommended she stay well-hydrated, continue with the Citrucel every day, and consider adding a capful of MiraLAX powder every day which is an osmotic laxative and is safe to use every day.  It adds water content of stools.    Osteopenia, on Prolia (started approximately 2017)  Chrissy believes she got a Prolia injection in July 2022, but for some reason I am having trouble finding it in her chart, if she did get an injection in July 2022, that means her next injection would be January 2023    Bone density scan December 15, 2019  1. The spine bone density L1-L4 (L3) with T-score 0.0 and significant improvement of 6.2 % compared to 2017.  2. Femoral bone densities show right femoral neck T- score -1.8 and significant improvement of 8.9% compared to 2017.  3. Trabecular bone score indicates moderate trabecular bone architecture.   79 y.o. female with LOW BONE DENSITY (OSTEOPENIA), stable on the current treatment.     Takes her calcium and vitamin D     Overweight with body mass index of 28.  She has been less physically active because of her lumbar spine issues.  She does water aerobics which is excellent.     Menopause, status post complete hysterectomy  Had complete hysterectomy at age in her 50s, has not needed to get Pap smears anymore  Result Date: 9/6/2022  BILATERAL  FULL FIELD DIGITAL SCREENING MAMMOGRAM WITH TOMOSYNTHESIS     She recalls a colonoscopy that was done approximately at age 72    Received third shot of Pfizer COVID-19 vaccine October 29, 2021, so she could get a booster anytime  Has received both pneumococcal vaccines    And recomment shingles vaccine    Could consider getting a tetanus booster since it looks like her last one was in 2007, she should get that at a community pharmacy since it is paid under the Medicare prescription drug benefit

## 2022-10-05 NOTE — PROGRESS NOTES
Office Visit - Follow Up   Sheryl Trotter   82 year old female    Date of Visit: 10/5/2022    Chief Complaint   Patient presents with     RECHECK     6 month follow up        -------------------------------------------------------------------------------------------------------------------------  Assessment and Plan    Follow multiple issues, overall she was doing well, but she is been under some stress recently because her  Virgil, who is also my patient, has had number of rather complex medical problems, and Chrissy is Virgil's primary caregiver.    Chrissy is working with the neurology and spine clinics, continuing with physical therapy, and I encouraged her to be more physically active.    She is supposed to get a Prolia injection in January 2023.    Would like to see her back in about 6 months, and we will plan to have that be a annual wellness visit.    Chrissy plans to get her seasonal flu shot at [employer] along with her  Virgil    Neuroclaudication symptoms of lumbar spinal stenosis  Lumbosacral spondylosis without myelopathy  Chronic pain of left knee  Myofascial pain  Known Lumbar DDD (ESTEBAN 2005, for reported lumbar stenosis and spondylolisthesis and sciatica  Characteristic Pain Low back, both thighs, both calves. Worse when standing.  Difficulty climbing stairs. Most comfortable seated.  MRI 2012 had degenerating discs    Lyrica 75/150 was helpful to reduce paresthesias to some degree but she also had a foggy feeling with use.  She   Could consider duloxetine for neuropathy pending response to lyrica increase  Could trial RLS mdication like pramipexole if sympoms continue to break through even on 300 BID dose, or could increase lyrica to 300/375    Result Date: 8/29/2022  LUMBAR INTERLAMINAR EPIDURAL STEROID INJECTION WITH FLUOROSCOPIC     Distal symmetric polyneuropathy, small fiber peripheral neuropathy, also restless leg syndrome  Bilateral leg coldness leading to discomfort in the day and at night.    Lyrica used briefly, as of April 202 on nortriptyline, which seems to help  10/11/2021  Neurology: Cholo Khanna, DO  EMG through PM&R on 8/30/2019 with Dr. Izquierdo:  EMG on 8/30/2019 which showed an essentially normal study.  Comment NCS: Essentially normal study  Comment EMG: Normal study  1.  Normal needle EMG bilateral lower extremities.  Interpretation: Essentially normal study:   There is electrodiagnostic evidence of:  1.  Borderline bilateral sensory amplitudes.  This is normal for the patient's age is very likely normal finding.  I cannot, however, completely exclude a very mild sensory or sensorimotor peripheral polyneuropathy.   2 There is no electrodiagnostic evidence of lumbosacral radiculopathy, lumbosacral plexopathy, or focal neuropathy in the bilateral lower extremities.  Specifically, there is no electrodiagnostic evidence of tibial neuropathy at the tarsal tunnel in the bilateral lower extremities    Left knee osteoarthritis improved with Euflexxa injection.    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     Insomnia, KASANDRA is currently untreated  She stopped CPAP because not tolerated  sleep study in 2017, which showed sleep apnea. Stopped CPAP, a year and a half ago, as she wasn't sleeping and it wasn't positive experience.     Essential hypertension  Hydrochlorothiazide one half of a 50 mg pill. Lisinopril 20 mg daily.  She has a home blood pressure machine, but has not been using it regularly.  I recommend that she start recording the blood pressure numbers in a notebook, and 1 day bring the machine to clinic so that we can validate it against a manual reading to make sure it is accurate, then she can believe the numbers.     I told her that target blood pressure is  120/80 at rest and seated position measured on the right upper arm.  I reminded her that healthy diet particular less sodium, also regular exercise, and weight control will help with blood pressure, as well as treating sleep apnea    BP Readings from Last 6 Encounters:   10/05/22 124/82   10/03/22 (!) 139/90   09/20/22 (!) 141/69   08/29/22 114/68   08/12/22 (!) 150/71   07/15/22 137/70     Hyperlipidemia, mildly elevated LDL, very generous HDL which is favorable  Lipid profile July 24, 2019 with total cholesterol 265, triglycerides 43, LDL bad cholesterol modestly elevated 141, but with a very generous level of the HDL good cholesterol 115  10-  Hemoglobin A1C <=5.6 % 5.3      Gastroesophageal reflux, uses Tums intermittently    Constipation that she has noticed since starting nortriptyline for neuropathy and restless legs  I told her that constipation is a well-known side effect of nortriptyline because of its anticholinergic effects.  To combat constipation, I recommended she stay well-hydrated, continue with the Citrucel every day, and consider adding a capful of MiraLAX powder every day which is an osmotic laxative and is safe to use every day.  It adds water content of stools.    Osteopenia, on Prolia (started approximately 2017)  Chrissy believes she got a Prolia injection in July 2022, but for some reason I am having trouble finding it in her chart, if she did get an injection in July 2022, that means her next injection would be January 2023    Bone density scan December 15, 2019  1. The spine bone density L1-L4 (L3) with T-score 0.0 and significant improvement of 6.2 % compared to 2017.  2. Femoral bone densities show right femoral neck T- score -1.8 and significant improvement of 8.9% compared to 2017.  3. Trabecular bone score indicates moderate trabecular bone architecture.   79 y.o. female with LOW BONE DENSITY (OSTEOPENIA), stable on the current treatment.     Takes her calcium and vitamin  D     Overweight with body mass index of 28.  She has been less physically active because of her lumbar spine issues.  She does water aerobics which is excellent.     Menopause, status post complete hysterectomy  Had complete hysterectomy at age in her 50s, has not needed to get Pap smears anymore  Result Date: 9/6/2022  BILATERAL FULL FIELD DIGITAL SCREENING MAMMOGRAM WITH TOMOSYNTHESIS     She recalls a colonoscopy that was done approximately at age 72    Received third shot of Pfizer COVID-19 vaccine October 29, 2021, so she could get a booster anytime  Has received both pneumococcal vaccines    And recomment shingles vaccine    Could consider getting a tetanus booster since it looks like her last one was in 2007, she should get that at a community pharmacy since it is paid under the Medicare prescription drug benefit      --------------------------------------------------------------------------------------------------------------------------  History of Present Illness  This 82 year old old     Follow multiple issues, overall she was doing well, but she is been under some stress recently because her  Virgil, who is also my patient, has had number of rather complex medical problems, and Chrissy is Virgil's primary caregiver.    Chrissy is working with the neurology and spine clinics, continuing with physical therapy, and I encouraged her to be more physically active.    She is supposed to get a Prolia injection in January 2023.    Would like to see her back in about 6 months, and we will plan to have that be a annual wellness visit.    Chrissy plans to get her seasonal flu shot at [employer] along with her  Virgil    Neuroclaudication symptoms of lumbar spinal stenosis  Lumbosacral spondylosis without myelopathy  Chronic pain of left knee  Myofascial pain  Known Lumbar DDD (ESTEBAN 2005, for reported lumbar stenosis and spondylolisthesis and sciatica  Characteristic Pain Low back, both thighs, both calves. Worse when  standing.  Difficulty climbing stairs. Most comfortable seated.  MRI 2012 had degenerating discs    Lyrica 75/150 was helpful to reduce paresthesias to some degree but she also had a foggy feeling with use.  She   Could consider duloxetine for neuropathy pending response to lyrica increase  Could trial RLS mdication like pramipexole if sympoms continue to break through even on 300 BID dose, or could increase lyrica to 300/375    Result Date: 8/29/2022  LUMBAR INTERLAMINAR EPIDURAL STEROID INJECTION WITH FLUOROSCOPIC         Wt Readings from Last 3 Encounters:   10/05/22 83 kg (183 lb)   08/12/22 81.2 kg (179 lb)   06/10/22 81.2 kg (179 lb)     BP Readings from Last 3 Encounters:   10/05/22 124/82   10/03/22 (!) 139/90   09/20/22 (!) 141/69       ---------------------------------------------------------------------------------------------------------------------------    Medications, Allergies, Social, and Problem List   Current Outpatient Medications   Medication Sig Dispense Refill     acetaminophen (TYLENOL) 650 MG CR tablet Take 650 mg by mouth 2 times daily as needed for mild pain or fever       calcium carbonate-vitamin D3 600 mg (1,500 mg)-800 unit Chew [CALCIUM CARBONATE-VITAMIN D3 600 MG (1,500 MG)-800 UNIT CHEW] Chew 1 tablet daily.       denosumab 60 mg/mL Syrg Inject 60 mg Subcutaneous once       fluocinonide (LIDEX) 0.05 % external ointment use 1 Application three times a day topically for up to 10 days       hydrochlorothiazide (HYDRODIURIL) 50 MG tablet TAKE 1/2 TABLET EVERY DAY (SUBSTITUTED FOR  HYDRODIURIL) 45 tablet 6     lisinopril (ZESTRIL) 20 MG tablet TAKE 1 TABLET EVERY DAY 90 tablet 3     METHYLCELLULOSE (CITRUCEL ORAL) [METHYLCELLULOSE (CITRUCEL ORAL)] Take 1 tablet by mouth daily.       pregabalin (LYRICA) 75 MG capsule Take 2 capsules (150 mg) by mouth 2 times daily 180 capsule 3     Vitamin D3 (CHOLECALCIFEROL) 25 mcg (1000 units) tablet Take 1 tablet (25 mcg) by mouth daily 90 tablet 3  "    Allergies   Allergen Reactions     Duloxetine Headache     Nausea, difficult sleeping ankles cramps, loose bowel      Gabapentin Anxiety     Social History     Tobacco Use     Smoking status: Former Smoker     Years: 4.00     Types: Cigarettes     Smokeless tobacco: Never Used     Tobacco comment: smoking in college-social   Substance Use Topics     Alcohol use: Yes     Alcohol/week: 3.3 standard drinks     Comment: Alcoholic Drinks/day: occasional glass of wine     Drug use: No     Patient Active Problem List   Diagnosis     Osteoporosis     Hyperlipidemia     Essential hypertension     Primary insomnia     Trochanteric Bursitis     Lumbar Spondylosis     Lumbar Disc Degeneration     Headache     Osteoarthritis of the Knee     Polyarthralgia     Status post left hip replacement     Hip dislocation, left (H)     Bilateral shoulder pain     Polymyalgia (H)     Primary osteoarthritis involving multiple joints     Chondrocalcinosis     KASANDRA (obstructive sleep apnea)     Other polyneuropathy     Spinal stenosis of lumbar region with neurogenic claudication        Reviewed, reconciled and updated       Physical Exam   General Appearance:     /82 (BP Location: Left arm, Patient Position: Sitting, Cuff Size: Adult Large)   Pulse 79   Ht 1.689 m (5' 6.5\")   Wt 83 kg (183 lb)   SpO2 98%   BMI 29.09 kg/m      Chrissy appears generally well, normal affect  Able to rise easily from seated to standing, mobility still looks good       Additional Information   I spent 20 minutes on this encounter, including reviewing interval history since last visit, examining the patient, explaining and counseling the issues enumerated in the Assessment and Plan (patient given a copy)     RACHAEL PEDERSEN MD, MD        "

## 2022-10-07 ENCOUNTER — TELEPHONE (OUTPATIENT)
Dept: NEUROLOGY | Facility: CLINIC | Age: 82
End: 2022-10-07

## 2022-10-07 NOTE — TELEPHONE ENCOUNTER
Prior Authorization Retail Medication Request    Medication/Dose: pregabalin (LYRICA) 75 MG capsule  ICD code (if different than what is on RX):      Previously Tried and Failed:    Rationale:  Polyneuropathy;  She is tolerating the 75/150 dose of lyrica relating to RLS and paresthesias. Recommend she continue to receive this treatment.    Insurance Name:  medicare  Insurance ID:  [id number]       Pharmacy Information (if different than what is on RX)  Name:    Phone:

## 2022-10-07 NOTE — TELEPHONE ENCOUNTER
Prior Authorization Approval        Authorization Effective Date: 1/1/2022  Authorization Expiration Date: 12/31/2023  Medication: pregabalin (LYRICA) 75 MG capsule  Approved Dose/Quantity: 120 per 30 days  Reference #: [id number]   Insurance Company: [employer] - Phone [phone] Fax [phone]  Expected CoPay:       Which Pharmacy is filling the prescription (Not needed for infusion/clinic administered): [id number] - [place], [place] - [address] [place] [address]  Pharmacy Notified: Yes - spoke to [facility]  Patient Notified: No

## 2022-10-07 NOTE — TELEPHONE ENCOUNTER
Central Prior Authorization Team   Phone: [phone]      PA Initiation - I've submitted the P/A request to insurance as an URGENT request.    Medication: pregabalin (LYRICA) 75 MG capsule  Insurance Company: [employer] - Phone [phone] Fax [phone]  Pharmacy Filling the Rx: [facility] [id number] [place] [address] - [facility] [address]  Filling Pharmacy Phone: [phone]  Filling Pharmacy Fax:    Start Date: 10/7/2022

## 2022-10-10 ENCOUNTER — TRANSFERRED RECORDS (OUTPATIENT)
Dept: HEALTH INFORMATION MANAGEMENT | Facility: CLINIC | Age: 82
End: 2022-10-10

## 2022-10-12 DIAGNOSIS — I10 ESSENTIAL HYPERTENSION: ICD-10-CM

## 2022-10-12 RX ORDER — HYDROCHLOROTHIAZIDE 50 MG/1
TABLET ORAL
Qty: 45 TABLET | Refills: 0 | Status: SHIPPED | OUTPATIENT
Start: 2022-10-12 | End: 2023-03-09

## 2022-10-12 NOTE — TELEPHONE ENCOUNTER
"Last Written Prescription Date:  10/1/2021  Last Fill Quantity: 45,  # refills: 6   Last office visit provider:  10/5/2022     Requested Prescriptions   Pending Prescriptions Disp Refills     hydrochlorothiazide (HYDRODIURIL) 50 MG tablet [Pharmacy Med Name: HYDROCHLOROTHIAZIDE 50 MG Tablet] 45 tablet 6     Sig: TAKE 1/2 TABLET EVERY DAY (SUBSTITUTED FOR  HYDRODIURIL)       Diuretics (Including Combos) Protocol Passed - 10/12/2022  8:04 AM        Passed - Blood pressure under 140/90 in past 12 months     BP Readings from Last 3 Encounters:   10/05/22 124/82   10/03/22 (!) 139/90   09/20/22 (!) 141/69                 Passed - Recent (12 mo) or future (30 days) visit within the authorizing provider's specialty     Patient has had an office visit with the authorizing provider or a provider within the authorizing providers department within the previous 12 mos or has a future within next 30 days. See \"Patient Info\" tab in inbasket, or \"Choose Columns\" in Meds & Orders section of the refill encounter.              Passed - Medication is active on med list        Passed - Patient is age 18 or older        Passed - No active pregancy on record        Passed - Normal serum creatinine on file in past 12 months     Recent Labs   Lab Test 01/04/22  1050   CR 0.79              Passed - Normal serum potassium on file in past 12 months     Recent Labs   Lab Test 01/04/22  1050   POTASSIUM 3.9                    Passed - Normal serum sodium on file in past 12 months     Recent Labs   Lab Test 01/04/22  1050                 Passed - No positive pregnancy test in past 12 months             Laura Helms RN 10/12/22 2:51 PM  "

## 2022-10-25 ASSESSMENT — SLEEP AND FATIGUE QUESTIONNAIRES
HOW LIKELY ARE YOU TO NOD OFF OR FALL ASLEEP WHILE SITTING INACTIVE IN A PUBLIC PLACE: WOULD NEVER DOZE
HOW LIKELY ARE YOU TO NOD OFF OR FALL ASLEEP WHILE SITTING QUIETLY AFTER LUNCH WITHOUT ALCOHOL: SLIGHT CHANCE OF DOZING
HOW LIKELY ARE YOU TO NOD OFF OR FALL ASLEEP WHILE WATCHING TV: SLIGHT CHANCE OF DOZING
HOW LIKELY ARE YOU TO NOD OFF OR FALL ASLEEP WHEN YOU ARE A PASSENGER IN A CAR FOR AN HOUR WITHOUT A BREAK: SLIGHT CHANCE OF DOZING
HOW LIKELY ARE YOU TO NOD OFF OR FALL ASLEEP IN A CAR, WHILE STOPPED FOR A FEW MINUTES IN TRAFFIC: WOULD NEVER DOZE
HOW LIKELY ARE YOU TO NOD OFF OR FALL ASLEEP WHILE SITTING AND READING: SLIGHT CHANCE OF DOZING
HOW LIKELY ARE YOU TO NOD OFF OR FALL ASLEEP WHILE SITTING AND TALKING TO SOMEONE: WOULD NEVER DOZE
HOW LIKELY ARE YOU TO NOD OFF OR FALL ASLEEP WHILE LYING DOWN TO REST IN THE AFTERNOON WHEN CIRCUMSTANCES PERMIT: HIGH CHANCE OF DOZING

## 2022-10-28 ENCOUNTER — NURSE TRIAGE (OUTPATIENT)
Dept: INTERNAL MEDICINE | Facility: CLINIC | Age: 82
End: 2022-10-28

## 2022-10-28 ENCOUNTER — TELEPHONE (OUTPATIENT)
Dept: INTERNAL MEDICINE | Facility: CLINIC | Age: 82
End: 2022-10-28

## 2022-10-28 ENCOUNTER — VIRTUAL VISIT (OUTPATIENT)
Dept: SLEEP MEDICINE | Facility: CLINIC | Age: 82
End: 2022-10-28
Attending: PSYCHIATRY & NEUROLOGY
Payer: MEDICARE

## 2022-10-28 VITALS — WEIGHT: 180 LBS | BODY MASS INDEX: 28.25 KG/M2 | HEIGHT: 67 IN

## 2022-10-28 DIAGNOSIS — G62.89 OTHER POLYNEUROPATHY: ICD-10-CM

## 2022-10-28 DIAGNOSIS — G25.81 RESTLESS LEGS SYNDROME (RLS): ICD-10-CM

## 2022-10-28 DIAGNOSIS — F51.04 CHRONIC INSOMNIA: Primary | ICD-10-CM

## 2022-10-28 DIAGNOSIS — G47.33 OSA (OBSTRUCTIVE SLEEP APNEA): ICD-10-CM

## 2022-10-28 PROCEDURE — 99205 OFFICE O/P NEW HI 60 MIN: CPT | Mod: 95 | Performed by: INTERNAL MEDICINE

## 2022-10-28 NOTE — PATIENT INSTRUCTIONS
"      MY TREATMENT INFORMATION FOR SLEEP APNEA-  Sheryl Trotter      Am I having a sleep study at a sleep center?  --->Due to normal delays, you will be contacted within 2-4 weeks to schedule      Frequently asked questions:  1. What is Obstructive Sleep Apnea (KASANDRA)? KASANDRA is the most common type of sleep apnea. Apnea means, \"without breath.\"  Apnea is most often caused by narrowing or collapse of the upper airway as muscles relax during sleep.   Almost everyone has occasional apneas. Most people with sleep apnea have had brief interruptions at night frequently for many years.  The severity of sleep apnea is related to how frequent and severe the events are.   2. What are the consequences of KASANDRA? Symptoms include: feeling sleepy during the day, snoring loudly, gasping or stopping of breathing, trouble sleeping, and occasionally morning headaches or heartburn at night.  Sleepiness can be serious and even increase the risk of falling asleep while driving. Other health consequences may include development of high blood pressure and other cardiovascular disease in persons who are susceptible. Untreated KASANDRA  can contribute to heart disease, stroke and diabetes.   3. What are the treatment options? In most situations, sleep apnea is a lifelong disease that must be managed with daily therapy. Medications are not effective for sleep apnea and surgery is generally not considered until other therapies have been tried. Your treatment is your choice . Continuous Positive Airway (CPAP) works right away and is the therapy that is effective in nearly everyone. An oral device to hold your jaw forward is usually the next most reliable option. Other options include postioning devices (to keep you off your back), weight loss, and surgery including a tongue pacing device. There is more detail about some of these options below.  4. Are my sleep studies covered by insurance? Although we will request verification of coverage, we advise you " also check in advance of the study to ensure there is coverage.    Important tips for those choosing CPAP and similar devices   Know your equipment:  CPAP is continuous positive airway pressure that prevents obstructive sleep apnea by keeping the throat from collapsing while you are sleeping. In most cases, the device is  smart  and can slowly self-adjusts if your throat collapses and keeps a record every day of how well you are treated-this information is available to you and your care team.  BPAP is bilevel positive airway pressure that keeps your throat open and also assists each breath with a pressure boost to maintain adequate breathing.  Special kinds of BPAP are used in patients who have inadequate breathing from lung or heart disease. In most cases, the device is  smart  and can slowly self-adjusts to assist breathing. Like CPAP, the device keeps a record of how well you are treated.  Your mask is your connection to the device. You get to choose what feels most comfortable and the staff will help to make sure if fits. Here: are some examples of the different masks that are available:       Key points to remember on your journey with sleep apnea:  Sleep study.  PAP devices often need to be adjusted during a sleep study to show that they are effective and adjusted right.  Good tips to remember: Try wearing just the mask during a quiet time during the day so your body adapts to wearing it. A humidifier is recommended for comfort in most cases to prevent drying of your nose and throat. Allergy medication from your provider may help you if you are having nasal congestion.  Getting settled-in. It takes more than one night for most of us to get used to wearing a mask. Try wearing just the mask during a quiet time during the day so your body adapts to wearing it. A humidifier is recommended for comfort in most cases. Our team will work with you carefully on the first day and will be in contact within 4 days and  again at 2 and 4 weeks for advice and remote device adjustments. Your therapy is evaluated by the device each day.   Use it every night. The more you are able to sleep naturally for 7-8 hours, the more likely you will have good sleep and to prevent health risks or symptoms from sleep apnea. Even if you use it 4 hours it helps. Occasionally all of us are unable to use a medical therapy, in sleep apnea, it is not dangerous to miss one night.   Communicate. Call our skilled team on the number provided on the first day if your visit for problems that make it difficult to wear the device. Over 2 out of 3 patients can learn to wear the device long-term with help from our team. Remember to call our team or your sleep providers if you are unable to wear the device as we may have other solutions for those who cannot adapt to mask CPAP therapy. It is recommended that you sleep your sleep provider within the first 3 months and yearly after that if you are not having problems.   Use it for your health. We encourage use of CPAP masks during daytime quiet periods to allow your face and brain to adapt to the sensation of CPAP so that it will be a more natural sensation to awaken to at night or during naps. This can be very useful during the first few weeks or months of adapting to CPAP though it does not help medically to wear CPAP during wakefulness and  should not be used as a strategy just to meet guidelines.  Take care of your equipment. Make sure you clean your mask and tubing using directions every day and that your filter and mask are replaced as recommended or if they are not working.     BESIDES CPAP, WHAT OTHER THERAPIES ARE THERE?    Positioning Device  Positioning devices are generally used when sleep apnea is mild and only occurs on your back.This example shows a pillow that straps around the waist. It may be appropriate for those whose sleep study shows milder sleep apnea that occurs primarily when lying flat on  one's back. Preliminary studies have shown benefit but effectiveness at home may need to be verified by a home sleep test. These devices are generally not covered by medical insurance.  Examples of devices that maintain sleeping on the back to prevent snoring and mild sleep apnea.    Belt type body positioner  [email or web address]/    Electronic reminder  http://nightshifttherapy.com/            Oral Appliance  What is oral appliance therapy?  An oral appliance device fits on your teeth at night like a retainer used after having braces. The device is made by a specialized dentist and requires several visits over 1-2 months before a manufactured device is made to fit your teeth and is adjusted to prevent your sleep apnea. Once an oral device is working properly, snoring should be improved. A home sleep test may be recommended at that time if to determine whether the sleep apnea is adequately treated.       Some things to remember:  -Oral devices are often, but not always, covered by your medical insurance. Be sure to check with your insurance provider.   -If you are referred for oral therapy, you will be given a list of specialized dentists to consider or you may choose to visit the Web site of the American Academy of Dental Sleep Medicine  -Oral devices are less likely to work if you have severe sleep apnea or are extremely overweight.     More detailed information  An oral appliance is a small acrylic device that fits over the upper and lower teeth  (similar to a retainer or a mouth guard). This device slightly moves jaw forward, which moves the base of the tongue forward, opens the airway, improves breathing for effective treat snoring and obstructive sleep apnea in perhaps 7 out of 10 people .  The best working devices are custom-made by a dental device  after a mold is made of the teeth 1, 2, 3.  When is an oral appliance indicated?  Oral appliance therapy is recommended as a first-line treatment for  patients with primary snoring, mild sleep apnea, and for patients with moderate sleep apnea who prefer appliance therapy to use of CPAP4, 5. Severity of sleep apnea is determined by sleep testing and is based on the number of respiratory events per hour of sleep.   How successful is oral appliance therapy?  The success rate of oral appliance therapy in patients with mild sleep apnea is 75-80% while in patients with moderate sleep apnea it is 50-70%. The chance of success in patients with severe sleep apnea is 40-50%. The research also shows that oral appliances have a beneficial effect on the cardiovascular health of KASANDRA patients at the same magnitude as CPAP therapy7.  Oral appliances should be a second-line treatment in cases of severe sleep apnea, but if not completely successful then a combination therapy utilizing CPAP plus oral appliance therapy may be effective. Oral appliances tend to be effective in a broad range of patients although studies show that the patients who have the highest success are females, younger patients, those with milder disease, and less severe obesity. 3, 6.   Finding a dentist that practices dental sleep medicine  Specific training is available through the American Academy of Dental Sleep Medicine for dentists interested in working in the field of sleep. To find a dentist who is educated in the field of sleep and the use of oral appliances, near you, visit the Web site of the American Academy of Dental Sleep Medicine.    References  1. Luma et al. Objectively measured vs self-reported compliance during oral appliance therapy for sleep-disordered breathing. Chest 2013; 144(5): 6123-8248.  2. Parth et al. Objective measurement of compliance during oral appliance therapy for sleep-disordered breathing. Thorax 2013; 68(1): 91-96.  3. Pacheco et al. Mandibular advancement devices in 620 men and women with KASANDRA and snoring: tolerability and predictors of treatment success.  Chest 2004; 125: 0724-8664.  4. Adolfo et al. Oral appliances for snoring and KASANDRA: a review. Sleep 2006; 29: 244-262.  5. Yehuda et al. Oral appliance treatment for KASANDRA: an update. J Clin Sleep Med 2014; 10(2): 215-227.  6. Heriberto et al. Predictors of OSAH treatment outcome. J Dent Res 2007; 86: 8281-2241.      Weight Loss:    Weight loss is a long-term strategy that may improve sleep apnea in some patients.    Weight management is a personal decision and the decision should be based on your interest and the potential benefits.  If you are interested in exploring weight loss strategies, the following discussion covers the impact on weight loss on sleep apnea and the approaches that may be successful.    Being overweight does not necessarily mean you will have health consequences.  Those who have BMI over 35 or over 27 with existing medical conditions carries greater risk.   Weight loss decreases severity of sleep apnea in most people with obesity. For those with mild obesity who have developed snoring with weight gain, even 15-30 pound weight loss can improve and occasionally eliminate sleep apnea.  Structured and life-long dietary and health habits are necessary to lose weight and keep healthier weight levels.     Though there may be significant health benefits from weight loss, long-term weight loss is very difficult to achieve- studies show success with dietary management in less than 10% of people. In addition, substantial weight loss may require years of dietary control and may be difficult if patients have severe obesity. In these cases, surgical management may be considered.  Finally, older individuals who have tolerated obesity without health complications may be less likely to benefit from weight loss strategies.           Surgery:    Surgery for obstructive sleep apnea is considered generally only when other therapies fail to work. Surgery may be discussed with you if you are having a difficult  time tolerating CPAP and or when there is an abnormal structure that requires surgical correction.  Nose and throat surgeries often enlarge the airway to prevent collapse.  Most of these surgeries create pain for 1-2 weeks and up to half of the most common surgeries are not effective throughout life.  You should carefully discuss the benefits and drawbacks to surgery with your sleep provider and surgeon to determine if it is the best solution for you.   More information  Surgery for KASANDRA is directed at areas that are responsible for narrowing or complete obstruction of the airway during sleep.  There are a wide range of procedures available to enlarge and/or stabilize the airway to prevent blockage of breathing in the three major areas where it can occur: the palate, tongue, and nasal regions.  Successful surgical treatment depends on the accurate identification of the factors responsible for obstructive sleep apnea in each person.  A personalized approach is required because there is no single treatment that works well for everyone.  Because of anatomic variation, consultation with an examination by a sleep surgeon is a critical first step in determining what surgical options are best for each patient.  In some cases, examination during sedation may be recommended in order to guide the selection of procedures.  Patients will be counseled about risks and benefits as well as the typical recovery course after surgery. Surgery is typically not a cure for a person s KASANDRA.  However, surgery will often significantly improve one s KASANDRA severity (termed  success rate ).  Even in the absence of a cure, surgery will decrease the cardiovascular risk associated with OSA7; improve overall quality of life8 (sleepiness, functionality, sleep quality, etc).      Palate Procedures:  Patients with KASANDRA often have narrowing of their airway in the region of their tonsils and uvula.  The goals of palate procedures are to widen the airway in  this region as well as to help the tissues resist collapse.  Modern palate procedure techniques focus on tissue conservation and soft tissue rearrangement, rather than tissue removal.  Often the uvula is preserved in this procedure. Residual sleep apnea is common in patient after pharyngoplasty with an average reduction in sleep apnea events of 33%2.      Tongue Procedures:  ExamWhile patients are awake, the muscles that surround the throat are active and keep this region open for breathing. These muscles relax during sleep, allowing the tongue and other structures to collapse and block breathing.  There are several different tongue procedures available.  Selection of a tongue base procedure depends on characteristics seen on physical exam.  Generally, procedures are aimed at removing bulky tissues in this area or preventing the back of the tongue from falling back during sleep.  Success rates for tongue surgery range from 50-62%3.    Hypoglossal Nerve Stimulation:  Hypoglossal nerve stimulation has recently received approval from the [place] Food and Drug Administration for the treatment of obstructive sleep apnea.  This is based on research showing that the system was safe and effective in treating sleep apnea6.  Results showed that the median AHI score decreased 68%, from 29.3 to 9.0. This therapy uses an implant system that senses breathing patterns and delivers mild stimulation to airway muscles, which keeps the airway open during sleep.  The system consists of three fully implanted components: a small generator (similar in size to a pacemaker), a breathing sensor, and a stimulation lead.  Using a small handheld remote, a patient turns the therapy on before bed and off upon awakening.    Candidates for this device must be greater than 22 years of age, have moderate to severe KASANDRA (AHI between 20-65), BMI less than 32, have tried CPAP/oral appliance without success, and have appropriate upper airway anatomy  (determined by a sleep endoscopy performed by Dr. Doe).    Hypoglossal Nerve Stimulation Pathway:    The sleep surgeon s office will work with the patient through the insurance prior-authorization process (including communications and appeals).    Nasal Procedures:  Nasal obstruction can interfere with nasal breathing during the day and night.  Studies have shown that relief of nasal obstruction can improve the ability of some patients to tolerate positive airway pressure therapy for obstructive sleep apnea1.  Treatment options include medications such as nasal saline, topical corticosteroid and antihistamine sprays, and oral medications such as antihistamines or decongestants. Non-surgical treatments can include external nasal dilators for selected patients. If these are not successful by themselves, surgery can improve the nasal airway either alone or in combination with these other options.      Combination Procedures:  Combination of surgical procedures and other treatments may be recommended, particularly if patients have more than one area of narrowing or persistent positional disease.  The success rate of combination surgery ranges from 66-80%2,3.    References  Feliciano ABDI. The Role of the Nose in Snoring and Obstructive Sleep Apnoea: An Update.  Eur Arch Otorhinolaryngol. 2011; 268: 1365-73.   Vipul SM; Luis Enrique JA; Kaycee JR; Pallanch JF; Armando MB; Bennie SG; Yuliya BIGGS. Surgical modifications of the upper airway for obstructive sleep apnea in adults: a systematic review and meta-analysis. SLEEP 2010;33(10):8004-8671. Jerome BARAHONA. Hypopharyngeal surgery in obstructive sleep apnea: an evidence-based medicine review.  Arch Otolaryngol Head Neck Surg. 2006 Feb;132(2):206-13.  Chente YH1, Yaritza Y, Jay CHELA. The efficacy of anatomically based multilevel surgery for obstructive sleep apnea. Otolaryngol Head Neck Surg. 2003 Oct;129(4):327-35.  Jerome BARAHONA, Goldberg A. Hypopharyngeal Surgery in Obstructive Sleep  Apnea: An Evidence-Based Medicine Review. Arch Otolaryngol Head Neck Surg. 2006 Feb;132(2):206-13.  Seth PJ et al. Upper-Airway Stimulation for Obstructive Sleep Apnea.  N Engl J Med. 2014 Jan 9;370(2):139-49.  Cristnia Y et al. Increased Incidence of Cardiovascular Disease in Middle-aged Men with Obstructive Sleep Apnea. Am J Respir Crit Care Med; 2002 166: 159-165  Bunch EM et al. Studying Life Effects and Effectiveness of Palatopharyngoplasty (SLEEP) study: Subjective Outcomes of Isolated Uvulopalatopharyngoplasty. Otolaryngol Head Neck Surg. 2011; 144: 623-631.        WHAT IF I ONLY HAVE SNORING?    Mandibular advancement devices, lateral sleep positioning, long-term weight loss and treatment of nasal allergies have been shown to improve snoring.  Exercising tongue muscles with a game (Applifierttps://apps.Biolase/us/kyle/soundly-reduce-snoring/cq6931861316) or stimulating the tongue during the day with a device (https://doi.org/10.3390/idd02778428) have improved snoring in some individuals.    Remember to Drive Safe... Drive Alive     Sleep health profoundly affects your health, mood, and your safety.  Thirty three percent of the population (one in three of us) is not getting enough sleep and many have a sleep disorder. Not getting enough sleep or having an untreated / undertreated sleep condition may make us sleepy without even knowing it. In fact, our driving could be dramatically impaired due to our sleep health. As your provider, here are some things I would like you to know about driving:     Here are some warning signs for impairment and dangerous drowsy driving:              -Having been awake more than 16 hours               -Looking tired               -Eyelid drooping              -Head nodding (it could be too late at this point)              -Driving for more than 30 minutes     Some things you could do to make the driving safer if you are experiencing some drowsiness:              -Stop driving and  rest              -Call for transportation              -Make sure your sleep disorder is adequately treated     Some things that have been shown NOT to work when experiencing drowsiness while driving:              -Turning on the radio              -Opening windows              -Eating any  distracting  /  entertaining  foods (e.g., sunflower seeds, candy, or any other)              -Talking on the phone      Your decision may not only impact your life, but also the life of others. Please, remember to drive safe for yourself and all of us.        Insomnia and Behavioral Sleep Medicine Program    The [facility] Insomnia and Behavioral Sleep Medicine Program provides non-drug treatment for sleep problems including:    Cognitive-behavioral Therapies for Insomnia (CBT-I)  Management of Shift-work and Jet Lag  Management of Delayed, Advanced and Irregular Circadian Rhythm Sleep Disorders  Imagery Rehearsal Therapy (IRT) for Nightmare Disorder  PAP Therapy Desensitization    You have been referred for consultation with a sleep psychologist who specializes in behavioral sleep medicine and treatment of insomnia.  The [facility] Insomnia and Behavioral Sleep Medicine Program offers individualized telehealth services through our [facility] and online CBT-I.    Preparing for your Consultation    You will need to keep a Sleep Diary for at least a week prior to your visit. Complete the sleep diary each day first thing after you get up by answering a few key questions about your sleep using our convenient mobile alma or paper sleep diary.  Your answers should be based on your recall of the past 24 hours.  Avoid watching the clock or recording data during the night.     Insomnia  Alma    The Insomnia  mobile alma  is a convenient way to keep track of your sleep prior to your sleep consultation.  Simply download the free alma on your Apple or Android phone and record your information each  morning.  The kyle includes training, self-assessment, and sleep schedule recommendations.  Prior to your consultation we recommend you use only the sleep diary function. You can e-mail yourself a copy of your sleep diary data by going to the Settings section and using the [employer] User Data function.  During your consultation your provider will review the data with you.           [employer] Sleep Diary    You can also track your sleep using the [employer] paper sleep diary.  You can upload your sleep diary and send it via a [employer] message, fax it to [phone], or have it with you at the time of your consultation.            CBT-I:  Frequently Asked Questions    What is CBT-I?    Cognitive Behavioral Therapy for Insomnia, also known as CBT-I, is a highly effective non-drug treatment for insomnia. The American College of Physicians recommends CBT-I as the first treatment for chronic insomnia.  Research has shown CBT-I to be safer and more effective long term than sleeping pills.    What does CBT-I involve?     CBT-I targets behaviors that lead to chronic insomnia:  Habits that weaken the bed as a cue for sleep  Habits that weaken your body's sleep drive and sleep/wake clock   Unhelpful sleep thoughts that increase sleep-related worry and arousal.    The process involves 3-6 telehealth visits that guide you to implement proven strategies to get a better night's sleep.    People often see improvement in their sleep within a few weeks. Research shows if you keep practicing the skills you learn your sleep is likely to continue to improve 6-12 months after treatment.    Does this program prescribe or manage sleep medication?    No.  Your prescribing provider is responsible to assist you in managing your sleep medications.  Some people choose to stop using sleep medication prior to or during CBT-I.  Our program can work with your prescribing provider to help reduce or eliminate use of sleep medications.      Getting Started Today!    If you haven't already done so, we recommend you consider making the following changes to your sleep habits prior to your sleep consultation:     Reduce your consumption of caffeine and alcohol.  Both can disrupt sleep and make strengthening your sleep more difficult.  Specifically:    - Avoid caffeine within 6 hours of bedtime   - No more than 3 caffeinated beverages per day (e.g. 8 oz. cup coffee or 12 oz. cup soda)            - No alcohol within 3 hours of bedtime    Make sure your bedroom is quiet, comfortable and dark.  Noise, light and an uncomfortable sleep space can harm your sleep.      Keep the same sleep schedule 7 days a week.unless you do shift work.      Online CBT-I     If you want to get started today, research indicates that online CBT-I can be effective for some individuals. These programs requires comfort with kyle-based or online learning.  However, digital CBT-I programs are not for everyone.  Contraindications include:    Seizure disorders,   Bipolar disorder,   Unstable medical or mental health conditions,   Frailty or risk of falling  Pregnancy    You should consult a sleep specialist before using these resources if you have:    Sleep Apnea  Restless Leg Syndrome  Sleep Walking  REM behavior disorder  Night Terrors  Excessive Daytime Sleepiness  Are engaged in shift work  Use prescription sleep medication    Our Online CBT-I program    If your sleep provider recommends online CBT-I for you , the cost for an entire 6-week program is $40.    To get started, copy and paste the link below which will take you to the landing page to register:                           [email or web address]               If you wish to complete the online CBT-I program but do not plan to follow-up with a sleep provider, you are set to begin the program.    If you are planning to work with an [facility] sleep provider, there are a couple of extra steps you can take to  share your sleep data with your sleep provider.  To share sleep log data, go to the left side navigation and click on the  share sleep log  button:         You will be taken to the page below where you will enter  the provider code [facility] into the box.          Once you press the locate button, the information for [employer] will pop up as below.  By pressing the Submit button your data will be sent to our  secure [employer] sleep program portal for review by your sleep provider. You will only need to do this step once.                                  Self-help Workbooks for Insomnia    If you have found self-help books useful in the past, you may want to consider reading one of the following books prior to your consultation:    Say Talya to Insomnia: The Six-Week, Drug-Free Program Developed at [employer].  Maikel Buchanan MD. Available in paperback, Omari, and audiobook.    Overcoming Insomnia: A Cognitive-Behavioral Therapy Approach, Workbook.  Brandan Esparza, PhD  and Kateryna Fontana, PhD.  Available in paperback and Omari.    Quiet Your Mind and Get to Sleep: Solutions to Insomnia for Those with Depression, Anxiety, or Chronic Pain.  Carolina Gauthier, PhD and Kateryna Fontana, PhD.  Available in paperback and Omari

## 2022-10-28 NOTE — TELEPHONE ENCOUNTER
Provider Recommendation Follow Up:   Unable to reach patient/caregiver.     Please see MyC message from yesterday. Messaged pt to call [phone] to schedule in clinic appt for next week per Dr Thompson.    LEIDY MillerN, RN  [facility]

## 2022-10-28 NOTE — TELEPHONE ENCOUNTER
"Nurse Triage SBAR    Is this a 2nd Level Triage? YES, LICENSED PRACTITIONER REVIEW IS REQUIRED    Situation:   Per Ian starkey from yesterday:  \"I was awakend about 3:30 am with a pain under my breast. and also in my upper back, same level. I took 2 tums and tried to fall asleep. the pain persisted so , i got up and took 2 more tums. the pain persisted.The pain was different than that of lower back pain and that of my occsional indigestion discomfort. I left the bed and sat in a chair for about a half an hour, until I felt better.  I had no shortness of breath or any other problems.  I should have prefaced by saying at 5:00 pmI had a large  cheese, murray mushoom [employer]. Nothing after beto and I have not eated today .\"     Background:   Took some Tums yesterday but the epigastric discomfort is still there with some discomfort in the back.    Assessment:   Please see Initial Assessment Questions below.    Felt better during the day. A little discomfort right now. Some discomfort under what she calls her chest, in the middle where the sternum is (epigastric) and she can feel some discomfort in the back at the same level.    Patient state she feels like she would like to burp, but is not burping.   No N/V/D.  No Fever or chills.  No abdominal or chest injury.  No SOB or difficulty breathing.      Protocol Recommended Disposition:   See in Office Today    Recommendation:   Advised pt to be seen at [facility] as there are no available clinic appt for today. Care advices given as well.    Patient stated she is not sure if she should be seen or wait it out. Patient stated she was feeling better yesterday as she was up and moving and doing some water exercise. She is not sure if this is indigestion or something else. She would like Dr Thompson to provide her with some advice.    Please respond via [facility] as she will be on a Telehealth visit with another provider at 9 AM today.     Routed to provider    Does the patient meet one of the " following criteria for ADS visit consideration? 16+ years old, with an [facility] PCP       Reason for Disposition    Age > 60 years    Additional Information    Negative: Passed out (i.e., fainted, collapsed and was not responding)    Negative: Shock suspected (e.g., cold/pale/clammy skin, too weak to stand, low BP, rapid pulse)    Negative: Visible sweat on face or sweat is dripping down    Negative: Chest pain    Negative: SEVERE abdominal pain (e.g., excruciating)    Negative: Pain lasting > 10 minutes and over 50 years old    Negative: Pain lasting > 10 minutes and over 40 years old and associated chest, arm, neck, upper back, or jaw pain    Negative: Pain lasting > 10 minutes and over 35 years old and at least one cardiac risk factor (i.e., hypertension, diabetes, obesity, smoker or strong family history of heart disease)    Negative: Pain lasting > 10 minutes and history of heart disease (i.e., heart attack, bypass surgery, angina, angioplasty, CHF)    Negative: Recent injury to the abdomen    Negative: Vomiting red blood or black (coffee ground) material    Negative: Bloody, black, or tarry bowel movements  (Exception: Chronic-unchanged black-grey bowel movements and is taking iron pills or Pepto-Bismol.)    Negative: Pregnant 20 weeks or more and new hand or face swelling    Negative: Constant abdominal pain lasting > 2 hours    Negative: Vomiting bile (green color)    Negative: Patient sounds very sick or weak to the triager    Negative: Vomiting and abdomen looks much more swollen than usual    Negative: White of the eyes have turned yellow (i.e., jaundice)    Negative: Fever > 103 F (39.4 C)    Negative: Fever > 101 F (38.3 C) and over 60 years of age    Negative: Fever > 100.0 F (37.8 C) and has diabetes mellitus or a weak immune system (e.g., HIV positive, cancer chemotherapy, organ transplant, splenectomy, chronic steroids)    Negative: Fever > 100.0 F (37.8 C) and bedridden (e.g., nursing home patient,  "stroke, chronic illness, recovering from surgery)    Answer Assessment - Initial Assessment Questions  1. LOCATION: \"Where does it hurt?\"       Epigastric discomfort and pressure in throat    2. RADIATION: \"Does the pain shoot anywhere else?\" (e.g., chest, back)      Not radiating, but can feel equal amount of pain in the front and in the back    3. ONSET: \"When did the pain begin?\" (e.g., minutes, hours or days ago)       Yesterday early morning    4. SUDDEN: \"Gradual or sudden onset?\"      Sudden onset, awakened by it    5. PATTERN \"Does the pain come and go, or is it constant?\"     - If constant: \"Is it getting better, staying the same, or worsening?\"       (Note: Constant means the pain never goes away completely; most serious pain is constant and it progresses)      - If intermittent: \"How long does it last?\" \"Do you have pain now?\"      (Note: Intermittent means the pain goes away completely between bouts)        Last night, the intensity comes and goes. She thought it was getting better but it is not.    Constant discomfort now    6. SEVERITY: \"How bad is the pain?\"  (e.g., Scale 1-10; mild, moderate, or severe)     - MILD (1-3): doesn't interfere with normal activities, abdomen soft and not tender to touch      - MODERATE (4-7): interferes with normal activities or awakens from sleep, abdomen tender to touch      - SEVERE (8-10): excruciating pain, doubled over, unable to do any normal activities      Mild pain rating of 1-2 out of 10; as it is \"present\" and getting her attention.     7. RECURRENT SYMPTOM: \"Have you ever had this type of stomach pain before?\" If Yes, ask: \"When was the last time?\" and \"What happened that time?\"       This is unusual; \"an unusual feeling\"  \"I feel like I have indigestion again and it's the first thing in the morning.\"    8. AGGRAVATING FACTORS: \"Does anything seem to cause this pain?\" (e.g., foods, stress, alcohol)      Last night it kind of went away on it's own  This " "morning, she's been drinking water since she's been up.  This is something new so she can't tell yet if anything has made it worst or better    9. CARDIAC SYMPTOMS: \"Do you have any of the following symptoms: chest pain, difficulty breathing, sweating, nausea?\"      No    10. OTHER SYMPTOMS: \"Do you have any other symptoms?\" (e.g., back pain, diarrhea, fever, urination pain, vomiting)        Back pain- same level as in the front    11. PREGNANCY: \"Is there any chance you are pregnant?\" \"When was your last menstrual period?\"        No    Protocols used: ABDOMINAL PAIN - UPPER-A-OH    Naimaa CARA Soria, RN  [facility]      "

## 2022-10-28 NOTE — TELEPHONE ENCOUNTER
Reason for Call:  Other appointment    Detailed comments: patient called and was suggested to see Jay Balbuena at [facility] next week for GI issues.    Needs approval.    Please contact patient today. Thank you.    Phone Number Patient can be reached at: Cell number on file:    Telephone Information:   Mobile [phone]       Best Time: any    Can we leave a detailed message on this number? YES    Call taken on 10/28/2022 at 10:49 AM by Kayli Hassan

## 2022-10-28 NOTE — PROGRESS NOTES
Chrissy is a 82 year old who is being evaluated via a billable video visit.      How would you like to obtain your AVS? MyChart  If the video visit is dropped, the invitation should be resent by: Send to e-mail at: [email or web address]  Will anyone else be joining your video visit? Khushi Mcnally        Video-Visit Details    Video Start Time:941am  Type of service:  Video Visit    Video End Time: 1015am  Originating Location (pt. Location): Home      Distant Location (provider location):  Off-site    Platform used for Video Visit: [facility]     Outpatient Sleep Medicine Consultation:      Name: Sheryl Trotter MRN# [id number]   Age: 82 year old YOB: 1940     Date of Consultation: October 28, 2022  Consultation is requested by: Cholo Khanna DO  [address] [place] [id number] Cholo Khanna  Primary care provider: Chema Thompson       Reason for Sleep Consult:     Sheryl Trotter is sent by Cholo Khanna for a sleep consultation regarding evaluation management of previously diagnosed obstructive sleep apnea.    Patient s Reason for visit  Sheryl Trotter main reason for visit: I am taking lyrica 300mg daily, I amd going to sleep, but i waken often, and do not feel rested in the day time.  Patient states problem(s) started: years ago, i was tested in 2017 for sleep apneia  Sheryl Trotter's goals for this visit: to establish If i would benefit from a relationship with a sleep specialist.           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  Patient now presents with symptoms of snoring, nonrestorative sleep and fatigue.  Patient risk factors for KASANDRA include age, positive family history for KASANDRA, postmenopausal status and history of  hypertension.  Recommend obtaining a in-lab sleep study to evaluate the current status of the obstructive sleep apnea.   Polysomnography reviewed.  Obstructive sleep apnea and consequences of untreated sleep apnea were reviewed.  Information provided regarding treatment options for KASANDRA.    Restless legs syndrome and Polyneuropathy: Patient reports that the symptoms are controled with Lyrica. Patient will continue follow-up with her neurologist. Encouraged to try non-pharmacological treatments as well - hot bath/shower, stretching, heating pads, reducing caffeine/avoiding alcohol at least 2-3 hours before bed and avoiding chocolate.    Chronic Insomnia -multifactorial. Psychophysiological, anxiety, untreated KASANDRA, pain  We discussed stimulus control measures.  She was instructed to go to bed when she is ready to sleep.  If it takes more than 1/2-hour to fall asleep when she goes to bed or if she takes more than 20 minutes to resume sleep after she wakes up during the middle of the night she was instructed to leave the bedroom, go to a different room, keep the lights dim and do some relaxing activity such as reading a book or listening to music or meditation and returning back to bed when she is ready to sleep.  Encouraged to follow good sleep hygiene.  Psychophysiological insomnia: She would be a good candidate for Cognitive Behavioral Therapy, referral provided to Dr. Moses Daniels to obtain CBT-I.   Anxiety: We discussed the association of insomnia and mood disorders. Recommended the patient to continue follow-up with her  primary care provider for optimizing the management of anxiety.      We discussed weight management with healthy diet, and exercise.    Patient was strongly advised to avoid driving, operating any heavy machinery or other hazardous situations while drowsy or sleepy.  Patient was counseled on the importance of driving while alert, to pull over if drowsy, or nap before getting into the vehicle if  "sleepy.      Plan is to communicate results of sleep study via video visit in approximately 2 weeks after PSG is completed.    Summary Counseling:    Sleep Testing Reviewed  Obstructive Sleep Apnea Reviewed  Complications of Untreated Sleep Apnea Reviewed    Medical Decision-making:   Educational materials provided in instruction.    CC: Cholo Khanna    The above note was dictated using voice recognition software. Although reviewed after completion, some word and grammatical error may remain . Please contact the author for any clarifications.    \"I spent a total of 60 minutes  with Sheryl Trotter during today's video visit. Most of this time was spent counseling the patient and  coordinating care regarding  KASANDRA, PSG, consequences of untreated sleep apnea, RLS, insomnia, stimulus control measures, CBT-I, anxiety, weight management , going over previous sleep study report and chart review., including documentation and further activities as noted above.\"      Mirela Marc MD  [facility]   Floor 1, [address]   [address] [place] [address]. [place] [address]   Appointments: [phone]         History of Present Illness:   Sheryl Trotter is a very pleasant 82-year-old female who presents to sleep clinic at the request of her neurologist for evaluation and management of previously diagnosed sleep apnea.  According to her neurologist, patient is having symptoms consistent with KASANDRA, and it was thought that perhaps her cognitive concerns/fogginess was less likely related to Lyrica and more consistent with KASANDRA related cognitive issues. So, she was referred to sleep medicine.    Past Sleep Evaluations:  PSG  [facility] 8/9/17    Respiratory monitoring showed intermittent snoring and overall mild obstructive sleep apnea/hypopnea (AHI=9.9 events per hour with the lowest O2 sat of 80%)    Respiratory events were more severe " during supine sleep.    The patient spent a total of 5 minutes at an oxygen saturation below 88%.    There was also evidence of limb movements during sleep independent of respiratory events.     She used CPAP for a while, but had difficulty falling asleep with it and felt it was which was an annoyance and discontinued for treatment.      SLEEP-WAKE SCHEDULE:     Work/School Days: Patient goes to school/work: No   Usually gets into bed at 11pm  Takes patient about when in lyrica about 15 t0 20 min. most nights to fall asleep  Has trouble falling asleep one nights per week  Wakes up in the middle of the night often 3 or more times.  Wakes up due to Pain;Use the bathroom;Anxiety;Uncertain  She has trouble falling back asleep 3 times a week. (episodical periods of trouble falling asleep)  It usually takes Varies, i will get up and read, or walk around, or take valerien  etc, sometimes i fall back on my own, with out getting  up to get back to sleep  Patient is usually up at 7;30 am.  Active mind probably affects sleep.  Uses alarm: Yes    Weekends/Non-work Days/All Other Days:  Usually gets into bed at 10;30pm   Takes patient about it varies to fall asleep  Patient is usually up at 7:30am  Uses alarm:      Sleep Need  Patient gets  7hours sleep on average   Patient thinks she needs about 8 hours sleep    She reports non restorative sleep, fatigue, but denies EDS.    Sheryl Trotter prefers to sleep in this position(s): Side   Patient states they do the following activities in bed: Read    Naps  Patient takes a purposeful nap 0 times a week and naps are usually 2hours in duration  She feels better after a nap: Yes  She dozes off unintentionally one, or 2 days per week  Patient has had a driving accident or near-miss due to sleepiness/drowsiness: No      SLEEP DISRUPTIONS:    Breathing/Snoring  Patient snores:Yes  Other people complain about her snoring: Yes  Patient has been told she stops breathing in her sleep:No    Gasping/choking: No  She has issues with the following: Morning mouth dryness;Heartburn or reflux at night  In last few months reports dull headache when she awakened during night and occasional morning headaches.    Movement:  Patient gets pain, discomfort, with an urge to move:  Yes patient was previously diagnosed with restless leg syndrome and is currently following up with her neurologist who is also managing her polyneuropathy symptoms.  With the current dose of Lyrica she reports improvement in the overall RLS symptoms  It happens when she is resting:  Yes  It happens more at night:  Yes  Patient has been told she kicks her legs at night:  No     Behaviors in Sleep:  She denies sleepwalking, sleep eating, sleep talking or dream enactment behavior.  She denies cataplexy.    Is there anything else you would like your sleep provider to know: Lyrica is helping the RLS,also falling to sleep most nights,Often when I waken  and cant go to sleep i get up ans reas for an hour, eat crackers, etc. nights. I  150 mg AM awakening  150 mg 1030pm      CAFFEINE AND OTHER SUBSTANCES:    Patient consumes caffeinated beverages per day:  2 to4 cupe coffee in am ,rarely decaf tea ans soda  Last caffeine use is usually: 9am  List of any prescribed or over the counter stimulants that patient takes: none  chocolate?  List of any prescribed or over the counter sleep medication patient takes: lyrica,   none  List of previous sleep medications that patient has tried: melatonin, sleeping pills  Patient drinks alcohol to help them sleep: No  Patient drinks alcohol near bedtime: Yes    Family History:  Patient has a family member been diagnosed with a sleep disorder: Yes  daughter and son have KASANDRA      SCALES:    EPWORTH SLEEPINESS SCALE      Hector Sleepiness Scale ( YASH Galvez  [id number]<br>ESS - [place]/English - Final version - 21 Nov 07 - [facility] [place].) 10/25/2022   Sitting and reading Slight chance of dozing   Watching TV  Slight chance of dozing   Sitting, inactive in a public place (e.g. a theatre or a meeting) Would never doze   As a passenger in a car for an hour without a break Slight chance of dozing   Lying down to rest in the afternoon when circumstances permit High chance of dozing   Sitting and talking to someone Would never doze   Sitting quietly after a lunch without alcohol Slight chance of dozing   In a car, while stopped for a few minutes in traffic Would never doze   Gordon Score (MC) 7   Gordon Score (Sleep) 7         INSOMNIA SEVERITY INDEX (СВЕТЛАНА)      Insomnia Severity Index (СВЕТЛАНА) 10/25/2022   Difficulty falling asleep 1   Difficulty staying asleep 2   Problems waking up too early 1   How SATISFIED/DISSATISFIED are you with your CURRENT sleep pattern? 3   How NOTICEABLE to others do you think your sleep problem is in terms of impairing the quality of your life? 0   How WORRIED/DISTRESSED are you about your current sleep problem? 1   To what extent do you consider your sleep problem to INTERFERE with your daily functioning (e.g. daytime fatigue, mood, ability to function at work/daily chores, concentration, memory, mood, etc.) CURRENTLY? 2   СВЕТЛАНА Total Score 10       Guidelines for Scoring/Interpretation:  Total score categories:  0-7 = No clinically significant insomnia   8-14 = Subthreshold insomnia   15-21 = Clinical insomnia (moderate severity)  22-28 = Clinical insomnia (severe)  Used via courtesy of www.Starriserealth.va.gov with permission from Felipe Roman PhD., [facility]      STOP BANG     STOP BANG Questionnaire (  2008, the American Society of Anesthesiologists, Inc. [employer].) 10/28/2022   1. Snoring - Do you snore loudly (louder than talking or loud enough to be heard through closed doors)? -   2. Tired - Do you often feel tired, fatigued, or sleepy during daytime? -   3. Observed - Has anyone observed you stop breathing during your sleep? -   4. Blood pressure - Do you  "have or are you being treated for high blood pressure? -   5. BMI - BMI more than 35 kg/m2? -   6. Age - Age over 50 yr old? -   7. Neck circumference - Neck circumference greater than 40 cm? -   8. Gender - Gender male? -   STOP BANG Score (MC): -   B/P Clinic: -   BMI Clinic: 28.62         GAD7    No flowsheet data found.      CAGE-AID    No flowsheet data found.    CAGE-AID reprinted with permission from the [place] Medical Journal, YOLIE Fields. and WALLACE Chacon, \"Conjoint screening questionnaires for alcohol and drug abuse\" [place] YAZUO Journal 94: 135-140, 1995.      PATIENT HEALTH QUESTIONNAIRE-9 (PHQ - 9)    PHQ-9 ([employer]) 5/25/2021   1.  Little interest or pleasure in doing things 0   2.  Feeling down, depressed, or hopeless 0       Developed by Narciso Pineda, Charmaine Skaggs, Jorge Alberto Mei and colleagues, with an educational shanita from [employer]. No permission required to reproduce, translate, display or distribute.        Allergies:    Allergies   Allergen Reactions     Duloxetine Headache     Nausea, difficult sleeping ankles cramps, loose bowel      Gabapentin Anxiety       Medications:    Current Outpatient Medications   Medication Sig Dispense Refill     acetaminophen (TYLENOL) 650 MG CR tablet Take 650 mg by mouth 2 times daily as needed for mild pain or fever       calcium carbonate-vitamin D3 600 mg (1,500 mg)-800 unit Chew [CALCIUM CARBONATE-VITAMIN D3 600 MG (1,500 MG)-800 UNIT CHEW] Chew 1 tablet daily.       denosumab 60 mg/mL Syrg Inject 60 mg Subcutaneous once       fluocinonide (LIDEX) 0.05 % external ointment use 1 Application three times a day topically for up to 10 days       hydrochlorothiazide (HYDRODIURIL) 50 MG tablet TAKE 1/2 TABLET EVERY DAY (SUBSTITUTED FOR  HYDRODIURIL) 45 tablet 0     lisinopril (ZESTRIL) 20 MG tablet TAKE 1 TABLET EVERY DAY 90 tablet 3     METHYLCELLULOSE (CITRUCEL ORAL) [METHYLCELLULOSE (CITRUCEL ORAL)] Take 1 tablet by mouth daily.       " pregabalin (LYRICA) 75 MG capsule Take 2 capsules (150 mg) by mouth 2 times daily 180 capsule 3     Vitamin D3 (CHOLECALCIFEROL) 25 mcg (1000 units) tablet Take 1 tablet (25 mcg) by mouth daily 90 tablet 3       Problem List:  Patient Active Problem List    Diagnosis Date Noted     Spinal stenosis of lumbar region with neurogenic claudication 04/01/2022     Priority: Medium     KASANDRA (obstructive sleep apnea) 08/19/2021     Priority: Medium     Other polyneuropathy 08/19/2021     Priority: Medium     Headache      Priority: Medium     Created by Conversion         Chondrocalcinosis 02/14/2019     Priority: Medium     Polymyalgia (H) 01/09/2019     Priority: Medium     Primary osteoarthritis involving multiple joints 01/09/2019     Priority: Medium     Hyperlipidemia      Priority: Medium     Created by Conversion         Bilateral shoulder pain 10/02/2018     Priority: Medium     Hip dislocation, left (H) 03/28/2018     Priority: Medium     Osteoporosis      Priority: Medium     Created by Conversion         Essential hypertension      Priority: Medium     Created by Conversion  Replacement Utility updated for latest IMO load         Primary insomnia      Priority: Medium     Created by Conversion         Status post left hip replacement 02/07/2018     Priority: Medium     Polyarthralgia 09/12/2017     Priority: Medium     Osteoarthritis of the Knee 02/23/2015     Priority: Medium     right  Replacement Utility updated for latest IMO load    Replacing diagnoses that were inactivated after the 10/1/2021 regulatory import.       Lumbar Spondylosis      Priority: Medium     Created by Conversion         Lumbar Disc Degeneration      Priority: Medium     Created by Conversion         Trochanteric Bursitis      Priority: Medium     Created by Conversion            Past Medical/Surgical History:  Past Medical History:   Diagnosis Date     Arthritis      AVN (avascular necrosis of bone) (H)     let hip     DDD (degenerative  disc disease), lumbar      GERD (gastroesophageal reflux disease)      HLD (hyperlipidemia)      Hypertension      Insomnia      Osteoporosis      PONV (postoperative nausea and vomiting)      Sleep apnea     cpap     Past Surgical History:   Procedure Laterality Date     APPENDECTOMY       CHOLECYSTECTOMY       FOOT FUSION Right 2017     HIP PINNING Left 3/28/2018    Procedure: OPEN REDUCTION OF DISLOCATED LEFT TOTAL HIP;  Surgeon: Daron Rincon MD;  Location: [facility];  Service:      HYSTERECTOMY  Mid 1990's     IR LUMBAR EPIDURAL STEROID INJECTION  2/18/2005     IR LUMBAR EPIDURAL STEROID INJECTION  12/19/2005     OOPHORECTOMY  Mid 1990's     ZZC TOTAL HIP ARTHROPLASTY Left 2/7/2018    Procedure: LEFT TOTAL HIP ARTHROPLASTY;  Surgeon: Daron Rincon MD;  Location: [facility];  Service: Orthopedics       Social History:  Social History     Socioeconomic History     Marital status:      Spouse name: Not on file     Number of children: Not on file     Years of education: Not on file     Highest education level: Not on file   Occupational History     Not on file   Tobacco Use     Smoking status: Former     Years: 4.00     Types: Cigarettes     Smokeless tobacco: Never     Tobacco comments:     smoking in college-social   Substance and Sexual Activity     Alcohol use: Yes     Alcohol/week: 3.3 standard drinks     Comment: Alcoholic Drinks/day: occasional glass of wine     Drug use: No     Sexual activity: Not on file   Other Topics Concern     Not on file   Social History Narrative     Not on file     Social Determinants of Health     Financial Resource Strain: Not on file   Food Insecurity: Not on file   Transportation Needs: Not on file   Physical Activity: Not on file   Stress: Not on file   Social Connections: Not on file   Intimate Partner Violence: Not on file   Housing Stability: Not on file       Family History:  Family History   Problem Relation Age of Onset     Lymphoma Father  "28.00        Hodgkins     Colon Cancer Maternal Grandmother      Vaginal Cancer Maternal Aunt      Sleep Apnea Son        Review of Systems:  A complete review of systems reviewed by me is negative with the exeption of what has been mentioned in the history of present illness.  In the last TWO WEEKS have you experienced any of the following symptoms?  Night Sweats: No  Weight Gain: No  Pain at Night: Yes  Double Vision: No  Changes in Vision: No  Difficulty Breathing through Nose: No  Sore Throat in Morning: No  Dry Mouth in the Morning: Yes  Shortness of Breath Lying Flat: No  Shortness of Breath With Activity: No  Awakening with Shortness of Breath: No  Increased Cough: No  Heart Racing at Night: No  Swelling in Feet or Legs: No  Diarrhea at Night: No  Heartburn at Night: Yes  Urinating More than Once at Night: No  Losing Control of Urine at Night: No  Joint Pains at Night: Yes  Headaches in Morning: Yes  Weakness in Arms or Legs: Yes  Depressed Mood: No  Anxiety: No      Physical Examination:  Vitals: Ht 1.689 m (5' 6.5\")   Wt 81.6 kg (180 lb)   BMI 28.62 kg/m    BMI= Body mass index is 28.62 kg/m .  General: No apparent distress, appropriately groomed  Head: Normocephalic, atraumatic  Chest: No cough, no audible wheezing, able to talk in full sentences  Skin: no rash  Psych: coherent speech, normal rate and volume, able to articulate logical thoughts, able   to abstract reason, no tangential thoughts, no hallucinations   or delusions  Her affect is normal  Neuro:  Mental status: Alert and  Oriented X 3  Speech: normal             Data: All pertinent previous laboratory data reviewed     Recent Labs   Lab Test 01/04/22  1050 08/17/21  1450    134*   POTASSIUM 3.9 3.6   CHLORIDE 100 96*   CO2 25 26   ANIONGAP 11 12   GLC 94 94   BUN 16 9   CR 0.79 0.71   ANYI 9.4 10.1       Recent Labs   Lab Test 08/17/21  1450   WBC 7.9   RBC 4.84   HGB 14.2   HCT 43.0   MCV 89   MCH 29.3   MCHC 33.0   RDW 12.7    "       Recent Labs   Lab Test 08/17/21  1450   PROTTOTAL 7.2   ALBUMIN 4.0   BILITOTAL 0.5   ALKPHOS 58   AST 18   ALT 15       TSH (uIU/mL)   Date Value   10/11/2021 1.30   07/24/2019 0.74       No results found for: UAMP, UBARB, BENZODIAZEUR, UCANN, UCOC, OPIT, UPCP    Iron Sat Index   Date/Time Value Ref Range Status   10/11/2021 11:17 AM 27 15 - 46 % Final     Ferritin   Date/Time Value Ref Range Status   10/11/2021 11:17 AM 80 10 - 130 ng/mL Final       pH (no units)   Date Value   07/15/2021 7.33 (L)     Echocardiology: No results found for this or any previous visit (from the past 4320 hour(s)).    Chest x-ray: No results found for this or any previous visit from the past 365 days.      Chest CT: No results found for this or any previous visit from the past 365 days.    PFT: Most Recent [facility] Pulmonary Function Testing    No results found for: 20001     Mirela Marc MD 10/28/2022

## 2022-10-28 NOTE — TELEPHONE ENCOUNTER
Outgoing Call:  NO answer    Indira Barroso RN contacted Sheryl on 10/28/22 and left a message. If patient calls back please schedule pt on Dr. Thompson's schedule for next week.      Indira BURKS RN  [facility]

## 2022-11-01 ENCOUNTER — OFFICE VISIT (OUTPATIENT)
Dept: INTERNAL MEDICINE | Facility: CLINIC | Age: 82
End: 2022-11-01
Payer: MEDICARE

## 2022-11-01 VITALS
HEART RATE: 86 BPM | SYSTOLIC BLOOD PRESSURE: 177 MMHG | DIASTOLIC BLOOD PRESSURE: 81 MMHG | HEIGHT: 67 IN | OXYGEN SATURATION: 99 % | WEIGHT: 183 LBS | BODY MASS INDEX: 28.72 KG/M2

## 2022-11-01 DIAGNOSIS — R07.89 ATYPICAL CHEST PAIN: Primary | ICD-10-CM

## 2022-11-01 DIAGNOSIS — R10.13 DYSPEPSIA: ICD-10-CM

## 2022-11-01 PROCEDURE — 93005 ELECTROCARDIOGRAM TRACING: CPT | Performed by: INTERNAL MEDICINE

## 2022-11-01 PROCEDURE — 93010 ELECTROCARDIOGRAM REPORT: CPT | Mod: OFF | Performed by: INTERNAL MEDICINE

## 2022-11-01 PROCEDURE — 99214 OFFICE O/P EST MOD 30 MIN: CPT | Performed by: INTERNAL MEDICINE

## 2022-11-01 RX ORDER — FAMOTIDINE 20 MG/1
20 TABLET, FILM COATED ORAL 2 TIMES DAILY
Qty: 60 TABLET | Refills: 4 | Status: SHIPPED | OUTPATIENT
Start: 2022-11-01

## 2022-11-01 NOTE — PROGRESS NOTES
Office Visit - Follow Up   Sheryl Trotter   82 year old female    Date of Visit: 11/1/2022    Chief Complaint   Patient presents with     Gastric Problem     Upper abdominal pains that went around to her back x 5 days, started with stabbing pain, no appetite the next day x 2 days, felt pressure up into throat, feeling good today        -------------------------------------------------------------------------------------------------------------------------  Assessment and Plan    Follow multiple issues    And a new symptom that occurred last week that is the focus of today's discussion    Sudden epigastric pain occurred on Thursday, October 27, which sounds to me likely to be of gastrointestinal source, may be reflux, may be intestinal gas, cardiac source less likely, but we will get an electrocardiogram today November 1, 2022 to make sure there has been no change.    Chrissy continues to be under a lot of stress these days dealing with her 's health problems, and also health issues from her daughter.  Chrissy has had to put her own health somewhat on hold.    She reported the onset of the epigastric pain suddenly on Thursday, with radiation under both anterior rib margins, and then around to her back.    By the next day Friday pain was mostly gone, but she felt very exhausted, and continued to feel tired on October 30 and 31.  Today November 1, 2022, she is pain-free but still feeling tired.    Her physical examination is unremarkable.  Clear lungs, heart regular rate and rhythm.  Elevated systolic blood pressure is noted.  She did take her blood pressure medicine today    Lets get an electrocardiogram to see if there is been any change.  If there is any doubt, will have her undergo a cardiac stress test.    I instructed Chrissy to continue to check her blood pressure on her home machine, and to remain consistent with her lisinopril 20 mg a day and hydrochlorothiazide 50 mg a day.    Remind her to cut down on salt in the  diet, abstain from alcohol, try to control stress, and get restful sleep as best as possible.    With the idea that maybe this was gastrointestinal, will have her take famotidine 20 mg twice a day.    LATER:  ECG 11-1-2022 (I personally viewed, interpreted, and I agree with the automated report)  Sinus rhythm   Normal ECG   When compared with ECG of 02-NOV-2017 10:09,   No significant change was found      Pulling forward her longer-term medical issues:  Neuroclaudication symptoms of lumbar spinal stenosis  Lumbosacral spondylosis without myelopathy  Chronic pain of left knee  Myofascial pain  Known Lumbar DDD (ESTEBAN 2005, for reported lumbar stenosis and spondylolisthesis and sciatica  Characteristic Pain Low back, both thighs, both calves. Worse when standing.  Difficulty climbing stairs. Most comfortable seated.  MRI 2012 had degenerating discs     Lyrica 75/150 was helpful to reduce paresthesias to some degree but she also had a foggy feeling with use.  She   Could consider duloxetine for neuropathy pending response to lyrica increase  Could trial RLS mdication like pramipexole if sympoms continue to break through even on 300 BID dose, or could increase lyrica to 300/375     Result Date: 8/29/2022  LUMBAR INTERLAMINAR EPIDURAL STEROID INJECTION WITH FLUOROSCOPIC     Distal symmetric polyneuropathy, small fiber peripheral neuropathy, also restless leg syndrome  Bilateral leg coldness leading to discomfort in the day and at night.   Lyrica used briefly, as of April 202 on nortriptyline, which seems to help  10/11/2021  Neurology: Cholo Khanna, DO  EMG through PM&R on 8/30/2019 with Dr. Izquierdo:  EMG on 8/30/2019 which showed an essentially normal study.  Comment NCS: Essentially normal study  Comment EMG: Normal study  1.  Normal needle EMG bilateral lower extremities.  Interpretation: Essentially normal study:   There is electrodiagnostic evidence of:  1.  Borderline bilateral sensory amplitudes.   This is normal for the patient's age is very likely normal finding.  I cannot, however, completely exclude a very mild sensory or sensorimotor peripheral polyneuropathy.   2 There is no electrodiagnostic evidence of lumbosacral radiculopathy, lumbosacral plexopathy, or focal neuropathy in the bilateral lower extremities.  Specifically, there is no electrodiagnostic evidence of tibial neuropathy at the tarsal tunnel in the bilateral lower extremities    Left knee osteoarthritis improved with Euflexxa injection.    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    Insomnia, KASANDRA is currently untreated  She stopped CPAP because not tolerated  sleep study in 2017, which showed sleep apnea. Stopped CPAP, a year and a half ago, as she wasn't sleeping and it wasn't positive experience.     Essential hypertension  Hydrochlorothiazide one half of a 50 mg pill. Lisinopril 20 mg daily.  She has a home blood pressure machine, but has not been using it regularly.  I recommend that she start recording the blood pressure numbers in a notebook, and 1 day bring the machine to clinic so that we can validate it against a manual reading to make sure it is accurate, then she can believe the numbers.     I told her that target blood pressure is 120/80 at rest and seated position measured on the right upper arm.  I reminded her that healthy diet particular less sodium, also regular exercise, and weight control will help with blood pressure, as well as treating sleep apnea     BP Readings from Last 6 Encounters:   11/01/22 (!) 160/84   10/05/22 124/82   10/03/22 (!) 139/90   09/20/22 (!) 141/69   08/29/22 114/68   08/12/22 (!) 150/71     Hyperlipidemia, mildly elevated LDL, very generous HDL which is favorable  Lipid profile July  24, 2019 with total cholesterol 265, triglycerides 43, LDL bad cholesterol modestly elevated 141, but with a very generous level of the HDL good cholesterol 115  10-  Hemoglobin A1C <=5.6 % 5.3       Gastroesophageal reflux, uses Tums intermittently      Constipation that she has noticed since starting nortriptyline for neuropathy and restless legs  I told her that constipation is a well-known side effect of nortriptyline because of its anticholinergic effects.  To combat constipation, I recommended she stay well-hydrated, continue with the Citrucel every day, and consider adding a capful of MiraLAX powder every day which is an osmotic laxative and is safe to use every day.  It adds water content of stools.     Osteopenia, on Prolia (started approximately 2017)  Chrissy believes she got a Prolia injection in July 2022, but for some reason I am having trouble finding it in her chart, if she did get an injection in July 2022, that means her next injection would be January 2023  Bone density scan December 15, 2019  1. The spine bone density L1-L4 (L3) with T-score 0.0 and significant improvement of 6.2 % compared to 2017.  2. Femoral bone densities show right femoral neck T- score -1.8 and significant improvement of 8.9% compared to 2017.  3. Trabecular bone score indicates moderate trabecular bone architecture.   79 y.o. female with LOW BONE DENSITY (OSTEOPENIA), stable on the current treatment.     Takes her calcium and vitamin D     Overweight with body mass index of 28.  She has been less physically active because of her lumbar spine issues.  She does water aerobics which is excellent.    Wt Readings from Last 5 Encounters:   11/01/22 83 kg (183 lb)   10/28/22 81.6 kg (180 lb)   10/05/22 83 kg (183 lb)   08/12/22 81.2 kg (179 lb)   06/10/22 81.2 kg (179 lb)     Menopause, status post complete hysterectomy  Had complete hysterectomy at age in her 50s, has not needed to get Pap smears anymore  Result Date:  9/6/2022  BILATERAL FULL FIELD DIGITAL SCREENING MAMMOGRAM WITH TOMOSYNTHESIS      She recalls a colonoscopy that was done approximately at age 72    Received third shot of Pfizer COVID-19 vaccine October 29, 2021, so she could get a booster anytime  Has received both pneumococcal vaccines    And recmbinant shingles vaccine     Could consider getting a tetanus booster since it looks like her last one was in 2007, she should get that at a community pharmacy since it is paid under the Medicare prescription drug benefit      --------------------------------------------------------------------------------------------------------------------------  History of Present Illness  This 82 year old old     Gastric Problem (Upper abdominal pains that went around to her back x 5 days, started with stabbing pain, no appetite the next day x 2 days, felt pressure up into throat, feeling good today)      Gastric Problem    History of Present Illness       Reason for visit:  Discussion with primarycare Dr  Symptom onset:  3-7 days ago  Symptoms include:  Discomfort  Symptom intensity:  Mild  Symptom progression:  Improving  Had these symptoms before:  No  What makes it worse:  No  What makes it better:  No    She eats 2-3 servings of fruits and vegetables daily.She consumes 0 sweetened beverage(s) daily.She exercises with enough effort to increase her heart rate 9 or less minutes per day.  She exercises with enough effort to increase her heart rate 3 or less days per week.   She is taking medications regularly.      Wt Readings from Last 3 Encounters:   11/01/22 83 kg (183 lb)   10/28/22 81.6 kg (180 lb)   10/05/22 83 kg (183 lb)     BP Readings from Last 3 Encounters:   11/01/22 (!) 160/84   10/05/22 124/82   10/03/22 (!) 139/90       Review of Systems: A comprehensive review of systems was negative except as  noted.  ---------------------------------------------------------------------------------------------------------------------------    Medications, Allergies, Social, and Problem List   Current Outpatient Medications   Medication Sig Dispense Refill     acetaminophen (TYLENOL) 650 MG CR tablet Take 650 mg by mouth 2 times daily as needed for mild pain or fever       calcium carbonate-vitamin D3 600 mg (1,500 mg)-800 unit Chew [CALCIUM CARBONATE-VITAMIN D3 600 MG (1,500 MG)-800 UNIT CHEW] Chew 1 tablet daily.       denosumab 60 mg/mL Syrg Inject 60 mg Subcutaneous once       fluocinonide (LIDEX) 0.05 % external ointment use 1 Application three times a day topically for up to 10 days       hydrochlorothiazide (HYDRODIURIL) 50 MG tablet TAKE 1/2 TABLET EVERY DAY (SUBSTITUTED FOR  HYDRODIURIL) 45 tablet 0     lisinopril (ZESTRIL) 20 MG tablet TAKE 1 TABLET EVERY DAY 90 tablet 3     METHYLCELLULOSE (CITRUCEL ORAL) [METHYLCELLULOSE (CITRUCEL ORAL)] Take 1 tablet by mouth daily.       pregabalin (LYRICA) 75 MG capsule Take 2 capsules (150 mg) by mouth 2 times daily 180 capsule 3     Vitamin D3 (CHOLECALCIFEROL) 25 mcg (1000 units) tablet Take 1 tablet (25 mcg) by mouth daily 90 tablet 3     Allergies   Allergen Reactions     Duloxetine Headache     Nausea, difficult sleeping ankles cramps, loose bowel      Gabapentin Anxiety     Social History     Tobacco Use     Smoking status: Former     Years: 4.00     Types: Cigarettes     Smokeless tobacco: Never     Tobacco comments:     smoking in college-social   Vaping Use     Vaping Use: Never used   Substance Use Topics     Alcohol use: Yes     Alcohol/week: 3.3 standard drinks     Comment: Alcoholic Drinks/day: occasional glass of wine     Drug use: No     Patient Active Problem List   Diagnosis     Osteoporosis     Hyperlipidemia     Essential hypertension     Primary insomnia     Trochanteric Bursitis     Lumbar Spondylosis     Lumbar Disc Degeneration     Headache      "Osteoarthritis of the Knee     Polyarthralgia     Status post left hip replacement     Hip dislocation, left (H)     Bilateral shoulder pain     Polymyalgia (H)     Primary osteoarthritis involving multiple joints     Chondrocalcinosis     KASANDRA (obstructive sleep apnea)     Other polyneuropathy     Spinal stenosis of lumbar region with neurogenic claudication        Reviewed, reconciled and updated       Physical Exam   General Appearance:       BP (!) 160/84 (BP Location: Right arm, Patient Position: Sitting, Cuff Size: Adult Regular)   Pulse 86   Ht 1.689 m (5' 6.5\")   Wt 83 kg (183 lb)   SpO2 99%   BMI 29.09 kg/m      Appears generally well.  Note elevated blood pressure, which I believe is on the basis of feeling anxious today  Lungs clear  Heart regular rate and rhythm  Abdomen nontender  Extremities no edema     Additional Information   I spent 30 minutes on this encounter, including reviewing interval history since last visit, examining the patient, explaining and counseling the issues enumerated in the Assessment and Plan (patient given a copy), ordering indicated tests, interpreting ECG     RACHAEL PEDERSEN MD, MD        "

## 2022-11-01 NOTE — PATIENT INSTRUCTIONS
Follow multiple issues    And a new symptom that occurred last week that is the focus of today's discussion    Sudden epigastric pain occurred on Thursday, October 27, which sounds to me likely to be of gastrointestinal source, may be reflux, may be intestinal gas, cardiac source less likely, but we will get an electrocardiogram today November 1, 2022 to make sure there has been no change.    Chrissy continues to be under a lot of stress these days dealing with her 's health problems, and also health issues from her daughter.  Chrissy has had to put her own health somewhat on hold.    She reported the onset of the epigastric pain suddenly on Thursday, with radiation under both anterior rib margins, and then around to her back.    By the next day Friday pain was mostly gone, but she felt very exhausted, and continued to feel tired on October 30 and 31.  Today November 1, 2022, she is pain-free but still feeling tired.    Her physical examination is unremarkable.  Clear lungs, heart regular rate and rhythm.  Elevated systolic blood pressure is noted.  She did take her blood pressure medicine today    Lets get an electrocardiogram to see if there is been any change.  If there is any doubt, will have her undergo a cardiac stress test.    I instructed Chrissy to continue to check her blood pressure on her home machine, and to remain consistent with her lisinopril 20 mg a day and hydrochlorothiazide 50 mg a day.    Remind her to cut down on salt in the diet, abstain from alcohol, try to control stress, and get restful sleep as best as possible.    With the idea that maybe this was gastrointestinal, will have her take famotidine 20 mg twice a day.     Pulling forward her longer-term medical issues:  Neuroclaudication symptoms of lumbar spinal stenosis  Lumbosacral spondylosis without myelopathy  Chronic pain of left knee  Myofascial pain  Known Lumbar DDD (ESTEBAN 2005, for reported lumbar stenosis and spondylolisthesis and  sciatica  Characteristic Pain Low back, both thighs, both calves. Worse when standing.  Difficulty climbing stairs. Most comfortable seated.  MRI 2012 had degenerating discs     Lyrica 75/150 was helpful to reduce paresthesias to some degree but she also had a foggy feeling with use.  She   Could consider duloxetine for neuropathy pending response to lyrica increase  Could trial RLS mdication like pramipexole if sympoms continue to break through even on 300 BID dose, or could increase lyrica to 300/375     Result Date: 8/29/2022  LUMBAR INTERLAMINAR EPIDURAL STEROID INJECTION WITH FLUOROSCOPIC     Distal symmetric polyneuropathy, small fiber peripheral neuropathy, also restless leg syndrome  Bilateral leg coldness leading to discomfort in the day and at night.   Lyrica used briefly, as of April 202 on nortriptyline, which seems to help  10/11/2021  Neurology: Cholo Khanna, DO  EMG through PM&R on 8/30/2019 with Dr. Izquierdo:  EMG on 8/30/2019 which showed an essentially normal study.  Comment NCS: Essentially normal study  Comment EMG: Normal study  1.  Normal needle EMG bilateral lower extremities.  Interpretation: Essentially normal study:   There is electrodiagnostic evidence of:  1.  Borderline bilateral sensory amplitudes.  This is normal for the patient's age is very likely normal finding.  I cannot, however, completely exclude a very mild sensory or sensorimotor peripheral polyneuropathy.   2 There is no electrodiagnostic evidence of lumbosacral radiculopathy, lumbosacral plexopathy, or focal neuropathy in the bilateral lower extremities.  Specifically, there is no electrodiagnostic evidence of tibial neuropathy at the tarsal tunnel in the bilateral lower extremities    Left knee osteoarthritis improved with Euflexxa injection.    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    Insomnia, KASANDRA is currently untreated  She stopped CPAP because not tolerated  sleep study in 2017, which showed sleep apnea. Stopped CPAP, a year and a half ago, as she wasn't sleeping and it wasn't positive experience.     Essential hypertension  Hydrochlorothiazide one half of a 50 mg pill. Lisinopril 20 mg daily.  She has a home blood pressure machine, but has not been using it regularly.  I recommend that she start recording the blood pressure numbers in a notebook, and 1 day bring the machine to clinic so that we can validate it against a manual reading to make sure it is accurate, then she can believe the numbers.     I told her that target blood pressure is 120/80 at rest and seated position measured on the right upper arm.  I reminded her that healthy diet particular less sodium, also regular exercise, and weight control will help with blood pressure, as well as treating sleep apnea     BP Readings from Last 6 Encounters:   11/01/22 (!) 160/84   10/05/22 124/82   10/03/22 (!) 139/90   09/20/22 (!) 141/69   08/29/22 114/68   08/12/22 (!) 150/71     Hyperlipidemia, mildly elevated LDL, very generous HDL which is favorable  Lipid profile July 24, 2019 with total cholesterol 265, triglycerides 43, LDL bad cholesterol modestly elevated 141, but with a very generous level of the HDL good cholesterol 115  10-  Hemoglobin A1C <=5.6 % 5.3       Gastroesophageal reflux, uses Tums intermittently      Constipation that she has noticed since starting nortriptyline for neuropathy and restless legs  I told her that constipation is a well-known side effect of nortriptyline because of its anticholinergic effects.  To combat constipation, I recommended she stay well-hydrated, continue with the Citrucel every day, and consider adding a capful of MiraLAX powder every day which is an osmotic laxative and is safe to use every  day.  It adds water content of stools.     Osteopenia, on Prolia (started approximately 2017)  Chrissy believes she got a Prolia injection in July 2022, but for some reason I am having trouble finding it in her chart, if she did get an injection in July 2022, that means her next injection would be January 2023  Bone density scan December 15, 2019  1. The spine bone density L1-L4 (L3) with T-score 0.0 and significant improvement of 6.2 % compared to 2017.  2. Femoral bone densities show right femoral neck T- score -1.8 and significant improvement of 8.9% compared to 2017.  3. Trabecular bone score indicates moderate trabecular bone architecture.   79 y.o. female with LOW BONE DENSITY (OSTEOPENIA), stable on the current treatment.     Takes her calcium and vitamin D     Overweight with body mass index of 28.  She has been less physically active because of her lumbar spine issues.  She does water aerobics which is excellent.    Wt Readings from Last 5 Encounters:   11/01/22 83 kg (183 lb)   10/28/22 81.6 kg (180 lb)   10/05/22 83 kg (183 lb)   08/12/22 81.2 kg (179 lb)   06/10/22 81.2 kg (179 lb)     Menopause, status post complete hysterectomy  Had complete hysterectomy at age in her 50s, has not needed to get Pap smears anymore  Result Date: 9/6/2022  BILATERAL FULL FIELD DIGITAL SCREENING MAMMOGRAM WITH TOMOSYNTHESIS      She recalls a colonoscopy that was done approximately at age 72    Received third shot of Pfizer COVID-19 vaccine October 29, 2021, so she could get a booster anytime  Has received both pneumococcal vaccines    And recmbinant shingles vaccine     Could consider getting a tetanus booster since it looks like her last one was in 2007, she should get that at a community pharmacy since it is paid under the Medicare prescription drug benefit

## 2022-11-03 LAB
ATRIAL RATE - MUSE: 67 BPM
DIASTOLIC BLOOD PRESSURE - MUSE: NORMAL MMHG
INTERPRETATION ECG - MUSE: NORMAL
P AXIS - MUSE: 55 DEGREES
PR INTERVAL - MUSE: 170 MS
QRS DURATION - MUSE: 82 MS
QT - MUSE: 392 MS
QTC - MUSE: 414 MS
R AXIS - MUSE: 29 DEGREES
SYSTOLIC BLOOD PRESSURE - MUSE: NORMAL MMHG
T AXIS - MUSE: 41 DEGREES
VENTRICULAR RATE- MUSE: 67 BPM

## 2022-11-09 ENCOUNTER — VIRTUAL VISIT (OUTPATIENT)
Dept: FAMILY MEDICINE | Facility: CLINIC | Age: 82
End: 2022-11-09
Payer: MEDICARE

## 2022-11-09 DIAGNOSIS — U07.1 INFECTION DUE TO 2019 NOVEL CORONAVIRUS: ICD-10-CM

## 2022-11-09 PROCEDURE — 99441 PR PHYSICIAN TELEPHONE EVALUATION 5-10 MIN: CPT | Mod: CS | Performed by: FAMILY MEDICINE

## 2022-11-09 NOTE — PROGRESS NOTES
"Chrissy is a 82 year old who is being evaluated via a billable telephone visit.      What phone number would you like to be contacted at? [phone]  How would you like to obtain your AVS? [facility]    Assessment & Plan   Problem List Items Addressed This Visit     Infection due to 2019 novel coronavirus     Patient reports that she is improving.  She is beyond the timeframe for oral therapies, and is disinclined to pursue infusion.  Discussed isolation (completed) masking and medications underlying illnesses symptomatic measures status of immunizations.                      BMI:   Estimated body mass index is 29.09 kg/m  as calculated from the following:    Height as of 11/1/22: 1.689 m (5' 6.5\").    Weight as of 11/1/22: 83 kg (183 lb).           No follow-ups on file.   Follow-up Visit   Expected date:  Dec 09, 2022 (Approximate)      Follow Up Appointment Details:     Follow-up with whom?: PCP    Follow-Up for what?: Medicare Wellness    Welcome or Annual?: Annual Wellness    How?: In Person    Is this an as-needed follow-up?: No                    Schuyler Garcia MD  [facility]    Subjective   Chrissy is a 82 year old, presenting for the following health issues:  No chief complaint on file.      HPI       COVID-19 Symptom Review  How many days ago did these symptoms start? 5 days    Are any of the following symptoms significant for you?    New or worsening difficulty breathing? No    Worsening cough? Yes, I am coughing up mucus.    Fever or chills? Yes, I felt feverish or had chills.    Headache: YES    Sore throat: YES    Chest pain: No    Diarrhea: No    Body aches? No    What treatments has patient tried? Vitamin C, cough drops, vicks , tylenol  Does patient live in a nursing home, group home, or shelter? No  Does patient have a way to get food/medications during quarantined? Yes, I have a friend or family member who can help me.                  Review of Systems   As described    "   Objective           Vitals:  No vitals were obtained today due to virtual visit.    Physical Exam   healthy, alert and no distress  PSYCH: Alert and oriented times 3; coherent speech, normal   rate and volume, able to articulate logical thoughts, able   to abstract reason, no tangential thoughts, no hallucinations   or delusions  Her affect is normal  RESP: No cough, no audible wheezing, able to talk in full sentences  Remainder of exam unable to be completed due to telephone visits                Phone call duration: 12 minutes  Schuyler Garcia MD

## 2022-11-09 NOTE — PATIENT INSTRUCTIONS
Coping with Life After COVID-19  Being in the hospital because of COVID-19 is scary. Going home can be scary, too. You may face changes to your life, the way you work or what you can eat. It s hard to adjust to change, and it s normal to feel afraid, frustrated or even angry. These feelings usually go away over time. If your feelings don t start to get better, it s called  adjustment disorder.      What signs should I look out for?  Adjustment disorder can happen to anyone in a time of stress. It makes it hard to cope with daily life. You may feel lonely or fight with loved ones, even if you re glad to be home. Watch for these signs:    Fear or worry    Hard time focusing    Sadness or anger    Trouble talking to family or friends    Feeling like you don t fit in or isolating yourself    Problems with sleep     Drinking alcohol or taking drugs to cope    What can I do?  You can help yourself get better. Feeling you have control helps you move forward. You may wonder if you ll be able to do things you did before. Be patient. Do your best to make the most of every day. Try to build relationships, be as active as you can, eat right and keep a sense of humor. Avoid smoking and drinking too much alcohol. Call your family doctor or clinic if you re not sure what to do. They can guide you to care or other services.    When should I get help?  Think about getting counseling if your sadness or frustration gets worse. Together with a trained counselor, you can talk about your problems adjusting to life after your hospital stay. You can come up with new ways to handle changes that give you more control. Your family doctor or care team can help you find a counselor.     Get help if you re thinking about hurting yourself. If you need help right away, call 911 or go to the nearest emergency room. You can also try the Crisis Text Line.    Crisis Text Line: 274-493 (http://www.crisistextline.org)  The Crisis Text Line serves  anyone, in any crisis. It gives free, 24/7 support. Here's how it works:  1. Text HOME to 249239 from anywhere in the [place], anytime, about any type of crisis.  2. A live, trained Crisis Counselor will text you back quickly.  3. The volunteer Crisis Counselor can help you move from a  hot moment  to a  cool moment.  They can also help you work out a safety plan.

## 2022-11-09 NOTE — ASSESSMENT & PLAN NOTE
Patient reports that she is improving.  She is beyond the timeframe for oral therapies, and is disinclined to pursue infusion.  Discussed isolation (completed) masking and medications underlying illnesses symptomatic measures status of immunizations.

## 2022-12-19 ENCOUNTER — TELEPHONE (OUTPATIENT)
Dept: NEUROLOGY | Facility: CLINIC | Age: 82
End: 2022-12-19

## 2022-12-19 NOTE — TELEPHONE ENCOUNTER
Pt was rescheduled for virtual visit at the [facility] with Dr. Khanna.  Appt was kept in June as that is 4 months after February visit.     ARIANA Ahumada ATC on 12/19/2022 at 9:12 AM

## 2022-12-19 NOTE — TELEPHONE ENCOUNTER
Health Call Center    Phone Message    May a detailed message be left on voicemail: yes     Reason for Call:     Patient is calling to reschedule her appointment with , next in clinic openings are not until June, patient would like a video appointment if possible. Writer was unable to pull forward video appts for patient, patient asking for a call back to help schedule.     Action Taken: Message routed to:  [facility] ([facility]  neurology    Travel Screening: Not Applicable

## 2023-01-05 ENCOUNTER — TRANSFERRED RECORDS (OUTPATIENT)
Dept: HEALTH INFORMATION MANAGEMENT | Facility: CLINIC | Age: 83
End: 2023-01-05

## 2023-01-12 ENCOUNTER — TRANSFERRED RECORDS (OUTPATIENT)
Dept: HEALTH INFORMATION MANAGEMENT | Facility: CLINIC | Age: 83
End: 2023-01-12

## 2023-01-16 NOTE — PROGRESS NOTES
Assessment:   Sheryl Trotter is a 82 year old y.o. female with past medical history significant for headache, polyarthralgia, polymyalgia, polyneuropathy, obstructive sleep apnea, hyperlipidemia, hypertension, osteoporosis, insomnia who presents today for follow-up regarding chronic low back pain with radiation into the bilateral lower extremities with limited walking and standing tolerance consistent with neurogenic claudication.  My review of an MRI cervical spine shows degenerative spondylolisthesis at L3-4 and L4-5.  At each of these levels there is severe spinal stenosis and mild bilateral foraminal stenosis.  Patient is status post an L5-S1 interlaminar epidural steroid injection August 29, 2022 which provided greater than 50% relief of her pain for approximately 3 months.  Since then pain has gradually returned.  Patient is neurologically intact on exam.    PSP: Dr. Hutchison  Plan:     A shared decision making plan was used.  The patient's values and choices were respected.  The following represents what was discussed and decided upon by the physician assistant and the patient.      1.  DIAGNOSTIC TESTS: I reviewed the MRI lumbar spine.  -No additional diagnostic tests were ordered.  - If pain worsens, I would recommend obtaining lumbar spine flexion-extension x-rays.    2.  PHYSICAL THERAPY: Patient is currently in physical therapy at [facility] in [place].  Encouraged her to continue with physical therapy and the home exercises.    3.  MEDICATIONS: No changes are made to the patient's medications.  - Patient takes pregabalin 150 mg twice daily.  - Patient takes Tylenol as needed.    4.  INTERVENTIONS:  - I offer the patient repeat L5-S1 interlaminar epidural steroid injection.  Patient indicated she would like to proceed and an order was placed.  - Patient previously had bilateral L4-5 transforaminal epidural steroid injections May 24, 2022 which did not provide as much relief of her pain.    5.  PATIENT  EDUCATION: Due to the severity of the patient's stenosis I offered for the patient to see spine surgery.  Patient indicates she is not interested in pursuing a surgical consultation at this point.    6.  FOLLOW-UP: Patient can follow-up with me 2 weeks after her L5-S1 interlaminar epidural steroid injection.  If she has questions or concerns in the meantime, she should not hesitate to call.    Subjective:     Sheryl Trotter is a 82 year old female who presents today for follow-up regarding chronic low back pain with radiation into the bilateral lower extremities with associated numbness and tinglin and weakness.  Patient is status post an L5-S1 interlaminar epidural steroid injection August 29, 2022.  Patient reports the injection provided greater than 50% relief of her pain for about 3 months.  After that pain gradually returned.    Patient complains of bilateral low back pain.  Pain spans across low back at the lumbosacral junction.  Pain radiates into the buttocks, down the posterior thighs, into the posterior calves.  She describes the pain as a shooting pain.  She has numbness and tingling in the same distribution as her pain.  She states her legs feel weak.  She rates her pain today as a 2 out of 10.  At its worst it is a 6 out of 10.  Pain is aggravated with standing and walking.  She can only stand or walk for 10 minutes max before she has to sit down.  Pain is also aggravated with transitioning from seated to standing and getting up after lying down.  Pain is alleviated with sitting and lying down.    Patient is currently in physical therapy at [facility].  She takes pregabalin 75 mg twice daily.  She takes Tylenol as needed which is somewhat helpful.    Review of Systems:  Positive for numbness lastingly, weakness, trip/stumble/falls.  Negative for loss of bowel/bladder control, inability to urinate, headache, pain much worse at night, difficulty swallowing, difficulty with hand skills, fevers, unintentional  weight loss.     Objective:   CONSTITUTIONAL:  Vital signs as above.  No acute distress.  The patient is well nourished and well groomed.    PSYCHIATRIC:  The patient is awake, alert, oriented to person, place and time.  The patient is answering questions appropriately with clear speech.  Normal affect.  HEENT: Normocephalic, atraumatic.  Sclera clear.    SKIN:  Skin over the face, posterior torso, bilateral upper and lower extremities is clean, dry, intact without rashes.  VASCULAR: No significant lower extremity edema.  MUSCULOSKELETAL:  Gait is non-antalgic.  The patient is able to rise onto toes and heels bilaterally with support.  Mild tenderness over the bilateral lower lumbar paraspinal muscles.      The patient has 5/5 strength for the bilateral hip flexors, knee flexors/extensors, ankle dorsiflexors/plantar flexors, ankle evertors/invertors.    NEUROLOGICAL: No ankle clonus bilaterally.  Sensation to light touch is intact in the bilateral L4, L5, and S1 dermatomes.       RESULTS: I reviewed the MRI lumbar spine from [facility] dated April 29, 2022.  This shows grade 1 spondylolisthesis L3-4 and L4-5.  At L3-4 there is a disc bulge and severe facet arthropathy with severe spinal stenosis and mild bilateral foraminal stenosis.  At L4-5 there is a disc bulge and severe facet arthropathy with severe spinal stenosis and mild bilateral foraminal stenosis.

## 2023-01-17 ENCOUNTER — OFFICE VISIT (OUTPATIENT)
Dept: PHYSICAL MEDICINE AND REHAB | Facility: CLINIC | Age: 83
End: 2023-01-17
Payer: MEDICARE

## 2023-01-17 VITALS — SYSTOLIC BLOOD PRESSURE: 137 MMHG | DIASTOLIC BLOOD PRESSURE: 63 MMHG

## 2023-01-17 DIAGNOSIS — M48.062 SPINAL STENOSIS OF LUMBAR REGION WITH NEUROGENIC CLAUDICATION: Primary | ICD-10-CM

## 2023-01-17 DIAGNOSIS — M43.16 SPONDYLOLISTHESIS OF LUMBAR REGION: ICD-10-CM

## 2023-01-17 PROCEDURE — 99204 OFFICE O/P NEW MOD 45 MIN: CPT | Performed by: PHYSICIAN ASSISTANT

## 2023-01-17 ASSESSMENT — PAIN SCALES - GENERAL: PAINLEVEL: MILD PAIN (2)

## 2023-01-17 NOTE — PATIENT INSTRUCTIONS
Bedside and Verbal shift change report given to Annamarie Albright RN (oncoming nurse) by Sarah Chavez RNC (offgoing nurse). Report included the following information SBAR, Kardex, Intake/Output, MAR and Recent Results. L5/S1 interlaminar epidural steroid injection has been ordered today.      Please note that this injection uses cortisone.  The cortisone may somewhat weaken the immune system.  It is unknown how much the immune system is weakened.  It is unknown if it is weakened to the point that you may be more likely to get the COVID-19 virus, or if you do get the COVID-19 virus, if you would be sicker than you would have been if you had not had the cortisone injection.  If you do not wish to proceed with the injection, please let the nurse/physician know and do NOT schedule the injection.    Please note that since your immune system is weakened from the cortisone, having any vaccine/shot may be less effective if you have this vaccine within 2 weeks from your cortisone injection.  It is advised to wait 2 weeks after your cortisone injection to have any vaccine (or if you have a vaccine first, wait 2 weeks before you have the cortisone injection).    Please schedule this injection at least 1 week  from now to allow time for insurance prior authorization.  On the day of your injection, you cannot be sick or taking antibiotics.  If you become sick and are prescribed, please call the clinic so your injection can be rescheduled for once you have completed your antibiotics.  You will need to bring a  with you for your injection.   If you have any questions or concerns prior to your injection, please do not hesitate to call the nurse navigation line at [phone] or contact Brissa Corea through [employer].

## 2023-01-17 NOTE — LETTER
1/17/2023         RE: Sheryl Trotter  [address]  [place] [address]        Dear Colleague,    Thank you for referring your patient, Sheryl Trotter, to the [facility]. Please see a copy of my visit note below.       Assessment:   Sheryl Trotter is a 82 year old y.o. female with past medical history significant for headache, polyarthralgia, polymyalgia, polyneuropathy, obstructive sleep apnea, hyperlipidemia, hypertension, osteoporosis, insomnia who presents today for follow-up regarding chronic low back pain with radiation into the bilateral lower extremities with limited walking and standing tolerance consistent with neurogenic claudication.  My review of an MRI cervical spine shows degenerative spondylolisthesis at L3-4 and L4-5.  At each of these levels there is severe spinal stenosis and mild bilateral foraminal stenosis.  Patient is status post an L5-S1 interlaminar epidural steroid injection August 29, 2022 which provided greater than 50% relief of her pain for approximately 3 months.  Since then pain has gradually returned.  Patient is neurologically intact on exam.    PSP: Dr. Hutchison  Plan:     A shared decision making plan was used.  The patient's values and choices were respected.  The following represents what was discussed and decided upon by the physician assistant and the patient.      1.  DIAGNOSTIC TESTS: I reviewed the MRI lumbar spine.  -No additional diagnostic tests were ordered.  - If pain worsens, I would recommend obtaining lumbar spine flexion-extension x-rays.    2.  PHYSICAL THERAPY: Patient is currently in physical therapy at [facility] in [place].  Encouraged her to continue with physical therapy and the home exercises.    3.  MEDICATIONS: No changes are made to the patient's medications.  - Patient takes pregabalin 150 mg twice daily.  - Patient takes Tylenol as needed.    4.  INTERVENTIONS:  - I offer the patient repeat L5-S1  interlaminar epidural steroid injection.  Patient indicated she would like to proceed and an order was placed.  - Patient previously had bilateral L4-5 transforaminal epidural steroid injections May 24, 2022 which did not provide as much relief of her pain.    5.  PATIENT EDUCATION: Due to the severity of the patient's stenosis I offered for the patient to see spine surgery.  Patient indicates she is not interested in pursuing a surgical consultation at this point.    6.  FOLLOW-UP: Patient can follow-up with me 2 weeks after her L5-S1 interlaminar epidural steroid injection.  If she has questions or concerns in the meantime, she should not hesitate to call.    Subjective:     Sheryl Trotter is a 82 year old female who presents today for follow-up regarding chronic low back pain with radiation into the bilateral lower extremities with associated numbness and tinglin and weakness.  Patient is status post an L5-S1 interlaminar epidural steroid injection August 29, 2022.  Patient reports the injection provided greater than 50% relief of her pain for about 3 months.  After that pain gradually returned.    Patient complains of bilateral low back pain.  Pain spans across low back at the lumbosacral junction.  Pain radiates into the buttocks, down the posterior thighs, into the posterior calves.  She describes the pain as a shooting pain.  She has numbness and tingling in the same distribution as her pain.  She states her legs feel weak.  She rates her pain today as a 2 out of 10.  At its worst it is a 6 out of 10.  Pain is aggravated with standing and walking.  She can only stand or walk for 10 minutes max before she has to sit down.  Pain is also aggravated with transitioning from seated to standing and getting up after lying down.  Pain is alleviated with sitting and lying down.    Patient is currently in physical therapy at [facility].  She takes pregabalin 75 mg twice daily.  She takes Tylenol as needed which is somewhat  helpful.    Review of Systems:  Positive for numbness lastingly, weakness, trip/stumble/falls.  Negative for loss of bowel/bladder control, inability to urinate, headache, pain much worse at night, difficulty swallowing, difficulty with hand skills, fevers, unintentional weight loss.     Objective:   CONSTITUTIONAL:  Vital signs as above.  No acute distress.  The patient is well nourished and well groomed.    PSYCHIATRIC:  The patient is awake, alert, oriented to person, place and time.  The patient is answering questions appropriately with clear speech.  Normal affect.  HEENT: Normocephalic, atraumatic.  Sclera clear.    SKIN:  Skin over the face, posterior torso, bilateral upper and lower extremities is clean, dry, intact without rashes.  VASCULAR: No significant lower extremity edema.  MUSCULOSKELETAL:  Gait is non-antalgic.  The patient is able to rise onto toes and heels bilaterally with support.  Mild tenderness over the bilateral lower lumbar paraspinal muscles.      The patient has 5/5 strength for the bilateral hip flexors, knee flexors/extensors, ankle dorsiflexors/plantar flexors, ankle evertors/invertors.    NEUROLOGICAL: No ankle clonus bilaterally.  Sensation to light touch is intact in the bilateral L4, L5, and S1 dermatomes.       RESULTS: I reviewed the MRI lumbar spine from [facility] dated April 29, 2022.  This shows grade 1 spondylolisthesis L3-4 and L4-5.  At L3-4 there is a disc bulge and severe facet arthropathy with severe spinal stenosis and mild bilateral foraminal stenosis.  At L4-5 there is a disc bulge and severe facet arthropathy with severe spinal stenosis and mild bilateral foraminal stenosis.          Again, thank you for allowing me to participate in the care of your patient.        Sincerely,        Brissa Corea PA-C

## 2023-01-18 ENCOUNTER — TRANSFERRED RECORDS (OUTPATIENT)
Dept: HEALTH INFORMATION MANAGEMENT | Facility: CLINIC | Age: 83
End: 2023-01-18

## 2023-02-01 ENCOUNTER — RADIOLOGY INJECTION OFFICE VISIT (OUTPATIENT)
Dept: PHYSICAL MEDICINE AND REHAB | Facility: CLINIC | Age: 83
End: 2023-02-01
Attending: PHYSICIAN ASSISTANT
Payer: MEDICARE

## 2023-02-01 VITALS
TEMPERATURE: 97.7 F | HEART RATE: 78 BPM | OXYGEN SATURATION: 99 % | SYSTOLIC BLOOD PRESSURE: 128 MMHG | DIASTOLIC BLOOD PRESSURE: 60 MMHG

## 2023-02-01 DIAGNOSIS — M48.062 SPINAL STENOSIS OF LUMBAR REGION WITH NEUROGENIC CLAUDICATION: ICD-10-CM

## 2023-02-01 PROCEDURE — 62323 NJX INTERLAMINAR LMBR/SAC: CPT | Performed by: PAIN MEDICINE

## 2023-02-01 RX ORDER — DEXAMETHASONE SODIUM PHOSPHATE 10 MG/ML
INJECTION, SOLUTION INTRAMUSCULAR; INTRAVENOUS
Status: COMPLETED | OUTPATIENT
Start: 2023-02-01 | End: 2023-02-01

## 2023-02-01 RX ORDER — LIDOCAINE HYDROCHLORIDE 10 MG/ML
INJECTION, SOLUTION EPIDURAL; INFILTRATION; INTRACAUDAL; PERINEURAL
Status: COMPLETED | OUTPATIENT
Start: 2023-02-01 | End: 2023-02-01

## 2023-02-01 RX ADMIN — DEXAMETHASONE SODIUM PHOSPHATE 10 MG: 10 INJECTION, SOLUTION INTRAMUSCULAR; INTRAVENOUS at 14:58

## 2023-02-01 RX ADMIN — LIDOCAINE HYDROCHLORIDE 2 ML: 10 INJECTION, SOLUTION EPIDURAL; INFILTRATION; INTRACAUDAL; PERINEURAL at 14:58

## 2023-02-01 ASSESSMENT — PAIN SCALES - GENERAL
PAINLEVEL: NO PAIN (1)
PAINLEVEL: NO PAIN (0)

## 2023-02-01 NOTE — PATIENT INSTRUCTIONS
Follow-up visit in 2 weeks with Brissa YOUNG to discuss injection outcome and determine care plan going forward.     DISCHARGE INSTRUCTIONS    During office hours (8:00 a.m.- 4:00 p.m.) questions or concerns may be answered  by calling [facility] Center Navigation Nurses at  [phone].  Messages received after hours will be returned the following business day.      In the case of an emergency, please dial 911 or seek assistance at the nearest Emergency Room/Urgent Care facility.     All Patients:    You may experience an increase in your symptoms for the first 2 days (It may take anywhere between 2 days- 2 weeks for the steroid to have maximum effect).    You may use ice on the injection site, as frequently as 20 minutes each hour if needed.    You may take your pain medicine.    You may continue taking your regular medication after your injection. If you have had a Medial Branch Block you may resume pain medication once your pain diary is completed.    You may shower. No swimming, tub bath or hot tub for 48 hours.  You may remove your bandaid/bandage as soon as you are home.    You may resume light activities, as tolerated.    Resume your usual diet as tolerated.    It is strongly advised that you do not drive for 1-3 hours post injection.    If you have had oral sedation:  Do not drive for 8 hours post injection.      If you have had IV sedation:  Do not drive for 24 hours post injection.  Do not operate hazardous machinery or make important personal/business decisions for 24 hours.      POSSIBLE STEROID SIDE EFFECTS (If steroid/cortisone was used for your procedure)    -If you experience these symptoms, it should only last for a short period    Swelling of the legs              Skin redness (flushing)     Mouth (oral) irritation   Blood sugar (glucose) levels            Sweats                    Mood changes  Headache  Sleeplessness  Weakened immune system for up to 14 days, which could increase the risk of  manuel the COVID-19 virus and/or experiencing more severe symptoms of the disease, if exposed.  Decreased effectiveness of the flu vaccine if given within 2 weeks of the steroid.         POSSIBLE PROCEDURE SIDE EFFECTS  -Call the Spine Center if you are concerned  Increased Pain           Increased numbness/tingling      Nausea/Vomiting          Bruising/bleeding at site      Redness or swelling                                              Difficulty walking      Weakness           Fever greater than 100.5    *In the event of a severe headache after an epidural steroid injection that is relieved by lying down, please call the [facility] to speak with a clinical staff member*

## 2023-02-08 ENCOUNTER — OFFICE VISIT (OUTPATIENT)
Dept: INTERNAL MEDICINE | Facility: CLINIC | Age: 83
End: 2023-02-08
Payer: MEDICARE

## 2023-02-08 VITALS
BODY MASS INDEX: 28.56 KG/M2 | OXYGEN SATURATION: 98 % | WEIGHT: 182 LBS | SYSTOLIC BLOOD PRESSURE: 128 MMHG | TEMPERATURE: 98.6 F | DIASTOLIC BLOOD PRESSURE: 80 MMHG | RESPIRATION RATE: 16 BRPM | HEART RATE: 78 BPM | HEIGHT: 67 IN

## 2023-02-08 DIAGNOSIS — Z79.899 OTHER LONG TERM (CURRENT) DRUG THERAPY: ICD-10-CM

## 2023-02-08 DIAGNOSIS — G47.33 OSA (OBSTRUCTIVE SLEEP APNEA): ICD-10-CM

## 2023-02-08 DIAGNOSIS — M67.442 GANGLION CYST OF BOTH HANDS: ICD-10-CM

## 2023-02-08 DIAGNOSIS — I10 ESSENTIAL HYPERTENSION: ICD-10-CM

## 2023-02-08 DIAGNOSIS — M35.3 POLYMYALGIA (H): ICD-10-CM

## 2023-02-08 DIAGNOSIS — M67.441 GANGLION CYST OF BOTH HANDS: ICD-10-CM

## 2023-02-08 DIAGNOSIS — R53.82 CHRONIC FATIGUE: Primary | ICD-10-CM

## 2023-02-08 LAB
ALBUMIN SERPL BCG-MCNC: 4.2 G/DL (ref 3.5–5.2)
ALP SERPL-CCNC: 85 U/L (ref 35–104)
ALT SERPL W P-5'-P-CCNC: 15 U/L (ref 10–35)
ANION GAP SERPL CALCULATED.3IONS-SCNC: 12 MMOL/L (ref 7–15)
AST SERPL W P-5'-P-CCNC: 24 U/L (ref 10–35)
BILIRUB SERPL-MCNC: 0.3 MG/DL
BUN SERPL-MCNC: 15.1 MG/DL (ref 8–23)
CALCIUM SERPL-MCNC: 10.4 MG/DL (ref 8.8–10.2)
CHLORIDE SERPL-SCNC: 98 MMOL/L (ref 98–107)
CREAT SERPL-MCNC: 0.7 MG/DL (ref 0.51–0.95)
CRP SERPL-MCNC: 4.31 MG/L
DEPRECATED HCO3 PLAS-SCNC: 26 MMOL/L (ref 22–29)
ERYTHROCYTE [DISTWIDTH] IN BLOOD BY AUTOMATED COUNT: 13.5 % (ref 10–15)
ERYTHROCYTE [SEDIMENTATION RATE] IN BLOOD BY WESTERGREN METHOD: 8 MM/HR (ref 0–20)
GFR SERPL CREATININE-BSD FRML MDRD: 87 ML/MIN/1.73M2
GLUCOSE SERPL-MCNC: 94 MG/DL (ref 70–99)
HCT VFR BLD AUTO: 42.6 % (ref 35–47)
HGB BLD-MCNC: 14.1 G/DL (ref 11.7–15.7)
MCH RBC QN AUTO: 28.1 PG (ref 26.5–33)
MCHC RBC AUTO-ENTMCNC: 33.1 G/DL (ref 31.5–36.5)
MCV RBC AUTO: 85 FL (ref 78–100)
PLATELET # BLD AUTO: 242 10E3/UL (ref 150–450)
POTASSIUM SERPL-SCNC: 4 MMOL/L (ref 3.4–5.3)
PROT SERPL-MCNC: 6.9 G/DL (ref 6.4–8.3)
RBC # BLD AUTO: 5.02 10E6/UL (ref 3.8–5.2)
SODIUM SERPL-SCNC: 136 MMOL/L (ref 136–145)
TSH SERPL DL<=0.005 MIU/L-ACNC: 1.23 UIU/ML (ref 0.3–4.2)
WBC # BLD AUTO: 9.7 10E3/UL (ref 4–11)

## 2023-02-08 PROCEDURE — 84443 ASSAY THYROID STIM HORMONE: CPT | Performed by: INTERNAL MEDICINE

## 2023-02-08 PROCEDURE — 82306 VITAMIN D 25 HYDROXY: CPT | Performed by: INTERNAL MEDICINE

## 2023-02-08 PROCEDURE — 85652 RBC SED RATE AUTOMATED: CPT | Performed by: INTERNAL MEDICINE

## 2023-02-08 PROCEDURE — 80053 COMPREHEN METABOLIC PANEL: CPT | Performed by: INTERNAL MEDICINE

## 2023-02-08 PROCEDURE — 85027 COMPLETE CBC AUTOMATED: CPT | Performed by: INTERNAL MEDICINE

## 2023-02-08 PROCEDURE — 86140 C-REACTIVE PROTEIN: CPT | Performed by: INTERNAL MEDICINE

## 2023-02-08 PROCEDURE — 36415 COLL VENOUS BLD VENIPUNCTURE: CPT | Performed by: INTERNAL MEDICINE

## 2023-02-08 PROCEDURE — 99214 OFFICE O/P EST MOD 30 MIN: CPT | Performed by: INTERNAL MEDICINE

## 2023-02-08 NOTE — PATIENT INSTRUCTIONS
Chrissy comes into clinic today to evaluate for episodes that started at the beginning of December 2022, consisting of    Initial symptoms of frontal headache, followed quickly by nausea, chills, extreme fatigue, each episode lasts about 2 days total, then she feels back to normal  When she is sick, she says that she sometimes feels a bit lightheaded.  Dates of these episodes were December 1, 2022, and then January 14, 2022, and 31 in 2023.    What really ends up relieving her symptoms is a solid day or 2 in bed.  She told me the symptoms do NOT resemble her polymyalgia rheumatica    She does carry a diagnosis of obstructive sleep apnea, and has not been using CPAP in recent years.  I bring this up because untreated sleep apnea sometimes has headache as an associated symptom. but she is in the process of scheduling a sleep study to be done sometime early 2023    Her symptoms sound like they might be some sort of migraine phenomenon, but it is a new symptom for her, and previously headaches have really not been much of an issue.    I do not find anything focal on physical exam today.  She does not seem to have any neurological deficits.    I think we should check her general health parameters, to look for any clues, for example specifically her thyroid status, and look for any electrolyte or acid-base disturbance that could suggest adrenal insufficiency.    No recurrence of epigastric pain of October 27, 2022 which sounds to me likely to be of gastrointestinal source, may be reflux, may be intestinal gas    Stable Neuroclaudication symptoms of lumbar spinal stenosis  Lumbosacral spondylosis without myelopathy  Chronic pain of left knee  Myofascial pain  Known Lumbar DDD (ESTEBAN 2005, for reported lumbar stenosis and spondylolisthesis and sciatica  Characteristic Pain Low back, both thighs, both calves. Worse when standing.  Difficulty climbing stairs. Most comfortable seated.  MRI 2012 had degenerating discs     Lyrica  75/150 was helpful to reduce paresthesias to some degree but she also had a foggy feeling with use.  She   Could consider duloxetine for neuropathy pending response to lyrica increase  Could trial RLS mdication like pramipexole if sympoms continue to break through even on 300 BID dose, or could increase lyrica to 300/375     Result Date: 8/29/2022  LUMBAR INTERLAMINAR EPIDURAL STEROID INJECTION WITH FLUOROSCOPIC      Distal symmetric polyneuropathy, small fiber peripheral neuropathy, also restless leg syndrome  Bilateral leg coldness leading to discomfort in the day and at night.   Lyrica used briefly, as of April 202 on nortriptyline, which seems to help  10/11/2021  Neurology: Cholo Khanna, DO  EMG through PM&R on 8/30/2019 with Dr. Izquierdo:  EMG on 8/30/2019 which showed an essentially normal study.  Comment NCS: Essentially normal study  Comment EMG: Normal study  1.  Normal needle EMG bilateral lower extremities.  Interpretation: Essentially normal study:   There is electrodiagnostic evidence of:  1.  Borderline bilateral sensory amplitudes.  This is normal for the patient's age is very likely normal finding.  I cannot, however, completely exclude a very mild sensory or sensorimotor peripheral polyneuropathy.   2 There is no electrodiagnostic evidence of lumbosacral radiculopathy, lumbosacral plexopathy, or focal neuropathy in the bilateral lower extremities.  Specifically, there is no electrodiagnostic evidence of tibial neuropathy at the tarsal tunnel in the bilateral lower extremities     Ganglion cyst dorsum right hand, which has become more uncomfortable, therefore we will have her see orthopedic hand    Left knee osteoarthritis improved with Euflexxa injection.     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     Insomnia, KASANDRA is currently untreated--but she is in the process of scheduling a sleep study to be done sometime early 2023  She stopped CPAP because not tolerated  sleep study in 2017, which showed sleep apnea. Stopped CPAP, a year and a half ago, as she wasn't sleeping and it wasn't positive experience.      Essential hypertension  Hydrochlorothiazide one half of a 50 mg pill. Lisinopril 20 mg daily.  She has a home blood pressure machine, but has not been using it regularly.  I recommend that she start recording the blood pressure numbers in a notebook, and 1 day bring the machine to clinic so that we can validate it against a manual reading to make sure it is accurate, then she can believe the numbers.     I told her that target blood pressure is 120/80 at rest and seated position measured on the right upper arm.  I reminded her that healthy diet particular less sodium, also regular exercise, and weight control will help with blood pressure, as well as treating sleep apnea     BP Readings from Last 6 Encounters:   02/08/23 128/80   02/01/23 128/60   01/17/23 137/63   11/01/22 (!) 177/81   10/05/22 124/82   10/03/22 (!) 139/90     Hyperlipidemia, mildly elevated LDL, very generous HDL which is favorable  Lipid profile July 24, 2019 with total cholesterol 265, triglycerides 43, LDL bad cholesterol modestly elevated 141, but with a very generous level of the HDL good cholesterol 115  10-  Hemoglobin A1C <=5.6 % 5.3       Gastroesophageal reflux, uses Tums intermittently      Constipation that she has noticed since starting nortriptyline for neuropathy and restless legs-- no longer takes nortriptylene     Osteopenia, on Prolia (started approximately 2017)  Chrissy believes she got a Prolia injection in July 2022, but for some reason I am having trouble finding it in her chart, if she did get an injection in July 2022, that means her next injection would be January 2023  Bone  density scan December 15, 2019  1. The spine bone density L1-L4 (L3) with T-score 0.0 and significant improvement of 6.2 % compared to 2017.  2. Femoral bone densities show right femoral neck T- score -1.8 and significant improvement of 8.9% compared to 2017.  3. Trabecular bone score indicates moderate trabecular bone architecture.   79 y.o. female with LOW BONE DENSITY (OSTEOPENIA), stable on the current treatment.     Takes her calcium and vitamin D     Overweight with body mass index of 29.  She has been less physically active because of her lumbar spine issues.  She does water aerobics which is excellent.     Wt Readings from Last 5 Encounters:   02/08/23 82.6 kg (182 lb)   11/01/22 83 kg (183 lb)   10/28/22 81.6 kg (180 lb)   10/05/22 83 kg (183 lb)   08/12/22 81.2 kg (179 lb)     Menopause, status post complete hysterectomy  Had complete hysterectomy at age in her 50s, has not needed to get Pap smears anymore  Result Date: 9/6/2022  BILATERAL FULL FIELD DIGITAL SCREENING MAMMOGRAM WITH TOMOSYNTHESIS      She recalls a colonoscopy that was done approximately at age 72     COVID-19 vaccine  COVID-19 Vaccine Bivalent Booster 12+ (Pfizer) 09/16/2022     Has received both pneumococcal vaccines  And recmbinant shingles vaccine    Could consider getting a tetanus booster since it looks like her last one was in 2007, she should get that at a community pharmacy since it is paid under the Medicare prescription drug benefit

## 2023-02-08 NOTE — PROGRESS NOTES
Office Visit - Follow Up   Sheryl Trotter   82 year old female    Date of Visit: 2/8/2023    Chief Complaint   Patient presents with     Headache        -------------------------------------------------------------------------------------------------------------------------  Assessment and Plan    Chrissy comes into clinic today to evaluate for episodes that started at the beginning of December 2022, consisting of    Initial symptoms of frontal headache, followed quickly by nausea, chills, extreme fatigue, each episode lasts about 2 days total, then she feels back to normal  When she is sick, she says that she sometimes feels a bit lightheaded.  Dates of these episodes were December 1, 2022, and then January 14, 2022, and 31 in 2023.    What really ends up relieving her symptoms is a solid day or 2 in bed.  She told me the symptoms do NOT resemble her polymyalgia rheumatica    She does carry a diagnosis of obstructive sleep apnea, and has not been using CPAP in recent years.  I bring this up because untreated sleep apnea sometimes has headache as an associated symptom. but she is in the process of scheduling a sleep study to be done sometime early 2023    Her symptoms sound like they might be some sort of migraine phenomenon, but it is a new symptom for her, and previously headaches have really not been much of an issue.    I do not find anything focal on physical exam today.  She does not seem to have any neurological deficits.    I think we should check her general health parameters, to look for any clues, for example specifically her thyroid status, and look for any electrolyte or acid-base disturbance that could suggest adrenal insufficiency.    No recurrence of epigastric pain of October 27, 2022 which sounds to me likely to be of gastrointestinal source, may be reflux, may be intestinal gas    Stable Neuroclaudication symptoms of lumbar spinal stenosis  Lumbosacral spondylosis without myelopathy  Chronic pain of  left knee  Myofascial pain  Known Lumbar DDD (ESTEBAN 2005, for reported lumbar stenosis and spondylolisthesis and sciatica  Characteristic Pain Low back, both thighs, both calves. Worse when standing.  Difficulty climbing stairs. Most comfortable seated.  MRI 2012 had degenerating discs     Lyrica 75/150 was helpful to reduce paresthesias to some degree but she also had a foggy feeling with use.  She   Could consider duloxetine for neuropathy pending response to lyrica increase  Could trial RLS mdication like pramipexole if sympoms continue to break through even on 300 BID dose, or could increase lyrica to 300/375     Result Date: 8/29/2022  LUMBAR INTERLAMINAR EPIDURAL STEROID INJECTION WITH FLUOROSCOPIC      Distal symmetric polyneuropathy, small fiber peripheral neuropathy, also restless leg syndrome  Bilateral leg coldness leading to discomfort in the day and at night.   Lyrica used briefly, as of April 202 on nortriptyline, which seems to help  10/11/2021  Neurology: Cholo Khanna, DO  EMG through PM&R on 8/30/2019 with Dr. Izquierdo:  EMG on 8/30/2019 which showed an essentially normal study.  Comment NCS: Essentially normal study  Comment EMG: Normal study  1.  Normal needle EMG bilateral lower extremities.  Interpretation: Essentially normal study:   There is electrodiagnostic evidence of:  1.  Borderline bilateral sensory amplitudes.  This is normal for the patient's age is very likely normal finding.  I cannot, however, completely exclude a very mild sensory or sensorimotor peripheral polyneuropathy.   2 There is no electrodiagnostic evidence of lumbosacral radiculopathy, lumbosacral plexopathy, or focal neuropathy in the bilateral lower extremities.  Specifically, there is no electrodiagnostic evidence of tibial neuropathy at the tarsal tunnel in the bilateral lower extremities     Ganglion cyst dorsum right hand, which has become more uncomfortable, therefore we will have her see orthopedic  hand    Left knee osteoarthritis improved with Euflexxa injection.     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     Insomnia, KASANDRA is currently untreated--but she is in the process of scheduling a sleep study to be done sometime early 2023  She stopped CPAP because not tolerated  sleep study in 2017, which showed sleep apnea. Stopped CPAP, a year and a half ago, as she wasn't sleeping and it wasn't positive experience.      Essential hypertension  Hydrochlorothiazide one half of a 50 mg pill. Lisinopril 20 mg daily.  She has a home blood pressure machine, but has not been using it regularly.  I recommend that she start recording the blood pressure numbers in a notebook, and 1 day bring the machine to clinic so that we can validate it against a manual reading to make sure it is accurate, then she can believe the numbers.     I told her that target blood pressure is 120/80 at rest and seated position measured on the right upper arm.  I reminded her that healthy diet particular less sodium, also regular exercise, and weight control will help with blood pressure, as well as treating sleep apnea     BP Readings from Last 6 Encounters:   02/08/23 128/80   02/01/23 128/60   01/17/23 137/63   11/01/22 (!) 177/81   10/05/22 124/82   10/03/22 (!) 139/90     Hyperlipidemia, mildly elevated LDL, very generous HDL which is favorable  Lipid profile July 24, 2019 with total cholesterol 265, triglycerides 43, LDL bad cholesterol modestly elevated 141, but with a very generous level of the HDL good cholesterol 115  10-  Hemoglobin A1C <=5.6 % 5.3       Gastroesophageal reflux, uses Tums intermittently      Constipation that she has noticed since starting nortriptyline for neuropathy and restless legs-- no longer  takes nortriptylene     Osteopenia, on Prolia (started approximately 2017)  Chrissy believes she got a Prolia injection in July 2022, but for some reason I am having trouble finding it in her chart, if she did get an injection in July 2022, that means her next injection would be January 2023  Bone density scan December 15, 2019  1. The spine bone density L1-L4 (L3) with T-score 0.0 and significant improvement of 6.2 % compared to 2017.  2. Femoral bone densities show right femoral neck T- score -1.8 and significant improvement of 8.9% compared to 2017.  3. Trabecular bone score indicates moderate trabecular bone architecture.   79 y.o. female with LOW BONE DENSITY (OSTEOPENIA), stable on the current treatment.     Takes her calcium and vitamin D     Overweight with body mass index of 29.  She has been less physically active because of her lumbar spine issues.  She does water aerobics which is excellent.     Wt Readings from Last 5 Encounters:   02/08/23 82.6 kg (182 lb)   11/01/22 83 kg (183 lb)   10/28/22 81.6 kg (180 lb)   10/05/22 83 kg (183 lb)   08/12/22 81.2 kg (179 lb)     Menopause, status post complete hysterectomy  Had complete hysterectomy at age in her 50s, has not needed to get Pap smears anymore  Result Date: 9/6/2022  BILATERAL FULL FIELD DIGITAL SCREENING MAMMOGRAM WITH TOMOSYNTHESIS      She recalls a colonoscopy that was done approximately at age 72     COVID-19 vaccine  COVID-19 Vaccine Bivalent Booster 12+ (Pfizer) 09/16/2022     Has received both pneumococcal vaccines  And recmbinant shingles vaccine    Could consider getting a tetanus booster since it looks like her last one was in 2007, she should get that at a community pharmacy since it is paid under the Medicare prescription drug benefit      --------------------------------------------------------------------------------------------------------------------------  History of Present Illness  This 82 year old old     Chrissy comes into clinic today  to evaluate for episodes that started at the beginning of December 2022, consisting of    Initial symptoms of frontal headache, followed quickly by nausea, chills, extreme fatigue, each episode lasts about 2 days total, then she feels back to normal  When she is sick, she says that she sometimes feels a bit lightheaded.  Dates of these episodes were December 1, 2022, and then January 14, 2022, and 31 in 2023.    What really ends up relieving her symptoms is a solid day or 2 in bed.  She told me the symptoms do NOT resemble her polymyalgia rheumatica    She does carry a diagnosis of obstructive sleep apnea, and has not been using CPAP in recent years.  I bring this up because untreated sleep apnea sometimes has headache as an associated symptom. but she is in the process of scheduling a sleep study to be done sometime early 2023    Her symptoms sound like they might be some sort of migraine phenomenon, but it is a new symptom for her, and previously headaches have really not been much of an issue.    I do not find anything focal on physical exam today.  She does not seem to have any neurological deficits.    I think we should check her general health parameters, to look for any clues, for example specifically her thyroid status, and look for any electrolyte or acid-base disturbance that could suggest adrenal insufficiency.        Wt Readings from Last 3 Encounters:   02/08/23 82.6 kg (182 lb)   11/01/22 83 kg (183 lb)   10/28/22 81.6 kg (180 lb)     BP Readings from Last 3 Encounters:   02/08/23 128/80   02/01/23 128/60   01/17/23 137/63       ---------------------------------------------------------------------------------------------------------------------------    Medications, Allergies, Social, and Problem List   Current Outpatient Medications   Medication Sig Dispense Refill     acetaminophen (TYLENOL) 650 MG CR tablet Take 650 mg by mouth 2 times daily as needed for mild pain or fever       calcium  carbonate-vitamin D3 600 mg (1,500 mg)-800 unit Chew [CALCIUM CARBONATE-VITAMIN D3 600 MG (1,500 MG)-800 UNIT CHEW] Chew 1 tablet daily.       denosumab 60 mg/mL Syrg Inject 60 mg Subcutaneous once       famotidine (PEPCID) 20 MG tablet Take 1 tablet (20 mg) by mouth 2 times daily 60 tablet 4     fluocinonide (LIDEX) 0.05 % external ointment use 1 Application three times a day topically for up to 10 days       hydrochlorothiazide (HYDRODIURIL) 50 MG tablet TAKE 1/2 TABLET EVERY DAY (SUBSTITUTED FOR  HYDRODIURIL) 45 tablet 0     lisinopril (ZESTRIL) 20 MG tablet TAKE 1 TABLET EVERY DAY 90 tablet 3     METHYLCELLULOSE (CITRUCEL ORAL) [METHYLCELLULOSE (CITRUCEL ORAL)] Take 1 tablet by mouth daily.       pregabalin (LYRICA) 75 MG capsule Take 2 capsules (150 mg) by mouth 2 times daily 180 capsule 3     Vitamin D3 (CHOLECALCIFEROL) 25 mcg (1000 units) tablet Take 1 tablet (25 mcg) by mouth daily 90 tablet 3     Allergies   Allergen Reactions     Duloxetine Headache     Nausea, difficult sleeping ankles cramps, loose bowel      Gabapentin Anxiety     Social History     Tobacco Use     Smoking status: Former     Years: 4.00     Types: Cigarettes     Smokeless tobacco: Never     Tobacco comments:     smoking in college-social   Vaping Use     Vaping Use: Never used   Substance Use Topics     Alcohol use: Yes     Alcohol/week: 3.3 standard drinks     Comment: Alcoholic Drinks/day: occasional glass of wine     Drug use: No     Patient Active Problem List   Diagnosis     Osteoporosis     Hyperlipidemia     Essential hypertension     Primary insomnia     Trochanteric Bursitis     Lumbar Spondylosis     Lumbar Disc Degeneration     Headache     Osteoarthritis of the Knee     Polyarthralgia     Status post left hip replacement     Hip dislocation, left (H)     Bilateral shoulder pain     Polymyalgia (H)     Primary osteoarthritis involving multiple joints     Chondrocalcinosis     KASANDRA (obstructive sleep apnea)     Other  "polyneuropathy     Spinal stenosis of lumbar region with neurogenic claudication     Infection due to 2019 novel coronavirus        Reviewed, reconciled and updated       Physical Exam   General Appearance:       /80 (BP Location: Right arm, Patient Position: Sitting, Cuff Size: Adult Regular)   Pulse 78   Temp 98.6  F (37  C)   Resp 16   Ht 1.689 m (5' 6.5\")   Wt 82.6 kg (182 lb)   SpO2 98%   BMI 28.94 kg/m      Chrissy appears generally well today, blood pressures good, temperature is normal  Face is symmetrical, eye movements are normal, ear canals clear, oropharynx clear  No cervical adenopathy  Lungs clear  Heart regular rate and rhythm  Abdomen nontender  Extremities no edema  + Ganglion cyst about 2 cm dorsum right hand,  She is able to rise easily from seated to standing and walk smoothly around the exam room.  Mental status normal.     Additional Information   I spent 30 minutes on this encounter, including reviewing interval history since last visit, examining the patient, explaining and counseling the issues enumerated in the Assessment and Plan (patient given a copy), ordering indicated tests       RACHAEL PEDERSEN MD, MD        "

## 2023-02-09 LAB — DEPRECATED CALCIDIOL+CALCIFEROL SERPL-MC: 38 UG/L (ref 20–75)

## 2023-02-14 ENCOUNTER — VIRTUAL VISIT (OUTPATIENT)
Dept: NEUROLOGY | Facility: CLINIC | Age: 83
End: 2023-02-14
Payer: MEDICARE

## 2023-02-14 DIAGNOSIS — G62.89 OTHER POLYNEUROPATHY: ICD-10-CM

## 2023-02-14 PROCEDURE — 99214 OFFICE O/P EST MOD 30 MIN: CPT | Mod: VID | Performed by: PSYCHIATRY & NEUROLOGY

## 2023-02-14 RX ORDER — PREGABALIN 150 MG/1
150 CAPSULE ORAL 2 TIMES DAILY
Qty: 120 CAPSULE | Refills: 5 | Status: SHIPPED | OUTPATIENT
Start: 2023-02-14 | End: 2023-07-28

## 2023-02-14 NOTE — PATIENT INSTRUCTIONS
You could trial Alpha lipoic acid 600 mg every day for neuropathy pain management, but would need to continue the supplement for at least 3 months to see a clinically defined benefit if it occurs at all    Continue on Lyrica 150 mg BID    Consider other agents (as in your AVS packet) for neuropathy pain control.

## 2023-02-14 NOTE — PROGRESS NOTES
[facility] Neurology Follow Up Visit    Sheryl Trotter MRN# [id number]   Age: 82 year old YOB: 1940     Brief history of symptoms: The patient was initially seen in neurologic consultation on 10/11/2021 and most recently 10/3/2022 for evaluation of peripheral neuropathy. Please see the comprehensive neurologic consultation notes from those dates in the Epic records for details.     At our last visit, the patient was tolerating Lyrica 75/150 for RLS and paresthesias.  Her chronic lumbar pain was improving with ESTEBAN (R>L) at L5-S1.  She was to increase her Lyrica dosing to 150 mg BID, and follow up with plans to discuss other medication options (duloxetine, pramipexole) for both neuropathy and RLS management, if needed.    Interval history: The patient feels the restless leg syndrome symptoms are well managed with Lyrica.  She doesn't feel that her burning pain in the feet, tingling in the feet, or cold sensation in her feet are much better with Lyrica.  Her walking is becoming a bit off balance when walking.  Turning quickly seems to be the biggest factor of her imbalance, compared to walking in the dark or standing in the shower.  She has no swelling of the extremities, no depression symptoms, and limited to negligible fatigue/sedation.    She is unsure if her most recent injection has helped her lower back and leg pain.  The injection occurred at L5-S1 on 11/17/2023.   We went over her 8/30/2019 EMG which showed neuropathy but not radiculopathy (b/l lower extremities).     Physical Exam:   General: Seated comfortably in no acute distress.  Neurologic:     Mental Status: Fully alert, attentive and oriented. Speech clear and fluent, no paraphasic errors.     Cranial Nerves: EOMI with normal smooth pursuit. Facial movements symmetric. Hearing not formally tested but intact to conversation.  No dysarthria.     Motor: No tremors or other abnormal movements observed.      Sensory:Negative Romberg.           Assessment and Plan:   Assessment:  Distal symmetric sensorimotor polyneuropathy  RLS    The patient's neuropathy remains, but her RLS is well managed.  I think adding to her Lyrica dosing or adding another agent like duloxetine seems reasonable to manage her slightly progressing symptoms. She did trial duloxetine in the past (2019) with rheumatology for knee pain, and she had some issues of headaches with the medication leading to discontinuation.  I think either duloxetine or nortriptyline may be reasonable options for additive agents to lyrica if she did not want to increase the lyirca dosing in general.  Overall, at this time, the patient doesn't want to start a new medication or change her lyrica, but will let me know if she does want to do this in the future.      We discussed that alpha lipoic acid may be a reasonable option for an OTC type treatment with minimal side-effects, and she will look into this medication as a 600 mg every day dosing structure.    Her pain of the lower back did have prior response to ESTEBAN, but it seems it isn't helping as much anymore.  Her EMG in 2019 was not suggestive for radiculopathy, but if she has continued radiating pain down either leg, it may be reasonable to repeat this test in the future, especially if weakness or sensory loss is noted.     Plan:  Continue Lyrica 150 mg BID  Gave patient information about duloxetine and nortriptyline  Alpha lipoic acid 600 mg every day (chyna will consider this option and obtain as OTC)  Could consider EMG in the future to help define the presence of radiculopathy is suspected    Follow up in Neurology clinic in one year, or earlier as needed should new concerns arise.    NISA Khanna D.O.   of Neurology    Total time today (35 min) in this patient encounter was spent on pre-charting, counseling and/or coordination of care.

## 2023-02-14 NOTE — PROGRESS NOTES
Chrissy is a 82 year old who is being evaluated via a billable video visit.      How would you like to obtain your AVS? [place]  If the video visit is dropped, the invitation should be resent by: [phone]        Video-Visit Details    Type of service:  Video Visit   Video Start Time: 1058  Video End Time:11:37 AM    Originating Location (pt. Location): Home    Distant Location (provider location):  On-site  Platform used for Video Visit: [facility]

## 2023-02-14 NOTE — LETTER
2/14/2023       RE: Sheryl Trotter  [address]  [place] [facility] [place] [facility] [address]     Dear Colleague,    Thank you for referring your patient, Sheryl Trotter, to the [facility] [place] [facility]. Please see a copy of my visit note below.    [facility] Neurology Follow Up Visit    Sheryl Trotter MRN# [id number]   Age: 82 year old YOB: 1940     Brief history of symptoms: The patient was initially seen in neurologic consultation on 10/11/2021 and most recently 10/3/2022 for evaluation of peripheral neuropathy. Please see the comprehensive neurologic consultation notes from those dates in the Epic records for details.     At our last visit, the patient was tolerating Lyrica 75/150 for RLS and paresthesias.  Her chronic lumbar pain was improving with ESTEBAN (R>L) at L5-S1.  She was to increase her Lyrica dosing to 150 mg BID, and follow up with plans to discuss other medication options (duloxetine, pramipexole) for both neuropathy and RLS management, if needed.    Interval history: The patient feels the restless leg syndrome symptoms are well managed with Lyrica.  She doesn't feel that her burning pain in the feet, tingling in the feet, or cold sensation in her feet are much better with Lyrica.  Her walking is becoming a bit off balance when walking.  Turning quickly seems to be the biggest factor of her imbalance, compared to walking in the dark or standing in the shower.  She has no swelling of the extremities, no depression symptoms, and limited to negligible fatigue/sedation.    She is unsure if her most recent injection has helped her lower back and leg pain.  The injection occurred at L5-S1 on 11/17/2023.   We went over her 8/30/2019 EMG which showed neuropathy but not radiculopathy (b/l lower extremities).     Physical Exam:   General: Seated comfortably in no acute distress.  Neurologic:     Mental Status: Fully alert, attentive  and oriented. Speech clear and fluent, no paraphasic errors.     Cranial Nerves: EOMI with normal smooth pursuit. Facial movements symmetric. Hearing not formally tested but intact to conversation.  No dysarthria.     Motor: No tremors or other abnormal movements observed.      Sensory:Negative Romberg.          Assessment and Plan:   Assessment:  Distal symmetric sensorimotor polyneuropathy  RLS    The patient's neuropathy remains, but her RLS is well managed.  I think adding to her Lyrica dosing or adding another agent like duloxetine seems reasonable to manage her slightly progressing symptoms. She did trial duloxetine in the past (2019) with rheumatology for knee pain, and she had some issues of headaches with the medication leading to discontinuation.  I think either duloxetine or nortriptyline may be reasonable options for additive agents to lyrica if she did not want to increase the lyirca dosing in general.  Overall, at this time, the patient doesn't want to start a new medication or change her lyrica, but will let me know if she does want to do this in the future.      We discussed that alpha lipoic acid may be a reasonable option for an OTC type treatment with minimal side-effects, and she will look into this medication as a 600 mg every day dosing structure.    Her pain of the lower back did have prior response to ESTEBAN, but it seems it isn't helping as much anymore.  Her EMG in 2019 was not suggestive for radiculopathy, but if she has continued radiating pain down either leg, it may be reasonable to repeat this test in the future, especially if weakness or sensory loss is noted.     Plan:  Continue Lyrica 150 mg BID  Gave patient information about duloxetine and nortriptyline  Alpha lipoic acid 600 mg every day (chyna will consider this option and obtain as OTC)  Could consider EMG in the future to help define the presence of radiculopathy is suspected    Follow up in Neurology clinic in one year, or  earlier as needed should new concerns arise.    NISA Khanna D.O.   of Neurology    Total time today (35 min) in this patient encounter was spent on pre-charting, counseling and/or coordination of care.

## 2023-02-15 ENCOUNTER — TELEPHONE (OUTPATIENT)
Dept: NEUROLOGY | Facility: CLINIC | Age: 83
End: 2023-02-15
Payer: MEDICARE

## 2023-02-15 NOTE — TELEPHONE ENCOUNTER
KATHYM to sched 1 year follow up in February 2024 per Dr. Khanna. If patient calls back please cancel appointment with Dr. Khanna in June of 2023 and schedule 1 year follow up in person around 2/14/2024.    Jesse Gayle on 2/15/2023 at 10:33 AM

## 2023-02-20 ENCOUNTER — TELEPHONE (OUTPATIENT)
Dept: OPHTHALMOLOGY | Facility: CLINIC | Age: 83
End: 2023-02-20

## 2023-02-20 NOTE — TELEPHONE ENCOUNTER
Patient left message on voicemail over weekend wanting to discuss surgery dates. Please call. Thank you.

## 2023-03-08 DIAGNOSIS — I10 ESSENTIAL HYPERTENSION: ICD-10-CM

## 2023-03-09 RX ORDER — HYDROCHLOROTHIAZIDE 50 MG/1
TABLET ORAL
Qty: 45 TABLET | Refills: 3 | Status: ON HOLD | OUTPATIENT
Start: 2023-03-09 | End: 2023-06-23

## 2023-03-09 NOTE — TELEPHONE ENCOUNTER
"Last Written Prescription Date:  10/12/2022  Last Fill Quantity: 45,  # refills: 0   Last office visit provider:  2/8/2023     Requested Prescriptions   Pending Prescriptions Disp Refills     hydrochlorothiazide (HYDRODIURIL) 50 MG tablet [Pharmacy Med Name: HYDROCHLOROTHIAZIDE 50 MG Tablet] 45 tablet 0     Sig: TAKE 1/2 TABLET EVERY DAY (SUBSTITUTED FOR  HYDRODIURIL)       Diuretics (Including Combos) Protocol Passed - 3/9/2023 11:42 AM        Passed - Blood pressure under 140/90 in past 12 months     BP Readings from Last 3 Encounters:   02/08/23 128/80   02/01/23 128/60   01/17/23 137/63                 Passed - Recent (12 mo) or future (30 days) visit within the authorizing provider's specialty     Patient has had an office visit with the authorizing provider or a provider within the authorizing providers department within the previous 12 mos or has a future within next 30 days. See \"Patient Info\" tab in inbasket, or \"Choose Columns\" in Meds & Orders section of the refill encounter.              Passed - Medication is active on med list        Passed - Patient is age 18 or older        Passed - No active pregancy on record        Passed - Normal serum creatinine on file in past 12 months     Recent Labs   Lab Test 02/08/23  1621   CR 0.70              Passed - Normal serum potassium on file in past 12 months     Recent Labs   Lab Test 02/08/23  1621   POTASSIUM 4.0                    Passed - Normal serum sodium on file in past 12 months     Recent Labs   Lab Test 02/08/23  1621                 Passed - No positive pregnancy test in past 12 months             Heather Herring RN 03/09/23 11:42 AM  "

## 2023-03-28 ENCOUNTER — TRANSFERRED RECORDS (OUTPATIENT)
Dept: HEALTH INFORMATION MANAGEMENT | Facility: CLINIC | Age: 83
End: 2023-03-28

## 2023-04-10 ENCOUNTER — TRANSFERRED RECORDS (OUTPATIENT)
Dept: HEALTH INFORMATION MANAGEMENT | Facility: CLINIC | Age: 83
End: 2023-04-10
Payer: MEDICARE

## 2023-04-17 ENCOUNTER — TRANSFERRED RECORDS (OUTPATIENT)
Dept: HEALTH INFORMATION MANAGEMENT | Facility: CLINIC | Age: 83
End: 2023-04-17

## 2023-04-28 ENCOUNTER — TRANSFERRED RECORDS (OUTPATIENT)
Dept: HEALTH INFORMATION MANAGEMENT | Facility: CLINIC | Age: 83
End: 2023-04-28
Payer: MEDICARE

## 2023-05-03 ENCOUNTER — HOSPITAL ENCOUNTER (INPATIENT)
Facility: CLINIC | Age: 83
Setting detail: SURGERY ADMIT
End: 2023-05-03
Attending: ORTHOPAEDIC SURGERY | Admitting: ORTHOPAEDIC SURGERY
Payer: MEDICARE

## 2023-05-05 ENCOUNTER — TELEPHONE (OUTPATIENT)
Dept: NEUROLOGY | Facility: CLINIC | Age: 83
End: 2023-05-05
Payer: MEDICARE

## 2023-05-05 NOTE — TELEPHONE ENCOUNTER
Prior Authorization Retail Medication Request    Medication/Dose: pregabalin (LYRICA) 150 MG capsule 120 capsule   ICD code (if different than what is on RX):  G62.89  Previously Tried and Failed:     Rationale:  Polyneuropathy;  She is tolerating  lyrica relating to RLS and paresthesias. Recommend she continue to receive this treatment.      Insurance Name:  Medicare  Insurance ID:  [id number]       Pharmacy Information (if different than what is on RX)  Name:    Phone:

## 2023-05-11 NOTE — TELEPHONE ENCOUNTER
Called [facility] pharmacy-    They were processing an old Rx for the pregabalin 75mg which patient paid for through a discount card.     Let them know that the most recent Rx is for the pregabalin 150mg twice daily.  Pharmacist states they do have a paid claim now for that strength but will need to wait to fill since patient just filled the 75mg strength.  They will contact patient when this is ready to fill.

## 2023-05-11 NOTE — TELEPHONE ENCOUNTER
Prior Authorization Not Needed per Insurance    Medication: pregabalin (LYRICA) 150 MG capsule 120 capsule   Insurance Company: [employer] - Phone [phone] Fax [phone]  Expected CoPay:      Pharmacy Filling the Rx: [id number] [place], [place] - [address] [facility] [address]  Pharmacy Notified: Yes  Patient Notified: No    Medication is covered under plan. Called [employer] and confirmed rep gets a paid test claim for qty 120 per 60 days, $13.99 copay.

## 2023-05-30 ENCOUNTER — OFFICE VISIT (OUTPATIENT)
Dept: INTERNAL MEDICINE | Facility: CLINIC | Age: 83
End: 2023-05-30
Payer: MEDICARE

## 2023-05-30 VITALS
DIASTOLIC BLOOD PRESSURE: 62 MMHG | SYSTOLIC BLOOD PRESSURE: 128 MMHG | HEART RATE: 86 BPM | HEIGHT: 67 IN | OXYGEN SATURATION: 96 % | BODY MASS INDEX: 28.43 KG/M2 | WEIGHT: 181.1 LBS

## 2023-05-30 DIAGNOSIS — M48.062 SPINAL STENOSIS OF LUMBAR REGION WITH NEUROGENIC CLAUDICATION: ICD-10-CM

## 2023-05-30 DIAGNOSIS — I10 ESSENTIAL HYPERTENSION: ICD-10-CM

## 2023-05-30 DIAGNOSIS — M81.0 AGE-RELATED OSTEOPOROSIS WITHOUT CURRENT PATHOLOGICAL FRACTURE: ICD-10-CM

## 2023-05-30 DIAGNOSIS — Z01.818 PREOPERATIVE EXAMINATION: Primary | ICD-10-CM

## 2023-05-30 LAB
ANION GAP SERPL CALCULATED.3IONS-SCNC: 13 MMOL/L (ref 7–15)
BUN SERPL-MCNC: 19.4 MG/DL (ref 8–23)
CALCIUM SERPL-MCNC: 10.2 MG/DL (ref 8.8–10.2)
CHLORIDE SERPL-SCNC: 92 MMOL/L (ref 98–107)
CREAT SERPL-MCNC: 0.9 MG/DL (ref 0.51–0.95)
DEPRECATED HCO3 PLAS-SCNC: 25 MMOL/L (ref 22–29)
ERYTHROCYTE [DISTWIDTH] IN BLOOD BY AUTOMATED COUNT: 13.9 % (ref 10–15)
GFR SERPL CREATININE-BSD FRML MDRD: 64 ML/MIN/1.73M2
GLUCOSE SERPL-MCNC: 100 MG/DL (ref 70–99)
HCT VFR BLD AUTO: 42.4 % (ref 35–47)
HGB BLD-MCNC: 14.3 G/DL (ref 11.7–15.7)
MCH RBC QN AUTO: 28.9 PG (ref 26.5–33)
MCHC RBC AUTO-ENTMCNC: 33.7 G/DL (ref 31.5–36.5)
MCV RBC AUTO: 86 FL (ref 78–100)
PLATELET # BLD AUTO: 240 10E3/UL (ref 150–450)
POTASSIUM SERPL-SCNC: 4.3 MMOL/L (ref 3.4–5.3)
RBC # BLD AUTO: 4.94 10E6/UL (ref 3.8–5.2)
SODIUM SERPL-SCNC: 130 MMOL/L (ref 136–145)
WBC # BLD AUTO: 11.1 10E3/UL (ref 4–11)

## 2023-05-30 PROCEDURE — 80048 BASIC METABOLIC PNL TOTAL CA: CPT | Performed by: INTERNAL MEDICINE

## 2023-05-30 PROCEDURE — 85027 COMPLETE CBC AUTOMATED: CPT | Performed by: INTERNAL MEDICINE

## 2023-05-30 PROCEDURE — 36415 COLL VENOUS BLD VENIPUNCTURE: CPT | Performed by: INTERNAL MEDICINE

## 2023-05-30 PROCEDURE — 99214 OFFICE O/P EST MOD 30 MIN: CPT | Performed by: INTERNAL MEDICINE

## 2023-05-30 PROCEDURE — 93005 ELECTROCARDIOGRAM TRACING: CPT | Performed by: INTERNAL MEDICINE

## 2023-05-30 RX ORDER — PREGABALIN 75 MG/1
CAPSULE ORAL
COMMUNITY
Start: 2023-05-04 | End: 2023-06-19

## 2023-05-30 NOTE — PROGRESS NOTES
[address] [address]  Phone: [phone]  Fax: [phone]  Primary Provider: Rachael Thompson  Pre-op Performing Provider: RACHAEL THOMPSON  [id number]}  PREOPERATIVE EVALUATION:  Today's date: 5/30/2023    Sheryl Trotter is a 82 year old female who presents for a preoperative evaluation.      5/30/2023     2:32 PM   Additional Questions   Roomed by YULIANA ESTRELLA   Accompanied by none     Surgical Information:  Surgery/Procedure: FUSION and LAMINECTOMY of back   Surgery Location: [facility]   Surgeon:   Surgery Date: 6/20/2023  Time of Surgery: unknown  Where patient plans to recover: At home with family  Fax number for surgical facility: Note does not need to be faxed, will be available electronically in Epic.    Assessment & Plan     The proposed surgical procedure is considered INTERMEDIATE risk.    Satisfactory preoperative medical examination in anticipation of lumbar laminectomy and fusion surgery (3 levels) that is scheduled for June 20, 2023 done at [facility], Dr. Dereck Roman of [facility], for indication of lumbar spinal stenosis symptoms with neuro claudication that has not responded to conservative measures including epidural steroid injections.  Chrissy has previously worked with the [facility] multidisciplinary spine clinic, Dr. Pollo Hutchison, who supported moving forward with back surgery.     Planned surgery  RIGHT LUMBAR 3-LUMBAR 4 AND RIGHT LUMBAR 4-LUMBAR 5 TRANSFORAMINAL LUMBAR INTERBODY FUSION WITH LAMINECTOMY USING STEALTH NAVIGATION    Chrissy is aware that this is major surgery, and hopefully she will be able to go directly home from the hospital, but there could be a possibility she will need to go to a TCU for recuperation, and then home from the TCU.    Last time Chrissy went through major surgery was her left hip dislocation and eventual joint implant in 2018, which went smoothly, without any problems with  anesthesia, bleeding, or clotting.    For preoperative testing, lets get a basic metabolic panel and CBC.  LATER: Basic metabolic panel shows mildly reduced sodium concentration 130, but I think this could be a sporadic transient phenomena, because her sodium was just fine at 136 measured in February 8, 2023.  She does take hydrochlorothiazide, but is going to hold that the morning of her procedure.  The rest of the basic metabolic panel is normal.  CBC satisfactory, slight WBC elevation 11.1 I do not think significant.  Hemoglobin 14.3 normal..  Platelets 240 normal.    Her electrocardiogram done today 5/30/2023 is satisfactory.  We had a technical problem with the machine so we could not  precordial leads V4, but her electrocardiogram otherwise looks normal    Once he was recovered from her back surgery, we need to revisit her osteoporosis issue.  It seems that her last dose of Prolia may have been back in July 2021, because I cannot find any entries corresponding to her Prolia injection since then.    We might consider whether to resume Prolia, or possibly switch to a bisphosphonate such as oral alendronate tablets taken weekly, or annual infusion of intravenous zoledronic acid (known as Reclast).    In the meantime, Chrissy should continue to take her calcium and vitamin D supplement.    With regards to Chrissy's other medications around time of surgery  I told her that on the evening before surgery she can take her usual evening medications which is Lyrica    Then the morning of her surgery when she will be otherwise n.p.o., I told her to take her morning dose of lisinopril but SKIP THE MORNING DOSE OF HYDROCHLOROTHIAZIDE  Skip her calcium and vitamin D  Take the morning dose of Lyrica.      RECOMMENDATION:  APPROVAL GIVEN to proceed with proposed procedure, without further diagnostic evaluation.      Subjective       HPI related to upcoming procedure:     Neuroclaudication symptoms of lumbar spinal  stenosis  Lumbosacral spondylosis without myelopathy  Chronic pain of left knee  Myofascial pain  Known Lumbar DDD (ESTEBAN 2005, for reported lumbar stenosis and spondylolisthesis and sciatica  Characteristic Pain Low back, both thighs, both calves. Worse when standing.  Difficulty climbing stairs. Most comfortable seated.  MRI 2012 had degenerating discs     Lyrica 75/150 was helpful to reduce paresthesias to some degree but she also had a foggy feeling with use.  She   Could consider duloxetine for neuropathy pending response to lyrica increase  Could trial RLS mdication like pramipexole if sympoms continue to break through even on 300 BID dose, or could increase lyrica to 300/375     Result Date: 8/29/2022  LUMBAR INTERLAMINAR EPIDURAL STEROID INJECTION WITH FLUOROSCOPIC      Distal symmetric polyneuropathy, small fiber peripheral neuropathy, also restless leg syndrome  Bilateral leg coldness leading to discomfort in the day and at night.   Lyrica used briefly, as of April 202 on nortriptyline, which seems to help  10/11/2021  Neurology: Cholo Khanna, DO  EMG through PM&R on 8/30/2019 with Dr. Izquierdo:  EMG on 8/30/2019 which showed an essentially normal study.  Comment NCS: Essentially normal study  Comment EMG: Normal study  1.  Normal needle EMG bilateral lower extremities.  Interpretation: Essentially normal study:   There is electrodiagnostic evidence of:  1.  Borderline bilateral sensory amplitudes.  This is normal for the patient's age is very likely normal finding.  I cannot, however, completely exclude a very mild sensory or sensorimotor peripheral polyneuropathy.   2 There is no electrodiagnostic evidence of lumbosacral radiculopathy, lumbosacral plexopathy, or focal neuropathy in the bilateral lower extremities.  Specifically, there is no electrodiagnostic evidence of tibial neuropathy at the tarsal tunnel in the bilateral lower extremities     After January 2023, has not had any subsequent  episodes of frontal headache, followed quickly by nausea, chills, extreme fatigue, each episode lasts about 2 days total, then she feels back to normal  When she is sick, she says that she sometimes feels a bit lightheaded.  Dates of these episodes were December 1, 2022, and then January 14, 2022, and 31 in 2023.    Ganglion cyst dorsum right hand, which has become more uncomfortable, therefore we will have her see orthopedic hand     Left knee osteoarthritis improved with Euflexxa injection.     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     Insomnia, KASANDRA is currently untreated--but she is in the process of scheduling a sleep study to be done sometime early 2023  She stopped CPAP because not tolerated  sleep study in 2017, which showed sleep apnea. Stopped CPAP, a year and a half ago, as she wasn't sleeping and it wasn't positive experience.      Essential hypertension  Hydrochlorothiazide one half of a 50 mg pill. Lisinopril 20 mg daily.  She has a home blood pressure machine, but has not been using it regularly.  I recommend that she start recording the blood pressure numbers in a notebook, and 1 day bring the machine to clinic so that we can validate it against a manual reading to make sure it is accurate, then she can believe the numbers.     I told her that target blood pressure is 120/80 at rest and seated position measured on the right upper arm.  I reminded her that healthy diet particular less sodium, also regular exercise, and weight control will help with blood pressure, as well as treating sleep apnea     BP Readings from Last 6 Encounters:   05/30/23 128/62   02/08/23 128/80   02/01/23 128/60   01/17/23 137/63   11/01/22 (!) 177/81   10/05/22 124/82     Hyperlipidemia, mildly elevated LDL,  very generous HDL which is favorable  Lipid profile July 24, 2019 with total cholesterol 265, triglycerides 43, LDL bad cholesterol modestly elevated 141, but with a very generous level of the HDL good cholesterol 115  10-  Hemoglobin A1C <=5.6 % 5.3       Gastroesophageal reflux, uses Tums intermittently      Constipation that she has noticed since starting nortriptyline for neuropathy and restless legs-- no longer takes nortriptylene     Osteopenia, on Prolia (started approximately 2017)  Chrissy believes she got a Prolia injection in July 2022, but for some reason I am having trouble finding it in her chart, if she did get an injection in July 2022, that means her next injection would be January 2023    BONE DENSITY (DEXA)  12/13/2021 1:28 PM  HISTORY: Postmenopausal.  COMPARISON: 12/9/2019  TECHNIQUE: The study was performed using DEXA in the AP lumbar spine  and AP femur.  In accordance with the ISCD (International Society of  Clinical Densitometry), the lowest BMD between the total hip and  femoral neck will be used.   FINDINGS: Using DEXA, the results were reported according to T-score.  The T-score is the standard deviation from the peak bone mass in a  normal young patient. A T-score of 0 to -1.0 is normal. A T-score of  -1.0 to -2.5 correlates with osteopenia. A T-score of less than -2.5  correlates with osteoporosis.       The L1-L2 T-score is 0 and I suspect false elevation at the other  levels. The L1-L4 bone density (subtracting L3) is increased 6.3%  compared with prior. The right femoral neck T-score is -1.8. The right  total proximal femur bone density is increased 1% compared with prior.  The FRAX 10-year probability is 14.8% for major osteoporotic fracture  and 4.1% for hip fracture. All treatment decisions require clinical  judgment and consideration of individual patient factors which may not  be captured in the FRAX model and the risk of fracture may be over- or  under-estimated by FRAX.                          IMPRESSION: Right femoral neck osteopenia.    Takes her calcium and vitamin D     Overweight with body mass index of 29.  She has been less physically active because of her lumbar spine issues.  She does water aerobics which is excellent.     Wt Readings from Last 5 Encounters:   05/30/23 82.1 kg (181 lb 1.6 oz)   02/08/23 82.6 kg (182 lb)   11/01/22 83 kg (183 lb)   10/28/22 81.6 kg (180 lb)   10/05/22 83 kg (183 lb)     Menopause, status post complete hysterectomy  Had complete hysterectomy at age in her 50s, has not needed to get Pap smears anymore  Result Date: 9/6/2022  BILATERAL FULL FIELD DIGITAL SCREENING MAMMOGRAM WITH TOMOSYNTHESIS      She recalls a colonoscopy that was done approximately at age 72     COVID-19 vaccine  COVID-19 Vaccine Bivalent Booster 12+ (Pfizer) 09/16/2022      Has received both pneumococcal vaccines  And recmbinant shingles vaccine     Could consider getting a tetanus booster since it looks like her last one was in 2007, she should get that at a community pharmacy since it is paid under the Medicare prescription drug benefit         5/30/2023     2:23 PM   Preop Questions   1. Have you ever had a heart attack or stroke? No   2. Have you ever had surgery on your heart or blood vessels, such as a stent placement, a coronary artery bypass, or surgery on an artery in your head, neck, heart, or legs? No   3. Do you have chest pain with activity? No   4. Do you have a history of  heart failure? No   5. Do you currently have a cold, bronchitis or symptoms of other infection? No   6. Do you have a cough, shortness of breath, or wheezing? No   7. Do you or anyone in your family have previous history of blood clots? No   8. Do you or does anyone in your family have a serious bleeding problem such as prolonged bleeding following surgeries or cuts? No   9. Have you ever had problems with anemia or been told to take iron pills? YES -    10. Have you had any abnormal blood loss  such as black, tarry or bloody stools, or abnormal vaginal bleeding? No   11. Have you ever had a blood transfusion? No   12. Are you willing to have a blood transfusion if it is medically needed before, during, or after your surgery? Yes   13. Have you or any of your relatives ever had problems with anesthesia? No   14. Do you have sleep apnea, excessive snoring or daytime drowsiness? No   15. Do you have any artifical heart valves or other implanted medical devices like a pacemaker, defibrillator, or continuous glucose monitor? No   16. Do you have artificial joints? YES -    17. Are you allergic to latex? No       Review of Systems  CONSTITUTIONAL: NEGATIVE for fever, chills, change in weight  INTEGUMENTARY/SKIN: NEGATIVE for worrisome rashes, moles or lesions  EYES: NEGATIVE for vision changes or irritation  ENT/MOUTH: NEGATIVE for ear, mouth and throat problems  RESP: NEGATIVE for significant cough or SOB  CV: NEGATIVE for chest pain, palpitations or peripheral edema  GI: NEGATIVE for nausea, abdominal pain, heartburn, or change in bowel habits  : NEGATIVE for frequency, dysuria, or hematuria  MUSCULOSKELETAL:back pain  NEURO: NEGATIVE for weakness, dizziness or paresthesias  ENDOCRINE: NEGATIVE for temperature intolerance, skin/hair changes  HEME: NEGATIVE for bleeding problems  PSYCHIATRIC: NEGATIVE for changes in mood or affect    Patient Active Problem List    Diagnosis Date Noted     Infection due to 2019 novel coronavirus 11/09/2022     Priority: Medium     Spinal stenosis of lumbar region with neurogenic claudication 04/01/2022     Priority: Medium     KASANDRA (obstructive sleep apnea) 08/19/2021     Priority: Medium     Other polyneuropathy 08/19/2021     Priority: Medium     Headache      Priority: Medium     Created by Conversion         Chondrocalcinosis 02/14/2019     Priority: Medium     Polymyalgia (H) 01/09/2019     Priority: Medium     Primary osteoarthritis involving multiple joints 01/09/2019      Priority: Medium     Hyperlipidemia      Priority: Medium     Created by Conversion         Bilateral shoulder pain 10/02/2018     Priority: Medium     Hip dislocation, left (H) 03/28/2018     Priority: Medium     Osteoporosis      Priority: Medium     Created by Conversion         Essential hypertension      Priority: Medium     Created by Conversion  Replacement Utility updated for latest IMO load         Primary insomnia      Priority: Medium     Created by Conversion         Status post left hip replacement 02/07/2018     Priority: Medium     Polyarthralgia 09/12/2017     Priority: Medium     Osteoarthritis of the Knee 02/23/2015     Priority: Medium     right  Replacement Utility updated for latest IMO load    Replacing diagnoses that were inactivated after the 10/1/2021 regulatory import.       Lumbar Spondylosis      Priority: Medium     Created by Conversion         Lumbar Disc Degeneration      Priority: Medium     Created by Conversion         Trochanteric Bursitis      Priority: Medium     Created by Conversion          Past Medical History:   Diagnosis Date     Arthritis      AVN (avascular necrosis of bone) (H)     let hip     DDD (degenerative disc disease), lumbar      GERD (gastroesophageal reflux disease)      HLD (hyperlipidemia)      Hypertension      Infection due to 2019 novel coronavirus 11/9/2022     Insomnia      Osteoporosis      PONV (postoperative nausea and vomiting)      Sleep apnea     cpap     Past Surgical History:   Procedure Laterality Date     APPENDECTOMY       CHOLECYSTECTOMY       FOOT FUSION Right 2017     HIP PINNING Left 3/28/2018    Procedure: OPEN REDUCTION OF DISLOCATED LEFT TOTAL HIP;  Surgeon: Daron Rincon MD;  Location: [facility] OR;  Service:      HYSTERECTOMY  Mid 1990's     IR LUMBAR EPIDURAL STEROID INJECTION  2/18/2005     IR LUMBAR EPIDURAL STEROID INJECTION  12/19/2005     OOPHORECTOMY  Mid 1990's     ZZC TOTAL HIP ARTHROPLASTY Left 2/7/2018     "Procedure: LEFT TOTAL HIP ARTHROPLASTY;  Surgeon: Daron Rincon MD;  Location: [facility];  Service: Orthopedics     Current Outpatient Medications   Medication Sig Dispense Refill     acetaminophen (TYLENOL) 650 MG CR tablet Take 650 mg by mouth 2 times daily as needed for mild pain or fever       calcium carbonate-vitamin D3 600 mg (1,500 mg)-800 unit Chew [CALCIUM CARBONATE-VITAMIN D3 600 MG (1,500 MG)-800 UNIT CHEW] Chew 1 tablet daily.       denosumab 60 mg/mL Syrg Inject 60 mg Subcutaneous once       hydrochlorothiazide (HYDRODIURIL) 50 MG tablet TAKE 1/2 TABLET EVERY DAY (SUBSTITUTED FOR  HYDRODIURIL) 45 tablet 3     lisinopril (ZESTRIL) 20 MG tablet TAKE 1 TABLET EVERY DAY 90 tablet 3     METHYLCELLULOSE (CITRUCEL ORAL) [METHYLCELLULOSE (CITRUCEL ORAL)] Take 1 tablet by mouth daily.       pregabalin (LYRICA) 75 MG capsule TAKE TWO CAPSULES BY MOUTH TWICE DAILY*       Vitamin D3 (CHOLECALCIFEROL) 25 mcg (1000 units) tablet Take 1 tablet (25 mcg) by mouth daily 90 tablet 3     famotidine (PEPCID) 20 MG tablet Take 1 tablet (20 mg) by mouth 2 times daily (Patient not taking: Reported on 5/30/2023) 60 tablet 4     pregabalin (LYRICA) 150 MG capsule Take 1 capsule (150 mg) by mouth 2 times daily (Patient not taking: Reported on 5/30/2023) 120 capsule 5       Allergies   Allergen Reactions     Duloxetine Headache     Nausea, difficult sleeping ankles cramps, loose bowel      Gabapentin Anxiety        Social History     Tobacco Use     Smoking status: Former     Years: 4.00     Types: Cigarettes     Smokeless tobacco: Never     Tobacco comments:     smoking in college-social   Vaping Use     Vaping status: Never Used   Substance Use Topics     Alcohol use: Yes     Alcohol/week: 3.3 standard drinks of alcohol     Comment: Alcoholic Drinks/day: occasional glass of wine       History   Drug Use No         Objective     /62   Pulse 86   Ht 1.689 m (5' 6.5\")   Wt 82.1 kg (181 lb 1.6 oz)   SpO2 96%   " BMI 28.79 kg/m      Physical Exam    General: Alert, in no distress  Skin: No significant lesion seen.  Eyes/nose/throat: Eyes without scleral icterus, eye movements normal, pupils equal and reactive, oropharynx clear  MSK: Neck with good ROM  Pulm: Lungs clear to auscultation bilaterally  Cardiac: Heart with regular rate and rhythm, no murmur or gallop  GI: Abdomen soft, nontender. No palpable enlargement of liver or spleen  MSK: Extremities no tenderness or edema  + Walks with a cane because of lumbar spinal stenosis pain  Neuro: Moves all extremities, without focal weakness  Psych: Alert, normal mental status. Normal affect and speech      Recent Labs   Lab Test 02/08/23  1621 01/04/22  1050 10/11/21  1117 08/17/21  1450   HGB 14.1  --   --  14.2     --   --  266    136  --  134*   POTASSIUM 4.0 3.9  --  3.6   CR 0.70 0.79  --  0.71   A1C  --   --  5.3  --         Diagnostics:  Recent Results (from the past 48 hour(s))   Basic metabolic panel  (Ca, Cl, CO2, Creat, Gluc, K, Na, BUN)    Collection Time: 05/30/23  3:28 PM   Result Value Ref Range    Sodium 130 (L) 136 - 145 mmol/L    Potassium 4.3 3.4 - 5.3 mmol/L    Chloride 92 (L) 98 - 107 mmol/L    Carbon Dioxide (CO2) 25 22 - 29 mmol/L    Anion Gap 13 7 - 15 mmol/L    Urea Nitrogen 19.4 8.0 - 23.0 mg/dL    Creatinine 0.90 0.51 - 0.95 mg/dL    Calcium 10.2 8.8 - 10.2 mg/dL    Glucose 100 (H) 70 - 99 mg/dL    GFR Estimate 64 >60 mL/min/1.73m2   CBC with platelets    Collection Time: 05/30/23  3:28 PM   Result Value Ref Range    WBC Count 11.1 (H) 4.0 - 11.0 10e3/uL    RBC Count 4.94 3.80 - 5.20 10e6/uL    Hemoglobin 14.3 11.7 - 15.7 g/dL    Hematocrit 42.4 35.0 - 47.0 %    MCV 86 78 - 100 fL    MCH 28.9 26.5 - 33.0 pg    MCHC 33.7 31.5 - 36.5 g/dL    RDW 13.9 10.0 - 15.0 %    Platelet Count 240 150 - 450 10e3/uL      Revised Cardiac Risk Index (RCRI):  The patient has the following serious cardiovascular risks for perioperative complications:   - No  serious cardiac risks = 0 points     RCRI Interpretation: 0 points: Class I (very low risk - 0.4% complication rate)           Signed Electronically by: RACHAEL PEDERSEN MD  Copy of this evaluation report is provided to requesting physician.     :[id number]}

## 2023-05-30 NOTE — H&P (VIEW-ONLY)
[address] [address]  Phone: [phone]  Fax: [phone]  Primary Provider: Rachael Thompson  Pre-op Performing Provider: RACHAEL THOMPSON  [id number]}  PREOPERATIVE EVALUATION:  Today's date: 5/30/2023    Sheryl Trotter is a 82 year old female who presents for a preoperative evaluation.      5/30/2023     2:32 PM   Additional Questions   Roomed by YULIANA ESTRELLA   Accompanied by none     Surgical Information:  Surgery/Procedure: FUSION and LAMINECTOMY of back   Surgery Location: [facility]   Surgeon:   Surgery Date: 6/20/2023  Time of Surgery: unknown  Where patient plans to recover: At home with family  Fax number for surgical facility: Note does not need to be faxed, will be available electronically in Epic.    Assessment & Plan     The proposed surgical procedure is considered INTERMEDIATE risk.    Satisfactory preoperative medical examination in anticipation of lumbar laminectomy and fusion surgery (3 levels) that is scheduled for June 20, 2023 done at [facility], Dr. Dereck Roman of [facility], for indication of lumbar spinal stenosis symptoms with neuro claudication that has not responded to conservative measures including epidural steroid injections.  Chrissy has previously worked with the [facility] multidisciplinary spine clinic, Dr. Pollo Hutchison, who supported moving forward with back surgery.     Planned surgery  RIGHT LUMBAR 3-LUMBAR 4 AND RIGHT LUMBAR 4-LUMBAR 5 TRANSFORAMINAL LUMBAR INTERBODY FUSION WITH LAMINECTOMY USING STEALTH NAVIGATION    Chrissy is aware that this is major surgery, and hopefully she will be able to go directly home from the hospital, but there could be a possibility she will need to go to a TCU for recuperation, and then home from the TCU.    Last time Chrissy went through major surgery was her left hip dislocation and eventual joint implant in 2018, which went smoothly, without any problems with  anesthesia, bleeding, or clotting.    For preoperative testing, lets get a basic metabolic panel and CBC.  LATER: Basic metabolic panel shows mildly reduced sodium concentration 130, but I think this could be a sporadic transient phenomena, because her sodium was just fine at 136 measured in February 8, 2023.  She does take hydrochlorothiazide, but is going to hold that the morning of her procedure.  The rest of the basic metabolic panel is normal.  CBC satisfactory, slight WBC elevation 11.1 I do not think significant.  Hemoglobin 14.3 normal..  Platelets 240 normal.    Her electrocardiogram done today 5/30/2023 is satisfactory.  We had a technical problem with the machine so we could not  precordial leads V4, but her electrocardiogram otherwise looks normal    Once he was recovered from her back surgery, we need to revisit her osteoporosis issue.  It seems that her last dose of Prolia may have been back in July 2021, because I cannot find any entries corresponding to her Prolia injection since then.    We might consider whether to resume Prolia, or possibly switch to a bisphosphonate such as oral alendronate tablets taken weekly, or annual infusion of intravenous zoledronic acid (known as Reclast).    In the meantime, Chrissy should continue to take her calcium and vitamin D supplement.    With regards to Chrissy's other medications around time of surgery  I told her that on the evening before surgery she can take her usual evening medications which is Lyrica    Then the morning of her surgery when she will be otherwise n.p.o., I told her to take her morning dose of lisinopril but SKIP THE MORNING DOSE OF HYDROCHLOROTHIAZIDE  Skip her calcium and vitamin D  Take the morning dose of Lyrica.      RECOMMENDATION:  APPROVAL GIVEN to proceed with proposed procedure, without further diagnostic evaluation.      Subjective       HPI related to upcoming procedure:     Neuroclaudication symptoms of lumbar spinal  stenosis  Lumbosacral spondylosis without myelopathy  Chronic pain of left knee  Myofascial pain  Known Lumbar DDD (ESTEBAN 2005, for reported lumbar stenosis and spondylolisthesis and sciatica  Characteristic Pain Low back, both thighs, both calves. Worse when standing.  Difficulty climbing stairs. Most comfortable seated.  MRI 2012 had degenerating discs     Lyrica 75/150 was helpful to reduce paresthesias to some degree but she also had a foggy feeling with use.  She   Could consider duloxetine for neuropathy pending response to lyrica increase  Could trial RLS mdication like pramipexole if sympoms continue to break through even on 300 BID dose, or could increase lyrica to 300/375     Result Date: 8/29/2022  LUMBAR INTERLAMINAR EPIDURAL STEROID INJECTION WITH FLUOROSCOPIC      Distal symmetric polyneuropathy, small fiber peripheral neuropathy, also restless leg syndrome  Bilateral leg coldness leading to discomfort in the day and at night.   Lyrica used briefly, as of April 202 on nortriptyline, which seems to help  10/11/2021  Neurology: Cholo Khanna, DO  EMG through PM&R on 8/30/2019 with Dr. Izquierdo:  EMG on 8/30/2019 which showed an essentially normal study.  Comment NCS: Essentially normal study  Comment EMG: Normal study  1.  Normal needle EMG bilateral lower extremities.  Interpretation: Essentially normal study:   There is electrodiagnostic evidence of:  1.  Borderline bilateral sensory amplitudes.  This is normal for the patient's age is very likely normal finding.  I cannot, however, completely exclude a very mild sensory or sensorimotor peripheral polyneuropathy.   2 There is no electrodiagnostic evidence of lumbosacral radiculopathy, lumbosacral plexopathy, or focal neuropathy in the bilateral lower extremities.  Specifically, there is no electrodiagnostic evidence of tibial neuropathy at the tarsal tunnel in the bilateral lower extremities     After January 2023, has not had any subsequent  episodes of frontal headache, followed quickly by nausea, chills, extreme fatigue, each episode lasts about 2 days total, then she feels back to normal  When she is sick, she says that she sometimes feels a bit lightheaded.  Dates of these episodes were December 1, 2022, and then January 14, 2022, and 31 in 2023.    Ganglion cyst dorsum right hand, which has become more uncomfortable, therefore we will have her see orthopedic hand     Left knee osteoarthritis improved with Euflexxa injection.     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     Insomnia, KASANDRA is currently untreated--but she is in the process of scheduling a sleep study to be done sometime early 2023  She stopped CPAP because not tolerated  sleep study in 2017, which showed sleep apnea. Stopped CPAP, a year and a half ago, as she wasn't sleeping and it wasn't positive experience.      Essential hypertension  Hydrochlorothiazide one half of a 50 mg pill. Lisinopril 20 mg daily.  She has a home blood pressure machine, but has not been using it regularly.  I recommend that she start recording the blood pressure numbers in a notebook, and 1 day bring the machine to clinic so that we can validate it against a manual reading to make sure it is accurate, then she can believe the numbers.     I told her that target blood pressure is 120/80 at rest and seated position measured on the right upper arm.  I reminded her that healthy diet particular less sodium, also regular exercise, and weight control will help with blood pressure, as well as treating sleep apnea     BP Readings from Last 6 Encounters:   05/30/23 128/62   02/08/23 128/80   02/01/23 128/60   01/17/23 137/63   11/01/22 (!) 177/81   10/05/22 124/82     Hyperlipidemia, mildly elevated LDL,  very generous HDL which is favorable  Lipid profile July 24, 2019 with total cholesterol 265, triglycerides 43, LDL bad cholesterol modestly elevated 141, but with a very generous level of the HDL good cholesterol 115  10-  Hemoglobin A1C <=5.6 % 5.3       Gastroesophageal reflux, uses Tums intermittently      Constipation that she has noticed since starting nortriptyline for neuropathy and restless legs-- no longer takes nortriptylene     Osteopenia, on Prolia (started approximately 2017)  Chrissy believes she got a Prolia injection in July 2022, but for some reason I am having trouble finding it in her chart, if she did get an injection in July 2022, that means her next injection would be January 2023    BONE DENSITY (DEXA)  12/13/2021 1:28 PM  HISTORY: Postmenopausal.  COMPARISON: 12/9/2019  TECHNIQUE: The study was performed using DEXA in the AP lumbar spine  and AP femur.  In accordance with the ISCD (International Society of  Clinical Densitometry), the lowest BMD between the total hip and  femoral neck will be used.   FINDINGS: Using DEXA, the results were reported according to T-score.  The T-score is the standard deviation from the peak bone mass in a  normal young patient. A T-score of 0 to -1.0 is normal. A T-score of  -1.0 to -2.5 correlates with osteopenia. A T-score of less than -2.5  correlates with osteoporosis.       The L1-L2 T-score is 0 and I suspect false elevation at the other  levels. The L1-L4 bone density (subtracting L3) is increased 6.3%  compared with prior. The right femoral neck T-score is -1.8. The right  total proximal femur bone density is increased 1% compared with prior.  The FRAX 10-year probability is 14.8% for major osteoporotic fracture  and 4.1% for hip fracture. All treatment decisions require clinical  judgment and consideration of individual patient factors which may not  be captured in the FRAX model and the risk of fracture may be over- or  under-estimated by FRAX.                          IMPRESSION: Right femoral neck osteopenia.    Takes her calcium and vitamin D     Overweight with body mass index of 29.  She has been less physically active because of her lumbar spine issues.  She does water aerobics which is excellent.     Wt Readings from Last 5 Encounters:   05/30/23 82.1 kg (181 lb 1.6 oz)   02/08/23 82.6 kg (182 lb)   11/01/22 83 kg (183 lb)   10/28/22 81.6 kg (180 lb)   10/05/22 83 kg (183 lb)     Menopause, status post complete hysterectomy  Had complete hysterectomy at age in her 50s, has not needed to get Pap smears anymore  Result Date: 9/6/2022  BILATERAL FULL FIELD DIGITAL SCREENING MAMMOGRAM WITH TOMOSYNTHESIS      She recalls a colonoscopy that was done approximately at age 72     COVID-19 vaccine  COVID-19 Vaccine Bivalent Booster 12+ (Pfizer) 09/16/2022      Has received both pneumococcal vaccines  And recmbinant shingles vaccine     Could consider getting a tetanus booster since it looks like her last one was in 2007, she should get that at a community pharmacy since it is paid under the Medicare prescription drug benefit         5/30/2023     2:23 PM   Preop Questions   1. Have you ever had a heart attack or stroke? No   2. Have you ever had surgery on your heart or blood vessels, such as a stent placement, a coronary artery bypass, or surgery on an artery in your head, neck, heart, or legs? No   3. Do you have chest pain with activity? No   4. Do you have a history of  heart failure? No   5. Do you currently have a cold, bronchitis or symptoms of other infection? No   6. Do you have a cough, shortness of breath, or wheezing? No   7. Do you or anyone in your family have previous history of blood clots? No   8. Do you or does anyone in your family have a serious bleeding problem such as prolonged bleeding following surgeries or cuts? No   9. Have you ever had problems with anemia or been told to take iron pills? YES -    10. Have you had any abnormal blood loss  such as black, tarry or bloody stools, or abnormal vaginal bleeding? No   11. Have you ever had a blood transfusion? No   12. Are you willing to have a blood transfusion if it is medically needed before, during, or after your surgery? Yes   13. Have you or any of your relatives ever had problems with anesthesia? No   14. Do you have sleep apnea, excessive snoring or daytime drowsiness? No   15. Do you have any artifical heart valves or other implanted medical devices like a pacemaker, defibrillator, or continuous glucose monitor? No   16. Do you have artificial joints? YES -    17. Are you allergic to latex? No       Review of Systems  CONSTITUTIONAL: NEGATIVE for fever, chills, change in weight  INTEGUMENTARY/SKIN: NEGATIVE for worrisome rashes, moles or lesions  EYES: NEGATIVE for vision changes or irritation  ENT/MOUTH: NEGATIVE for ear, mouth and throat problems  RESP: NEGATIVE for significant cough or SOB  CV: NEGATIVE for chest pain, palpitations or peripheral edema  GI: NEGATIVE for nausea, abdominal pain, heartburn, or change in bowel habits  : NEGATIVE for frequency, dysuria, or hematuria  MUSCULOSKELETAL:back pain  NEURO: NEGATIVE for weakness, dizziness or paresthesias  ENDOCRINE: NEGATIVE for temperature intolerance, skin/hair changes  HEME: NEGATIVE for bleeding problems  PSYCHIATRIC: NEGATIVE for changes in mood or affect    Patient Active Problem List    Diagnosis Date Noted     Infection due to 2019 novel coronavirus 11/09/2022     Priority: Medium     Spinal stenosis of lumbar region with neurogenic claudication 04/01/2022     Priority: Medium     KASANDRA (obstructive sleep apnea) 08/19/2021     Priority: Medium     Other polyneuropathy 08/19/2021     Priority: Medium     Headache      Priority: Medium     Created by Conversion         Chondrocalcinosis 02/14/2019     Priority: Medium     Polymyalgia (H) 01/09/2019     Priority: Medium     Primary osteoarthritis involving multiple joints 01/09/2019      Priority: Medium     Hyperlipidemia      Priority: Medium     Created by Conversion         Bilateral shoulder pain 10/02/2018     Priority: Medium     Hip dislocation, left (H) 03/28/2018     Priority: Medium     Osteoporosis      Priority: Medium     Created by Conversion         Essential hypertension      Priority: Medium     Created by Conversion  Replacement Utility updated for latest IMO load         Primary insomnia      Priority: Medium     Created by Conversion         Status post left hip replacement 02/07/2018     Priority: Medium     Polyarthralgia 09/12/2017     Priority: Medium     Osteoarthritis of the Knee 02/23/2015     Priority: Medium     right  Replacement Utility updated for latest IMO load    Replacing diagnoses that were inactivated after the 10/1/2021 regulatory import.       Lumbar Spondylosis      Priority: Medium     Created by Conversion         Lumbar Disc Degeneration      Priority: Medium     Created by Conversion         Trochanteric Bursitis      Priority: Medium     Created by Conversion          Past Medical History:   Diagnosis Date     Arthritis      AVN (avascular necrosis of bone) (H)     let hip     DDD (degenerative disc disease), lumbar      GERD (gastroesophageal reflux disease)      HLD (hyperlipidemia)      Hypertension      Infection due to 2019 novel coronavirus 11/9/2022     Insomnia      Osteoporosis      PONV (postoperative nausea and vomiting)      Sleep apnea     cpap     Past Surgical History:   Procedure Laterality Date     APPENDECTOMY       CHOLECYSTECTOMY       FOOT FUSION Right 2017     HIP PINNING Left 3/28/2018    Procedure: OPEN REDUCTION OF DISLOCATED LEFT TOTAL HIP;  Surgeon: Daron Rincon MD;  Location: [facility] OR;  Service:      HYSTERECTOMY  Mid 1990's     IR LUMBAR EPIDURAL STEROID INJECTION  2/18/2005     IR LUMBAR EPIDURAL STEROID INJECTION  12/19/2005     OOPHORECTOMY  Mid 1990's     ZZC TOTAL HIP ARTHROPLASTY Left 2/7/2018     "Procedure: LEFT TOTAL HIP ARTHROPLASTY;  Surgeon: Daron Rincon MD;  Location: [facility];  Service: Orthopedics     Current Outpatient Medications   Medication Sig Dispense Refill     acetaminophen (TYLENOL) 650 MG CR tablet Take 650 mg by mouth 2 times daily as needed for mild pain or fever       calcium carbonate-vitamin D3 600 mg (1,500 mg)-800 unit Chew [CALCIUM CARBONATE-VITAMIN D3 600 MG (1,500 MG)-800 UNIT CHEW] Chew 1 tablet daily.       denosumab 60 mg/mL Syrg Inject 60 mg Subcutaneous once       hydrochlorothiazide (HYDRODIURIL) 50 MG tablet TAKE 1/2 TABLET EVERY DAY (SUBSTITUTED FOR  HYDRODIURIL) 45 tablet 3     lisinopril (ZESTRIL) 20 MG tablet TAKE 1 TABLET EVERY DAY 90 tablet 3     METHYLCELLULOSE (CITRUCEL ORAL) [METHYLCELLULOSE (CITRUCEL ORAL)] Take 1 tablet by mouth daily.       pregabalin (LYRICA) 75 MG capsule TAKE TWO CAPSULES BY MOUTH TWICE DAILY*       Vitamin D3 (CHOLECALCIFEROL) 25 mcg (1000 units) tablet Take 1 tablet (25 mcg) by mouth daily 90 tablet 3     famotidine (PEPCID) 20 MG tablet Take 1 tablet (20 mg) by mouth 2 times daily (Patient not taking: Reported on 5/30/2023) 60 tablet 4     pregabalin (LYRICA) 150 MG capsule Take 1 capsule (150 mg) by mouth 2 times daily (Patient not taking: Reported on 5/30/2023) 120 capsule 5       Allergies   Allergen Reactions     Duloxetine Headache     Nausea, difficult sleeping ankles cramps, loose bowel      Gabapentin Anxiety        Social History     Tobacco Use     Smoking status: Former     Years: 4.00     Types: Cigarettes     Smokeless tobacco: Never     Tobacco comments:     smoking in college-social   Vaping Use     Vaping status: Never Used   Substance Use Topics     Alcohol use: Yes     Alcohol/week: 3.3 standard drinks of alcohol     Comment: Alcoholic Drinks/day: occasional glass of wine       History   Drug Use No         Objective     /62   Pulse 86   Ht 1.689 m (5' 6.5\")   Wt 82.1 kg (181 lb 1.6 oz)   SpO2 96%   " BMI 28.79 kg/m      Physical Exam    General: Alert, in no distress  Skin: No significant lesion seen.  Eyes/nose/throat: Eyes without scleral icterus, eye movements normal, pupils equal and reactive, oropharynx clear  MSK: Neck with good ROM  Pulm: Lungs clear to auscultation bilaterally  Cardiac: Heart with regular rate and rhythm, no murmur or gallop  GI: Abdomen soft, nontender. No palpable enlargement of liver or spleen  MSK: Extremities no tenderness or edema  + Walks with a cane because of lumbar spinal stenosis pain  Neuro: Moves all extremities, without focal weakness  Psych: Alert, normal mental status. Normal affect and speech      Recent Labs   Lab Test 02/08/23  1621 01/04/22  1050 10/11/21  1117 08/17/21  1450   HGB 14.1  --   --  14.2     --   --  266    136  --  134*   POTASSIUM 4.0 3.9  --  3.6   CR 0.70 0.79  --  0.71   A1C  --   --  5.3  --         Diagnostics:  Recent Results (from the past 48 hour(s))   Basic metabolic panel  (Ca, Cl, CO2, Creat, Gluc, K, Na, BUN)    Collection Time: 05/30/23  3:28 PM   Result Value Ref Range    Sodium 130 (L) 136 - 145 mmol/L    Potassium 4.3 3.4 - 5.3 mmol/L    Chloride 92 (L) 98 - 107 mmol/L    Carbon Dioxide (CO2) 25 22 - 29 mmol/L    Anion Gap 13 7 - 15 mmol/L    Urea Nitrogen 19.4 8.0 - 23.0 mg/dL    Creatinine 0.90 0.51 - 0.95 mg/dL    Calcium 10.2 8.8 - 10.2 mg/dL    Glucose 100 (H) 70 - 99 mg/dL    GFR Estimate 64 >60 mL/min/1.73m2   CBC with platelets    Collection Time: 05/30/23  3:28 PM   Result Value Ref Range    WBC Count 11.1 (H) 4.0 - 11.0 10e3/uL    RBC Count 4.94 3.80 - 5.20 10e6/uL    Hemoglobin 14.3 11.7 - 15.7 g/dL    Hematocrit 42.4 35.0 - 47.0 %    MCV 86 78 - 100 fL    MCH 28.9 26.5 - 33.0 pg    MCHC 33.7 31.5 - 36.5 g/dL    RDW 13.9 10.0 - 15.0 %    Platelet Count 240 150 - 450 10e3/uL      Revised Cardiac Risk Index (RCRI):  The patient has the following serious cardiovascular risks for perioperative complications:   - No  serious cardiac risks = 0 points     RCRI Interpretation: 0 points: Class I (very low risk - 0.4% complication rate)           Signed Electronically by: RACHAEL PEDERSEN MD  Copy of this evaluation report is provided to requesting physician.     :[id number]}

## 2023-05-30 NOTE — PATIENT INSTRUCTIONS
Satisfactory preoperative medical examination in anticipation of lumbar laminectomy and fusion surgery (3 levels) that is scheduled for June 20, 2023 done at [facility], Dr. Dereck Roman of [facility], for indication of lumbar spinal stenosis symptoms with neuro claudication that has not responded to conservative measures including epidural steroid injections.  Chrissy has previously worked with the [facility] multidisciplinary spine clinic, Dr. Pollo Hutchison, who supported moving forward with back surgery.     Planned surgery  RIGHT LUMBAR 3-LUMBAR 4 AND RIGHT LUMBAR 4-LUMBAR 5 TRANSFORAMINAL LUMBAR INTERBODY FUSION WITH LAMINECTOMY USING STEALTH NAVIGATION    Chrissy is aware that this is major surgery, and hopefully she will be able to go directly home from the hospital, but there could be a possibility she will need to go to a TCU for recuperation, and then home from the TCU.    Last time Chrissy went through major surgery was her left hip dislocation and eventual joint implant in 2018, which went smoothly, without any problems with anesthesia, bleeding, or clotting.    For preoperative testing, lets get a basic metabolic panel and CBC.    Her electrocardiogram done today 8/30/2023 is satisfactory.  We had a technical problem with the machine so we could not  precordial leads V4, but her electrocardiogram otherwise looks normal    Once he was recovered from her back surgery, we need to revisit her osteoporosis issue.  It seems that her last dose of Prolia may have been back in July 2021, because I cannot find any entries corresponding to her Prolia injection since then.    We might consider whether to resume Prolia, or possibly switch to a bisphosphonate such as oral alendronate tablets taken weekly, or annual infusion of intravenous zoledronic acid (known as Reclast).    In the meantime, Chrissy should continue to take her calcium and vitamin D supplement.    With regards to Chrissy's other medications around  time of surgery  I told her that on the evening before surgery she can take her usual evening medications which is Lyrica    Then the morning of her surgery when she will be otherwise n.p.o., I told her to take her morning dose of lisinopril but SKIP THE MORNING DOSE OF HYDROCHLOROTHIAZIDE  Skip her calcium and vitamin D  Take the morning dose of Lyrica.      RECOMMENDATION:  APPROVAL GIVEN to proceed with proposed procedure, without further diagnostic evaluation.

## 2023-06-13 ENCOUNTER — TELEPHONE (OUTPATIENT)
Dept: INTERNAL MEDICINE | Facility: CLINIC | Age: 83
End: 2023-06-13
Payer: MEDICARE

## 2023-06-13 NOTE — TELEPHONE ENCOUNTER
TC called and left VM for Pt to CB and schedule her medicare AWV.      Please help Pt schedule when she calls back.    Nell Rea

## 2023-06-19 ENCOUNTER — ANESTHESIA EVENT (OUTPATIENT)
Dept: SURGERY | Facility: HOSPITAL | Age: 83
DRG: 454 | End: 2023-06-19
Payer: MEDICARE

## 2023-06-19 ENCOUNTER — TELEPHONE (OUTPATIENT)
Dept: INTERNAL MEDICINE | Facility: CLINIC | Age: 83
End: 2023-06-19

## 2023-06-19 NOTE — TELEPHONE ENCOUNTER
[facility] needing EKG tracing for preop. Faxed to [phone] and returned to Dr. Thompson's desk

## 2023-06-20 ENCOUNTER — APPOINTMENT (OUTPATIENT)
Dept: RADIOLOGY | Facility: HOSPITAL | Age: 83
DRG: 454 | End: 2023-06-20
Attending: ORTHOPAEDIC SURGERY
Payer: MEDICARE

## 2023-06-20 ENCOUNTER — HOSPITAL ENCOUNTER (INPATIENT)
Facility: HOSPITAL | Age: 83
LOS: 3 days | Discharge: HOME HEALTH | DRG: 454 | End: 2023-06-23
Attending: ORTHOPAEDIC SURGERY | Admitting: ORTHOPAEDIC SURGERY
Payer: MEDICARE

## 2023-06-20 ENCOUNTER — ANESTHESIA (OUTPATIENT)
Dept: SURGERY | Facility: HOSPITAL | Age: 83
DRG: 454 | End: 2023-06-20
Payer: MEDICARE

## 2023-06-20 DIAGNOSIS — Z98.890 STATUS POST LUMBAR SURGERY: Primary | ICD-10-CM

## 2023-06-20 LAB
ANION GAP SERPL CALCULATED.3IONS-SCNC: 9 MMOL/L (ref 7–15)
BUN SERPL-MCNC: 20.7 MG/DL (ref 8–23)
CALCIUM SERPL-MCNC: 9.9 MG/DL (ref 8.8–10.2)
CHLORIDE SERPL-SCNC: 99 MMOL/L (ref 98–107)
CREAT SERPL-MCNC: 0.76 MG/DL (ref 0.51–0.95)
DEPRECATED HCO3 PLAS-SCNC: 27 MMOL/L (ref 22–29)
GFR SERPL CREATININE-BSD FRML MDRD: 78 ML/MIN/1.73M2
GLUCOSE BLDC GLUCOMTR-MCNC: 92 MG/DL (ref 70–99)
GLUCOSE SERPL-MCNC: 96 MG/DL (ref 70–99)
HOLD SPECIMEN: NORMAL
HOLD SPECIMEN: NORMAL
POTASSIUM SERPL-SCNC: 4.1 MMOL/L (ref 3.4–5.3)
SODIUM SERPL-SCNC: 135 MMOL/L (ref 136–145)

## 2023-06-20 PROCEDURE — 120N000001 HC R&B MED SURG/OB

## 2023-06-20 PROCEDURE — 250N000009 HC RX 250: Performed by: ORTHOPAEDIC SURGERY

## 2023-06-20 PROCEDURE — 250N000009 HC RX 250: Performed by: PHYSICIAN ASSISTANT

## 2023-06-20 PROCEDURE — 250N000011 HC RX IP 250 OP 636: Performed by: ANESTHESIOLOGY

## 2023-06-20 PROCEDURE — 999N000180 XR SURGERY CARM FLUORO LESS THAN 5 MIN

## 2023-06-20 PROCEDURE — 0SG1071 FUSION OF 2 OR MORE LUMBAR VERTEBRAL JOINTS WITH AUTOLOGOUS TISSUE SUBSTITUTE, POSTERIOR APPROACH, POSTERIOR COLUMN, OPEN APPROACH: ICD-10-PCS | Performed by: ORTHOPAEDIC SURGERY

## 2023-06-20 PROCEDURE — 0ST20ZZ RESECTION OF LUMBAR VERTEBRAL DISC, OPEN APPROACH: ICD-10-PCS | Performed by: ORTHOPAEDIC SURGERY

## 2023-06-20 PROCEDURE — 999N000141 HC STATISTIC PRE-PROCEDURE NURSING ASSESSMENT: Performed by: ORTHOPAEDIC SURGERY

## 2023-06-20 PROCEDURE — 01NB0ZZ RELEASE LUMBAR NERVE, OPEN APPROACH: ICD-10-PCS | Performed by: ORTHOPAEDIC SURGERY

## 2023-06-20 PROCEDURE — 250N000009 HC RX 250: Performed by: NURSE ANESTHETIST, CERTIFIED REGISTERED

## 2023-06-20 PROCEDURE — 370N000017 HC ANESTHESIA TECHNICAL FEE, PER MIN: Performed by: ORTHOPAEDIC SURGERY

## 2023-06-20 PROCEDURE — 0SG10AJ FUSION OF 2 OR MORE LUMBAR VERTEBRAL JOINTS WITH INTERBODY FUSION DEVICE, POSTERIOR APPROACH, ANTERIOR COLUMN, OPEN APPROACH: ICD-10-PCS | Performed by: ORTHOPAEDIC SURGERY

## 2023-06-20 PROCEDURE — 250N000013 HC RX MED GY IP 250 OP 250 PS 637: Performed by: PHYSICIAN ASSISTANT

## 2023-06-20 PROCEDURE — 360N000085 HC SURGERY LEVEL 5 W/ FLUORO, PER MIN: Performed by: ORTHOPAEDIC SURGERY

## 2023-06-20 PROCEDURE — 80048 BASIC METABOLIC PNL TOTAL CA: CPT | Performed by: ANESTHESIOLOGY

## 2023-06-20 PROCEDURE — 8E0WXBZ COMPUTER ASSISTED PROCEDURE OF TRUNK REGION: ICD-10-PCS | Performed by: ORTHOPAEDIC SURGERY

## 2023-06-20 PROCEDURE — 258N000003 HC RX IP 258 OP 636: Performed by: ANESTHESIOLOGY

## 2023-06-20 PROCEDURE — 250N000011 HC RX IP 250 OP 636: Performed by: PHYSICIAN ASSISTANT

## 2023-06-20 PROCEDURE — 250N000011 HC RX IP 250 OP 636: Performed by: ORTHOPAEDIC SURGERY

## 2023-06-20 PROCEDURE — 99232 SBSQ HOSP IP/OBS MODERATE 35: CPT | Performed by: INTERNAL MEDICINE

## 2023-06-20 PROCEDURE — 258N000003 HC RX IP 258 OP 636: Performed by: NURSE ANESTHETIST, CERTIFIED REGISTERED

## 2023-06-20 PROCEDURE — 258N000003 HC RX IP 258 OP 636: Performed by: PHYSICIAN ASSISTANT

## 2023-06-20 PROCEDURE — 250N000013 HC RX MED GY IP 250 OP 250 PS 637: Performed by: INTERNAL MEDICINE

## 2023-06-20 PROCEDURE — C1713 ANCHOR/SCREW BN/BN,TIS/BN: HCPCS | Performed by: ORTHOPAEDIC SURGERY

## 2023-06-20 PROCEDURE — 36415 COLL VENOUS BLD VENIPUNCTURE: CPT | Performed by: ANESTHESIOLOGY

## 2023-06-20 PROCEDURE — 250N000011 HC RX IP 250 OP 636: Performed by: NURSE ANESTHETIST, CERTIFIED REGISTERED

## 2023-06-20 PROCEDURE — 272N000001 HC OR GENERAL SUPPLY STERILE: Performed by: ORTHOPAEDIC SURGERY

## 2023-06-20 PROCEDURE — 250N000025 HC SEVOFLURANE, PER MIN: Performed by: ORTHOPAEDIC SURGERY

## 2023-06-20 PROCEDURE — 710N000009 HC RECOVERY PHASE 1, LEVEL 1, PER MIN: Performed by: ORTHOPAEDIC SURGERY

## 2023-06-20 PROCEDURE — 250N000009 HC RX 250: Performed by: ANESTHESIOLOGY

## 2023-06-20 PROCEDURE — C1762 CONN TISS, HUMAN(INC FASCIA): HCPCS | Performed by: ORTHOPAEDIC SURGERY

## 2023-06-20 DEVICE — IMP SCR MEDT 4.75MM SOLERA 6.5X45MM MA 54840006545: Type: IMPLANTABLE DEVICE | Site: SPINE LUMBAR | Status: FUNCTIONAL

## 2023-06-20 DEVICE — IMP SCR MEDT 4.75MM SOLERA 6.5X55MM MA 54840006555: Type: IMPLANTABLE DEVICE | Site: SPINE LUMBAR | Status: FUNCTIONAL

## 2023-06-20 DEVICE — IMP SCR MEDT BREAK-OFF SET SOLERA 4.75MM TI 5440030: Type: IMPLANTABLE DEVICE | Site: SPINE LUMBAR | Status: FUNCTIONAL

## 2023-06-20 DEVICE — IMP ROD MEDT 6CM 1475501060: Type: IMPLANTABLE DEVICE | Site: SPINE LUMBAR | Status: FUNCTIONAL

## 2023-06-20 DEVICE — IMP SPI INTERBODY MEDT CATALYFT PL LONG 9MM 6068096: Type: IMPLANTABLE DEVICE | Site: SPINE LUMBAR | Status: FUNCTIONAL

## 2023-06-20 DEVICE — KIT BNGF 9CC DMNR CORT FBR ACCELERATE GRFTN CNN T50209: Type: IMPLANTABLE DEVICE | Site: SPINE LUMBAR | Status: FUNCTIONAL

## 2023-06-20 DEVICE — DBM 7509010 MAGNIFUSE 10 X 25MM
Type: IMPLANTABLE DEVICE | Site: SPINE LUMBAR | Status: FUNCTIONAL
Brand: MAGNIFUSE® BONE GRAFT

## 2023-06-20 RX ORDER — CEFAZOLIN SODIUM/WATER 2 G/20 ML
2 SYRINGE (ML) INTRAVENOUS SEE ADMIN INSTRUCTIONS
Status: DISCONTINUED | OUTPATIENT
Start: 2023-06-20 | End: 2023-06-20 | Stop reason: HOSPADM

## 2023-06-20 RX ORDER — CEFAZOLIN SODIUM 2 G/100ML
2 INJECTION, SOLUTION INTRAVENOUS EVERY 8 HOURS
Status: COMPLETED | OUTPATIENT
Start: 2023-06-20 | End: 2023-06-21

## 2023-06-20 RX ORDER — PREGABALIN 75 MG/1
150 CAPSULE ORAL 2 TIMES DAILY
Status: DISCONTINUED | OUTPATIENT
Start: 2023-06-20 | End: 2023-06-23 | Stop reason: HOSPADM

## 2023-06-20 RX ORDER — PROPOFOL 10 MG/ML
INJECTION, EMULSION INTRAVENOUS PRN
Status: DISCONTINUED | OUTPATIENT
Start: 2023-06-20 | End: 2023-06-20

## 2023-06-20 RX ORDER — HYDROMORPHONE HYDROCHLORIDE 1 MG/ML
0.4 INJECTION, SOLUTION INTRAMUSCULAR; INTRAVENOUS; SUBCUTANEOUS EVERY 5 MIN PRN
Status: DISCONTINUED | OUTPATIENT
Start: 2023-06-20 | End: 2023-06-20 | Stop reason: HOSPADM

## 2023-06-20 RX ORDER — NALOXONE HYDROCHLORIDE 0.4 MG/ML
0.2 INJECTION, SOLUTION INTRAMUSCULAR; INTRAVENOUS; SUBCUTANEOUS
Status: DISCONTINUED | OUTPATIENT
Start: 2023-06-20 | End: 2023-06-23 | Stop reason: HOSPADM

## 2023-06-20 RX ORDER — LISINOPRIL 20 MG/1
20 TABLET ORAL DAILY
Status: DISCONTINUED | OUTPATIENT
Start: 2023-06-20 | End: 2023-06-23 | Stop reason: HOSPADM

## 2023-06-20 RX ORDER — POLYETHYLENE GLYCOL 3350 17 G/17G
17 POWDER, FOR SOLUTION ORAL DAILY
Status: DISCONTINUED | OUTPATIENT
Start: 2023-06-21 | End: 2023-06-23 | Stop reason: HOSPADM

## 2023-06-20 RX ORDER — BUPIVACAINE HYDROCHLORIDE AND EPINEPHRINE 2.5; 5 MG/ML; UG/ML
INJECTION, SOLUTION EPIDURAL; INFILTRATION; INTRACAUDAL; PERINEURAL PRN
Status: DISCONTINUED | OUTPATIENT
Start: 2023-06-20 | End: 2023-06-20 | Stop reason: HOSPADM

## 2023-06-20 RX ORDER — ONDANSETRON 2 MG/ML
4 INJECTION INTRAMUSCULAR; INTRAVENOUS EVERY 30 MIN PRN
Status: DISCONTINUED | OUTPATIENT
Start: 2023-06-20 | End: 2023-06-20 | Stop reason: HOSPADM

## 2023-06-20 RX ORDER — ACETAMINOPHEN 325 MG/1
650 TABLET ORAL EVERY 4 HOURS PRN
Status: DISCONTINUED | OUTPATIENT
Start: 2023-06-23 | End: 2023-06-23 | Stop reason: HOSPADM

## 2023-06-20 RX ORDER — ONDANSETRON 2 MG/ML
4 INJECTION INTRAMUSCULAR; INTRAVENOUS EVERY 6 HOURS PRN
Status: DISCONTINUED | OUTPATIENT
Start: 2023-06-20 | End: 2023-06-23 | Stop reason: HOSPADM

## 2023-06-20 RX ORDER — SODIUM CHLORIDE, SODIUM LACTATE, POTASSIUM CHLORIDE, CALCIUM CHLORIDE 600; 310; 30; 20 MG/100ML; MG/100ML; MG/100ML; MG/100ML
INJECTION, SOLUTION INTRAVENOUS CONTINUOUS
Status: DISCONTINUED | OUTPATIENT
Start: 2023-06-20 | End: 2023-06-20 | Stop reason: HOSPADM

## 2023-06-20 RX ORDER — OXYCODONE HYDROCHLORIDE 5 MG/1
5 TABLET ORAL EVERY 4 HOURS PRN
Status: DISCONTINUED | OUTPATIENT
Start: 2023-06-20 | End: 2023-06-23 | Stop reason: HOSPADM

## 2023-06-20 RX ORDER — ONDANSETRON 4 MG/1
4 TABLET, ORALLY DISINTEGRATING ORAL EVERY 6 HOURS PRN
Status: DISCONTINUED | OUTPATIENT
Start: 2023-06-20 | End: 2023-06-23 | Stop reason: HOSPADM

## 2023-06-20 RX ORDER — BISACODYL 10 MG
10 SUPPOSITORY, RECTAL RECTAL DAILY PRN
Status: DISCONTINUED | OUTPATIENT
Start: 2023-06-20 | End: 2023-06-23 | Stop reason: HOSPADM

## 2023-06-20 RX ORDER — ACETAMINOPHEN 325 MG/1
975 TABLET ORAL ONCE
Status: DISCONTINUED | OUTPATIENT
Start: 2023-06-20 | End: 2023-06-20

## 2023-06-20 RX ORDER — PROCHLORPERAZINE MALEATE 5 MG
5 TABLET ORAL EVERY 6 HOURS PRN
Status: DISCONTINUED | OUTPATIENT
Start: 2023-06-20 | End: 2023-06-23 | Stop reason: HOSPADM

## 2023-06-20 RX ORDER — GLYCOPYRROLATE 0.2 MG/ML
INJECTION, SOLUTION INTRAMUSCULAR; INTRAVENOUS PRN
Status: DISCONTINUED | OUTPATIENT
Start: 2023-06-20 | End: 2023-06-20

## 2023-06-20 RX ORDER — ACETAMINOPHEN 325 MG/1
975 TABLET ORAL EVERY 8 HOURS
Status: COMPLETED | OUTPATIENT
Start: 2023-06-20 | End: 2023-06-23

## 2023-06-20 RX ORDER — HYDROXYZINE HYDROCHLORIDE 10 MG/1
10 TABLET, FILM COATED ORAL EVERY 6 HOURS PRN
Status: DISCONTINUED | OUTPATIENT
Start: 2023-06-20 | End: 2023-06-23 | Stop reason: HOSPADM

## 2023-06-20 RX ORDER — NALOXONE HYDROCHLORIDE 0.4 MG/ML
0.4 INJECTION, SOLUTION INTRAMUSCULAR; INTRAVENOUS; SUBCUTANEOUS
Status: DISCONTINUED | OUTPATIENT
Start: 2023-06-20 | End: 2023-06-23 | Stop reason: HOSPADM

## 2023-06-20 RX ORDER — SCOLOPAMINE TRANSDERMAL SYSTEM 1 MG/1
1 PATCH, EXTENDED RELEASE TRANSDERMAL ONCE
Status: COMPLETED | OUTPATIENT
Start: 2023-06-20 | End: 2023-06-21

## 2023-06-20 RX ORDER — ACETAMINOPHEN 325 MG/1
975 TABLET ORAL ONCE
Status: COMPLETED | OUTPATIENT
Start: 2023-06-20 | End: 2023-06-20

## 2023-06-20 RX ORDER — FAMOTIDINE 20 MG/1
20 TABLET, FILM COATED ORAL 2 TIMES DAILY
Status: DISCONTINUED | OUTPATIENT
Start: 2023-06-20 | End: 2023-06-23 | Stop reason: HOSPADM

## 2023-06-20 RX ORDER — GABAPENTIN 100 MG/1
100 CAPSULE ORAL
Status: COMPLETED | OUTPATIENT
Start: 2023-06-20 | End: 2023-06-20

## 2023-06-20 RX ORDER — VANCOMYCIN HYDROCHLORIDE 1 G/20ML
INJECTION, POWDER, LYOPHILIZED, FOR SOLUTION INTRAVENOUS PRN
Status: DISCONTINUED | OUTPATIENT
Start: 2023-06-20 | End: 2023-06-20 | Stop reason: HOSPADM

## 2023-06-20 RX ORDER — FENTANYL CITRATE 50 UG/ML
25 INJECTION, SOLUTION INTRAMUSCULAR; INTRAVENOUS EVERY 5 MIN PRN
Status: DISCONTINUED | OUTPATIENT
Start: 2023-06-20 | End: 2023-06-20 | Stop reason: HOSPADM

## 2023-06-20 RX ORDER — HYDROCHLOROTHIAZIDE 25 MG/1
25 TABLET ORAL DAILY
Status: DISCONTINUED | OUTPATIENT
Start: 2023-06-21 | End: 2023-06-23 | Stop reason: HOSPADM

## 2023-06-20 RX ORDER — LORATADINE 10 MG/1
10 TABLET ORAL DAILY PRN
Status: DISCONTINUED | OUTPATIENT
Start: 2023-06-20 | End: 2023-06-23 | Stop reason: HOSPADM

## 2023-06-20 RX ORDER — AMOXICILLIN 250 MG
1 CAPSULE ORAL 2 TIMES DAILY
Status: DISCONTINUED | OUTPATIENT
Start: 2023-06-20 | End: 2023-06-23 | Stop reason: HOSPADM

## 2023-06-20 RX ORDER — LIDOCAINE 40 MG/G
CREAM TOPICAL
Status: DISCONTINUED | OUTPATIENT
Start: 2023-06-20 | End: 2023-06-20 | Stop reason: HOSPADM

## 2023-06-20 RX ORDER — FENTANYL CITRATE 50 UG/ML
INJECTION, SOLUTION INTRAMUSCULAR; INTRAVENOUS PRN
Status: DISCONTINUED | OUTPATIENT
Start: 2023-06-20 | End: 2023-06-20

## 2023-06-20 RX ORDER — PROPOFOL 10 MG/ML
INJECTION, EMULSION INTRAVENOUS CONTINUOUS PRN
Status: DISCONTINUED | OUTPATIENT
Start: 2023-06-20 | End: 2023-06-20

## 2023-06-20 RX ORDER — ONDANSETRON 4 MG/1
4 TABLET, ORALLY DISINTEGRATING ORAL EVERY 30 MIN PRN
Status: DISCONTINUED | OUTPATIENT
Start: 2023-06-20 | End: 2023-06-20 | Stop reason: HOSPADM

## 2023-06-20 RX ORDER — CEFAZOLIN SODIUM/WATER 2 G/20 ML
2 SYRINGE (ML) INTRAVENOUS
Status: COMPLETED | OUTPATIENT
Start: 2023-06-20 | End: 2023-06-20

## 2023-06-20 RX ORDER — GABAPENTIN 100 MG/1
100 CAPSULE ORAL AT BEDTIME
Status: DISCONTINUED | OUTPATIENT
Start: 2023-06-20 | End: 2023-06-20

## 2023-06-20 RX ORDER — HYDROMORPHONE HCL IN WATER/PF 6 MG/30 ML
0.4 PATIENT CONTROLLED ANALGESIA SYRINGE INTRAVENOUS
Status: DISCONTINUED | OUTPATIENT
Start: 2023-06-20 | End: 2023-06-23 | Stop reason: HOSPADM

## 2023-06-20 RX ORDER — HYDROMORPHONE HYDROCHLORIDE 1 MG/ML
0.2 INJECTION, SOLUTION INTRAMUSCULAR; INTRAVENOUS; SUBCUTANEOUS EVERY 5 MIN PRN
Status: DISCONTINUED | OUTPATIENT
Start: 2023-06-20 | End: 2023-06-20 | Stop reason: HOSPADM

## 2023-06-20 RX ORDER — SODIUM CHLORIDE 9 MG/ML
INJECTION, SOLUTION INTRAVENOUS CONTINUOUS
Status: DISCONTINUED | OUTPATIENT
Start: 2023-06-20 | End: 2023-06-21

## 2023-06-20 RX ORDER — FENTANYL CITRATE 50 UG/ML
50 INJECTION, SOLUTION INTRAMUSCULAR; INTRAVENOUS EVERY 5 MIN PRN
Status: DISCONTINUED | OUTPATIENT
Start: 2023-06-20 | End: 2023-06-20 | Stop reason: HOSPADM

## 2023-06-20 RX ORDER — OXYCODONE HYDROCHLORIDE 5 MG/1
10 TABLET ORAL EVERY 4 HOURS PRN
Status: DISCONTINUED | OUTPATIENT
Start: 2023-06-20 | End: 2023-06-23 | Stop reason: HOSPADM

## 2023-06-20 RX ORDER — DEXAMETHASONE SODIUM PHOSPHATE 10 MG/ML
INJECTION, SOLUTION INTRAMUSCULAR; INTRAVENOUS PRN
Status: DISCONTINUED | OUTPATIENT
Start: 2023-06-20 | End: 2023-06-20

## 2023-06-20 RX ORDER — ONDANSETRON 2 MG/ML
INJECTION INTRAMUSCULAR; INTRAVENOUS PRN
Status: DISCONTINUED | OUTPATIENT
Start: 2023-06-20 | End: 2023-06-20

## 2023-06-20 RX ORDER — LIDOCAINE HYDROCHLORIDE 10 MG/ML
INJECTION, SOLUTION INFILTRATION; PERINEURAL PRN
Status: DISCONTINUED | OUTPATIENT
Start: 2023-06-20 | End: 2023-06-20

## 2023-06-20 RX ORDER — MULTIVITAMIN,THERAPEUTIC
1 TABLET ORAL DAILY
Status: DISCONTINUED | OUTPATIENT
Start: 2023-06-21 | End: 2023-06-23 | Stop reason: HOSPADM

## 2023-06-20 RX ORDER — HYDROMORPHONE HCL IN WATER/PF 6 MG/30 ML
0.2 PATIENT CONTROLLED ANALGESIA SYRINGE INTRAVENOUS
Status: DISCONTINUED | OUTPATIENT
Start: 2023-06-20 | End: 2023-06-23 | Stop reason: HOSPADM

## 2023-06-20 RX ADMIN — DEXMEDETOMIDINE HYDROCHLORIDE 4 MCG: 100 INJECTION, SOLUTION INTRAVENOUS at 08:35

## 2023-06-20 RX ADMIN — ACETAMINOPHEN 975 MG: 325 TABLET ORAL at 07:13

## 2023-06-20 RX ADMIN — DEXMEDETOMIDINE HYDROCHLORIDE 4 MCG: 100 INJECTION, SOLUTION INTRAVENOUS at 08:40

## 2023-06-20 RX ADMIN — PHENYLEPHRINE HYDROCHLORIDE 100 MCG: 10 INJECTION INTRAVENOUS at 09:05

## 2023-06-20 RX ADMIN — OXYCODONE HYDROCHLORIDE 5 MG: 5 TABLET ORAL at 17:28

## 2023-06-20 RX ADMIN — FENTANYL CITRATE 50 MCG: 50 INJECTION, SOLUTION INTRAMUSCULAR; INTRAVENOUS at 08:15

## 2023-06-20 RX ADMIN — PHENYLEPHRINE HYDROCHLORIDE 100 MCG: 10 INJECTION INTRAVENOUS at 10:37

## 2023-06-20 RX ADMIN — PROPOFOL 40 MCG/KG/MIN: 10 INJECTION, EMULSION INTRAVENOUS at 07:53

## 2023-06-20 RX ADMIN — ACETAMINOPHEN 975 MG: 325 TABLET, FILM COATED ORAL at 16:40

## 2023-06-20 RX ADMIN — HYDROMORPHONE HYDROCHLORIDE 0.2 MG: 0.2 INJECTION, SOLUTION INTRAMUSCULAR; INTRAVENOUS; SUBCUTANEOUS at 15:18

## 2023-06-20 RX ADMIN — SODIUM CHLORIDE, POTASSIUM CHLORIDE, SODIUM LACTATE AND CALCIUM CHLORIDE: 600; 310; 30; 20 INJECTION, SOLUTION INTRAVENOUS at 11:00

## 2023-06-20 RX ADMIN — DEXMEDETOMIDINE HYDROCHLORIDE 4 MCG: 100 INJECTION, SOLUTION INTRAVENOUS at 08:16

## 2023-06-20 RX ADMIN — FENTANYL CITRATE 50 MCG: 50 INJECTION, SOLUTION INTRAMUSCULAR; INTRAVENOUS at 13:13

## 2023-06-20 RX ADMIN — CEFAZOLIN SODIUM 2 G: 2 INJECTION, SOLUTION INTRAVENOUS at 20:35

## 2023-06-20 RX ADMIN — OXYCODONE HYDROCHLORIDE 5 MG: 5 TABLET ORAL at 23:11

## 2023-06-20 RX ADMIN — DEXAMETHASONE SODIUM PHOSPHATE 5 MG: 10 INJECTION, SOLUTION INTRAMUSCULAR; INTRAVENOUS at 07:45

## 2023-06-20 RX ADMIN — DEXMEDETOMIDINE HYDROCHLORIDE 4 MCG: 100 INJECTION, SOLUTION INTRAVENOUS at 08:42

## 2023-06-20 RX ADMIN — SODIUM CHLORIDE, POTASSIUM CHLORIDE, SODIUM LACTATE AND CALCIUM CHLORIDE: 600; 310; 30; 20 INJECTION, SOLUTION INTRAVENOUS at 07:16

## 2023-06-20 RX ADMIN — HYDROMORPHONE HYDROCHLORIDE 0.4 MG: 1 INJECTION, SOLUTION INTRAMUSCULAR; INTRAVENOUS; SUBCUTANEOUS at 13:24

## 2023-06-20 RX ADMIN — ACETAMINOPHEN 975 MG: 325 TABLET, FILM COATED ORAL at 23:11

## 2023-06-20 RX ADMIN — DEXMEDETOMIDINE HYDROCHLORIDE 4 MCG: 100 INJECTION, SOLUTION INTRAVENOUS at 08:08

## 2023-06-20 RX ADMIN — ONDANSETRON 4 MG: 2 INJECTION INTRAMUSCULAR; INTRAVENOUS at 07:45

## 2023-06-20 RX ADMIN — SUGAMMADEX 200 MG: 100 INJECTION, SOLUTION INTRAVENOUS at 12:33

## 2023-06-20 RX ADMIN — OXYCODONE HYDROCHLORIDE 5 MG: 5 TABLET ORAL at 21:50

## 2023-06-20 RX ADMIN — TRANEXAMIC ACID 1 G: 1 INJECTION, SOLUTION INTRAVENOUS at 08:09

## 2023-06-20 RX ADMIN — PHENYLEPHRINE HYDROCHLORIDE 0.4 MCG/KG/MIN: 10 INJECTION INTRAVENOUS at 08:05

## 2023-06-20 RX ADMIN — ROCURONIUM BROMIDE 20 MG: 50 INJECTION, SOLUTION INTRAVENOUS at 08:36

## 2023-06-20 RX ADMIN — FENTANYL CITRATE 50 MCG: 50 INJECTION, SOLUTION INTRAMUSCULAR; INTRAVENOUS at 07:39

## 2023-06-20 RX ADMIN — SCOPALAMINE 1 PATCH: 1 PATCH, EXTENDED RELEASE TRANSDERMAL at 07:27

## 2023-06-20 RX ADMIN — LIDOCAINE HYDROCHLORIDE 50 MG: 10 INJECTION, SOLUTION INFILTRATION; PERINEURAL at 07:39

## 2023-06-20 RX ADMIN — LISINOPRIL 20 MG: 20 TABLET ORAL at 20:36

## 2023-06-20 RX ADMIN — DEXMEDETOMIDINE HYDROCHLORIDE 4 MCG: 100 INJECTION, SOLUTION INTRAVENOUS at 08:14

## 2023-06-20 RX ADMIN — PROPOFOL 150 MG: 10 INJECTION, EMULSION INTRAVENOUS at 07:39

## 2023-06-20 RX ADMIN — Medication 2 G: at 11:45

## 2023-06-20 RX ADMIN — DEXMEDETOMIDINE HYDROCHLORIDE 4 MCG: 100 INJECTION, SOLUTION INTRAVENOUS at 08:05

## 2023-06-20 RX ADMIN — SODIUM CHLORIDE: 9 INJECTION, SOLUTION INTRAVENOUS at 15:20

## 2023-06-20 RX ADMIN — HYDROMORPHONE HYDROCHLORIDE 0.5 MG: 1 INJECTION, SOLUTION INTRAMUSCULAR; INTRAVENOUS; SUBCUTANEOUS at 11:37

## 2023-06-20 RX ADMIN — GLYCOPYRROLATE 0.2 MG: 0.2 INJECTION INTRAMUSCULAR; INTRAVENOUS at 07:38

## 2023-06-20 RX ADMIN — SENNOSIDES AND DOCUSATE SODIUM 1 TABLET: 50; 8.6 TABLET ORAL at 21:52

## 2023-06-20 RX ADMIN — FAMOTIDINE 20 MG: 20 TABLET ORAL at 21:52

## 2023-06-20 RX ADMIN — DEXMEDETOMIDINE HYDROCHLORIDE 4 MCG: 100 INJECTION, SOLUTION INTRAVENOUS at 08:38

## 2023-06-20 RX ADMIN — DEXMEDETOMIDINE HYDROCHLORIDE 4 MCG: 100 INJECTION, SOLUTION INTRAVENOUS at 08:11

## 2023-06-20 RX ADMIN — PREGABALIN 150 MG: 75 CAPSULE ORAL at 21:52

## 2023-06-20 RX ADMIN — Medication 2 G: at 08:00

## 2023-06-20 RX ADMIN — DEXMEDETOMIDINE HYDROCHLORIDE 4 MCG: 100 INJECTION, SOLUTION INTRAVENOUS at 08:44

## 2023-06-20 RX ADMIN — FENTANYL CITRATE 50 MCG: 50 INJECTION, SOLUTION INTRAMUSCULAR; INTRAVENOUS at 12:59

## 2023-06-20 RX ADMIN — ROCURONIUM BROMIDE 50 MG: 50 INJECTION, SOLUTION INTRAVENOUS at 07:40

## 2023-06-20 RX ADMIN — PHENYLEPHRINE HYDROCHLORIDE 100 MCG: 10 INJECTION INTRAVENOUS at 08:05

## 2023-06-20 RX ADMIN — TIZANIDINE 4 MG: 4 TABLET ORAL at 17:32

## 2023-06-20 ASSESSMENT — ACTIVITIES OF DAILY LIVING (ADL)
ADLS_ACUITY_SCORE: 43
ADLS_ACUITY_SCORE: 37
ADLS_ACUITY_SCORE: 43
ADLS_ACUITY_SCORE: 37
ADLS_ACUITY_SCORE: 43
ADLS_ACUITY_SCORE: 43

## 2023-06-20 NOTE — ANESTHESIA POSTPROCEDURE EVALUATION
Patient: Sheryl Trotter    Procedure: Procedure(s):  RIGHT LUMBAR 3-LUMBAR 4 AND RIGHT LUMBAR 4-LUMBAR 5 TRANSFORAMINAL LUMBAR INTERBODY FUSION WITH LAMINECTOMY USING STEALTH NAVIGATION       Anesthesia Type:  General    Note:  Disposition: Inpatient   Postop Pain Control: Uneventful            Sign Out: Well controlled pain   PONV: No   Neuro/Psych: Uneventful            Sign Out: Acceptable/Baseline neuro status   Airway/Respiratory: Uneventful            Sign Out: Acceptable/Baseline resp. status   CV/Hemodynamics: Uneventful            Sign Out: Acceptable CV status; No obvious hypovolemia; No obvious fluid overload   Other NRE: NONE   DID A NON-ROUTINE EVENT OCCUR? No           Last vitals:  Vitals Value Taken Time   /72 06/20/23 1330   Temp 36.7  C (98.1  F) 06/20/23 1300   Pulse 82 06/20/23 1334   Resp 16 06/20/23 1334   SpO2 98 % 06/20/23 1334   Vitals shown include unvalidated device data.    Electronically Signed By: Christine Christopher MD  June 20, 2023  1:36 PM

## 2023-06-20 NOTE — INTERVAL H&P NOTE
"I have reviewed the surgical (or preoperative) H&P that is linked to this encounter, and examined the patient. There are no significant changes    Clinical Conditions Present on Arrival:  Clinically Significant Risk Factors Present on Admission         # Hyponatremia: Lowest Na = 130 mmol/L in last 30 days, will monitor as appropriate  # Hypercalcemia: Highest Ca = 10.2 mg/dL in last 30 days, will monitor as appropriate         # Overweight: Estimated body mass index is 28.79 kg/m  as calculated from the following:    Height as of 5/30/23: 1.689 m (5' 6.5\").    Weight as of this encounter: 82.1 kg (181 lb 1.6 oz).       "

## 2023-06-20 NOTE — ANESTHESIA PROCEDURE NOTES
Airway       Patient location during procedure: OR       Procedure Start/Stop Times: 6/20/2023 7:42 AM  Staff -        CRNA: Ash Magallon APRN CRNA       Performed By: CRNA  Consent for Airway        Urgency: elective  Indications and Patient Condition       Indications for airway management: iván-procedural         Mask difficulty assessment: 1 - vent by mask    Final Airway Details       Final airway type: endotracheal airway       Successful airway: ETT - single  Endotracheal Airway Details        ETT size (mm): 7.0       Cuffed: yes       Successful intubation technique: direct laryngoscopy       DL Blade Type: Vides 2       Grade View of Cords: 2       Adjucts: stylet       Position: Right       Measured from: gums/teeth       Secured at (cm): 21       Bite block used: None    Post intubation assessment        Placement verified by: capnometry, equal breath sounds and chest rise        Number of attempts at approach: 1       Number of other approaches attempted: 0       Secured with: silk tape       Ease of procedure: easy       Dentition: Intact and Unchanged       Dental guard used and removed. Dental Guard Type: Proguard Red.    Medication(s) Administered   Medication Administration Time: 6/20/2023 7:42 AM    Additional Comments       Very easy to mask ventilate and intubate - JOSE Magallon

## 2023-06-20 NOTE — PROGRESS NOTES
S: Pt. seen at [facility]. Female. Michael BAUER. RX: Assess for brace. DX: P/O spine.  O:A: Pt. had lumbar surgery today. Today I F/D an [place] QuickDraw RAP LSO size Medium to support surgical site. Pt. was happy with support. Donning doffing wear and care instructions given verbally and hard copy.   P: Pt. to be seen as needed.    Xu SAUNDERS,LO

## 2023-06-20 NOTE — OP NOTE
DATE OF SERVICE: 06/20/2023     PREOPERATIVE DIAGNOSES:   1.  Severe spinal stenosis at L3-4 and L4-5  2.  L3-4 and L4-5 spondylolisthesis   3.  L3-4 and L4-5 degenerative disc disease  4.  Failure of conservative measures     POSTOPERATIVE DIAGNOSES:   1.  Severe spinal stenosis at L3-4 and L4-5  2.  L3-4 and L4-5 spondylolisthesis   3.  L3-4 and L4-5 degenerative disc disease  4.  Failure of conservative measures        PROCEDURE:   1. Right-sided transforaminal/transfacet decompression of the exiting L4 and traversing L5 nerve roots.   2. Transforaminal lumbar interbody fusion L4-5 with autograft bone, Medtronic Catalyft interbody long 9  3. Right-sided transforaminal/transfacet decompression of the exiting L3 and traversing L4 nerve roots.   4. Transforaminal lumbar interbody fusion L3-4 with autograft bone, Medtronic Catalyft interbody long 9  5. Posterior lateral fusion on the left at L3-4 and L4-5.   6. Segmental pedicle screw fixation in the pedicles of L3, L4, and L5 bilaterally  7. Complete laminectomy at L3-4 and L4-5  8. O arm with intra operative navigation       Medtronic Solera navigated pedicle screws systems.      SURGEON: Dr. Dereck Roman.      ASSISTANT: Ulises Rodriguez PA-C, who was needed for patient positioning, soft tissue retraction, patient safety and assistance with closure of the wound.  He was vital in the protection of the nerve roots as well as the dura.  This could only be performed by a surgical PA trained in spine     ESTIMATED BLOOD LOSS: 265 mL     DRAINS: Hemovac      COMPLICATIONS: None perceived.      SPECIMENS SENT: None.      HISTORY OF PRESENT ILLNESS AND INDICATIONS FOR PROCEDURE:   This is a pleasant 83 year old female that presented with severe buttock and leg pain.  MRI demonstrated L3-4 and L4-5 severe central stenosis with L3-4 and L4-5 spondylolisthesis and L3-4 and L4-5 degenerative disc disease.  She underwent non-operative treatment with injections as well as  therapy with no improvement long term.  We discussed because of this that she was a candidate for surgery.  I discussed that a fusion would give her more reliable results given spondylolisthesis.  She wished to proceed with the fusion.  We discussed the risks and benefits which include but are not limited to bleeding, infection, damage to the nerve or dura, failure of procedure to provide pain relief, foot drop, iatrogenic instability, pseudoarthrosis, need for additional procedures, and risks associated with anesthesia.  The intention of the surgery is to treat leg pain and will not reliably treat any back pain. She was completely clear on this.  Following this, they would like to proceed with the fusion.       DESCRIPTION OF PROCEDURE     Therefore, the patient was brought to the operating room. He was intubated via general endotracheal anesthetic and placed on a Uli table with a Willy frame, care taken to pad all of her bony prominences. Pause for the Cause was performed correctly identifying the patient, procedure, proposed plan and radiographs and all were in agreement. We then dissected down over the L3 and L4 hemilamina bilaterally. We dissected over the facet joints at L3-4 and L4-5 bilaterally.  In addition to this, the transverse process was visualized at the L3, L4 and L5 level.  O arm was brought in and a tracer was placed on the L5 spinous process.  An intraoperative CT scan was taken.       We then turned our attention to placing pedicle screws, first using a Midas Kash bur, then a pedicle probe, then a Browning probe to palpate the cannulated pedicle. We then tapped the holes to 5.5 mm.  We then used the Browning probe again to palpate all 4 quadrants of our cannulated pedicle. We then placed 6.5x55 screws bilaterally at L3 and 6.5x55 screws at L4 and 6.5x45 screws in L5.   A subsequent O arm spin was performed and demonstrated the screws in good position.     We then turned our attention to the  decompression and interbody.  A laminotomy was made.  The right L3 pars was scored allowing for the removal of the descending articular process of L3 along with the ascending articular process of L4. This gave us our transforaminal/transfacet decompression of the exiting L3, traversing L4 nerve root.      At the disk space level, we performed a box annulotomy at L3-4, used a combination of ODC curettes, pituitary rongeurs and Kerrison rongeurs to remove the disk material. We took great care not to dig into the endplates. When all of the disk material that we could remove was taken out, we gently scored the endplates and placed autograft bone. We then placed the interbody graft, a 28 mm length and 9 height, Medtronic catalyft cage. This had good fit and fill.       We then turned our attention to the decompression and interbody at L4-5.  A laminotomy was made.  The right L4 pars was scored allowing for the removal of the descending articular process of L4 along with the ascending articular process of L5. This gave us our transforaminal/transfacet decompression of the exiting L4, traversing L5 nerve root.      At the disk space level, we performed a box annulotomy at L4-5, used a combination of ODC curettes, pituitary rongeurs and Kerrison rongeurs to remove the disk material. We took great care not to dig into the endplates. When all of the disk material that we could remove was taken out, we gently scored the endplates and placed autograft bone. We then placed the interbody graft,  a 28 mm length and 9 height Medtronic Catalyft cage. This had good fit and fill.     We then performed a complete laminectomy and medial facetectomy.  Thick ligamentum and epidural fat was encountered and removed in a piecemeal fashion to completely free the left lateral recess at L3-4.  And the same procedure was performed at L4-5       We then decorticated our contralateral facet joint at L3-4 and L4-5 as well as the transverse  processes and placed additional autograft in the defect.  This was mixed with a medium [place] biologic and additional autograft to assist in posterolateral fusion.       We then placed our connectors and rods and tightened to the 's specifications. We irrigated the wound copiously, had good hemostasis. Vancomycin was placed over the hardware.  The fascia was closed with #1 Vicryl, subcutaneous tissue with 2-0 Vicryl, skin with a 3-0 monocryl.  A sterile dressing was applied. The patient tolerated the procedure well. There were no apparent complications.      She will be weightbearing as tolerated bilateral lower extremities with strict instructions to avoid bending, lifting and twisting over the next 6 weeks. They may have a corset type brace for comfort and have early mobilization and EMY stockings for her DVT prophylaxis. She will have 2 grams of Ancef IV q.8 hours x2 doses for antibiotics or until his drain is removed. Return to see me in 2 weeks, sooner if there are any problems, questions or concerns.     Hardware used:  Medtronic Solera screws and rods with end caps  Medtronic Catalyft cage  [place]

## 2023-06-20 NOTE — PHARMACY-ADMISSION MEDICATION HISTORY
Pharmacist Admission Medication History    Admission medication history is complete. The information provided in this note is only as accurate as the sources available at the time of the update.    Medication reconciliation/reorder completed by provider prior to medication history? No    Information Source(s): Patient and CareEverywhere/SureScripts via in-person     Allergies reviewed with patient and updates made in EHR: yes    Medication History Completed By: Adele Vilchis RPH 6/20/2023 6:25 AM    Prior to Admission medications    Medication Sig Last Dose Taking? Auth Provider Long Term End Date   acetaminophen (TYLENOL) 650 MG CR tablet Take 650 mg by mouth 2 times daily as needed for mild pain or fever Unknown at unknown Yes Reported, Patient     calcium carbonate-vitamin D3 600 mg (1,500 mg)-800 unit Chew [CALCIUM CARBONATE-VITAMIN D3 600 MG (1,500 MG)-800 UNIT CHEW] Chew 1 tablet daily. 6/19/2023 at AM Yes Provider, Historical     denosumab 60 mg/mL Syrg Inject 60 mg Subcutaneous every 6 months More than a month at due for injection Yes Provider, Historical     famotidine (PEPCID) 20 MG tablet Take 1 tablet (20 mg) by mouth 2 times daily 6/19/2023 at PM Yes Chema Thompson MD     hydrochlorothiazide (HYDRODIURIL) 50 MG tablet TAKE 1/2 TABLET EVERY DAY (SUBSTITUTED FOR  HYDRODIURIL)  Patient taking differently: Take 25 mg by mouth daily 6/19/2023 at AM Yes Chema Thompson MD Yes    lisinopril (ZESTRIL) 20 MG tablet TAKE 1 TABLET EVERY DAY  Patient taking differently: Take 20 mg by mouth daily 6/19/2023 at AM Yes Chema Thompson MD Yes    METHYLCELLULOSE (CITRUCEL ORAL) Take 1 tablet by mouth daily as needed 6/19/2023 at AM Yes Provider, Historical     pregabalin (LYRICA) 150 MG capsule Take 1 capsule (150 mg) by mouth 2 times daily 6/19/2023 at PM Yes Cholo Khanna,  Yes    Vitamin D3 (CHOLECALCIFEROL) 25 mcg (1000 units) tablet Take 1 tablet (25 mcg) by mouth daily 6/19/2023 at  AM Yes Supriya Da Silva MD

## 2023-06-20 NOTE — ANESTHESIA CARE TRANSFER NOTE
Patient: Sheryl Trotter    Procedure: Procedure(s):  RIGHT LUMBAR 3-LUMBAR 4 AND RIGHT LUMBAR 4-LUMBAR 5 TRANSFORAMINAL LUMBAR INTERBODY FUSION WITH LAMINECTOMY USING STEALTH NAVIGATION       Diagnosis: Spinal stenosis [M48.00]  Spondylolisthesis [M43.10]  Diagnosis Additional Information: No value filed.    Anesthesia Type:   General     Note:    Oropharynx: oropharynx clear of all foreign objects and spontaneously breathing  Level of Consciousness: awake  Oxygen Supplementation: face mask  Level of Supplemental Oxygen (L/min / FiO2): 6  Independent Airway: airway patency satisfactory and stable  Dentition: dentition unchanged  Vital Signs Stable: post-procedure vital signs reviewed and stable  Report to RN Given: handoff report given  Patient transferred to: PACU    Handoff Report: Identifed the Patient, Identified the Reponsible Provider, Reviewed the pertinent medical history, Discussed the surgical course, Reviewed Intra-OP anesthesia mangement and issues during anesthesia, Set expectations for post-procedure period and Allowed opportunity for questions and acknowledgement of understanding      Vitals:  Vitals Value Taken Time   BP     Temp     Pulse     Resp     SpO2         Electronically Signed By: JOÃO Medellin CRNA  June 20, 2023  12:52 PM  
Clear

## 2023-06-20 NOTE — ANESTHESIA PREPROCEDURE EVALUATION
Anesthesia Pre-Procedure Evaluation    Patient: Sheryl Trotter   MRN: [id number] : 1940        Procedure : Procedure(s):  RIGHT LUMBAR 3-LUMBAR 4 AND RIGHT LUMBAR 4-LUMBAR 5 TRANSFORAMINAL LUMBAR INTERBODY FUSION WITH LAMINECTOMY USING STEALTH NAVIGATION          Past Medical History:   Diagnosis Date     Arthritis      AVN (avascular necrosis of bone) (H)     let hip     Chondrocalcinosis 2019     DDD (degenerative disc disease), lumbar      Degeneration of lumbar intervertebral disc      Dislocation of hip (H)     left     GERD (gastroesophageal reflux disease)      HLD (hyperlipidemia)      Hypertension      Idiopathic progressive polyneuropathy      Infection due to 2019 novel coronavirus 2022     Insomnia      Lumbar spondylosis      Osteoarthritis of right knee, unspecified osteoarthritis type      Osteoporosis      Polyarthralgia      Polymyalgia rheumatica (H) 2019     PONV (postoperative nausea and vomiting)      Primary osteoarthritis involving multiple joints      Shoulder pain, bilateral      Sleep apnea     cpap     Spinal stenosis      Trochanteric bursitis of both hips       Past Surgical History:   Procedure Laterality Date     APPENDECTOMY       CHOLECYSTECTOMY       FOOT FUSION Right 2017     HIP PINNING Left 3/28/2018    Procedure: OPEN REDUCTION OF DISLOCATED LEFT TOTAL HIP;  Surgeon: Daron Rincon MD;  Location: [facility];  Service:      HYSTERECTOMY  Mid      IR LUMBAR EPIDURAL STEROID INJECTION  2005     IR LUMBAR EPIDURAL STEROID INJECTION  2005     OOPHORECTOMY  Mid      ZZC TOTAL HIP ARTHROPLASTY Left 2018    Procedure: LEFT TOTAL HIP ARTHROPLASTY;  Surgeon: Daron Rincon MD;  Location: [facility];  Service: Orthopedics      Allergies   Allergen Reactions     Duloxetine Headache     Nausea, difficult sleeping ankles cramps, loose bowel      Gabapentin Anxiety      Social History     Tobacco Use     Smoking status: Former      Years: 4.00     Types: Cigarettes     Smokeless tobacco: Never     Tobacco comments:     smoking in college-social   Vaping Use     Vaping status: Never Used   Substance Use Topics     Alcohol use: Yes     Alcohol/week: 3.3 standard drinks of alcohol     Types: 3 Standard drinks or equivalent per week     Comment: 2 glases /week      Wt Readings from Last 1 Encounters:   06/20/23 82.1 kg (181 lb 1.6 oz)        Anesthesia Evaluation   Pt has had prior anesthetic.     History of anesthetic complications (PONV)  - PONV.      ROS/MED HX  ENT/Pulmonary:     (+) sleep apnea, uses CPAP,     Neurologic:       Cardiovascular:     (+) hypertension-----    METS/Exercise Tolerance:     Hematologic: Comments: History of hyponatremia    (+) anemia,     Musculoskeletal:   (+) arthritis,     GI/Hepatic:     (+) GERD, Asymptomatic on medication,     Renal/Genitourinary:  - neg Renal ROS     Endo: Comment: Polymyalgia rheumatica      Psychiatric/Substance Use:       Infectious Disease:  - neg infectious disease ROS     Malignancy:  - neg malignancy ROS     Other:            Physical Exam    Airway        Mallampati: II   TM distance: > 3 FB   Neck ROM: full     Respiratory Devices and Support         Dental       (+) Minor Abnormalities - some fillings, tiny chips      Cardiovascular          Rhythm and rate: regular and normal     Pulmonary   pulmonary exam normal        breath sounds clear to auscultation       Other findings: hyponatremia    OUTSIDE LABS:  CBC:   Lab Results   Component Value Date    WBC 11.1 (H) 05/30/2023    WBC 9.7 02/08/2023    HGB 14.3 05/30/2023    HGB 14.1 02/08/2023    HCT 42.4 05/30/2023    HCT 42.6 02/08/2023     05/30/2023     02/08/2023     BMP:   Lab Results   Component Value Date     (L) 06/20/2023     (L) 05/30/2023    POTASSIUM 4.1 06/20/2023    POTASSIUM 4.3 05/30/2023    CHLORIDE 99 06/20/2023    CHLORIDE 92 (L) 05/30/2023    CO2 27 06/20/2023    CO2 25 05/30/2023    BUN  20.7 06/20/2023    BUN 19.4 05/30/2023    CR 0.76 06/20/2023    CR 0.90 05/30/2023    GLC 96 06/20/2023    GLC 92 06/20/2023     COAGS:   Lab Results   Component Value Date    PTT 27 07/24/2019    INR 0.96 07/24/2019     POC: No results found for: BGM, HCG, HCGS  HEPATIC:   Lab Results   Component Value Date    ALBUMIN 4.2 02/08/2023    PROTTOTAL 6.9 02/08/2023    ALT 15 02/08/2023    AST 24 02/08/2023    ALKPHOS 85 02/08/2023    BILITOTAL 0.3 02/08/2023     OTHER:   Lab Results   Component Value Date    PH 7.33 (L) 07/15/2021    A1C 5.3 10/11/2021    ANYI 9.9 06/20/2023    TSH 1.23 02/08/2023    CRP 0.2 08/17/2021    SED 8 02/08/2023       Anesthesia Plan    ASA Status:  2   NPO Status:  NPO Appropriate    Anesthesia Type: General.     - Airway: ETT   Induction: Intravenous.   Maintenance: Balanced.        Consents         - Extended Intubation/Ventilatory Support Discussed: No.      - Patient is DNR/DNI Status: No    Use of blood products discussed: Yes.     - Discussed with: Patient.     - Consented: consented to blood products            Reason for refusal: other.     Postoperative Care    Pain management: IV analgesics.   PONV prophylaxis: Ondansetron (or other 5HT-3), Dexamethasone or Solumedrol, Scopolamine patch     Comments:    Other Comments: PONV prophylaxis            Christine Christopher MD

## 2023-06-21 ENCOUNTER — APPOINTMENT (OUTPATIENT)
Dept: OCCUPATIONAL THERAPY | Facility: HOSPITAL | Age: 83
DRG: 454 | End: 2023-06-21
Attending: PHYSICIAN ASSISTANT
Payer: MEDICARE

## 2023-06-21 ENCOUNTER — APPOINTMENT (OUTPATIENT)
Dept: PHYSICAL THERAPY | Facility: HOSPITAL | Age: 83
DRG: 454 | End: 2023-06-21
Attending: ORTHOPAEDIC SURGERY
Payer: MEDICARE

## 2023-06-21 LAB
ANION GAP SERPL CALCULATED.3IONS-SCNC: 9 MMOL/L (ref 7–15)
BASOPHILS # BLD AUTO: 0 10E3/UL (ref 0–0.2)
BASOPHILS NFR BLD AUTO: 0 %
BUN SERPL-MCNC: 13.8 MG/DL (ref 8–23)
CALCIUM SERPL-MCNC: 9.3 MG/DL (ref 8.8–10.2)
CHLORIDE SERPL-SCNC: 100 MMOL/L (ref 98–107)
CREAT SERPL-MCNC: 0.57 MG/DL (ref 0.51–0.95)
DEPRECATED HCO3 PLAS-SCNC: 24 MMOL/L (ref 22–29)
EOSINOPHIL # BLD AUTO: 0 10E3/UL (ref 0–0.7)
EOSINOPHIL NFR BLD AUTO: 0 %
ERYTHROCYTE [DISTWIDTH] IN BLOOD BY AUTOMATED COUNT: 13.9 % (ref 10–15)
GFR SERPL CREATININE-BSD FRML MDRD: >90 ML/MIN/1.73M2
GLUCOSE SERPL-MCNC: 120 MG/DL (ref 70–99)
HCT VFR BLD AUTO: 34.5 % (ref 35–47)
HGB BLD-MCNC: 11.4 G/DL (ref 11.7–15.7)
IMM GRANULOCYTES # BLD: 0 10E3/UL
IMM GRANULOCYTES NFR BLD: 0 %
LYMPHOCYTES # BLD AUTO: 1.4 10E3/UL (ref 0.8–5.3)
LYMPHOCYTES NFR BLD AUTO: 16 %
MCH RBC QN AUTO: 28.6 PG (ref 26.5–33)
MCHC RBC AUTO-ENTMCNC: 33 G/DL (ref 31.5–36.5)
MCV RBC AUTO: 87 FL (ref 78–100)
MONOCYTES # BLD AUTO: 0.9 10E3/UL (ref 0–1.3)
MONOCYTES NFR BLD AUTO: 10 %
NEUTROPHILS # BLD AUTO: 6 10E3/UL (ref 1.6–8.3)
NEUTROPHILS NFR BLD AUTO: 74 %
NRBC # BLD AUTO: 0 10E3/UL
NRBC BLD AUTO-RTO: 0 /100
PLATELET # BLD AUTO: 181 10E3/UL (ref 150–450)
POTASSIUM SERPL-SCNC: 4.1 MMOL/L (ref 3.4–5.3)
RBC # BLD AUTO: 3.99 10E6/UL (ref 3.8–5.2)
SODIUM SERPL-SCNC: 133 MMOL/L (ref 136–145)
WBC # BLD AUTO: 8.3 10E3/UL (ref 4–11)

## 2023-06-21 PROCEDURE — 250N000013 HC RX MED GY IP 250 OP 250 PS 637: Performed by: INTERNAL MEDICINE

## 2023-06-21 PROCEDURE — 36415 COLL VENOUS BLD VENIPUNCTURE: CPT | Performed by: INTERNAL MEDICINE

## 2023-06-21 PROCEDURE — 97535 SELF CARE MNGMENT TRAINING: CPT | Mod: GO

## 2023-06-21 PROCEDURE — 250N000013 HC RX MED GY IP 250 OP 250 PS 637: Performed by: PHYSICIAN ASSISTANT

## 2023-06-21 PROCEDURE — 97110 THERAPEUTIC EXERCISES: CPT | Mod: GP

## 2023-06-21 PROCEDURE — 97161 PT EVAL LOW COMPLEX 20 MIN: CPT | Mod: GP

## 2023-06-21 PROCEDURE — 250N000011 HC RX IP 250 OP 636: Performed by: PHYSICIAN ASSISTANT

## 2023-06-21 PROCEDURE — 120N000001 HC R&B MED SURG/OB

## 2023-06-21 PROCEDURE — 97530 THERAPEUTIC ACTIVITIES: CPT | Mod: GP

## 2023-06-21 PROCEDURE — 99232 SBSQ HOSP IP/OBS MODERATE 35: CPT | Performed by: HOSPITALIST

## 2023-06-21 PROCEDURE — 97116 GAIT TRAINING THERAPY: CPT | Mod: GP

## 2023-06-21 PROCEDURE — 80048 BASIC METABOLIC PNL TOTAL CA: CPT | Performed by: INTERNAL MEDICINE

## 2023-06-21 PROCEDURE — 258N000003 HC RX IP 258 OP 636: Performed by: PHYSICIAN ASSISTANT

## 2023-06-21 PROCEDURE — 85025 COMPLETE CBC W/AUTO DIFF WBC: CPT | Performed by: INTERNAL MEDICINE

## 2023-06-21 RX ORDER — HYDROXYZINE HYDROCHLORIDE 10 MG/1
10 TABLET, FILM COATED ORAL EVERY 6 HOURS PRN
Qty: 30 TABLET | Refills: 0 | Status: SHIPPED | OUTPATIENT
Start: 2023-06-21 | End: 2023-11-29

## 2023-06-21 RX ORDER — ACETAMINOPHEN 325 MG/1
975 TABLET ORAL EVERY 8 HOURS
Qty: 100 TABLET | Refills: 0 | Status: SHIPPED | OUTPATIENT
Start: 2023-06-21 | End: 2023-11-29

## 2023-06-21 RX ORDER — OXYCODONE HYDROCHLORIDE 5 MG/1
5-10 TABLET ORAL EVERY 4 HOURS PRN
Qty: 25 TABLET | Refills: 0 | Status: SHIPPED | OUTPATIENT
Start: 2023-06-21 | End: 2023-11-29

## 2023-06-21 RX ORDER — AMOXICILLIN 250 MG
1 CAPSULE ORAL 2 TIMES DAILY
Qty: 30 TABLET | Refills: 0 | Status: SHIPPED | OUTPATIENT
Start: 2023-06-21 | End: 2023-11-29

## 2023-06-21 RX ADMIN — ACETAMINOPHEN 975 MG: 325 TABLET, FILM COATED ORAL at 06:37

## 2023-06-21 RX ADMIN — OXYCODONE HYDROCHLORIDE 10 MG: 5 TABLET ORAL at 06:37

## 2023-06-21 RX ADMIN — FAMOTIDINE 20 MG: 20 TABLET ORAL at 09:10

## 2023-06-21 RX ADMIN — OXYCODONE HYDROCHLORIDE 10 MG: 5 TABLET ORAL at 20:10

## 2023-06-21 RX ADMIN — PREGABALIN 150 MG: 75 CAPSULE ORAL at 20:10

## 2023-06-21 RX ADMIN — SENNOSIDES AND DOCUSATE SODIUM 1 TABLET: 50; 8.6 TABLET ORAL at 09:10

## 2023-06-21 RX ADMIN — CEFAZOLIN SODIUM 2 G: 2 INJECTION, SOLUTION INTRAVENOUS at 03:39

## 2023-06-21 RX ADMIN — ACETAMINOPHEN 975 MG: 325 TABLET, FILM COATED ORAL at 16:03

## 2023-06-21 RX ADMIN — HYDROCHLOROTHIAZIDE 25 MG: 25 TABLET ORAL at 09:10

## 2023-06-21 RX ADMIN — SODIUM CHLORIDE: 9 INJECTION, SOLUTION INTRAVENOUS at 03:40

## 2023-06-21 RX ADMIN — THERA TABS 1 TABLET: TAB at 09:10

## 2023-06-21 RX ADMIN — POLYETHYLENE GLYCOL 3350 17 G: 17 POWDER, FOR SOLUTION ORAL at 09:10

## 2023-06-21 RX ADMIN — SENNOSIDES AND DOCUSATE SODIUM 1 TABLET: 50; 8.6 TABLET ORAL at 20:10

## 2023-06-21 RX ADMIN — PREGABALIN 150 MG: 75 CAPSULE ORAL at 09:10

## 2023-06-21 RX ADMIN — FAMOTIDINE 20 MG: 20 TABLET ORAL at 20:10

## 2023-06-21 RX ADMIN — LISINOPRIL 20 MG: 20 TABLET ORAL at 09:10

## 2023-06-21 ASSESSMENT — ACTIVITIES OF DAILY LIVING (ADL)
DIFFICULTY_EATING/SWALLOWING: NO
ADLS_ACUITY_SCORE: 43
DRESSING/BATHING_DIFFICULTY: NO
WEAR_GLASSES_OR_BLIND: NO
ADLS_ACUITY_SCORE: 43
TOILETING_ISSUES: NO
ADLS_ACUITY_SCORE: 43
CONCENTRATING,_REMEMBERING_OR_MAKING_DECISIONS_DIFFICULTY: NO
ADLS_ACUITY_SCORE: 43
FALL_HISTORY_WITHIN_LAST_SIX_MONTHS: NO
DOING_ERRANDS_INDEPENDENTLY_DIFFICULTY: NO
WALKING_OR_CLIMBING_STAIRS_DIFFICULTY: NO
CHANGE_IN_FUNCTIONAL_STATUS_SINCE_ONSET_OF_CURRENT_ILLNESS/INJURY: NO
ADLS_ACUITY_SCORE: 43
ADLS_ACUITY_SCORE: 43

## 2023-06-21 NOTE — PROGRESS NOTES
"[facility] Hospitalist Progress Note  [facility]  Admission date: 6/20/2023      Assessment/Plan    #s/p lumbar decompressive sx - per ortho  #HTN - BP running a little soft.  Hold hydrochlorothiazide for now.  Hold parameters on lisinopril.  #mild hyponatremia - stable.  Consider just stopping hydrochlorothiazide altogether.        Checklist:  Code Status: Full Code    Diet: Regular Diet Adult  Discharge Instruction - Regular Diet Adult      Disposition and Discharge Planning  Auto-populated from discharge tab:     Expected Discharge Date: 06/22/2023      Destination: home with family           System Identified Risk Variables  Auto-populated based on system request - if needs to be addressed in treatment plan they will be addressed above:  \"  Clinically Significant Risk Factors                  # Hypertension: Noted on problem list        # Overweight: Estimated body mass index is 28.79 kg/m  as calculated from the following:    Height as of 5/30/23: 1.689 m (5' 6.5\").    Weight as of this encounter: 82.1 kg (181 lb 1.6 oz)., PRESENT ON ADMISSION          \"    Interval Events/Subjective/Notable results:    Pain controlled this AM.   No new complaints    Objective    Vital signs in last 24 hours  Temp:  [97.5  F (36.4  C)-98.4  F (36.9  C)] 97.5  F (36.4  C)  Pulse:  [61-84] 66  Resp:  [10-18] 18  BP: ()/(53-89) 98/57  SpO2:  [92 %-100 %] 97 % O2 Device: None (Room air)    Weight:   181 lbs 1.6 oz  Body mass index is 28.79 kg/m .    Intake/Output last 3 shifts  I/O last 3 completed shifts:  In: 1900 [P.O.:400; I.V.:1500]  Out: 2475 [Urine:1975; Drains:235; Blood:265]    Physical Exam  General:  Alert, cooperative, no distress    Neurologic:  oriented, facial symmetry preserved, fluent speech.   Psych: calm  HEENT:  Anicteric, MMM  CV: RRR no MRG, normal S1 and S2, no edema  Lungs: CTAB.  Easyrespirations  Skin: no rashes or jaundice noted on exposed skin.    Dash Catheter: Not present  Lines: " None          Medical Decision Making               Odessa Dawn MD, [facility]  Internal Medicine Hospitalist

## 2023-06-21 NOTE — PROGRESS NOTES
Progress Note  Elective admission for spinal surgery, please see operative note for full details.  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  Otherwise no systemic symptoms    Objective  Vital signs in last 24 hours  Temp:  [97.8  F (36.6  C)-98.2  F (36.8  C)] 98.1  F (36.7  C)  Pulse:  [65-84] 65  Resp:  [0-18] 17  BP: (117-144)/(59-83) 137/67  SpO2:  [95 %-100 %] 97 %    Input and Output in 24 hrs     Intake/Output Summary (Last 24 hours) at 6/20/2023 1906  Last data filed at 6/20/2023 1443  Gross per 24 hour   Intake 1500 ml   Output 805 ml   Net 695 ml       GEN: Alert and oriented. Not in acute distress  HEENT: Atraumatic    Pupils- round and reactive to light bilaterally   Neck- supple, no JVP elevation, no lymphadenopathy or thyromegaly   Sclera- anicteric   Mucous membrane- moist and pink  CHEST: Clear to auscultation bilaterally  HEART: S1S2 regular. No murmurs, rubs or gallops  ABDOMEN: Soft. Non-tender, non-distended. No organomegaly. No guarding or rigidity. Bowel sounds- active  Extremities: No pedal oedema  CNS: No focal neurological deficit. No involuntary movements  SKIN: No skin rash, no cyanosis or clubbing      Pertinent Labs         Recent Results (from the past 24 hour(s))   Glucose by meter    Collection Time: 06/20/23  6:12 AM   Result Value Ref  Range    GLUCOSE BY METER POCT 92 70 - 99 mg/dL   Extra Purple Top Tube    Collection Time: 06/20/23  6:35 AM   Result Value Ref Range    Hold Specimen JI    Basic metabolic panel    Collection Time: 06/20/23  6:37 AM   Result Value Ref Range    Sodium 135 (L) 136 - 145 mmol/L    Potassium 4.1 3.4 - 5.3 mmol/L    Chloride 99 98 - 107 mmol/L    Carbon Dioxide (CO2) 27 22 - 29 mmol/L    Anion Gap 9 7 - 15 mmol/L    Urea Nitrogen 20.7 8.0 - 23.0 mg/dL    Creatinine 0.76 0.51 - 0.95 mg/dL    Calcium 9.9 8.8 - 10.2 mg/dL    Glucose 96 70 - 99 mg/dL    GFR Estimate 78 >60 mL/min/1.73m2   Extra Green Top (Lithium Heparin) Tube    Collection Time: 06/20/23  7:13 AM   Result Value Ref Range    Hold Specimen JI        EKG Results reviewed       Advanced Care Planning      MD MAYE CarreraD

## 2023-06-21 NOTE — PROGRESS NOTES
06/21/23 0900   Appointment Info   Signing Clinician's Name / Credentials (PT) Lor Shepard, PT   Living Environment   People in Home spouse   Current Living Arrangements house  (3 level home)   Home Accessibility stairs to enter home;stairs within home   Number of Stairs, Main Entrance 3   Stair Railings, Main Entrance railing on left side (ascending)   Number of Stairs, Within Home, Primary greater than 10 stairs   Stair Railings, Within Home, Primary railings on both sides of stairs   Self-Care   Usual Activity Tolerance good   Current Activity Tolerance moderate   Equipment Currently Used at Home cane, straight;walker, standard  (She does have a PUW that they can put wheels on.)   Fall history within last six months no   Activity/Exercise/Self-Care Comment Pt is indep with all mobility and did use a cane recently.   Indep with home ADLs and ADLs. She does drive.   General Information   Onset of Illness/Injury or Date of Surgery 06/20/23   Referring Physician Dereck Cooper..   Patient/Family Therapy Goals Statement (PT) None stated.   Pertinent History of Current Problem (include personal factors and/or comorbidities that impact the POC) Per the chart, RIGHT LUMBAR 3-LUMBAR 4 AND RIGHT LUMBAR 4-LUMBAR 5 TRANSFORAMINAL LUMBAR INTERBODY FUSION WITH LAMINECTOMY USING STEALTH NAVIGATION   Existing Precautions/Restrictions fall;spinal;brace worn when out of bed   Weight-Bearing Status - LLE weight-bearing as tolerated   Weight-Bearing Status - RLE weight-bearing as tolerated   Cognition   Affect/Mental Status (Cognition) WFL   Orientation Status (Cognition) oriented x 4   Pain Assessment   Patient Currently in Pain   (Pt was just medicated.)   Range of Motion (ROM)   ROM Comment Some cues for posture.   Strength (Manual Muscle Testing)   Strength Comments Pt is able to WB for mobility   Bed Mobility   Comment, (Bed Mobility) Supine>sit with CGA   Transfers   Comment, (Transfers) Sit<>stand with FWW with mod A x  1.   Gait/Stairs (Locomotion)   [place] Level (Gait) verbal cues;contact guard   Assistive Device (Gait) walker, front-wheeled  (fWW)   Distance in Feet 20'   Distance in Feet (Gait) 120'   Pattern (Gait) swing-through   Deviations/Abnormal Patterns (Gait) gait speed decreased   Balance   Balance Comments CGA with dyn bal with the FWW   Sensory Examination   Sensory Perception Comments Pt could feel light touch bilat feet but said she does have some decreased sensation bilat feet.   Clinical Impression   Criteria for Skilled Therapeutic Intervention Yes, treatment indicated   PT Diagnosis (PT) Impaired functional mobility.   Influenced by the following impairments weakness, dec bal, dec endurance. Pt is not at her PLOF.   Functional limitations due to impairments bed mobility, transfers, gait and steps.   Clinical Presentation (PT Evaluation Complexity) Stable/Uncomplicated   Clinical Presentation Rationale Pt presents medically diagnosed.   Clinical Decision Making (Complexity) low complexity   Planned Therapy Interventions (PT) bed mobility training;gait training;home exercise program;stair training;strengthening;transfer training   Anticipated Equipment Needs at Discharge (PT) walker, rolling  (FWW)   Risk & Benefits of therapy have been explained evaluation/treatment results reviewed;care plan/treatment goals reviewed;risks/benefits reviewed;current/potential barriers reviewed;patient;participants voiced agreement with care plan   PT Total Evaluation Time   PT Eval, Low Complexity Minutes (58038) 15   Plan of Care Review   Plan of Care Reviewed With patient   Physical Therapy Goals   PT Frequency 2x/day   PT Predicted Duration/Target Date for Goal Attainment 06/28/23   PT Goals Bed Mobility;Transfers;Gait;Stairs;PT Goal 1   PT: Bed Mobility Modified independent;Supine to/from sit;Rolling;Within precautions   PT: Transfers Supervision/stand-by assist;Sit to/from stand;Bed to/from chair;Assistive device   PT:  Gait Supervision/stand-by assist;Rolling walker;Within precautions;Greater than 200 feet   PT: Stairs Supervision/stand-by assist;Within precautions;10 stairs;Rail on both sides   PT: Goal 1 Pt to kniow her spine precautions and HEP.   Interventions   Interventions Quick Adds Gait Training;Therapeutic Activity;Therapeutic Procedure   Therapeutic Procedure/Exercise   Treatment Detail/Skilled Intervention PT did instruct and issue a spine precautions and spine HEP. Pt did supine spine ex x 10 reps.   Therapeutic Activity   Therapeutic Activities: dynamic activities to improve functional performance Minutes (90347) 15   Treatment Detail/Skilled Intervention Supine>sit with CGA with cues for hand placement and spine body mechanics.  Pt did use the rail.  PT did assist the pt with donning the LSO in bed before getting up.  Sit>stand with mod A x 1 with FWW with cues for hand placement.   Gait Training   Gait Training Minutes (87619) 10   Symptoms Noted During/After Treatment (Gait Training) fatigue   Treatment Detail/Skilled Intervention Pt walked slowly with the FWW with cues for drection and posture.   [place] Level (Gait Training) contact guard   Physical Assistance Level (Gait Training) verbal cues;1 person assist   Weight Bearing (Gait Training) weight-bearing as tolerated   Assistive Device (Gait Training) rolling walker  (FWW)   Pattern Analysis (Gait Training) swing-through gait   Gait Analysis Deviations decreased step length;decreased bakari   Impairments (Gait Analysis/Training) balance impaired;strength decreased;flexibility decreased   PT Discharge Planning   PT Plan gait w FWW, spine ex, bed mobility, transfers. steps.   PT Discharge Recommendation (DC Rec) home with home care physical therapy   PT Rationale for DC Rec Pt should be able to progress to go home.  Pt did walk with the FWW with CGA.  Pt needs to do steps before dc to home.   PT Brief overview of current status PT eval, bed mobility with  CGA, transfers with mod A with fWW and ambulated 140' with FWW with CGA. spine ex.   Total Session Time   Timed Code Treatment Minutes 25   Total Session Time (sum of timed and untimed services) 40

## 2023-06-21 NOTE — PROGRESS NOTES
Spine Surgery Progress Note      POD #: 1 Day Post-Op  S/P: Procedure(s):  RIGHT LUMBAR 3-LUMBAR 4 AND RIGHT LUMBAR 4-LUMBAR 5 TRANSFORAMINAL LUMBAR INTERBODY FUSION WITH LAMINECTOMY USING STEALTH NAVIGATION    Assessment:  S/P Procedure(s):  RIGHT LUMBAR 3-LUMBAR 4 AND RIGHT LUMBAR 4-LUMBAR 5 TRANSFORAMINAL LUMBAR INTERBODY FUSION WITH LAMINECTOMY USING STEALTH NAVIGATION on 6/20/2023        Plan:  - Continue PT/OT, appreciate recs  -Ambulate as tolerated  -Remove Dash per protocol.  Still in today  - Pain Management continue current regimen  - Anticoagulation:  SCDs, stephon stockings and early ambulation.  - Diet: progress diet as tolerated  - Labs: hgb 11.4, transfuse if <7.0. No indication today  - Remove HV drain once output is <30 cc for 2 consecutive nursing shifts (8-hr shifts) or POD 3.  No removal indication today  - Follow-up: Outpatient follow up in 2 weeks  - Disposition: Patient plans to d/c home with home care per PT, pending continued work with therapies, HV removal    Subjective:    Patient is seen sitting in chair  Appears comfortable. Incision pain that is well controlled.  Lower extremity pain noticeably improved compared with prior to surgery  No new extremity pain, numbness tingling or weakness to report  Nausea, Vomiting:  No  Lightheadedness, Dizziness:  No  Flatus: Yes  BM: No    Dash in place  HV drain in place    All questions/concerns answered.      Objective:  BP 98/57 (BP Location: Left arm)   Pulse 66   Temp 97.5  F (36.4  C) (Oral)   Resp 18   Wt 82.1 kg (181 lb 1.6 oz)   SpO2 97%   BMI 28.79 kg/m        Incision covered with dressing. Gauze dressing in place at low back. Appears clean, dry, intact.  Awake, alert and oriented. Patient is sitting bedside. Conversational  Non-labored breathing    RLE   5/5 hip flexors  5/5 quadriceps  5/5 tibialis anterior  5/5 EHL  5/5 Gastroc Soleus  RLE L2-S1 intact to light touch    LLE   5/5 hip flexors  5/5 quadriceps  5/5 tibialis  anterior  5/5 EHL  5/5 Gastroc Soleus  LLE L2-S1 intact to light touch    bilateral calves non-tender, non-edematous, non-erythematous, no warmth    Drain in place, without signs of infection: output 65 overnight      Labs:  Recent Labs   Lab Test 06/21/23  0550 05/30/23  1528   WBC 8.3 11.1*   RBC 3.99 4.94   HGB 11.4* 14.3   HCT 34.5* 42.4   MCV 87 86   MCH 28.6 28.9   MCHC 33.0 33.7    240     Recent Labs   Lab Test 06/21/23  0550 06/20/23  0637 05/30/23  1528   POTASSIUM 4.1 4.1 4.3   CHLORIDE 100 99 92*   BUN 13.8 20.7 19.4             ARIES CARLISLE PA-C  Date: 6/21/2023  Time: 10:36 AM  [facility]

## 2023-06-21 NOTE — PROGRESS NOTES
Met with patient. She is agreeable to home PT and OT as recommended by therapy . Referral sent and accepted by [facility]. Says family will transport

## 2023-06-22 ENCOUNTER — APPOINTMENT (OUTPATIENT)
Dept: PHYSICAL THERAPY | Facility: HOSPITAL | Age: 83
DRG: 454 | End: 2023-06-22
Attending: ORTHOPAEDIC SURGERY
Payer: MEDICARE

## 2023-06-22 LAB — HGB BLD-MCNC: 10.7 G/DL (ref 11.7–15.7)

## 2023-06-22 PROCEDURE — 120N000001 HC R&B MED SURG/OB

## 2023-06-22 PROCEDURE — 36415 COLL VENOUS BLD VENIPUNCTURE: CPT | Performed by: PHYSICIAN ASSISTANT

## 2023-06-22 PROCEDURE — 97530 THERAPEUTIC ACTIVITIES: CPT | Mod: GP

## 2023-06-22 PROCEDURE — 97110 THERAPEUTIC EXERCISES: CPT | Mod: GP

## 2023-06-22 PROCEDURE — 250N000013 HC RX MED GY IP 250 OP 250 PS 637: Performed by: HOSPITALIST

## 2023-06-22 PROCEDURE — 99232 SBSQ HOSP IP/OBS MODERATE 35: CPT | Performed by: HOSPITALIST

## 2023-06-22 PROCEDURE — 85018 HEMOGLOBIN: CPT | Performed by: PHYSICIAN ASSISTANT

## 2023-06-22 PROCEDURE — 97116 GAIT TRAINING THERAPY: CPT | Mod: GP

## 2023-06-22 PROCEDURE — 250N000013 HC RX MED GY IP 250 OP 250 PS 637: Performed by: INTERNAL MEDICINE

## 2023-06-22 PROCEDURE — 250N000013 HC RX MED GY IP 250 OP 250 PS 637: Performed by: PHYSICIAN ASSISTANT

## 2023-06-22 RX ADMIN — SENNOSIDES AND DOCUSATE SODIUM 1 TABLET: 50; 8.6 TABLET ORAL at 08:04

## 2023-06-22 RX ADMIN — THERA TABS 1 TABLET: TAB at 08:05

## 2023-06-22 RX ADMIN — LISINOPRIL 20 MG: 20 TABLET ORAL at 08:05

## 2023-06-22 RX ADMIN — POLYETHYLENE GLYCOL 3350 17 G: 17 POWDER, FOR SOLUTION ORAL at 08:04

## 2023-06-22 RX ADMIN — PREGABALIN 150 MG: 75 CAPSULE ORAL at 20:26

## 2023-06-22 RX ADMIN — SENNOSIDES AND DOCUSATE SODIUM 1 TABLET: 50; 8.6 TABLET ORAL at 20:26

## 2023-06-22 RX ADMIN — OXYCODONE HYDROCHLORIDE 10 MG: 5 TABLET ORAL at 05:24

## 2023-06-22 RX ADMIN — FAMOTIDINE 20 MG: 20 TABLET ORAL at 20:26

## 2023-06-22 RX ADMIN — PREGABALIN 150 MG: 75 CAPSULE ORAL at 08:04

## 2023-06-22 RX ADMIN — OXYCODONE HYDROCHLORIDE 5 MG: 5 TABLET ORAL at 15:59

## 2023-06-22 RX ADMIN — FAMOTIDINE 20 MG: 20 TABLET ORAL at 08:04

## 2023-06-22 RX ADMIN — OXYCODONE HYDROCHLORIDE 5 MG: 5 TABLET ORAL at 22:54

## 2023-06-22 RX ADMIN — ACETAMINOPHEN 975 MG: 325 TABLET, FILM COATED ORAL at 22:51

## 2023-06-22 RX ADMIN — ACETAMINOPHEN 975 MG: 325 TABLET, FILM COATED ORAL at 14:49

## 2023-06-22 ASSESSMENT — ACTIVITIES OF DAILY LIVING (ADL)
ADLS_ACUITY_SCORE: 22
ADLS_ACUITY_SCORE: 43
ADLS_ACUITY_SCORE: 26
ADLS_ACUITY_SCORE: 43
ADLS_ACUITY_SCORE: 26
ADLS_ACUITY_SCORE: 43

## 2023-06-22 NOTE — PROGRESS NOTES
Spine Surgery Progress Note      POD #: 2 Day Post-Op  S/P: Procedure(s):  RIGHT LUMBAR 3-LUMBAR 4 AND RIGHT LUMBAR 4-LUMBAR 5 TRANSFORAMINAL LUMBAR INTERBODY FUSION WITH LAMINECTOMY USING STEALTH NAVIGATION    Assessment:  S/P Procedure(s):  RIGHT LUMBAR 3-LUMBAR 4 AND RIGHT LUMBAR 4-LUMBAR 5 TRANSFORAMINAL LUMBAR INTERBODY FUSION WITH LAMINECTOMY USING STEALTH NAVIGATION on 6/20/2023        Plan:  - Continue PT/OT, appreciate recs  -Ambulate as tolerated  -Remove Dash per protocol.  - Pain Management continue current regimen  - Anticoagulation:  SCDs, stephon stockings and early ambulation.  - Diet: progress diet as tolerated  - Labs: hgb 10.7, transfuse if <7.0. No indication today  - Remove HV drain once output is <30 cc for 2 consecutive nursing shifts (8-hr shifts) or POD 3.  No removal indication today  - Follow-up: Outpatient follow up in 2 weeks  - Disposition: Patient plans to d/c home with home care per PT, pending continued work with therapies, HV removal.  Plan to discharge tomorrow    Subjective:    Patient is seen sitting in chair  Appears comfortable. Incision pain that is well controlled.  Lower extremity pain noticeably improved compared with prior to surgery  No new extremity pain, numbness tingling or weakness to report  Nausea, Vomiting:  No  Lightheadedness, Dizziness:  No  Flatus: Yes  BM: No    HV drain in place    All questions/concerns answered.      Objective:  /57   Pulse 87   Temp 97.8  F (36.6  C) (Oral)   Resp 18   Wt 82.1 kg (181 lb 1.6 oz)   SpO2 94%   BMI 28.79 kg/m        Incision covered with dressing. Gauze dressing in place at low back. Appears clean, dry, intact.  Awake, alert and oriented. Patient is sitting bedside. Conversational  Non-labored breathing    RLE   5/5 hip flexors  5/5 quadriceps  5/5 tibialis anterior  5/5 EHL  5/5 Gastroc Soleus  RLE L2-S1 intact to light touch    LLE   5/5 hip flexors  5/5 quadriceps  5/5 tibialis anterior  5/5 EHL  5/5 Gastroc  Soleus  LLE L2-S1 intact to light touch    bilateral calves non-tender, non-edematous, non-erythematous, no warmth    Drain in place, without signs of infection: output 40 overnight      Labs:  Recent Labs   Lab Test 06/22/23  0524 06/21/23  0550 05/30/23  1528   WBC  --  8.3 11.1*   RBC  --  3.99 4.94   HGB 10.7* 11.4* 14.3   HCT  --  34.5* 42.4   MCV  --  87 86   MCH  --  28.6 28.9   MCHC  --  33.0 33.7   PLT  --  181 240     Recent Labs   Lab Test 06/21/23  0550 06/20/23  0637 05/30/23  1528   POTASSIUM 4.1 4.1 4.3   CHLORIDE 100 99 92*   BUN 13.8 20.7 19.4             ARIES CARLISLE PA-C  Date: 6/22/2023  Time: 10:31 AM  [facility]

## 2023-06-22 NOTE — PROGRESS NOTES
"[facility] Hospitalist Progress Note  [facility]  Admission date: 6/20/2023      Assessment/Plan    #s/p lumbar decompressive sx - per ortho  #HTN - BP was running a little soft and hydrochlorothiazide held - now improved.  Hold parameters on lisinopril.   #mild hyponatremia - stable.  Consider just stopping hydrochlorothiazide altogether.    Ok for discharge from my perspective when post-op cares complete.    Checklist:  Code Status: Full Code    Diet: Regular Diet Adult  Discharge Instruction - Regular Diet Adult      Disposition and Discharge Planning  Auto-populated from discharge tab:     Expected Discharge Date: 06/22/2023      Destination: home with family           System Identified Risk Variables  Auto-populated based on system request - if needs to be addressed in treatment plan they will be addressed above:  \"  Clinically Significant Risk Factors                  # Hypertension: Noted on problem list        # Overweight: Estimated body mass index is 28.79 kg/m  as calculated from the following:    Height as of 5/30/23: 1.689 m (5' 6.5\").    Weight as of this encounter: 82.1 kg (181 lb 1.6 oz)., PRESENT ON ADMISSION          \"    Interval Events/Subjective/Notable results:    Pain controlled this AM.   No new complaints    Objective    Vital signs in last 24 hours  Temp:  [97.5  F (36.4  C)-98  F (36.7  C)] 97.8  F (36.6  C)  Pulse:  [63-87] 87  Resp:  [18] 18  BP: ()/(42-57) 120/57  SpO2:  [93 %-99 %] 94 % O2 Device: None (Room air)    Weight:   181 lbs 1.6 oz  Body mass index is 28.79 kg/m .    Intake/Output last 3 shifts  I/O last 3 completed shifts:  In: 840 [P.O.:840]  Out: 890 [Urine:720; Drains:170]    Physical Exam  General:  Alert, cooperative, no distress    Neurologic:  oriented, facial symmetry preserved, fluent speech.   Psych: calm  HEENT:  Anicteric, MMM  CV: RRR no MRG, normal S1 and S2, no edema  Lungs: CTAB.  Easyrespirations  Skin: no rashes or jaundice noted on " exposed skin.    Dash Catheter: Not present  Lines: None          Medical Decision Making               Odessa Dawn MD, [facility]  Internal Medicine Hospitalist

## 2023-06-22 NOTE — PROGRESS NOTES
Care Management Follow Up    Length of Stay (days): 2    Expected Discharge Date: 06/22/2023     Concerns to be Addressed:  Medical progression  Patient plan of care discussed at interdisciplinary rounds: Yes    Anticipated Discharge Disposition:  Home with home care     Anticipated Discharge Services:   Home care PT  Anticipated Discharge DME:  TBD    Patient/family educated on Medicare website which has current facility and service quality ratings:  NA  Education Provided on the Discharge Plan:  Per team  Patient/Family in Agreement with the Plan:  yes    Referrals Placed by CM/SW:  Home care  Private pay costs discussed: NA    Additional Information:  Pt is an 83 year old female who was admitted for lumbar spinal surgery.      Pt plans to goo home with home care on discharge and with the help of her spouse.  Referral made to the [facility].      CM will continue to follow      Vannessa uSh RN

## 2023-06-23 ENCOUNTER — APPOINTMENT (OUTPATIENT)
Dept: PHYSICAL THERAPY | Facility: HOSPITAL | Age: 83
DRG: 454 | End: 2023-06-23
Attending: ORTHOPAEDIC SURGERY
Payer: MEDICARE

## 2023-06-23 VITALS
RESPIRATION RATE: 18 BRPM | BODY MASS INDEX: 28.79 KG/M2 | TEMPERATURE: 98 F | HEART RATE: 86 BPM | SYSTOLIC BLOOD PRESSURE: 126 MMHG | OXYGEN SATURATION: 92 % | DIASTOLIC BLOOD PRESSURE: 60 MMHG | WEIGHT: 181.1 LBS

## 2023-06-23 PROCEDURE — 250N000013 HC RX MED GY IP 250 OP 250 PS 637: Performed by: PHYSICIAN ASSISTANT

## 2023-06-23 PROCEDURE — 97116 GAIT TRAINING THERAPY: CPT | Mod: GP

## 2023-06-23 PROCEDURE — 99232 SBSQ HOSP IP/OBS MODERATE 35: CPT | Performed by: HOSPITALIST

## 2023-06-23 PROCEDURE — 250N000013 HC RX MED GY IP 250 OP 250 PS 637: Performed by: INTERNAL MEDICINE

## 2023-06-23 PROCEDURE — 250N000013 HC RX MED GY IP 250 OP 250 PS 637: Performed by: HOSPITALIST

## 2023-06-23 PROCEDURE — 97110 THERAPEUTIC EXERCISES: CPT | Mod: GP

## 2023-06-23 RX ADMIN — LISINOPRIL 20 MG: 20 TABLET ORAL at 08:44

## 2023-06-23 RX ADMIN — OXYCODONE HYDROCHLORIDE 5 MG: 5 TABLET ORAL at 04:38

## 2023-06-23 RX ADMIN — ACETAMINOPHEN 650 MG: 325 TABLET ORAL at 04:38

## 2023-06-23 RX ADMIN — FAMOTIDINE 20 MG: 20 TABLET ORAL at 08:44

## 2023-06-23 RX ADMIN — SENNOSIDES AND DOCUSATE SODIUM 1 TABLET: 50; 8.6 TABLET ORAL at 08:44

## 2023-06-23 RX ADMIN — PREGABALIN 150 MG: 75 CAPSULE ORAL at 08:44

## 2023-06-23 RX ADMIN — THERA TABS 1 TABLET: TAB at 08:44

## 2023-06-23 RX ADMIN — OXYCODONE HYDROCHLORIDE 10 MG: 5 TABLET ORAL at 12:00

## 2023-06-23 RX ADMIN — POLYETHYLENE GLYCOL 3350 17 G: 17 POWDER, FOR SOLUTION ORAL at 08:44

## 2023-06-23 ASSESSMENT — ACTIVITIES OF DAILY LIVING (ADL)
ADLS_ACUITY_SCORE: 22

## 2023-06-23 NOTE — PROGRESS NOTES
"[facility] Hospitalist Progress Note  [facility]  Admission date: 6/20/2023      Assessment/Plan    #s/p lumbar decompressive sx - per ortho  #HTN - BP controlled on lisinopril alone.  In light of mild hypoNa would just hold off on restarting hydrochlorothiazide at this time.  Can re-address with PCP as needed.  D/w patient.    #mild hyponatremia - stable.  Stopped hydrochlorothiazide as above    Ok for discharge from my perspective when post-op cares complete.    Checklist:  Code Status: Full Code    Diet: Regular Diet Adult  Discharge Instruction - Regular Diet Adult      Disposition and Discharge Planning  Auto-populated from discharge tab:     Expected Discharge Date: 06/23/2023,  9:00 AM    Destination: home with family           System Identified Risk Variables  Auto-populated based on system request - if needs to be addressed in treatment plan they will be addressed above:  \"  Clinically Significant Risk Factors                  # Hypertension: Noted on problem list        # Overweight: Estimated body mass index is 28.79 kg/m  as calculated from the following:    Height as of 5/30/23: 1.689 m (5' 6.5\").    Weight as of this encounter: 82.1 kg (181 lb 1.6 oz)., PRESENT ON ADMISSION          \"    Interval Events/Subjective/Notable results:    Pain controlled this AM.   No new complaints    Objective    Vital signs in last 24 hours  Temp:  [97.7  F (36.5  C)-98.6  F (37  C)] 98  F (36.7  C)  Pulse:  [80-97] 86  Resp:  [17-18] 18  BP: ()/(46-60) 126/60  SpO2:  [92 %-96 %] 92 % O2 Device: None (Room air)    Weight:   181 lbs 1.6 oz  Body mass index is 28.79 kg/m .    Intake/Output last 3 shifts  I/O last 3 completed shifts:  In: 436 [P.O.:436]  Out: 30 [Drains:30]    Physical Exam  General:  Alert, cooperative, no distress    CV: RRR no MRG, normal S1 and S2, no edema  Lungs:   Easyrespirations  Dash Catheter: Not present  Lines: None          Medical Decision Making               Odessa Dawn, " MD, [facility]  Internal Medicine Hospitalist

## 2023-06-23 NOTE — DISCHARGE INSTRUCTIONS
Home Care Services have been arranged for you.  Home Care Agency:  [facility]  Home Care Agency Telephone:   [phone]  Services:  Physical Therapy and Occupational Therapy  Instructions:  Home Care agency will call you within 48 hours.  If you have not heard from them, please give them a call.

## 2023-06-23 NOTE — PLAN OF CARE
Problem: Pain Acute  Goal: Optimal Pain Control and Function  6/22/2023 0753 by Sheila Caputo RN  Outcome: Progressing  6/22/2023 0753 by Sheila Caputo RN  Outcome: Progressing  Intervention: Prevent or Manage Pain  Recent Flowsheet Documentation  Taken 6/22/2023 0150 by Sheila Caputo RN  Medication Review/Management: medications reviewed     Problem: Risk for Delirium  Goal: Improved Sleep  6/22/2023 0753 by Sheila Caputo RN  Outcome: Progressing  6/22/2023 0753 by Sheila Caputo RN  Outcome: Progressing   Goal Outcome Evaluation:       Patient had a good night, slept well. Alert and oriented and makes her needs known. Medicated with PRN Oxycodone x1, declined scheduled Tylenol. Up to the bathroom and voided 450 ml, PVR 78, hemovac 40ml.                 
  Problem: Pain Acute  Goal: Optimal Pain Control and Function  Outcome: Progressing  Intervention: Prevent or Manage Pain  Recent Flowsheet Documentation  Taken 6/23/2023 0125 by Sheila Caputo RN  Medication Review/Management: medications reviewed     Problem: Risk for Delirium  Goal: Improved Sleep  Outcome: Progressing   Goal Outcome Evaluation:       Patient is alert and oriented x4. Reported improvement with pain. Medicated with PRN oxycodone and Tylenol with good effect. Up to the bathroom with assist, walker and gait belt. Voiding without problems.Makes her needs known.                 
  Problem: Plan of Care - These are the overarching goals to be used throughout the patient stay.    Goal: Optimal Comfort and Wellbeing  Outcome: Progressing     Problem: Pain Acute  Goal: Optimal Pain Control and Function  Outcome: Progressing  Intervention: Prevent or Manage Pain  Recent Flowsheet Documentation  Taken 6/21/2023 0001 by Sheila Caputo RN  Medication Review/Management: medications reviewed   Goal Outcome Evaluation:       Patient is alert and oriented x 4, forgetful. Able to make her needs known. Repositioned as needed. Medicated with scheduled Tylenol and PRN Oxycodone x1 with good relief. Denied spasms. Dash and hemovac patent. Dash 825ml hemovac drain 65ml.                 
  Problem: Plan of Care - These are the overarching goals to be used throughout the patient stay.    Goal: Plan of Care Review  Description: The Plan of Care Review/Shift note should be completed every shift.  The Outcome Evaluation is a brief statement about your assessment that the patient is improving, declining, or no change.  This information will be displayed automatically on your shift note.  6/21/2023 1136 by Rita Proctor RN  Outcome: Progressing   Goal Outcome Evaluation:       Pt ambulated in garcia. Hemovac had 140 ml's drainage prior shift. Dressing stained but dry. Pt voided 20 ml's and bladder scanned for 123 ml's. Peppermint applied.                 
  Problem: Plan of Care - These are the overarching goals to be used throughout the patient stay.    Goal: Plan of Care Review  Description: The Plan of Care Review/Shift note should be completed every shift.  The Outcome Evaluation is a brief statement about your assessment that the patient is improving, declining, or no change.  This information will be displayed automatically on your shift note.  Outcome: Met  Flowsheets (Taken 6/23/2023 1023)  Plan of Care Reviewed With: patient   Goal Outcome Evaluation:      Plan of Care Reviewed With: patient  Pt discharging home with home with [facility], transported via family car. Discharge education and instruction has been provided. Dressing change completed. VSS on room air. Denies pain. No bowel movement since surgery, which pt has been advice to take stool softer and to ambulate as tolerated.                  
  Problem: Plan of Care - These are the overarching goals to be used throughout the patient stay.    Goal: Plan of Care Review  Description: The Plan of Care Review/Shift note should be completed every shift.  The Outcome Evaluation is a brief statement about your assessment that the patient is improving, declining, or no change.  This information will be displayed automatically on your shift note.  Outcome: Progressing   Goal Outcome Evaluation:       Pt up ambulated with brace. Hemovac still in place. Dressing stained. Medicated with scheduled tylenol and prn oxycodone. IV saline locked. R/A.                 
  Problem: Plan of Care - These are the overarching goals to be used throughout the patient stay.    Goal: Plan of Care Review  Description: The Plan of Care Review/Shift note should be completed every shift.  The Outcome Evaluation is a brief statement about your assessment that the patient is improving, declining, or no change.  This information will be displayed automatically on your shift note.  Outcome: Progressing   Goal Outcome Evaluation:      Pt up ambulates with walker and 1 assist. Drain output 40 ml's night shift. Dressing stained but dry. Pain controlled with oxycodone and tylenol. Pt voiding.                  
  Problem: Plan of Care - These are the overarching goals to be used throughout the patient stay.    Goal: Plan of Care Review  Description: The Plan of Care Review/Shift note should be completed every shift.  The Outcome Evaluation is a brief statement about your assessment that the patient is improving, declining, or no change.  This information will be displayed automatically on your shift note.  Outcome: Progressing   Goal Outcome Evaluation:  Pt ambulated with brace. Medicated with oxycodone and zanaflex and ice,. Repositioned. Dash and drain in place Dressing dry.                      
Occupational Therapy Discharge Summary    Reason for therapy discharge:    Discharged to remains in hospital    Progress towards therapy goal(s). See goals on Care Plan in [facility] electronic health record for goal details.  Goals met    Therapy recommendation(s):    No further therapy is recommended.        
Physical Therapy Discharge Summary    Reason for therapy discharge:    Discharged to home with home therapy.    Progress towards therapy goal(s). See goals on Care Plan in [facility] electronic health record for goal details.  Goals partially met.  Barriers to achieving goals:   Pt is still working on the goals yet .    Therapy recommendation(s):    Continued therapy is recommended.  Rationale/Recommendations:  to improve mobility and strength. .                          
no

## 2023-06-23 NOTE — PROGRESS NOTES
Spine Surgery Progress Note      POD #: 3 Day Post-Op  S/P: Procedure(s):  RIGHT LUMBAR 3-LUMBAR 4 AND RIGHT LUMBAR 4-LUMBAR 5 TRANSFORAMINAL LUMBAR INTERBODY FUSION WITH LAMINECTOMY USING STEALTH NAVIGATION    Assessment:  S/P Procedure(s):  RIGHT LUMBAR 3-LUMBAR 4 AND RIGHT LUMBAR 4-LUMBAR 5 TRANSFORAMINAL LUMBAR INTERBODY FUSION WITH LAMINECTOMY USING STEALTH NAVIGATION on 6/20/2023        Plan:  - Continue PT/OT, appreciate recs  -Ambulate as tolerated  -Remove Dash per protocol.  - Pain Management continue current regimen  - Anticoagulation:  SCDs, stephon stockings and early ambulation.  - Diet: progress diet as tolerated  - HV removed this morning  - Follow-up: Outpatient follow up in 2 weeks  - Disposition: Discharge home with home cares this morning    Subjective:    Patient is seen sitting in chair  Appears comfortable. Incision pain that is well controlled.  Lower extremity pain noticeably improved compared with prior to surgery  No new extremity pain, numbness tingling or weakness to report  Nausea, Vomiting:  No  Lightheadedness, Dizziness:  No  Flatus: Yes  BM: No    HV removed    All questions/concerns answered.      Objective:  /60 (BP Location: Right arm)   Pulse 86   Temp 98  F (36.7  C) (Oral)   Resp 18   Wt 82.1 kg (181 lb 1.6 oz)   SpO2 92%   BMI 28.79 kg/m        Incision covered with dressing. Gauze dressing in place at low back. Appears clean, dry, intact.  Awake, alert and oriented. Patient is sitting bedside. Conversational  Non-labored breathing    RLE   5/5 hip flexors  5/5 quadriceps  5/5 tibialis anterior  5/5 EHL  5/5 Gastroc Soleus  RLE L2-S1 intact to light touch    LLE   5/5 hip flexors  5/5 quadriceps  5/5 tibialis anterior  5/5 EHL  5/5 Gastroc Soleus  LLE L2-S1 intact to light touch    bilateral calves non-tender, non-edematous, non-erythematous, no warmth        Labs:  Recent Labs   Lab Test 06/22/23  0524 06/21/23  0550 05/30/23  1528   WBC  --  8.3 11.1*   RBC  --   3.99 4.94   HGB 10.7* 11.4* 14.3   HCT  --  34.5* 42.4   MCV  --  87 86   MCH  --  28.6 28.9   MCHC  --  33.0 33.7   PLT  --  181 240     Recent Labs   Lab Test 06/21/23  0550 06/20/23  0637 05/30/23  1528   POTASSIUM 4.1 4.1 4.3   CHLORIDE 100 99 92*   BUN 13.8 20.7 19.4             ARIES CARLISLE PA-C  Date: 6/23/2023  Time: 9:00 AM  [facility]

## 2023-06-23 NOTE — DISCHARGE SUMMARY
ORTHOPEDIC DISCHARGE SUMMARY       Sheryl Trotter,  1940, MRN [id number]    Admission Date: 2023      Admission Diagnoses: Spinal stenosis [M48.00]  Spondylolisthesis [M43.10]  Status post lumbar surgery [Z98.890]     Discharge Date: 2023    Post-operative Day:  3 Days Post-Op    Reason for Admission: The patient was admitted for the following: Procedure(s):  RIGHT LUMBAR 3-LUMBAR 4 AND RIGHT LUMBAR 4-LUMBAR 5 TRANSFORAMINAL LUMBAR INTERBODY FUSION WITH LAMINECTOMY USING STEALTH NAVIGATION    BRIEF HOSPITAL COURSE   Sheryl Trotter is a pleasant 83 year old female who underwent the aforementioned procedure with Dr. Roman on 23. There were no intraoperative complications and the patient was transferred to the recovery room and later the orthopedic unit in stable condition. Once the patient reached the orthopedic floor our orthopedic pain protocol was implemented along with the following:    Anticoagulation Medications: None  Therapy: PT and OT  Activity: WBAT  Bracing: Lumbar corset    Consultations during Admission: Hospitalist service for medical management     COMPLICATIONS/SIGNIFICANT FINDINGS    None    DISCHARGE INFORMATION   Condition at discharge: Good  Discharge destination: Home with home cares  Patient was seen by myself on the date of discharge.    FOLLOW UP CARE   Follow up with orthopedics in 2 weeks or sooner should the need arise. Ortho will continue to manage pain control, post op anticoagulation and incision care.     Follow up with your PCP for management of chronic medical problems and to evaluate for post op medical complications including constipation, nausea/vomiting, DVT/PE, anemia, changes in blood pressure, fevers/chills, urinary retention and atelectasis/pneumonia.     Subjective   Patient is doing well on POD #1. Pain is well controlled with oral medications. Ambulating. Tolerating oral intake. Improvement in radicular pain    Physical Exam   /60 (BP  Location: Right arm)   Pulse 86   Temp 98  F (36.7  C) (Oral)   Resp 18   Wt 82.1 kg (181 lb 1.6 oz)   SpO2 92%   BMI 28.79 kg/m    Incision covered with dressing. Gauze dressing in place at low back. Appears clean, dry, intact.  Awake, alert and oriented. Patient is sitting bedside. Conversational  Non-labored breathing     RLE   5/5 hip flexors  5/5 quadriceps  5/5 tibialis anterior  5/5 EHL  5/5 Gastroc Soleus  RLE L2-S1 intact to light touch     LLE   5/5 hip flexors  5/5 quadriceps  5/5 tibialis anterior  5/5 EHL  5/5 Gastroc Soleus  LLE L2-S1 intact to light touch     bilateral calves non-tender, non-edematous, non-erythematous, no warmth    Pertinent Results at Discharge     Hemoglobin   Date/Time Value Ref Range Status   06/22/2023 05:24 AM 10.7 (L) 11.7 - 15.7 g/dL Final   06/21/2023 05:50 AM 11.4 (L) 11.7 - 15.7 g/dL Final   05/30/2023 03:28 PM 14.3 11.7 - 15.7 g/dL Final     INR   Date/Time Value Ref Range Status   07/24/2019 11:59 AM 0.96 0.90 - 1.10 Final   03/29/2018 06:38 AM 1.03 0.90 - 1.10 Final   03/28/2018 11:40 AM 0.99 0.90 - 1.10 Final     Platelet Count   Date/Time Value Ref Range Status   06/21/2023 05:50  150 - 450 10e3/uL Final   05/30/2023 03:28  150 - 450 10e3/uL Final   02/08/2023 04:21  150 - 450 10e3/uL Final       Problem List   Principal Problem:    Status post lumbar surgery      ARIES CARLISLE PA-C/Dr. Roman  [facility]  [phone]  Date: 6/23/2023  Time: 9:00 AM

## 2023-06-23 NOTE — PROGRESS NOTES
Care Management Discharge Note    Discharge Date: 06/23/2023     Discharge Disposition: Home    Discharge Services:  Home Care services    Discharge DME:  Per Therapy    Discharge Transportation:  family    Private pay costs discussed: Not applicable    Does the patient's insurance plan have a 3 day qualifying hospital stay waiver?  No    PAS Confirmation Code:  Not applicable  Patient/family educated on Medicare website which has current facility and service quality ratings:      Education Provided on the Discharge Plan:    Persons Notified of Discharge Plans: pt  Patient/Family in Agreement with the Plan: yes    Handoff Referral Completed: Yes    Additional Information:  Chart reviewed.  Discharge orders have been written.  Pt will discharge home with spouse, with [facility] services for PT and OT, and OP f/u with surgeon and PCP.    Call placed to [facility] and provided update.     Viki Vicente RN

## 2023-06-25 ENCOUNTER — OFFICE VISIT (OUTPATIENT)
Dept: FAMILY MEDICINE | Facility: CLINIC | Age: 83
End: 2023-06-25
Payer: MEDICARE

## 2023-06-25 ENCOUNTER — HOSPITAL ENCOUNTER (EMERGENCY)
Facility: CLINIC | Age: 83
Discharge: HOME | End: 2023-06-25
Attending: EMERGENCY MEDICINE | Admitting: EMERGENCY MEDICINE
Payer: MEDICARE

## 2023-06-25 VITALS
WEIGHT: 180 LBS | SYSTOLIC BLOOD PRESSURE: 140 MMHG | BODY MASS INDEX: 28.93 KG/M2 | HEART RATE: 104 BPM | DIASTOLIC BLOOD PRESSURE: 63 MMHG | RESPIRATION RATE: 18 BRPM | OXYGEN SATURATION: 99 % | TEMPERATURE: 97.5 F | HEIGHT: 66 IN

## 2023-06-25 VITALS
BODY MASS INDEX: 28.62 KG/M2 | WEIGHT: 180 LBS | TEMPERATURE: 98 F | OXYGEN SATURATION: 96 % | RESPIRATION RATE: 16 BRPM | SYSTOLIC BLOOD PRESSURE: 122 MMHG | DIASTOLIC BLOOD PRESSURE: 78 MMHG | HEART RATE: 109 BPM

## 2023-06-25 DIAGNOSIS — R33.9 URINARY RETENTION: Primary | ICD-10-CM

## 2023-06-25 DIAGNOSIS — Z98.1 S/P LAMINECTOMY WITH SPINAL FUSION: ICD-10-CM

## 2023-06-25 DIAGNOSIS — R33.9 URINARY RETENTION: ICD-10-CM

## 2023-06-25 LAB
ALBUMIN UR-MCNC: NEGATIVE MG/DL
APPEARANCE UR: CLEAR
BILIRUB UR QL STRIP: NEGATIVE
COLOR UR AUTO: ABNORMAL
GLUCOSE UR STRIP-MCNC: NEGATIVE MG/DL
HGB UR QL STRIP: NEGATIVE
HYALINE CASTS: 3 /LPF
KETONES UR STRIP-MCNC: NEGATIVE MG/DL
LEUKOCYTE ESTERASE UR QL STRIP: NEGATIVE
MUCOUS THREADS #/AREA URNS LPF: PRESENT /LPF
NITRATE UR QL: NEGATIVE
PH UR STRIP: 5.5 [PH] (ref 5–7)
RBC URINE: 0 /HPF
SP GR UR STRIP: 1.01 (ref 1–1.03)
SQUAMOUS EPITHELIAL: 1 /HPF
UROBILINOGEN UR STRIP-MCNC: <2 MG/DL
WBC URINE: 2 /HPF

## 2023-06-25 PROCEDURE — 81001 URINALYSIS AUTO W/SCOPE: CPT | Performed by: EMERGENCY MEDICINE

## 2023-06-25 PROCEDURE — 99214 OFFICE O/P EST MOD 30 MIN: CPT | Performed by: FAMILY MEDICINE

## 2023-06-25 PROCEDURE — 51702 INSERT TEMP BLADDER CATH: CPT

## 2023-06-25 PROCEDURE — 51798 US URINE CAPACITY MEASURE: CPT

## 2023-06-25 PROCEDURE — 99284 EMERGENCY DEPT VISIT MOD MDM: CPT | Mod: 25

## 2023-06-25 ASSESSMENT — ACTIVITIES OF DAILY LIVING (ADL): ADLS_ACUITY_SCORE: 35

## 2023-06-25 NOTE — ED PROVIDER NOTES
EMERGENCY DEPARTMENT ENCOUnter      NAME: Sheryl Trotter  AGE: 83 year old female  YOB: 1940  MRN: [id number]  EVALUATION DATE & TIME: 2023  1:19 PM    PCP: Chema Thompson    ED PROVIDER: Delta Loving DO      Chief Complaint   Patient presents with     Urinary Retention         FINAL IMPRESSION:  1. Urinary retention          ED COURSE & MEDICAL DECISION MAKIN:21 PM I introduced myself to the patient, obtained patient history, performed a physical exam, and discussed plan for ED workup including potential diagnostic laboratory/imaging studies and interventions.    The patient presented to the emergency department today with urinary retention.  She had a recent back surgery and had a Dash catheter at that time.  Since going home from the hospital she has had difficulty emptying her bladder.  She notes some discomfort over her bladder.  She denies any other problems.  On exam, she has mild suprapubic tenderness.  Urinalysis shows no signs of infection.  We discussed disposition options and she would prefer to go home with a catheter in place.  I will refer her to urology and have recommended that she return for any worsening symptoms or other concerns.          Medical Decision Making    History:    Supplemental history from: Documented in chart, if applicable and Family Member/Significant Other    External Record(s) reviewed: Documented in chart, if applicable.    Work Up:    Chart documentation includes differential considered and any EKGs or imaging independently interpreted by provider, where specified.    In additional to work up documented, I considered the following work up: Documented in chart, if applicable.    External consultation:    Discussion of management with another provider: Documented in chart, if applicable    Complicating factors:    Care impacted by chronic illness: N/A    Care affected by social determinants of health: N/A    Disposition considerations:  Discharge. No recommendations on prescription strength medication(s). I considered admission, but discharged the patient after share decision making conversation.        At the conclusion of the encounter I discussed the results of all of the tests and the disposition. The questions were answered. The patient or family acknowledged understanding and was agreeable with the care plan.     =================================================================    HPI        Sheryl Trotter is a 83 year old female with a pertinent history of hypertension, hyperlipidemia, lumbar disc degeneration, who presents to this ED via walk in for evaluation of urinary retention.     Per chart review, the patient was admitted to [facility] from 6/20/2023-6/23/2023 (3 days) after a s/p surgery. The patient had a right lumbar 3-4 and right lumbar 4-5 trans foraminal lumbar interbody fusion with laminectomy with Dr. Roman on 6/20/2023.     Patient had back surgery on 6/20/2023 and had a catheter placed in afterwards. She reports urinary retention after having her catheter taken out last Wednesday (6/21/2023). States that she was able to urinate a little bit and was discharged last Friday (6/23/2023). The urinary retention got worse over the weekend along with lower abdominal discomfort which prompted her to present to the ED for further evaluation.       PAST MEDICAL HISTORY:  Past Medical History:   Diagnosis Date     Arthritis      AVN (avascular necrosis of bone) (H)     let hip     Chondrocalcinosis 2019     DDD (degenerative disc disease), lumbar      Degeneration of lumbar intervertebral disc      Dislocation of hip (H)     left     GERD (gastroesophageal reflux disease)      HLD (hyperlipidemia)      Hypertension      Idiopathic progressive polyneuropathy 2021     Infection due to 2019 novel coronavirus 11/09/2022     Insomnia      Lumbar spondylosis      Osteoarthritis of right knee, unspecified osteoarthritis type       Osteoporosis      Polyarthralgia      Polymyalgia rheumatica (H) 2019     PONV (postoperative nausea and vomiting)      Primary osteoarthritis involving multiple joints      Shoulder pain, bilateral      Sleep apnea     cpap     Spinal stenosis      Trochanteric bursitis of both hips        PAST SURGICAL HISTORY:  Past Surgical History:   Procedure Laterality Date     APPENDECTOMY       CHOLECYSTECTOMY       FOOT FUSION Right 2017     FUSION TRANSFORAMINAL LUMBAR INTERBODY, 2 LEVELS, POSTERIOR APPROACH, USING OTS IMAGING GUIDANCE Right 6/20/2023    Procedure: RIGHT LUMBAR 3-LUMBAR 4 AND RIGHT LUMBAR 4-LUMBAR 5 TRANSFORAMINAL LUMBAR INTERBODY FUSION WITH LAMINECTOMY USING STEALTH NAVIGATION;  Surgeon: Dereck Roman MD;  [place] [facility] [place]IP PINNING Left 3/28/2018    Procedure: OPEN REDUCTION OF DISLOCATED LEFT TOTAL HIP;  Surgeon: Daron Rincon MD;  Location: [facility];  Service:      HYSTERECTOMY  Mid 1990's     IR LUMBAR EPIDURAL STEROID INJECTION  2/18/2005     IR LUMBAR EPIDURAL STEROID INJECTION  12/19/2005     OOPHORECTOMY  Mid 1990's     ZZC TOTAL HIP ARTHROPLASTY Left 2/7/2018    Procedure: LEFT TOTAL HIP ARTHROPLASTY;  Surgeon: Daron Rincon MD;  Location: [facility];  Service: Orthopedics           CURRENT MEDICATIONS:    acetaminophen (TYLENOL) 325 MG tablet  calcium carbonate-vitamin D3 600 mg (1,500 mg)-800 unit Chew  denosumab 60 mg/mL Syrg  famotidine (PEPCID) 20 MG tablet  hydrOXYzine (ATARAX) 10 MG tablet  lisinopril (ZESTRIL) 20 MG tablet  METHYLCELLULOSE (CITRUCEL ORAL)  oxyCODONE (ROXICODONE) 5 MG tablet  pregabalin (LYRICA) 150 MG capsule  senna-docusate (SENOKOT-S/PERICOLACE) 8.6-50 MG tablet  tiZANidine (ZANAFLEX) 4 MG tablet  Vitamin D3 (CHOLECALCIFEROL) 25 mcg (1000 units) tablet        ALLERGIES:  Allergies   Allergen Reactions     Duloxetine Headache     Nausea, difficult sleeping ankles cramps, loose bowel      Gabapentin Anxiety       FAMILY  "HISTORY:  Family History   Problem Relation Age of Onset     Lymphoma Father 28.00        Hodgkins     Colon Cancer Maternal Grandmother      Vaginal Cancer Maternal Aunt      Sleep Apnea Son        SOCIAL HISTORY:   Social History     Socioeconomic History     Marital status:    Tobacco Use     Smoking status: Former     Years: 4.00     Types: Cigarettes     Smokeless tobacco: Never     Tobacco comments:     smoking in college-social   Vaping Use     Vaping Use: Never used   Substance and Sexual Activity     Alcohol use: Yes     Alcohol/week: 3.3 standard drinks of alcohol     Types: 3 Standard drinks or equivalent per week     Comment: 2 glases /week     Drug use: No     Sexual activity: Yes       VITALS:  Patient Vitals for the past 24 hrs:   BP Temp Temp src Pulse Resp SpO2 Height Weight   06/25/23 1317 (!) 140/63 97.5  F (36.4  C) Temporal 104 18 99 % 1.676 m (5' 6\") 81.6 kg (180 lb)       PHYSICAL EXAM    Constitutional:  Well developed, Well nourished,  HENT:  Normocephalic, Atraumatic, Oropharynx moist, Nose normal.   Eyes:  EOMI, Conjunctiva normal, No discharge.   Respiratory:  Normal breath sounds, No respiratory distress, No wheezing, No chest tenderness.   Cardiovascular:  Normal heart rate, Normal rhythm, No murmurs  GI:  Soft, No tenderness, No guarding, No CVA tenderness.   : Mild suprapubic tenderness.   Musculoskeletal:  No tenderness to palpation or major deformities noted.   Extremities: No lower extremity edema.  Neurologic:  Alert & oriented x 3, No focal deficits noted.   Psychiatric:  Affect normal, Judgment normal, Mood normal.        LAB:  All pertinent labs reviewed and interpreted.  Results for orders placed or performed during the hospital encounter of 06/25/23   UA with Microscopic reflex to Culture    Specimen: Urine, Clean Catch   Result Value Ref Range    Color Urine Light Yellow Colorless, Straw, Light Yellow, Yellow    Appearance Urine Clear Clear    Glucose Urine Negative " Negative mg/dL    Bilirubin Urine Negative Negative    Ketones Urine Negative Negative mg/dL    Specific Gravity Urine 1.013 1.001 - 1.030    Blood Urine Negative Negative    pH Urine 5.5 5.0 - 7.0    Protein Albumin Urine Negative Negative mg/dL    Urobilinogen Urine <2.0 <2.0 mg/dL    Nitrite Urine Negative Negative    Leukocyte Esterase Urine Negative Negative    Mucus Urine Present (A) None Seen /LPF    RBC Urine 0 <=2 /HPF    WBC Urine 2 <=5 /HPF    Squamous Epithelials Urine 1 <=1 /HPF    Hyaline Casts Urine 3 (H) <=2 /LPF         I, Isac Thompson, am serving as a scribe to document services personally performed by Dr. Loving based on my observation and the provider's statements to me. I, Delta Loving, DO attest that Isac Thompson is acting in a scribe capacity, has observed my performance of the services and has documented them in accordance with my direction.    Delta Loving DO  Emergency Medicine  [facility] EMERGENCY ROOM  [address] [facility] [id number] [phone]  Dept: [phone]     Delta Loving MD  06/25/23 8241

## 2023-06-25 NOTE — PATIENT INSTRUCTIONS
To [facility] for further evaluation of your bladder-unable to pass your urine now for the last 2 days since your discharge from the hospital on Friday, 6/23/2023 after your lumbar spinal laminectomy and fusion surgery on 6/20/2023

## 2023-06-25 NOTE — DISCHARGE INSTRUCTIONS
You were found to have urinary retention today.  Continue to use the Dash catheter and follow-up closely with urology as an outpatient.  Return to the ER for any worsening symptoms or other concerns.

## 2023-06-25 NOTE — ED TRIAGE NOTES
Pt presents to the ED with c/o urine retention. Pt had lumbar surgery 5 days ago. Pt has been home for 3 days and has had difficulty urinating. Pt did have BM prior to arrival.

## 2023-06-25 NOTE — ED NOTES
Expected Patient Referral to ED  12:38 PM    Referring Clinic/Provider:  Urgent care physician, Dr. Winston    Reason for referral/Clinical facts:  89 yof had decompressive lumbar surgery 5d ptp.  She had a moreland catheter that was removed before discharge, and did have even that day because she was having some difficulties urinating on her own.  Day of discharge (2d ptp) Has not been able to urinate since discharge.  No oxycodone since discharge, but has been taking her muscle relaxers and c/o constipation as well.   driving her.    Recommendations provided:  Send to ED for further evaluation    Caller was informed that this institution does possess the capabilities and/or resources to provide for patient and should be transferred to our facility.    Discussed that if direct admit is sought and any hurdles are encountered, this ED would be happy to see the patient and evaluate.    Informed caller that recommendations provided are recommendations based only on the facts provided and that they responsible to accept or reject the advice, or to seek a formal in person consultation as needed and that this ED will see/treat patient should they arrive.      Paris Shetty MD  [facility] EMERGENCY ROOM  [address] [id number] [phone]       Paris Shetty MD  06/25/23 1774

## 2023-06-25 NOTE — PROGRESS NOTES
ASSESSMENT/PLAN:      ICD-10-CM    1. Urinary retention  R33.9     spina surgery-6/20/23-catheter placed post op,  urinated on day of discharge- 6/23/23, since arrival home has not urinated, feels pressure/unable to pass urine, patient is currently off her narcotic pain medications uncertain etiology for urinary retention.      2. S/P laminectomy with spinal fusion  Z98.1     lumbar spine-6/20/23-discharged 6/23/2023, no oxycodone since friday, on tizanidine/tylenol, hydroxyzine for pain management and sleep          Patient Instructions   To [facility] ER for further evaluation of your bladder-unable to pass your urine now for the last 2 days since your discharge from the hospital on Friday, 6/23/2023 after your lumbar spinal laminectomy and fusion surgery on 6/20/2023       Called and discussed with Dr. Shetty at the [facility] ER.  She accepted the patient in transfer.  Patient will go via private vehicle to the ER across the parking lot driven by her spouse.    Reviewed medication instructions and side effects. Follow up if experiences side effects.     I reviewed supportive care, otc meds to use if needed, expected course, and signs of concern.  Follow up as needed or if she does not improve within  1-2 days or if worsens in any way.  Reviewed red flag symptoms and is to go to the ER if experiences any of these.     The use of Dragon/Pfenex dictation services may have been used to construct the content in this note; any grammatical or spelling errors are non-intentional. Please contact the author of this note directly if you are in need of any clarification.      On the day of the encounter, time spend on chart review, patient visit, review of testing, documentation was 30  minutes              Patient presents with:  Urinary Problem: Had surgery this Tuesday now cannot produce urine since Wednesday when she had her catheter taken out and now has chills.       Subjective     Sheryl Trotter is a 83 year  old female who presents to clinic today for the following health issues:    HPI   Patient with history of a lumbar laminectomy and fusion on 6/20/2023.  She was admitted to [facility] and required a catheter postoperatively.  The catheter was removed patient believes on 6/21/2023 cannot determine from notes available to me in the chart when this was completed.  Per patient she was unable to urinate and was told she could not be discharged when she urinated and was able to urinate just a small amount when she was discharged on Friday.  Her last oxycodone was on Friday.  Since discharge from hospital patient has been unable to urinate.  She has had the urge to urinate and the pressure however has not able to pass urine.  She is having some significant lower abdominal pressure and some pain.  No fever, no nausea vomiting or diarrhea.    At present she is taking her tizanidine and Tylenol 3 times daily for pain management.  She is taking her stool softener 2 times a day.  Is having some constipation initially at the time she returned home has resolved.  She has no urinary retention in the past.      Past Medical History:   Diagnosis Date     Arthritis      AVN (avascular necrosis of bone) (H)     let hip     Chondrocalcinosis 2019     DDD (degenerative disc disease), lumbar      Degeneration of lumbar intervertebral disc      Dislocation of hip (H)     left     GERD (gastroesophageal reflux disease)      HLD (hyperlipidemia)      Hypertension      Idiopathic progressive polyneuropathy 2021     Infection due to 2019 novel coronavirus 11/09/2022     Insomnia      Lumbar spondylosis      Osteoarthritis of right knee, unspecified osteoarthritis type      Osteoporosis      Polyarthralgia      Polymyalgia rheumatica (H) 2019     PONV (postoperative nausea and vomiting)      Primary osteoarthritis involving multiple joints      Shoulder pain, bilateral      Sleep apnea     cpap     Spinal stenosis      Trochanteric  bursitis of both hips      Social History     Tobacco Use     Smoking status: Former     Years: 4.00     Types: Cigarettes     Smokeless tobacco: Never     Tobacco comments:     smoking in college-social   Substance Use Topics     Alcohol use: Yes     Alcohol/week: 3.3 standard drinks of alcohol     Types: 3 Standard drinks or equivalent per week     Comment: 2 glases /week       Current Outpatient Medications   Medication Sig Dispense Refill     acetaminophen (TYLENOL) 325 MG tablet Take 3 tablets (975 mg) by mouth every 8 hours 100 tablet 0     calcium carbonate-vitamin D3 600 mg (1,500 mg)-800 unit Chew [CALCIUM CARBONATE-VITAMIN D3 600 MG (1,500 MG)-800 UNIT CHEW] Chew 1 tablet daily.       denosumab 60 mg/mL Syrg Inject 60 mg Subcutaneous every 6 months       famotidine (PEPCID) 20 MG tablet Take 1 tablet (20 mg) by mouth 2 times daily 60 tablet 4     hydrOXYzine (ATARAX) 10 MG tablet Take 1 tablet (10 mg) by mouth every 6 hours as needed for other (adjuvant pain) 30 tablet 0     lisinopril (ZESTRIL) 20 MG tablet TAKE 1 TABLET EVERY DAY (Patient taking differently: Take 20 mg by mouth daily) 90 tablet 3     METHYLCELLULOSE (CITRUCEL ORAL) Take 1 tablet by mouth daily as needed       oxyCODONE (ROXICODONE) 5 MG tablet Take 1-2 tablets (5-10 mg) by mouth every 4 hours as needed for moderate pain 25 tablet 0     pregabalin (LYRICA) 150 MG capsule Take 1 capsule (150 mg) by mouth 2 times daily 120 capsule 5     senna-docusate (SENOKOT-S/PERICOLACE) 8.6-50 MG tablet Take 1 tablet by mouth 2 times daily 30 tablet 0     tiZANidine (ZANAFLEX) 4 MG tablet Take 1 tablet (4 mg) by mouth every 8 hours as needed (muscle spasms or post-op pain) 30 tablet 0     Vitamin D3 (CHOLECALCIFEROL) 25 mcg (1000 units) tablet Take 1 tablet (25 mcg) by mouth daily 90 tablet 3     Allergies   Allergen Reactions     Duloxetine Headache     Nausea, difficult sleeping ankles cramps, loose bowel      Gabapentin Anxiety             ROS are  negative, except as otherwise noted HPI      Objective    /78   Pulse 109   Temp 98  F (36.7  C) (Oral)   Resp 16   Wt 81.6 kg (180 lb)   SpO2 96%   BMI 28.62 kg/m    Body mass index is 28.62 kg/m .  Physical Exam   GENERAL:  alert and moderate distress  ABDOMEN:limited exam- lower abdomen  moderately distended   NEURO: baseline strength and tone, mentation intact and speech normal, gait slow and deliberate with walker       Diagnostic Test Results:  Labs reviewed in Epic  none

## 2023-06-26 ENCOUNTER — TRANSFERRED RECORDS (OUTPATIENT)
Dept: HEALTH INFORMATION MANAGEMENT | Facility: CLINIC | Age: 83
End: 2023-06-26
Payer: MEDICARE

## 2023-06-28 ENCOUNTER — TRANSFERRED RECORDS (OUTPATIENT)
Dept: HEALTH INFORMATION MANAGEMENT | Facility: CLINIC | Age: 83
End: 2023-06-28
Payer: MEDICARE

## 2023-06-29 ENCOUNTER — TELEPHONE (OUTPATIENT)
Dept: INTERNAL MEDICINE | Facility: CLINIC | Age: 83
End: 2023-06-29
Payer: MEDICARE

## 2023-06-29 NOTE — TELEPHONE ENCOUNTER
I approve    Physical Therapy  Request for delay in care, service is not able to be provided within same scheduled day. Rescheduled start of care to July 1st per patient preference.

## 2023-06-29 NOTE — TELEPHONE ENCOUNTER
Home Care is calling regarding an established patient with [facility].     Requesting orders from: Chema Thompson  Provider is following patient: Yes  Is this a 60-day recertification request?  No    Orders Requested    Skilled Nursing  Request for delay in care, service is not able to be provided within same scheduled day. Rescheduled Start of care to July 1st per patient preference      Physical Therapy  Request for delay in care, service is not able to be provided within same scheduled day. Rescheduled start of care to July 1st per patient preference.      Information was gathered and will be sent to provider for review.  RN will contact Home Care with information after provider review.  Confirmed ok to leave a detailed message with call back.  Contact information confirmed and updated as needed.    Tea Rios RN

## 2023-06-30 ENCOUNTER — TRANSFERRED RECORDS (OUTPATIENT)
Dept: HEALTH INFORMATION MANAGEMENT | Facility: CLINIC | Age: 83
End: 2023-06-30
Payer: MEDICARE

## 2023-07-03 ENCOUNTER — OFFICE VISIT (OUTPATIENT)
Dept: FAMILY MEDICINE | Facility: CLINIC | Age: 83
End: 2023-07-03
Payer: MEDICARE

## 2023-07-03 ENCOUNTER — TELEPHONE (OUTPATIENT)
Dept: INTERNAL MEDICINE | Facility: CLINIC | Age: 83
End: 2023-07-03

## 2023-07-03 VITALS
RESPIRATION RATE: 16 BRPM | OXYGEN SATURATION: 99 % | SYSTOLIC BLOOD PRESSURE: 147 MMHG | TEMPERATURE: 97.9 F | HEART RATE: 77 BPM | BODY MASS INDEX: 30.02 KG/M2 | DIASTOLIC BLOOD PRESSURE: 84 MMHG | WEIGHT: 186 LBS

## 2023-07-03 DIAGNOSIS — Z48.89 ENCOUNTER FOR EXAMINATION OF SURGICAL SITE: ICD-10-CM

## 2023-07-03 DIAGNOSIS — Z98.890 STATUS POST LUMBAR SURGERY: Primary | ICD-10-CM

## 2023-07-03 LAB
BASOPHILS # BLD AUTO: 0.1 10E3/UL (ref 0–0.2)
BASOPHILS NFR BLD AUTO: 1 %
EOSINOPHIL # BLD AUTO: 0 10E3/UL (ref 0–0.7)
EOSINOPHIL NFR BLD AUTO: 0 %
ERYTHROCYTE [DISTWIDTH] IN BLOOD BY AUTOMATED COUNT: 13.3 % (ref 10–15)
HCT VFR BLD AUTO: 32.9 % (ref 35–47)
HGB BLD-MCNC: 10.9 G/DL (ref 11.7–15.7)
IMM GRANULOCYTES # BLD: 0.1 10E3/UL
IMM GRANULOCYTES NFR BLD: 1 %
LYMPHOCYTES # BLD AUTO: 2.4 10E3/UL (ref 0.8–5.3)
LYMPHOCYTES NFR BLD AUTO: 22 %
MCH RBC QN AUTO: 28.6 PG (ref 26.5–33)
MCHC RBC AUTO-ENTMCNC: 33.1 G/DL (ref 31.5–36.5)
MCV RBC AUTO: 86 FL (ref 78–100)
MONOCYTES # BLD AUTO: 1 10E3/UL (ref 0–1.3)
MONOCYTES NFR BLD AUTO: 9 %
NEUTROPHILS # BLD AUTO: 7.3 10E3/UL (ref 1.6–8.3)
NEUTROPHILS NFR BLD AUTO: 68 %
PLATELET # BLD AUTO: 346 10E3/UL (ref 150–450)
RBC # BLD AUTO: 3.81 10E6/UL (ref 3.8–5.2)
WBC # BLD AUTO: 10.8 10E3/UL (ref 4–11)

## 2023-07-03 PROCEDURE — 85025 COMPLETE CBC W/AUTO DIFF WBC: CPT | Performed by: STUDENT IN AN ORGANIZED HEALTH CARE EDUCATION/TRAINING PROGRAM

## 2023-07-03 PROCEDURE — 36415 COLL VENOUS BLD VENIPUNCTURE: CPT | Performed by: STUDENT IN AN ORGANIZED HEALTH CARE EDUCATION/TRAINING PROGRAM

## 2023-07-03 PROCEDURE — 99213 OFFICE O/P EST LOW 20 MIN: CPT | Performed by: STUDENT IN AN ORGANIZED HEALTH CARE EDUCATION/TRAINING PROGRAM

## 2023-07-03 RX ORDER — CEPHALEXIN 500 MG/1
CAPSULE ORAL
COMMUNITY
Start: 2023-06-27 | End: 2023-11-29

## 2023-07-03 NOTE — PROGRESS NOTES
Assessment & Plan     Status post lumbar surgery  Vital signs are within normal limits, checked a CBC white count is within normal limits.  Patient does not have a fever and has no pain or symptoms that are suggestive of a systemic infection or superficial cellulitis at this time continue oral antibiotics with Keflex 3 times daily and daily wound dressing changes.  Surgical site appears to be healing appropriately without any signs of infection at this time, recommended follow-up with orthopedic surgery in regards to further wound checks and postsurgical cares.  Chart to be forwarded to their office.  - CBC with platelets and differential; Future  - CRP, inflammation; Future  - CBC with platelets and differential  - CRP, inflammation     Encounter for examination of surgical site  - CBC with platelets and differential; Future  - CRP, inflammation; Future  - CBC with platelets and differential  - CRP, inflammation    Return in about 4 weeks (around 7/31/2023) for Follow up spine orthopedics.    Virgil Lopez MD  [facility]    Zulay Lowe is a 83 year old, presenting for the following health issues:  Wound Check (Drainage - back )        5/30/2023     2:32 PM   Additional Questions   Roomed by YULIANA ESTRELLA   Accompanied by none     Patient Active Problem List   Diagnosis     Osteoporosis     Hyperlipidemia     Essential hypertension     Primary insomnia     Trochanteric Bursitis     Lumbar Spondylosis     Lumbar Disc Degeneration     Headache     Osteoarthritis of the Knee     Polyarthralgia     Status post left hip replacement     Hip dislocation, left (H)     Bilateral shoulder pain     Polymyalgia (H)     Primary osteoarthritis involving multiple joints     Chondrocalcinosis     KASANDRA (obstructive sleep apnea)     Other polyneuropathy     Spinal stenosis of lumbar region with neurogenic claudication     Infection due to 2019 novel coronavirus     Status post lumbar surgery      Current Outpatient Medications   Medication     acetaminophen (TYLENOL) 325 MG tablet     calcium carbonate-vitamin D3 600 mg (1,500 mg)-800 unit Chew     cephALEXin (KEFLEX) 500 MG capsule     denosumab 60 mg/mL Syrg     famotidine (PEPCID) 20 MG tablet     hydrOXYzine (ATARAX) 10 MG tablet     lisinopril (ZESTRIL) 20 MG tablet     METHYLCELLULOSE (CITRUCEL ORAL)     oxyCODONE (ROXICODONE) 5 MG tablet     pregabalin (LYRICA) 150 MG capsule     senna-docusate (SENOKOT-S/PERICOLACE) 8.6-50 MG tablet     tiZANidine (ZANAFLEX) 4 MG tablet     Vitamin D3 (CHOLECALCIFEROL) 25 mcg (1000 units) tablet     No current facility-administered medications for this visit.       HPI     Patient presents for a wound check, had a L3-L5 lumbar fusion laminectomy surgery on 6/20.  Was seen at 2 weeks for a postop check, had wound and dressing changes at that time.  She also developed urinary retention postoperatively, has a Dash in place and was started on prophylactic antibiotics with Keflex 500 mg 3 times daily which she continues to take.  Notes some clear, white drainage over the incisional site but denies any pain, fever, chills, nausea, vomiting and mostly feels well.   Virgil has been helping her with daily dressing changes.      Review of Systems   Constitutional, HEENT, cardiovascular, pulmonary, gi and gu systems are negative, except as otherwise noted.      Objective    BP (!) 147/84   Pulse 77   Temp 97.9  F (36.6  C)   Resp 16   Wt 84.4 kg (186 lb)   SpO2 99%   BMI 30.02 kg/m    Body mass index is 30.02 kg/m .  Physical Exam   GENERAL: healthy, alert and no distress  EYES: Eyes grossly normal to inspection, PERRL and conjunctivae and sclerae normal  MS: no gross musculoskeletal defects noted, no edema  SKIN: Postsurgical incision over the L3-L5 lumbar spine with mild erythema and granulation tissue, scant discharge minimal surrounding erythema.  NEURO: Normal strength and tone, mentation intact and speech  normal  PSYCH: mentation appears normal, affect normal/bright    Results for orders placed or performed in visit on 07/03/23   CBC with platelets and differential     Status: Abnormal   Result Value Ref Range    WBC Count 10.8 4.0 - 11.0 10e3/uL    RBC Count 3.81 3.80 - 5.20 10e6/uL    Hemoglobin 10.9 (L) 11.7 - 15.7 g/dL    Hematocrit 32.9 (L) 35.0 - 47.0 %    MCV 86 78 - 100 fL    MCH 28.6 26.5 - 33.0 pg    MCHC 33.1 31.5 - 36.5 g/dL    RDW 13.3 10.0 - 15.0 %    Platelet Count 346 150 - 450 10e3/uL    % Neutrophils 68 %    % Lymphocytes 22 %    % Monocytes 9 %    % Eosinophils 0 %    % Basophils 1 %    % Immature Granulocytes 1 %    Absolute Neutrophils 7.3 1.6 - 8.3 10e3/uL    Absolute Lymphocytes 2.4 0.8 - 5.3 10e3/uL    Absolute Monocytes 1.0 0.0 - 1.3 10e3/uL    Absolute Eosinophils 0.0 0.0 - 0.7 10e3/uL    Absolute Basophils 0.1 0.0 - 0.2 10e3/uL    Absolute Immature Granulocytes 0.1 <=0.4 10e3/uL   CBC with platelets and differential     Status: Abnormal    Narrative    The following orders were created for panel order CBC with platelets and differential.  Procedure                               Abnormality         Status                     ---------                               -----------         ------                     CBC with platelets and d...[[id number]]  Abnormal            Final result                 Please view results for these tests on the individual orders.

## 2023-07-03 NOTE — Clinical Note
Saw patient for surgical wound check, appears ok at this moment but does appear that she is not scheduled for follow up until September. Wanted to reach out to see if Davidson wanted to have a post-op check sooner.  Best,

## 2023-07-11 ENCOUNTER — MYC MEDICAL ADVICE (OUTPATIENT)
Dept: INTERNAL MEDICINE | Facility: CLINIC | Age: 83
End: 2023-07-11
Payer: MEDICARE

## 2023-07-11 ENCOUNTER — TELEPHONE (OUTPATIENT)
Dept: INTERNAL MEDICINE | Facility: CLINIC | Age: 83
End: 2023-07-11
Payer: MEDICARE

## 2023-07-11 DIAGNOSIS — Z98.1 S/P LUMBAR SPINAL FUSION: ICD-10-CM

## 2023-07-11 DIAGNOSIS — Z74.09 IMPAIRED MOBILITY: ICD-10-CM

## 2023-07-11 DIAGNOSIS — M48.062 SPINAL STENOSIS OF LUMBAR REGION WITH NEUROGENIC CLAUDICATION: Primary | ICD-10-CM

## 2023-07-11 DIAGNOSIS — G47.01 INSOMNIA DUE TO MEDICAL CONDITION: Primary | ICD-10-CM

## 2023-07-11 RX ORDER — ZOLPIDEM TARTRATE 5 MG/1
5 TABLET ORAL
Qty: 10 TABLET | Refills: 0 | Status: SHIPPED | OUTPATIENT
Start: 2023-07-11 | End: 2024-03-07

## 2023-07-11 NOTE — TELEPHONE ENCOUNTER
Order/Referral Request    Who is requesting: [facility] home care    Orders being requested: New referral for home care for Physical therapy.    Reason service is needed/diagnosis: lumbar fusion laminectomy surgery    When are orders needed by: as soon as possible    Has this been discussed with Provider: Yes    Does patient have a preference on a Group/Provider/Facility? [facility].    Does patient have an appointment scheduled?: No    Where to send orders: Fax [phone]    Nell Rea

## 2023-07-13 ENCOUNTER — TELEPHONE (OUTPATIENT)
Dept: INTERNAL MEDICINE | Facility: CLINIC | Age: 83
End: 2023-07-13
Payer: MEDICARE

## 2023-07-13 NOTE — TELEPHONE ENCOUNTER
Home Care is calling regarding an established patient with [facility].       Requesting orders from: Chema Thompson  Provider is following patient: Yes  Is this a 60-day recertification request?  No    Orders Requested    Physical Therapy  Request for continuation of care with no increase or decrease in frequency  Frequency: 1x/wk for 4 wks  then 1 visit every other week for 2 visits          Verbal orders given.  Home Care will send orders for provider to sign.  Confirmed ok to leave a detailed message with call back.  Contact information confirmed and updated as needed.

## 2023-07-20 ENCOUNTER — MYC MEDICAL ADVICE (OUTPATIENT)
Dept: INTERNAL MEDICINE | Facility: CLINIC | Age: 83
End: 2023-07-20
Payer: MEDICARE

## 2023-07-20 ENCOUNTER — TELEPHONE (OUTPATIENT)
Dept: NEUROLOGY | Facility: CLINIC | Age: 83
End: 2023-07-20
Payer: MEDICARE

## 2023-07-20 NOTE — TELEPHONE ENCOUNTER
Please schedule pt for Friday July 21 at 11am with Dr. Khanna here at the [facility].  Once scheduled, please remove hold. Thank you.

## 2023-07-20 NOTE — TELEPHONE ENCOUNTER
Called pt and let her know that Dr. Khanna did get her message and wanted to see her in follow up to discuss her symptoms and she agreed.  Informed her he just got a cancellation for tomorrow Friday July 21 at 11am and offered appt to pt and she accepted.  Informed her check in time is 10:45 and [place] [facility] [place]  Informed her I will send this to scheduling team to get her scheduled and she verbally understood.

## 2023-07-21 ENCOUNTER — OFFICE VISIT (OUTPATIENT)
Dept: NEUROLOGY | Facility: CLINIC | Age: 83
End: 2023-07-21
Payer: MEDICARE

## 2023-07-21 ENCOUNTER — LAB (OUTPATIENT)
Dept: LAB | Facility: CLINIC | Age: 83
End: 2023-07-21
Payer: MEDICARE

## 2023-07-21 VITALS
DIASTOLIC BLOOD PRESSURE: 73 MMHG | RESPIRATION RATE: 16 BRPM | SYSTOLIC BLOOD PRESSURE: 127 MMHG | WEIGHT: 186 LBS | HEIGHT: 66 IN | BODY MASS INDEX: 29.89 KG/M2 | OXYGEN SATURATION: 98 % | HEART RATE: 77 BPM

## 2023-07-21 DIAGNOSIS — G25.81 RESTLESS LEGS SYNDROME (RLS): ICD-10-CM

## 2023-07-21 DIAGNOSIS — R79.9 ABNORMAL FINDING OF BLOOD CHEMISTRY, UNSPECIFIED: ICD-10-CM

## 2023-07-21 DIAGNOSIS — G62.89 OTHER POLYNEUROPATHY: ICD-10-CM

## 2023-07-21 DIAGNOSIS — G62.89 OTHER POLYNEUROPATHY: Primary | ICD-10-CM

## 2023-07-21 DIAGNOSIS — Z53.9 DIAGNOSIS NOT YET DEFINED: Primary | ICD-10-CM

## 2023-07-21 LAB
BASOPHILS # BLD AUTO: 0.1 10E3/UL (ref 0–0.2)
BASOPHILS NFR BLD AUTO: 1 %
CRP SERPL-MCNC: 17.4 MG/L
EOSINOPHIL # BLD AUTO: 0 10E3/UL (ref 0–0.7)
EOSINOPHIL NFR BLD AUTO: 0 %
ERYTHROCYTE [DISTWIDTH] IN BLOOD BY AUTOMATED COUNT: 13.4 % (ref 10–15)
ERYTHROCYTE [SEDIMENTATION RATE] IN BLOOD BY WESTERGREN METHOD: 12 MM/HR (ref 0–30)
FERRITIN SERPL-MCNC: 182 NG/ML (ref 11–328)
HCT VFR BLD AUTO: 37.2 % (ref 35–47)
HGB BLD-MCNC: 11.9 G/DL (ref 11.7–15.7)
IMM GRANULOCYTES # BLD: 0 10E3/UL
IMM GRANULOCYTES NFR BLD: 0 %
IRON BINDING CAPACITY (ROCHE): 317 UG/DL (ref 240–430)
IRON SATN MFR SERPL: 10 % (ref 15–46)
IRON SERPL-MCNC: 33 UG/DL (ref 37–145)
LYMPHOCYTES # BLD AUTO: 2.4 10E3/UL (ref 0.8–5.3)
LYMPHOCYTES NFR BLD AUTO: 28 %
MCH RBC QN AUTO: 28.3 PG (ref 26.5–33)
MCHC RBC AUTO-ENTMCNC: 32 G/DL (ref 31.5–36.5)
MCV RBC AUTO: 89 FL (ref 78–100)
MONOCYTES # BLD AUTO: 1.2 10E3/UL (ref 0–1.3)
MONOCYTES NFR BLD AUTO: 13 %
NEUTROPHILS # BLD AUTO: 5 10E3/UL (ref 1.6–8.3)
NEUTROPHILS NFR BLD AUTO: 58 %
NRBC # BLD AUTO: 0 10E3/UL
NRBC BLD AUTO-RTO: 0 /100
PLATELET # BLD AUTO: 286 10E3/UL (ref 150–450)
RBC # BLD AUTO: 4.2 10E6/UL (ref 3.8–5.2)
WBC # BLD AUTO: 8.6 10E3/UL (ref 4–11)

## 2023-07-21 PROCEDURE — 85025 COMPLETE CBC W/AUTO DIFF WBC: CPT | Performed by: PATHOLOGY

## 2023-07-21 PROCEDURE — 83540 ASSAY OF IRON: CPT | Performed by: PATHOLOGY

## 2023-07-21 PROCEDURE — G0180 MD CERTIFICATION HHA PATIENT: HCPCS | Performed by: INTERNAL MEDICINE

## 2023-07-21 PROCEDURE — 82728 ASSAY OF FERRITIN: CPT | Performed by: PATHOLOGY

## 2023-07-21 PROCEDURE — 86140 C-REACTIVE PROTEIN: CPT | Performed by: PATHOLOGY

## 2023-07-21 PROCEDURE — 85652 RBC SED RATE AUTOMATED: CPT | Performed by: PATHOLOGY

## 2023-07-21 PROCEDURE — 99214 OFFICE O/P EST MOD 30 MIN: CPT | Performed by: PSYCHIATRY & NEUROLOGY

## 2023-07-21 PROCEDURE — 36415 COLL VENOUS BLD VENIPUNCTURE: CPT | Performed by: PATHOLOGY

## 2023-07-21 PROCEDURE — 83550 IRON BINDING TEST: CPT | Performed by: PATHOLOGY

## 2023-07-21 ASSESSMENT — PAIN SCALES - GENERAL: PAINLEVEL: NO PAIN (0)

## 2023-07-21 NOTE — PROGRESS NOTES
[facility] Neurology Follow Up Visit    Sheryl Trotter MRN# [id number]   Age: 83 year old YOB: 1940     Brief history of symptoms: The patient was initially seen in neurologic consultation on 10/11/2021 and most recently 2/14/2023 for evaluation of neuropathy. Please see the comprehensive neurologic consultation notes from those dates in the Epic records for details.     The patient has both chronic lumbar pain (R>L, L5-S1, improved slightly with ESTEBAN) as well as peripheral neuropathy and RLS.  At our last visit, she was using Lyrica for RLS management, but the medication wasn't all to helpful for management of her peripheral neuropathy.  She was to continue with Lyrica 150 mg BID, she was hesitant to start nortriptyline or duloxetine so she was given medication information about these agents. She was to consider alpha lipoic acid 600 mg every day.    Today, the patient had a spinal surgery in 6/2023 and about two weeks afterwards she developed worsened issues related to sleep.  She described that her RLS worsened.  However, when taking about her symptoms today she shows me that her feet are purplish in color and with poor vascularization.  She also describes her symptoms of kicking her legs, popping her legs, and that these symptoms improved with movement.  The symptoms are predominantly occurring when trying to lay down and go to sleep.  When she is up and massaging her legs, the symptoms aren't necessarily predominant.       Physical Exam:   General: Seated comfortably in no acute distress.  HEENT: Neck supple with normal range of motion.   Neurologic:     Mental Status: Fully alert, attentive and oriented. Speech clear and fluent, no paraphasic errors.     Cranial Nerves: EOMI with normal smooth pursuit. Facial movements symmetric. Hearing not formally tested but intact to conversation.  No dysarthria.     Motor: No tremors or other abnormal movements observed. 5/5 strength with HF/HE/KF/KE/DF/PF b/l.      Sensory: Positive romberg. Vibration absent in great toes and at ankles b/l.     Coordination: Finger-nose-finger without dysmetria.         Assessment and Plan:   Assessment:  RLS  Peripheral neuropathy    The patient's nightly symptoms sound like an acute worsening of her RLS, but this is not often a disorder that worsens so abruptly. I asked the patient to obtain some serum studies to look for continuing anemia as is noted in her recent CBC, and to look at iron studies to see if there is a drop for some reason.  Otherwise, to help reduce her symptoms, she can trial a higher nightly dose of Lyrica.  If this isn't helpful after one week, then a dopamine agonist should be used.  We discussed ropinirole and I gave her a handout with information about the drug.  She will contact me in a week to discuss her symptoms further.    Otherwise, her exam today wasn't concerning for new weakness or sensory loss consistent with lumbar radiculopathy or cord compression.     Plan:  CBC, Iron and iron binding, ferritin, CRP  Lyrica 150/300  Consider Ropinirole 0.25 mg at bedtime if Lyrica isn't helpful after a week    Follow up in Neurology clinic in 3-6 months, or earlier as needed should new concerns arise.    NISA Khanna D.O.   of Neurology    Total time today (30 min) in this patient encounter was spent on pre-charting, counseling and/or coordination of care.

## 2023-07-21 NOTE — PATIENT INSTRUCTIONS
I think your RLS is worsened to some degree, but it is unclear why.  I would recommend looking at reasons why legs can feel uncomfortable following a surgery by obtaining some lab tests.    For the symptoms, please increase your Lyrica to the following dose:  - 150 mg in the AM, 300 mg in the PM    If this isn't helpful in reducing your symptoms within a week, I would recommend starting a dopamine agonist as follows:  - Ropinirole 0.25 mg at bedtime    You would need to contact me before starting this agent, as I would need to write the script for it.

## 2023-07-21 NOTE — LETTER
7/21/2023       RE: Sheryl Trotter  [address]  [place] [address]     Dear Colleague,    Thank you for referring your patient, Sheryl Trotter, to the [facility] [place] [facility]. Please see a copy of my visit note below.    [facility] Neurology Follow Up Visit    Sheryl Trotter MRN# [id number]   Age: 83 year old YOB: 1940     Brief history of symptoms: The patient was initially seen in neurologic consultation on 10/11/2021 and most recently 2/14/2023 for evaluation of neuropathy. Please see the comprehensive neurologic consultation notes from those dates in the Epic records for details.     The patient has both chronic lumbar pain (R>L, L5-S1, improved slightly with ESTEBAN) as well as peripheral neuropathy and RLS.  At our last visit, she was using Lyrica for RLS management, but the medication wasn't all to helpful for management of her peripheral neuropathy.  She was to continue with Lyrica 150 mg BID, she was hesitant to start nortriptyline or duloxetine so she was given medication information about these agents. She was to consider alpha lipoic acid 600 mg every day.    Today, the patient had a spinal surgery in 6/2023 and about two weeks afterwards she developed worsened issues related to sleep.  She described that her RLS worsened.  However, when taking about her symptoms today she shows me that her feet are purplish in color and with poor vascularization.  She also describes her symptoms of kicking her legs, popping her legs, and that these symptoms improved with movement.  The symptoms are predominantly occurring when trying to lay down and go to sleep.  When she is up and massaging her legs, the symptoms aren't necessarily predominant.       Physical Exam:   General: Seated comfortably in no acute distress.  HEENT: Neck supple with normal range of motion.   Neurologic:     Mental Status: Fully alert,  attentive and oriented. Speech clear and fluent, no paraphasic errors.     Cranial Nerves: EOMI with normal smooth pursuit. Facial movements symmetric. Hearing not formally tested but intact to conversation.  No dysarthria.     Motor: No tremors or other abnormal movements observed. 5/5 strength with HF/HE/KF/KE/DF/PF b/l.     Sensory: Positive romberg. Vibration absent in great toes and at ankles b/l.     Coordination: Finger-nose-finger without dysmetria.         Assessment and Plan:   Assessment:  RLS  Peripheral neuropathy    The patient's nightly symptoms sound like an acute worsening of her RLS, but this is not often a disorder that worsens so abruptly. I asked the patient to obtain some serum studies to look for continuing anemia as is noted in her recent CBC, and to look at iron studies to see if there is a drop for some reason.  Otherwise, to help reduce her symptoms, she can trial a higher nightly dose of Lyrica.  If this isn't helpful after one week, then a dopamine agonist should be used.  We discussed ropinirole and I gave her a handout with information about the drug.  She will contact me in a week to discuss her symptoms further.    Otherwise, her exam today wasn't concerning for new weakness or sensory loss consistent with lumbar radiculopathy or cord compression.     Plan:  CBC, Iron and iron binding, ferritin, CRP  Lyrica 150/300  Consider Ropinirole 0.25 mg at bedtime if Lyrica isn't helpful after a week    Follow up in Neurology clinic in 3-6 months, or earlier as needed should new concerns arise.      Total time today (30 min) in this patient encounter was spent on pre-charting, counseling and/or coordination of care.         Again, thank you for allowing me to participate in the care of your patient.      Sincerely,    Cholo Khanna, DO

## 2023-07-25 ENCOUNTER — TRANSFERRED RECORDS (OUTPATIENT)
Dept: HEALTH INFORMATION MANAGEMENT | Facility: CLINIC | Age: 83
End: 2023-07-25
Payer: MEDICARE

## 2023-07-28 DIAGNOSIS — G62.89 OTHER POLYNEUROPATHY: ICD-10-CM

## 2023-07-28 DIAGNOSIS — G25.81 RESTLESS LEGS SYNDROME (RLS): Primary | ICD-10-CM

## 2023-07-28 RX ORDER — ROPINIROLE 0.25 MG/1
0.25 TABLET, FILM COATED ORAL AT BEDTIME
Qty: 90 TABLET | Refills: 1 | Status: SHIPPED | OUTPATIENT
Start: 2023-07-28 | End: 2023-10-20

## 2023-07-28 RX ORDER — PREGABALIN 150 MG/1
CAPSULE ORAL
Qty: 120 CAPSULE | Refills: 5 | Status: SHIPPED | OUTPATIENT
Start: 2023-07-28 | End: 2023-10-20

## 2023-07-28 NOTE — PROGRESS NOTES
Patient contacted me to continue with the plans for treatment of her RLS and neuropathy.    - Lyrica 150 mg in the AM, 300 mg in the PM  - Ropinirole 0.25 mg at bedtime for RLS    NISA Khanna D.O.  [facility] Neurology

## 2023-08-05 ENCOUNTER — HEALTH MAINTENANCE LETTER (OUTPATIENT)
Age: 83
End: 2023-08-05

## 2023-08-23 DIAGNOSIS — I10 ESSENTIAL HYPERTENSION: ICD-10-CM

## 2023-08-23 RX ORDER — LISINOPRIL 20 MG/1
TABLET ORAL
Qty: 90 TABLET | Refills: 3 | Status: SHIPPED | OUTPATIENT
Start: 2023-08-23 | End: 2024-08-21

## 2023-08-23 NOTE — TELEPHONE ENCOUNTER
"Routing refill request to provider for review/approval because:  Blood pressure failed    Last Written Prescription Date:  8/23/2022  Last Fill Quantity: 90,  # refills: 3   Last office visit provider:  5/30/2023     Requested Prescriptions   Pending Prescriptions Disp Refills    lisinopril (ZESTRIL) 20 MG tablet [Pharmacy Med Name: LISINOPRIL 20 MG Tablet] 90 tablet 3     Sig: TAKE 1 TABLET EVERY DAY       ACE Inhibitors (Including Combos) Protocol Passed - 8/23/2023  2:44 AM        Passed - Blood pressure under 140/90 in past 12 months     BP Readings from Last 3 Encounters:   07/21/23 127/73   07/03/23 (!) 147/84   06/25/23 (!) 140/63                 Passed - Recent (12 mo) or future (30 days) visit within the authorizing provider's specialty     Patient has had an office visit with the authorizing provider or a provider within the authorizing providers department within the previous 12 mos or has a future within next 30 days. See \"Patient Info\" tab in inbasket, or \"Choose Columns\" in Meds & Orders section of the refill encounter.              Passed - Medication is active on med list        Passed - Patient is age 18 or older        Passed - No active pregnancy on record        Passed - Normal serum creatinine on file in past 12 months     Recent Labs   Lab Test 06/21/23  0550   CR 0.57       Ok to refill medication if creatinine is low          Passed - Normal serum potassium on file in past 12 months     Recent Labs   Lab Test 06/21/23  0550   POTASSIUM 4.1             Passed - No positive pregnancy test within past 12 months             Heather Herring RN 08/23/23 12:20 PM  "

## 2023-09-08 ENCOUNTER — TELEPHONE (OUTPATIENT)
Dept: INTERNAL MEDICINE | Facility: CLINIC | Age: 83
End: 2023-09-08
Payer: MEDICARE

## 2023-09-08 NOTE — TELEPHONE ENCOUNTER
Home Care is calling regarding an established patient with [facility].       Requesting orders from: Chema Thompson  Provider is following patient: Yes  Is this a 60-day recertification request?  No    Orders Requested    Physical Therapy  Request for continuation of care with decrease in frequency   Goals have been met/progressing.  Frequency:  1 visit every other week for 2 visits and then possible completion of home PT.        Confirmed ok to leave a detailed message with call back.  Contact information confirmed and updated as needed.    Anusha iGrard RN

## 2023-09-08 NOTE — TELEPHONE ENCOUNTER
I approve    Physical Therapy  Request for continuation of care with decrease in frequency   Goals have been met/progressing.  Frequency:  1 visit every other week for 2 visits and then possible completion of home PT.

## 2023-09-11 ENCOUNTER — TRANSFERRED RECORDS (OUTPATIENT)
Dept: HEALTH INFORMATION MANAGEMENT | Facility: CLINIC | Age: 83
End: 2023-09-11
Payer: MEDICARE

## 2023-09-19 ENCOUNTER — MYC MEDICAL ADVICE (OUTPATIENT)
Dept: INTERNAL MEDICINE | Facility: CLINIC | Age: 83
End: 2023-09-19

## 2023-09-19 ENCOUNTER — HOSPITAL ENCOUNTER (OUTPATIENT)
Dept: MAMMOGRAPHY | Facility: CLINIC | Age: 83
Discharge: HOME | End: 2023-09-19
Attending: INTERNAL MEDICINE | Admitting: INTERNAL MEDICINE
Payer: MEDICARE

## 2023-09-19 DIAGNOSIS — Z12.31 VISIT FOR SCREENING MAMMOGRAM: ICD-10-CM

## 2023-09-19 PROCEDURE — 77067 SCR MAMMO BI INCL CAD: CPT

## 2023-09-26 DIAGNOSIS — Z53.9 DIAGNOSIS NOT YET DEFINED: Primary | ICD-10-CM

## 2023-09-26 PROCEDURE — G0179 MD RECERTIFICATION HHA PT: HCPCS | Performed by: INTERNAL MEDICINE

## 2023-10-20 ENCOUNTER — VIRTUAL VISIT (OUTPATIENT)
Dept: NEUROLOGY | Facility: CLINIC | Age: 83
End: 2023-10-20
Payer: MEDICARE

## 2023-10-20 DIAGNOSIS — G62.89 OTHER POLYNEUROPATHY: ICD-10-CM

## 2023-10-20 DIAGNOSIS — G25.81 RESTLESS LEGS SYNDROME (RLS): ICD-10-CM

## 2023-10-20 PROCEDURE — 99213 OFFICE O/P EST LOW 20 MIN: CPT | Mod: VID | Performed by: PSYCHIATRY & NEUROLOGY

## 2023-10-20 RX ORDER — PREGABALIN 150 MG/1
300 CAPSULE ORAL 2 TIMES DAILY
Qty: 120 CAPSULE | Refills: 5 | Status: SHIPPED | OUTPATIENT
Start: 2023-10-20 | End: 2024-02-05

## 2023-10-20 RX ORDER — ROPINIROLE 0.25 MG/1
0.25 TABLET, FILM COATED ORAL AT BEDTIME
Qty: 90 TABLET | Refills: 3 | Status: SHIPPED | OUTPATIENT
Start: 2023-10-20 | End: 2024-02-05

## 2023-10-20 ASSESSMENT — PAIN SCALES - GENERAL: PAINLEVEL: NO PAIN (0)

## 2023-10-20 NOTE — LETTER
10/20/2023       RE: Sheryl Trotter  [address]  [place] [address]     Dear Colleague,    Thank you for referring your patient, Sheryl Trotter, to the [facility] [place] [facility]. Please see a copy of my visit note below.    [facility] Neurology Follow Up Visit    Sheryl Trotter MRN# [id number]   Age: 83 year old YOB: 1940     Brief history of symptoms: The patient was initially seen in neurologic consultation on 10/11/2021 and most recently 7/21/2023 for evaluation of neuropathy. Please see the comprehensive neurologic consultation notes from those dates in the Epic records for details.     The patient's RLS and peripheral neuropathy were being managed through Lyrica 150/300 and consideration of Ropinrole was to be made based on her clinical response.      Interval history:   - The patent tolerated Lyrica 150/300, but agreed to trial roprinrole 7/24/2023 (0.25 mg at bedtime)    Today, the patient is doing well on both medications.  She has had only two episodes of worsened RLS at night, compared to nightly events prior to using both agents.  She is sleeping well and feels good with the medications.  There are no issues of sedation, dyskinesia, nausea/vomiting, or passing out events.      She notes that her neuropathy is still nagging her to a degree. Her feet feel numb, and like blocks.  There are no issues of dysesthesias reported.      Physical Exam:   General: Seated comfortably in no acute distress.  HEENT: Neck supple with normal range of motion.   Neurologic:     Mental Status: Fully alert, attentive and oriented. Speech clear and fluent, no paraphasic errors.     Cranial Nerves: EOMI with normal smooth pursuit. Facial movements symmetric. Hearing not formally tested but intact to conversation.  No dysarthria.     Motor: No tremors or other abnormal movements observed.          Assessment and Plan:    Assessment:  RLS and neuropathy (idiopathic)    The patient is having and excellent response to ropinrole and lyrica together for RLS. Her neuropathy remains, but mostly paresthesia and numbness but not pain.  We discussed that prior trials of nortriptyline, duloxtine, and gabapentin didn't really help these symptoms.  I asked she try a higher dose of lyrica in the AM to see if her RLS response carries over into the neuropathy. This would put her at a maximum dosing of Lyrica 300 BID, and she will contact me should any side-effects occur.      Plan:  Lyrica 300 BID  Ropinirole 0.25 mg at bedtime  PT for imbalance    Follow up in Neurology clinic in one year or earlier as needed should new concerns arise.    Total time today (25 min) in this patient encounter was spent on pre-charting, counseling and/or coordination of care.           Again, thank you for allowing me to participate in the care of your patient.      Sincerely,    Cholo Khanna, DO

## 2023-10-20 NOTE — NURSING NOTE
Is the patient currently in the state of [place]? YES    Visit mode:VIDEO    If the visit is dropped, the patient can be reconnected by: VIDEO VISIT: Text to cell phone:   Telephone Information:   Mobile [phone]       Will anyone else be joining the visit? NO  (If patient encounters technical issues they should call [phone])    How would you like to obtain your AVS? MyChart    Are changes needed to the allergy or medication list? Pt stated no changes to allergies and Pt stated no med changes    Reason for visit: RECHECK (3 month follow-up )    Marilia MARIE

## 2023-10-20 NOTE — PATIENT INSTRUCTIONS
For your neuropathy treatment and RLS treatment, continue with the following medications:  Lyrica 300 BID  Ropinirole 0.25 mg at bedtime    For your imbalance, I will place the following referral for gait therapy:  PT for imbalance

## 2023-10-20 NOTE — PROGRESS NOTES
[facility] Neurology Follow Up Visit    Sheryl Trotter MRN# [id number]   Age: 83 year old YOB: 1940     Brief history of symptoms: The patient was initially seen in neurologic consultation on 10/11/2021 and most recently 7/21/2023 for evaluation of neuropathy. Please see the comprehensive neurologic consultation notes from those dates in the Epic records for details.     The patient's RLS and peripheral neuropathy were being managed through Lyrica 150/300 and consideration of Ropinrole was to be made based on her clinical response.      Interval history:   - The patent tolerated Lyrica 150/300, but agreed to trial roprinrole 7/24/2023 (0.25 mg at bedtime)    Today, the patient is doing well on both medications.  She has had only two episodes of worsened RLS at night, compared to nightly events prior to using both agents.  She is sleeping well and feels good with the medications.  There are no issues of sedation, dyskinesia, nausea/vomiting, or passing out events.      She notes that her neuropathy is still nagging her to a degree. Her feet feel numb, and like blocks.  There are no issues of dysesthesias reported.      Physical Exam:   General: Seated comfortably in no acute distress.  HEENT: Neck supple with normal range of motion.   Neurologic:     Mental Status: Fully alert, attentive and oriented. Speech clear and fluent, no paraphasic errors.     Cranial Nerves: EOMI with normal smooth pursuit. Facial movements symmetric. Hearing not formally tested but intact to conversation.  No dysarthria.     Motor: No tremors or other abnormal movements observed.          Assessment and Plan:   Assessment:  RLS and neuropathy (idiopathic)    The patient is having and excellent response to ropinrole and lyrica together for RLS. Her neuropathy remains, but mostly paresthesia and numbness but not pain.  We discussed that prior trials of nortriptyline, duloxtine, and gabapentin didn't really help these symptoms.  I  asked she try a higher dose of lyrica in the AM to see if her RLS response carries over into the neuropathy. This would put her at a maximum dosing of Lyrica 300 BID, and she will contact me should any side-effects occur.      Plan:  Lyrica 300 BID  Ropinirole 0.25 mg at bedtime  PT for imbalance    Follow up in Neurology clinic in one year or earlier as needed should new concerns arise.    NISA Khanna D.O.   of Neurology    Total time today (25 min) in this patient encounter was spent on pre-charting, counseling and/or coordination of care.      V-Y Flap Text: The defect edges were debeveled with a #15 scalpel blade.  Given the location of the defect, shape of the defect and the proximity to free margins a V-Y flap was deemed most appropriate.  Using a sterile surgical marker, an appropriate advancement flap was drawn incorporating the defect and placing the expected incisions within the relaxed skin tension lines where possible.    The area thus outlined was incised deep to adipose tissue with a #15 scalpel blade.  The skin margins were undermined to an appropriate distance in all directions utilizing iris scissors.

## 2023-10-20 NOTE — PROGRESS NOTES
Virtual Visit Details    Type of service:  Video Visit   Video Start Time:  115  Video End Time:1:36 PM    Originating Location (pt. Location): Home    Distant Location (provider location):  On-site  Platform used for Video Visit: Dre

## 2023-11-29 ENCOUNTER — OFFICE VISIT (OUTPATIENT)
Dept: INTERNAL MEDICINE | Facility: CLINIC | Age: 83
End: 2023-11-29
Payer: MEDICARE

## 2023-11-29 VITALS
DIASTOLIC BLOOD PRESSURE: 81 MMHG | HEIGHT: 66 IN | OXYGEN SATURATION: 97 % | BODY MASS INDEX: 29.17 KG/M2 | WEIGHT: 181.5 LBS | SYSTOLIC BLOOD PRESSURE: 131 MMHG | HEART RATE: 77 BPM | RESPIRATION RATE: 16 BRPM

## 2023-11-29 DIAGNOSIS — M75.21 BICIPITAL TENDONITIS OF RIGHT SHOULDER: ICD-10-CM

## 2023-11-29 DIAGNOSIS — Z98.1 S/P LUMBAR SPINAL FUSION: Primary | ICD-10-CM

## 2023-11-29 DIAGNOSIS — I10 ESSENTIAL HYPERTENSION: ICD-10-CM

## 2023-11-29 DIAGNOSIS — M81.0 AGE-RELATED OSTEOPOROSIS WITHOUT CURRENT PATHOLOGICAL FRACTURE: ICD-10-CM

## 2023-11-29 PROCEDURE — 99215 OFFICE O/P EST HI 40 MIN: CPT | Performed by: INTERNAL MEDICINE

## 2023-11-29 RX ORDER — HYDROCHLOROTHIAZIDE 12.5 MG/1
12.5 TABLET ORAL DAILY
Qty: 90 TABLET | Refills: 3 | Status: SHIPPED | OUTPATIENT
Start: 2023-11-29

## 2023-11-29 NOTE — PATIENT INSTRUCTIONS
Posthospitalization visit and follow-up for multiple issues, after Chrissy underwent extensive lumbar spinal surgery 2023 at [facility].  Her operative time was about 3-1/2 hours.    Chrissy seems to have made a good recovery.  However she has not been able to do the focused rehabilitation as much as desired a plan, because she had the stress of caring for her older brother who became very ill in the summer into the 2023 with rapidly progressive neurologic disease, and he  2023.    Chrissy has not been eating as carefully, not sleeping as well, gained some weight, and that probably contributed to the elevated blood pressure that we see today .    I am going to be adding a small dose of hydrochlorothiazide on top of her lisinopril, to try to improve her blood pressure control and also help with the ankle edema.    She is going to be resuming physical therapy that has been ordered by her surgeon Dr. Dereck Roman    DATE OF SERVICE: 2023  PREOPERATIVE DIAGNOSES:   1.  Severe spinal stenosis at L3-4 and L4-5  2.  L3-4 and L4-5 spondylolisthesis   3.  L3-4 and L4-5 degenerative disc disease  4.  Failure of conservative measures     POSTOPERATIVE DIAGNOSES:   1.  Severe spinal stenosis at L3-4 and L4-5  2.  L3-4 and L4-5 spondylolisthesis   3.  L3-4 and L4-5 degenerative disc disease  4.  Failure of conservative measures       PROCEDURE:   1. Right-sided transforaminal/transfacet decompression of the exiting L4 and traversing L5 nerve roots.   2. Transforaminal lumbar interbody fusion L4-5 with autograft bone, [employer] interbody long 9  3. Right-sided transforaminal/transfacet decompression of the exiting L3 and traversing L4 nerve roots.   4. Transforaminal lumbar interbody fusion L3-4 with autograft bone, [employer] interbody long 9  5. Posterior lateral fusion on the left at L3-4 and L4-5.   6. Segmental pedicle screw fixation in the pedicles of L3, L4, and L5  bilaterally  7. Complete laminectomy at L3-4 and L4-5  8. O arm with intra operative navigation    History  Known Lumbar DDD (ESTEBAN 2005, for reported lumbar stenosis and spondylolisthesis and sciatica  Characteristic Pain Low back, both thighs, both calves. Worse when standing.  Difficulty climbing stairs. Most comfortable seated.  MRI 2012 had degenerating discs     Result Date: 8/29/2022  LUMBAR INTERLAMINAR EPIDURAL STEROID INJECTION WITH FLUOROSCOPIC      Right arm/shoulder pain, which Dr. Roman diagnosed as bicipital tendinitis.  I am putting in an order for Chrissy to receive physical therapy for the right biceps and shoulder, to be done at [facility] in [place].    Bilateral leg edema, which I think is probably from venous insufficiency, but this might be helped by getting her back on a little bit of diuretic in the form of hydrochlorothiazide 12.5 mg a day (used to take higher dose), and furthermore the hydrochlorothiazide might help with blood pressure    In addition to the hydrochlorothiazide, I reminded Chrissy to try to keep her legs elevated when not up and about, and also consider using compression stockings, available over-the-counter    Urinary bladder urgency, with history of hysterectomy done in her mid 50s  She may have some pelvic floor laxity.  I am not able to do a formal pelvic exam to assess that, but if she wants to pursue more pelvic floor precision diagnosis, she should see her gynecologist.    In the meantime, I suggested that Chrissy consider  - Scheduled voiding, emptying the bladder every hour while awake, so that it does not get too full and trigger urgency  - Also she can do pelvic floor exercises such as Kegels.    There is the option of using medication for bladder urgency such as oxybutynin, but that can have anticholinergic side effects.  Chrissy would like to try to manage this without medication for starters    Distal symmetric polyneuropathy, small fiber peripheral neuropathy, also restless  leg syndrome  Bilateral leg coldness leading to discomfort in the day and at night.   Lyrica used briefly, as of April 202 on nortriptyline, which seems to help  10/11/2021  Neurology: Cholo Khanna, DO  EMG through PM&R on 8/30/2019 with Dr. Izquierdo:  EMG on 8/30/2019 which showed an essentially normal study.  Comment NCS: Essentially normal study  Comment EMG: Normal study  1.  Normal needle EMG bilateral lower extremities.  Interpretation: Essentially normal study:   There is electrodiagnostic evidence of:  1.  Borderline bilateral sensory amplitudes.  This is normal for the patient's age is very likely normal finding.  I cannot, however, completely exclude a very mild sensory or sensorimotor peripheral polyneuropathy.   2 There is no electrodiagnostic evidence of lumbosacral radiculopathy, lumbosacral plexopathy, or focal neuropathy in the bilateral lower extremities.  Specifically, there is no electrodiagnostic evidence of tibial neuropathy at the tarsal tunnel in the bilateral lower extremities     Osteopenia, on Prolia (started approximately 2017)  Chrissy believes she got a Prolia injection in July 2022  RECHECK DEXA bone density scan, and then we can make a decision about either resuming Prolia, or switching to a bisphosphonate.  Bisphosphonate could be once a week oral alendronate, or once a year intravenous infusion of zoledronic acid (Reclast)     BONE DENSITY (DEXA)  12/13/2021 1:28 PM  HISTORY: Postmenopausal.  COMPARISON: 12/9/2019  TECHNIQUE: The study was performed using DEXA in the AP lumbar spine and AP femur.  In accordance with the ISCD (International Society of Clinical Densitometry), the lowest BMD between the total hip and  femoral neck will be used.   FINDINGS: Using DEXA, the results were reported according to T-score. The T-score is the standard deviation from the peak bone mass in a normal young patient. A T-score of 0 to -1.0 is normal. A T-score of  -1.0 to -2.5 correlates  with osteopenia. A T-score of less than -2.5 correlates with osteoporosis.       The L1-L2 T-score is 0 and I suspect false elevation at the other levels. The L1-L4 bone density (subtracting L3) is increased 6.3% compared with prior. The right femoral neck T-score is -1.8. The right total proximal femur bone density is increased 1% compared with prior.  The FRAX 10-year probability is 14.8% for major osteoporotic fracture and 4.1% for hip fracture. All treatment decisions require clinical judgment and consideration of individual patient factors which may not  be captured in the FRAX model and the risk of fracture may be over- or under-estimated by FRAX.                         IMPRESSION: Right femoral neck osteopenia.    Takes her calcium and vitamin D     Essential hypertension  Continue lisinopril 20 mg once a day  November 29, 2023, add hydrochlorothiazide 12.5 mg once a day    I checked her [employer] home blood pressure machine against a manual reading on the right upper arm, and the readings are pretty close, within 10 mmHg.    I told her that target blood pressure is 120/80 at rest and seated position measured on the right upper arm.  I reminded her that healthy diet particular less sodium, also regular exercise, and weight control will help with blood pressure, as well as treating sleep apnea     BP Readings from Last 6 Encounters:   11/29/23 131/81   07/21/23 127/73   07/03/23 (!) 147/84   06/25/23 (!) 140/63   06/25/23 122/78   06/23/23 126/60      Ganglion cyst dorsum right hand, which has become more uncomfortable, therefore we will have her see orthopedic hand     Left knee osteoarthritis improved with Euflexxa injection.     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     Insomnia, KASANDRA is  currently untreated--but she is in the process of scheduling a sleep study to be done sometime early 2023  She stopped CPAP because not tolerated  sleep study in 2017, which showed sleep apnea. Stopped CPAP, a year and a half ago, as she wasn't sleeping and it wasn't positive experience.      Hyperlipidemia, mildly elevated LDL, very generous HDL which is favorable  Lipid profile July 24, 2019 with total cholesterol 265, triglycerides 43, LDL bad cholesterol modestly elevated 141, but with a very generous level of the HDL good cholesterol 115  10-  Hemoglobin A1C <=5.6 % 5.3       Gastroesophageal reflux, uses Tums intermittently      Constipation that she has noticed since starting nortriptyline for neuropathy and restless legs-- no longer takes nortriptylene     Overweight with body mass index of 29.  She has been less physically active because of her lumbar spine issues.  She does water aerobics which is excellent.     Wt Readings from Last 5 Encounters:   11/29/23 82.3 kg (181 lb 8 oz)   07/21/23 84.4 kg (186 lb)   07/03/23 84.4 kg (186 lb)   06/25/23 81.6 kg (180 lb)   06/25/23 81.6 kg (180 lb)     Menopause, status post complete hysterectomy  Had complete hysterectomy at age in her 50s, has not needed to get Pap smears anymore  Result Date: 9/6/2022  BILATERAL FULL FIELD DIGITAL SCREENING MAMMOGRAM WITH TOMOSYNTHESIS      She recalls a colonoscopy that was done approximately at age 72     Got updated 2023/4 COVID-19 vaccine    Has received both pneumococcal vaccines  And recmbinant shingles vaccine

## 2023-11-29 NOTE — PROGRESS NOTES
Office Visit - Follow Up   Sheryl Trotter   83 year old female    Date of Visit: 2023    Chief Complaint   Patient presents with    Medication Discussion     Question about Meds/Personal Questions        -------------------------------------------------------------------------------------------------------------------------  Assessment and Plan    Posthospitalization visit and follow-up for multiple issues, after Chrissy underwent extensive lumbar spinal surgery 2023 at [facility].  Her operative time was about 3-1/2 hours.    Chrissy seems to have made a good recovery.  However she has not been able to do the focused rehabilitation as much as desired a plan, because she had the stress of caring for her older brother who became very ill in the summer into the ada  with rapidly progressive neurologic disease, and he  2023.    Chrissy has not been eating as carefully, not sleeping as well, gained some weight, and that probably contributed to the elevated blood pressure that we see today .    I am going to be adding a small dose of hydrochlorothiazide on top of her lisinopril, to try to improve her blood pressure control and also help with the ankle edema.    She is going to be resuming physical therapy that has been ordered by her surgeon Dr. Dereck Roman    DATE OF SERVICE: 2023  PREOPERATIVE DIAGNOSES:   1.  Severe spinal stenosis at L3-4 and L4-5  2.  L3-4 and L4-5 spondylolisthesis   3.  L3-4 and L4-5 degenerative disc disease  4.  Failure of conservative measures     POSTOPERATIVE DIAGNOSES:   1.  Severe spinal stenosis at L3-4 and L4-5  2.  L3-4 and L4-5 spondylolisthesis   3.  L3-4 and L4-5 degenerative disc disease  4.  Failure of conservative measures       PROCEDURE:   1. Right-sided transforaminal/transfacet decompression of the exiting L4 and traversing L5 nerve roots.   2. Transforaminal lumbar interbody fusion L4-5 with autograft bone, [facility]  interbody long 9  3. Right-sided transforaminal/transfacet decompression of the exiting L3 and traversing L4 nerve roots.   4. Transforaminal lumbar interbody fusion L3-4 with autograft bone, [employer] interbody long 9  5. Posterior lateral fusion on the left at L3-4 and L4-5.   6. Segmental pedicle screw fixation in the pedicles of L3, L4, and L5 bilaterally  7. Complete laminectomy at L3-4 and L4-5  8. O arm with intra operative navigation    History  Known Lumbar DDD (ESTEBAN 2005, for reported lumbar stenosis and spondylolisthesis and sciatica  Characteristic Pain Low back, both thighs, both calves. Worse when standing.  Difficulty climbing stairs. Most comfortable seated.  MRI 2012 had degenerating discs     Result Date: 8/29/2022  LUMBAR INTERLAMINAR EPIDURAL STEROID INJECTION WITH FLUOROSCOPIC      Right arm/shoulder pain, which Dr. Roman diagnosed as bicipital tendinitis.  I am putting in an order for Chrissy to receive physical therapy for the right biceps and shoulder, to be done at [facility] in [place].    Bilateral leg edema, which I think is probably from venous insufficiency, but this might be helped by getting her back on a little bit of diuretic in the form of hydrochlorothiazide 12.5 mg a day (used to take higher dose), and furthermore the hydrochlorothiazide might help with blood pressure    In addition to the hydrochlorothiazide, I reminded Chrissy to try to keep her legs elevated when not up and about, and also consider using compression stockings, available over-the-counter    Urinary bladder urgency, with history of hysterectomy done in her mid 50s  She may have some pelvic floor laxity.  I am not able to do a formal pelvic exam to assess that, but if she wants to pursue more pelvic floor precision diagnosis, she should see her gynecologist.    In the meantime, I suggested that Chrissy consider  - Scheduled voiding, emptying the bladder every hour while awake, so that it does not get too full and  trigger urgency  - Also she can do pelvic floor exercises such as Kegels.    There is the option of using medication for bladder urgency such as oxybutynin, but that can have anticholinergic side effects.  Chrissy would like to try to manage this without medication for starters    Distal symmetric polyneuropathy, small fiber peripheral neuropathy, also restless leg syndrome  Bilateral leg coldness leading to discomfort in the day and at night.   Lyrica used briefly, as of April 202 on nortriptyline, which seems to help  10/11/2021  Neurology: Cholo Khanna, DO  EMG through PM&R on 8/30/2019 with Dr. Izquierdo:  EMG on 8/30/2019 which showed an essentially normal study.  Comment NCS: Essentially normal study  Comment EMG: Normal study  1.  Normal needle EMG bilateral lower extremities.  Interpretation: Essentially normal study:   There is electrodiagnostic evidence of:  1.  Borderline bilateral sensory amplitudes.  This is normal for the patient's age is very likely normal finding.  I cannot, however, completely exclude a very mild sensory or sensorimotor peripheral polyneuropathy.   2 There is no electrodiagnostic evidence of lumbosacral radiculopathy, lumbosacral plexopathy, or focal neuropathy in the bilateral lower extremities.  Specifically, there is no electrodiagnostic evidence of tibial neuropathy at the tarsal tunnel in the bilateral lower extremities     Osteopenia, on Prolia (started approximately 2017)  Chrissy believes she got a Prolia injection in July 2022  RECHECK DEXA bone density scan, and then we can make a decision about either resuming Prolia, or switching to a bisphosphonate.  Bisphosphonate could be once a week oral alendronate, or once a year intravenous infusion of zoledronic acid (Reclast)     BONE DENSITY (DEXA)  12/13/2021 1:28 PM  HISTORY: Postmenopausal.  COMPARISON: 12/9/2019  TECHNIQUE: The study was performed using DEXA in the AP lumbar spine and AP femur.  In accordance with the  ISCD (International Society of Clinical Densitometry), the lowest BMD between the total hip and  femoral neck will be used.   FINDINGS: Using DEXA, the results were reported according to T-score. The T-score is the standard deviation from the peak bone mass in a normal young patient. A T-score of 0 to -1.0 is normal. A T-score of  -1.0 to -2.5 correlates with osteopenia. A T-score of less than -2.5 correlates with osteoporosis.       The L1-L2 T-score is 0 and I suspect false elevation at the other levels. The L1-L4 bone density (subtracting L3) is increased 6.3% compared with prior. The right femoral neck T-score is -1.8. The right total proximal femur bone density is increased 1% compared with prior.  The FRAX 10-year probability is 14.8% for major osteoporotic fracture and 4.1% for hip fracture. All treatment decisions require clinical judgment and consideration of individual patient factors which may not  be captured in the FRAX model and the risk of fracture may be over- or under-estimated by FRAX.                         IMPRESSION: Right femoral neck osteopenia.    Takes her calcium and vitamin D     Essential hypertension  Continue lisinopril 20 mg once a day  November 29, 2023, add hydrochlorothiazide 12.5 mg once a day    I checked her [employer] home blood pressure machine against a manual reading on the right upper arm, and the readings are pretty close, within 10 mmHg.    I told her that target blood pressure is 120/80 at rest and seated position measured on the right upper arm.  I reminded her that healthy diet particular less sodium, also regular exercise, and weight control will help with blood pressure, as well as treating sleep apnea     BP Readings from Last 6 Encounters:   11/29/23 131/81   07/21/23 127/73   07/03/23 (!) 147/84   06/25/23 (!) 140/63   06/25/23 122/78   06/23/23 126/60      Ganglion cyst dorsum right hand, which has become more uncomfortable, therefore we will have her see orthopedic  hand     Left knee osteoarthritis improved with Euflexxa injection.     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     Insomnia, KASANDRA is currently untreated--but she is in the process of scheduling a sleep study to be done sometime early 2023  She stopped CPAP because not tolerated  sleep study in 2017, which showed sleep apnea. Stopped CPAP, a year and a half ago, as she wasn't sleeping and it wasn't positive experience.      Hyperlipidemia, mildly elevated LDL, very generous HDL which is favorable  Lipid profile July 24, 2019 with total cholesterol 265, triglycerides 43, LDL bad cholesterol modestly elevated 141, but with a very generous level of the HDL good cholesterol 115  10-  Hemoglobin A1C <=5.6 % 5.3       Gastroesophageal reflux, uses Tums intermittently      Constipation that she has noticed since starting nortriptyline for neuropathy and restless legs-- no longer takes nortriptylene     Overweight with body mass index of 29.  She has been less physically active because of her lumbar spine issues.  She does water aerobics which is excellent.     Wt Readings from Last 5 Encounters:   11/29/23 82.3 kg (181 lb 8 oz)   07/21/23 84.4 kg (186 lb)   07/03/23 84.4 kg (186 lb)   06/25/23 81.6 kg (180 lb)   06/25/23 81.6 kg (180 lb)     Menopause, status post complete hysterectomy  Had complete hysterectomy at age in her 50s, has not needed to get Pap smears anymore  Result Date: 9/6/2022  BILATERAL FULL FIELD DIGITAL SCREENING MAMMOGRAM WITH TOMOSYNTHESIS      She recalls a colonoscopy that was done approximately at age 72     Got updated 2023/4 COVID-19 vaccine    Has received both pneumococcal vaccines  And recmbinant shingles vaccine        --------------------------------------------------------------------------------------------------------------------------  History of Present Illness  This 83 year old old     Posthospitalization visit and follow-up for multiple issues, after Chrissy underwent extensive lumbar spinal surgery 2023 at [facility].  Her operative time was about 3-1/2 hours.    Chrissy seems to have made a good recovery.  However she has not been able to do the focused rehabilitation as much as desired a plan, because she had the stress of caring for her older brother who became very ill in the summer into the ada  with rapidly progressive neurologic disease, and he  2023.    Chrissy has not been eating as carefully, not sleeping as well, gained some weight, and that probably contributed to the elevated blood pressure that we see today .    I am going to be adding a small dose of hydrochlorothiazide on top of her lisinopril, to try to improve her blood pressure control and also help with the ankle edema.    She is going to be resuming physical therapy that has been ordered by her surgeon Dr. Dereck Roman    DATE OF SERVICE: 2023  PREOPERATIVE DIAGNOSES:   1.  Severe spinal stenosis at L3-4 and L4-5  2.  L3-4 and L4-5 spondylolisthesis   3.  L3-4 and L4-5 degenerative disc disease  4.  Failure of conservative measures     POSTOPERATIVE DIAGNOSES:   1.  Severe spinal stenosis at L3-4 and L4-5  2.  L3-4 and L4-5 spondylolisthesis   3.  L3-4 and L4-5 degenerative disc disease  4.  Failure of conservative measures       PROCEDURE:   1. Right-sided transforaminal/transfacet decompression of the exiting L4 and traversing L5 nerve roots.   2. Transforaminal lumbar interbody fusion L4-5 with autograft bone, [employer] interbody long 9  3. Right-sided transforaminal/transfacet decompression of the exiting L3 and traversing L4 nerve roots.   4. Transforaminal lumbar interbody fusion L3-4 with  autograft bone, [employer] interbody long 9  5. Posterior lateral fusion on the left at L3-4 and L4-5.   6. Segmental pedicle screw fixation in the pedicles of L3, L4, and L5 bilaterally  7. Complete laminectomy at L3-4 and L4-5  8. O arm with intra operative navigation    History  Known Lumbar DDD (ESTEBAN 2005, for reported lumbar stenosis and spondylolisthesis and sciatica  Characteristic Pain Low back, both thighs, both calves. Worse when standing.  Difficulty climbing stairs. Most comfortable seated.  MRI 2012 had degenerating discs     Result Date: 8/29/2022  LUMBAR INTERLAMINAR EPIDURAL STEROID INJECTION WITH FLUOROSCOPIC      Right arm/shoulder pain, which Dr. Roman diagnosed as bicipital tendinitis.  I am putting in an order for Chrissy to receive physical therapy for the right biceps and shoulder, to be done at [facility] in [place].    Bilateral leg edema, which I think is probably from venous insufficiency, but this might be helped by getting her back on a little bit of diuretic in the form of hydrochlorothiazide 12.5 mg a day (used to take higher dose), and furthermore the hydrochlorothiazide might help with blood pressure    In addition to the hydrochlorothiazide, I reminded Chrissy to try to keep her legs elevated when not up and about, and also consider using compression stockings, available over-the-counter    Urinary bladder urgency, with history of hysterectomy done in her mid 50s  She may have some pelvic floor laxity.  I am not able to do a formal pelvic exam to assess that, but if she wants to pursue more pelvic floor precision diagnosis, she should see her gynecologist.    In the meantime, I suggested that Chrissy consider  - Scheduled voiding, emptying the bladder every hour while awake, so that it does not get too full and trigger urgency  - Also she can do pelvic floor exercises such as Kegels.    There is the option of using medication for bladder urgency such as oxybutynin, but that can have  anticholinergic side effects.  Chrissy would like to try to manage this without medication for starters    Distal symmetric polyneuropathy, small fiber peripheral neuropathy, also restless leg syndrome  Bilateral leg coldness leading to discomfort in the day and at night.   Lyrica used briefly, as of April 202 on nortriptyline, which seems to help  10/11/2021  Neurology: Cholo Khanna, DO  EMG through PM&R on 8/30/2019 with Dr. Izquierdo:  EMG on 8/30/2019 which showed an essentially normal study.  Comment NCS: Essentially normal study  Comment EMG: Normal study  1.  Normal needle EMG bilateral lower extremities.  Interpretation: Essentially normal study:   There is electrodiagnostic evidence of:  1.  Borderline bilateral sensory amplitudes.  This is normal for the patient's age is very likely normal finding.  I cannot, however, completely exclude a very mild sensory or sensorimotor peripheral polyneuropathy.   2 There is no electrodiagnostic evidence of lumbosacral radiculopathy, lumbosacral plexopathy, or focal neuropathy in the bilateral lower extremities.  Specifically, there is no electrodiagnostic evidence of tibial neuropathy at the tarsal tunnel in the bilateral lower extremities     Osteopenia, on Prolia (started approximately 2017)  Chrissy believes she got a Prolia injection in July 2022  RECHECK DEXA bone density scan, and then we can make a decision about either resuming Prolia, or switching to a bisphosphonate.  Bisphosphonate could be once a week oral alendronate, or once a year intravenous infusion of zoledronic acid (Reclast)     BONE DENSITY (DEXA)  12/13/2021 1:28 PM  HISTORY: Postmenopausal.  COMPARISON: 12/9/2019  TECHNIQUE: The study was performed using DEXA in the AP lumbar spine and AP femur.  In accordance with the ISCD (International Society of Clinical Densitometry), the lowest BMD between the total hip and  femoral neck will be used.   FINDINGS: Using DEXA, the results were reported  according to T-score. The T-score is the standard deviation from the peak bone mass in a normal young patient. A T-score of 0 to -1.0 is normal. A T-score of  -1.0 to -2.5 correlates with osteopenia. A T-score of less than -2.5 correlates with osteoporosis.       The L1-L2 T-score is 0 and I suspect false elevation at the other levels. The L1-L4 bone density (subtracting L3) is increased 6.3% compared with prior. The right femoral neck T-score is -1.8. The right total proximal femur bone density is increased 1% compared with prior.  The FRAX 10-year probability is 14.8% for major osteoporotic fracture and 4.1% for hip fracture. All treatment decisions require clinical judgment and consideration of individual patient factors which may not  be captured in the FRAX model and the risk of fracture may be over- or under-estimated by FRAX.                         IMPRESSION: Right femoral neck osteopenia.    Takes her calcium and vitamin D      Wt Readings from Last 3 Encounters:   11/29/23 82.3 kg (181 lb 8 oz)   07/21/23 84.4 kg (186 lb)   07/03/23 84.4 kg (186 lb)     BP Readings from Last 3 Encounters:   11/29/23 131/81   07/21/23 127/73   07/03/23 (!) 147/84       ---------------------------------------------------------------------------------------------------------------------------    Medications, Allergies, Social, and Problem List   MED REC REQUIRED  Post Medication Reconciliation Status: discharge medications reconciled and changed, per note/orders      Current Outpatient Medications   Medication Sig Dispense Refill    calcium carbonate-vitamin D3 600 mg (1,500 mg)-800 unit Chew [CALCIUM CARBONATE-VITAMIN D3 600 MG (1,500 MG)-800 UNIT CHEW] Chew 1 tablet daily.      famotidine (PEPCID) 20 MG tablet Take 1 tablet (20 mg) by mouth 2 times daily 60 tablet 4    lisinopril (ZESTRIL) 20 MG tablet TAKE 1 TABLET EVERY DAY 90 tablet 3    METHYLCELLULOSE (CITRUCEL ORAL) Take 1 tablet by mouth daily as needed       "pregabalin (LYRICA) 150 MG capsule Take 2 capsules (300 mg) by mouth 2 times daily 120 capsule 5    rOPINIRole (REQUIP) 0.25 MG tablet Take 1 tablet (0.25 mg) by mouth at bedtime 90 tablet 3    zolpidem (AMBIEN) 5 MG tablet Take 1 tablet (5 mg) by mouth nightly as needed for sleep 10 tablet 0     Allergies   Allergen Reactions    Duloxetine Headache     Nausea, difficult sleeping ankles cramps, loose bowel     Gabapentin Anxiety     Social History     Tobacco Use    Smoking status: Former     Years: 4     Types: Cigarettes     Passive exposure: Never    Smokeless tobacco: Never    Tobacco comments:     smoking in college-social   Vaping Use    Vaping Use: Never used   Substance Use Topics    Alcohol use: Yes     Alcohol/week: 3.3 standard drinks of alcohol     Types: 3 Standard drinks or equivalent per week     Comment: 2 glases /week    Drug use: No     Patient Active Problem List   Diagnosis    Osteoporosis    Hyperlipidemia    Essential hypertension    Primary insomnia    Trochanteric Bursitis    Lumbar Spondylosis    Lumbar Disc Degeneration    Headache    Osteoarthritis of the Knee    Polyarthralgia    Status post left hip replacement    Hip dislocation, left (H)    Bilateral shoulder pain    Polymyalgia (H24)    Primary osteoarthritis involving multiple joints    Chondrocalcinosis    KASANDRA (obstructive sleep apnea)    Other polyneuropathy    Spinal stenosis of lumbar region with neurogenic claudication    Infection due to 2019 novel coronavirus    Status post lumbar surgery        Reviewed, reconciled and updated       Physical Exam   General Appearance:       /81   Pulse 77   Resp 16   Ht 1.676 m (5' 6\")   Wt 82.3 kg (181 lb 8 oz)   LMP  (LMP Unknown)   SpO2 97%   BMI 29.29 kg/m      Chrissy appeared well today.  Note mildly elevated blood pressure, and I also validated her home machine against a manual reading and they are in pretty good agreement within about 10 mm    She was able to rise from seated " to standing, walks with a slightly forward stooped posture    Legs notable for 1+ bilateral edema, bit more on the right side, and some venous varicosities.  No skin ulcers.       Additional Information   I spent 40 minutes on this encounter, including reviewing interval history since last visit, examining the patient, explaining and counseling the issues enumerated in the Assessment and Plan (patient given a copy), ordering indicated tests, ordering prescriptions, ordering referrals.       RACHAEL PEDERSEN MD, MD

## 2023-12-12 ENCOUNTER — MYC MEDICAL ADVICE (OUTPATIENT)
Dept: INTERNAL MEDICINE | Facility: CLINIC | Age: 83
End: 2023-12-12
Payer: MEDICARE

## 2023-12-12 DIAGNOSIS — M81.0 AGE-RELATED OSTEOPOROSIS WITHOUT CURRENT PATHOLOGICAL FRACTURE: Primary | ICD-10-CM

## 2023-12-13 NOTE — TELEPHONE ENCOUNTER
See [employer] message, last dexascan done on 12/13/21. Order pended, routing to PCP for review.    Janny Smith RN  [facility]

## 2023-12-29 ENCOUNTER — TRANSFERRED RECORDS (OUTPATIENT)
Dept: HEALTH INFORMATION MANAGEMENT | Facility: CLINIC | Age: 83
End: 2023-12-29
Payer: MEDICARE

## 2024-01-15 ENCOUNTER — TELEPHONE (OUTPATIENT)
Dept: NEUROLOGY | Facility: CLINIC | Age: 84
End: 2024-01-15
Payer: MEDICARE

## 2024-01-15 NOTE — TELEPHONE ENCOUNTER
[facility] Call Center    Phone Message    May a detailed message be left on voicemail: yes     Reason for Call: Medication Question or concern regarding medication   Prescription Clarification  Name of Medication: pregabalin (LYRICA) 150 MG capsule   Prescribing Provider:        What on the order needs clarification?  [employer] called to speak to care team, needing clinical questions answered. Please have nurse call    P# [phone] HRS: 8am-8 pm    Ref [id number]    Action Taken: Message routed to:  [facility] ([facility] neurology    Travel Screening: n/a

## 2024-02-05 ENCOUNTER — OFFICE VISIT (OUTPATIENT)
Dept: NEUROLOGY | Facility: CLINIC | Age: 84
End: 2024-02-05
Payer: MEDICARE

## 2024-02-05 VITALS — SYSTOLIC BLOOD PRESSURE: 120 MMHG | DIASTOLIC BLOOD PRESSURE: 76 MMHG | HEART RATE: 72 BPM

## 2024-02-05 DIAGNOSIS — G25.81 RESTLESS LEGS SYNDROME (RLS): ICD-10-CM

## 2024-02-05 DIAGNOSIS — G62.89 OTHER POLYNEUROPATHY: ICD-10-CM

## 2024-02-05 PROCEDURE — 99213 OFFICE O/P EST LOW 20 MIN: CPT | Performed by: PSYCHIATRY & NEUROLOGY

## 2024-02-05 RX ORDER — ROPINIROLE 0.25 MG/1
0.25 TABLET, FILM COATED ORAL AT BEDTIME
Qty: 90 TABLET | Refills: 3 | Status: SHIPPED | OUTPATIENT
Start: 2024-02-05

## 2024-02-05 RX ORDER — PREGABALIN 150 MG/1
300 CAPSULE ORAL 2 TIMES DAILY
Qty: 120 CAPSULE | Refills: 5 | Status: SHIPPED | OUTPATIENT
Start: 2024-02-05 | End: 2024-08-28

## 2024-02-05 NOTE — PROGRESS NOTES
[facility] Neurology Follow Up Visit    Sheryl Trotter MRN# [id number]   Age: 83 year old YOB: 1940     Brief history of symptoms: The patient was initially seen in neurologic consultation on 10/11/2021 and most recently 10/20/2023 for evaluation of neuropathy. Please see the comprehensive neurologic consultation notes from those dates in the Epic records for details.     At our last visit, the patient was tolerating Lyrica 150/300 and was to use Ropinrole for RLS (0.25 mg at bedtime).  After our visit, I recommended she increase her Lyrica to 300 mg BID for trying to improve her paresthesias/numbness.    Today, the patient has noted some progression of her neuropathy.  Her right foot and leg is more involved now.  It feels more like a solid block.  There is no pain breakthrough of the neuropathy.  There is no definitive weakness, bu she does feel her balance and walking abilities are concerning.      She does tell me her chronic lower back pain improved after her fusion surgery in 6/2023;  PROCEDURE:   1. Right-sided transforaminal/transfacet decompression of the exiting L4 and traversing L5 nerve roots.   2. Transforaminal lumbar interbody fusion L4-5 with autograft bone, [employer] interbody long 9  3. Right-sided transforaminal/transfacet decompression of the exiting L3 and traversing L4 nerve roots.   4. Transforaminal lumbar interbody fusion L3-4 with autograft bone, [employer] interbody long 9  5. Posterior lateral fusion on the left at L3-4 and L4-5.   6. Segmental pedicle screw fixation in the pedicles of L3, L4, and L5 bilaterally  7. Complete laminectomy at L3-4 and L4-5     Physical Exam:   Vitals: /76 (BP Location: Right arm, Patient Position: Sitting)   Pulse 72   LMP  (LMP Unknown)    General: Seated comfortably in no acute distress.  Neurologic:     Mental Status: Fully alert, attentive and oriented. Speech clear and fluent, no paraphasic errors.     Cranial Nerves:  EOMI with normal smooth pursuit. Facial movements symmetric. Hearing not formally tested but intact to conversation.  No dysarthria.     Motor: No tremors or other abnormal movements observed.      Sensory: Positive Romberg. Vibration is absent at great toes and MM but present at knees (5-6 seconds b/l).      Coordination: Finger-nose-finger without dysmetria.     Gait: Normal, steady casual gait. Tandem walking leads to falls in either direction.  Turns easily without issue.         Assessment and Plan:   Assessment:  - Peripheral neuropathy  - RLS    The patient's neuropathy is continuing to spread but is without dysesthesia. 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  She is a bit overwhelmed at her PT and rehab at this time, but will consider a referral to go over balance and devices to aide in balance in the future.  Her RLS symptoms are well under control, and she is having no major side-effects of Lyrica at this time.  We discussed what Lyrica toxicity may look like and what may bring it about (kidney related injury).    Plan:  - Ropinrole 0.25 mg at bedtime  - Lyrica 300 mg BID    Follow up in Neurology clinic in one year, or earlier as needed should new concerns arise.    NISA Khanna D.O.   of Neurology    Total time today (25 min) in this patient encounter was spent on pre-charting, counseling and/or coordination of care.

## 2024-02-05 NOTE — NURSING NOTE
Chief Complaint   Patient presents with    Follow Up     Return       Rebecca Julian MA on 2/5/2024 at 1:29 PM

## 2024-02-05 NOTE — PATIENT INSTRUCTIONS
I think your neuropathy is somewhat stable in terms of your exam, however your walking is hesitant and you are still a fall risk given this neuropathy.  I would recommend you use a balance device when walking in most situations to help avoid falls.    Otherwise, continue with lyrica and ropinrole for symptoms management of the neuropathy and restless leg syndrome.

## 2024-02-05 NOTE — LETTER
2/5/2024         RE: Sheryl Trotter  [address]  [place] [address]        Dear Colleague,    Thank you for referring your patient, Sheryl Trotter, to the [facility] NEUROLOGY CLINIC [facility]. Please see a copy of my visit note below.    [facility] Neurology Follow Up Visit    Sheryl Trotter MRN# [id number]   Age: 83 year old YOB: 1940     Brief history of symptoms: The patient was initially seen in neurologic consultation on 10/11/2021 and most recently 10/20/2023 for evaluation of neuropathy. Please see the comprehensive neurologic consultation notes from those dates in the Epic records for details.     At our last visit, the patient was tolerating Lyrica 150/300 and was to use Ropinrole for RLS (0.25 mg at bedtime).  After our visit, I recommended she increase her Lyrica to 300 mg BID for trying to improve her paresthesias/numbness.    Today, the patient has noted some progression of her neuropathy.  Her right foot and leg is more involved now.  It feels more like a solid block.  There is no pain breakthrough of the neuropathy.  There is no definitive weakness, bu she does feel her balance and walking abilities are concerning.      She does tell me her chronic lower back pain improved after her fusion surgery in 6/2023;  PROCEDURE:   1. Right-sided transforaminal/transfacet decompression of the exiting L4 and traversing L5 nerve roots.   2. Transforaminal lumbar interbody fusion L4-5 with autograft bone, [employer] interbody long 9  3. Right-sided transforaminal/transfacet decompression of the exiting L3 and traversing L4 nerve roots.   4. Transforaminal lumbar interbody fusion L3-4 with autograft bone, [employer] interbody long 9  5. Posterior lateral fusion on the left at L3-4 and L4-5.   6. Segmental pedicle screw fixation in the pedicles of L3, L4, and L5 bilaterally  7. Complete laminectomy at L3-4 and L4-5     Physical Exam:   Vitals: /76  (BP Location: Right arm, Patient Position: Sitting)   Pulse 72   LMP  (LMP Unknown)    General: Seated comfortably in no acute distress.  Neurologic:     Mental Status: Fully alert, attentive and oriented. Speech clear and fluent, no paraphasic errors.     Cranial Nerves: EOMI with normal smooth pursuit. Facial movements symmetric. Hearing not formally tested but intact to conversation.  No dysarthria.     Motor: No tremors or other abnormal movements observed.      Sensory: Positive Romberg. Vibration is absent at great toes and MM but present at knees (5-6 seconds b/l).      Coordination: Finger-nose-finger without dysmetria.     Gait: Normal, steady casual gait. Tandem walking leads to falls in either direction.  Turns easily without issue.         Assessment and Plan:   Assessment:  - Peripheral neuropathy  - RLS    The patient's neuropathy is continuing to spread but is without dysesthesia. 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  She is a bit overwhelmed at her PT and rehab at this time, but will consider a referral to go over balance and devices to aide in balance in the future.  Her RLS symptoms are well under control, and she is having no major side-effects of Lyrica at this time.  We discussed what Lyrica toxicity may look like and what may bring it about (kidney related injury).    Plan:  - Ropinrole 0.25 mg at bedtime  - Lyrica 300 mg BID    Follow up in Neurology clinic in one year, or earlier as needed should new concerns arise.    NISA Khanna D.O.   of Neurology    Total time today (25 min) in this patient encounter was spent on pre-charting, counseling and/or coordination of care.       Again, thank you for allowing me to participate in the care of your patient.        Sincerely,        Cholo Khanna, DO

## 2024-02-07 ENCOUNTER — ANCILLARY PROCEDURE (OUTPATIENT)
Dept: BONE DENSITY | Facility: CLINIC | Age: 84
End: 2024-02-07
Attending: INTERNAL MEDICINE
Payer: MEDICARE

## 2024-02-07 DIAGNOSIS — Z78.0 POSTMENOPAUSAL STATUS: ICD-10-CM

## 2024-02-07 DIAGNOSIS — M81.0 AGE-RELATED OSTEOPOROSIS WITHOUT CURRENT PATHOLOGICAL FRACTURE: ICD-10-CM

## 2024-02-07 PROCEDURE — 77081 DXA BONE DENSITY APPENDICULR: CPT | Mod: TC | Performed by: PHYSICIAN ASSISTANT

## 2024-02-07 PROCEDURE — 77080 DXA BONE DENSITY AXIAL: CPT | Mod: TC | Performed by: PHYSICIAN ASSISTANT

## 2024-02-12 ENCOUNTER — MYC MEDICAL ADVICE (OUTPATIENT)
Dept: INTERNAL MEDICINE | Facility: CLINIC | Age: 84
End: 2024-02-12
Payer: MEDICARE

## 2024-02-12 DIAGNOSIS — M81.0 AGE-RELATED OSTEOPOROSIS WITHOUT CURRENT PATHOLOGICAL FRACTURE: Primary | ICD-10-CM

## 2024-02-12 RX ORDER — ALENDRONATE SODIUM 70 MG/1
70 TABLET ORAL
Qty: 12 TABLET | Refills: 3 | Status: SHIPPED | OUTPATIENT
Start: 2024-02-12

## 2024-02-12 NOTE — TELEPHONE ENCOUNTER
Dr. Thompson's last OV note 11/29/2023:    Osteopenia, on Prolia (started approximately 2017)  Chrissy believes she got a Prolia injection in July 2022  RECHECK DEXA bone density scan, and then we can make a decision about either resuming Prolia, or switching to a bisphosphonate.  Bisphosphonate could be once a week oral alendronate, or once a year intravenous infusion of zoledronic acid (Reclast)

## 2024-02-15 ENCOUNTER — MYC MEDICAL ADVICE (OUTPATIENT)
Dept: INTERNAL MEDICINE | Facility: CLINIC | Age: 84
End: 2024-02-15
Payer: MEDICARE

## 2024-02-15 DIAGNOSIS — M25.511 CHRONIC RIGHT SHOULDER PAIN: Primary | ICD-10-CM

## 2024-02-15 DIAGNOSIS — G89.29 CHRONIC RIGHT SHOULDER PAIN: Primary | ICD-10-CM

## 2024-02-22 ENCOUNTER — ANCILLARY PROCEDURE (OUTPATIENT)
Dept: GENERAL RADIOLOGY | Facility: CLINIC | Age: 84
End: 2024-02-22
Attending: INTERNAL MEDICINE
Payer: MEDICARE

## 2024-02-22 DIAGNOSIS — M25.511 CHRONIC RIGHT SHOULDER PAIN: ICD-10-CM

## 2024-02-22 DIAGNOSIS — G89.29 CHRONIC RIGHT SHOULDER PAIN: ICD-10-CM

## 2024-02-22 PROCEDURE — 73030 X-RAY EXAM OF SHOULDER: CPT | Mod: TC | Performed by: RADIOLOGY

## 2024-03-06 ENCOUNTER — MYC MEDICAL ADVICE (OUTPATIENT)
Dept: NEUROLOGY | Facility: CLINIC | Age: 84
End: 2024-03-06
Payer: MEDICARE

## 2024-03-07 ENCOUNTER — VIRTUAL VISIT (OUTPATIENT)
Dept: NEUROLOGY | Facility: CLINIC | Age: 84
End: 2024-03-07
Payer: MEDICARE

## 2024-03-07 DIAGNOSIS — G25.81 RESTLESS LEGS SYNDROME (RLS): Primary | ICD-10-CM

## 2024-03-07 PROCEDURE — 99214 OFFICE O/P EST MOD 30 MIN: CPT | Mod: 95 | Performed by: PSYCHIATRY & NEUROLOGY

## 2024-03-07 RX ORDER — FERROUS SULFATE 325(65) MG
325 TABLET ORAL
Qty: 30 TABLET | Refills: 0 | Status: SHIPPED | OUTPATIENT
Start: 2024-03-07 | End: 2024-03-19

## 2024-03-07 NOTE — NURSING NOTE
Is the patient currently in the state of [place]? YES    Visit mode:VIDEO    If the visit is dropped, the patient can be reconnected by: VIDEO VISIT: Send to e-mail at: [email or web address]    Will anyone else be joining the visit? NO  (If patient encounters technical issues they should call [phone]    How would you like to obtain your AVS? MyChart    Are changes needed to the allergy or medication list? No    Reason for visit: Follow Up    Ashlee MARIE

## 2024-03-07 NOTE — PROGRESS NOTES
Virtual Visit Details    Type of service:  Video Visit   Video Start Time:  0758  Video End Time:8:18 AM    Originating Location (pt. Location): Home    Distant Location (provider location):  On-site  Platform used for Video Visit: Dre

## 2024-03-07 NOTE — LETTER
3/7/2024       RE: Sheryl Trotter  [address]  [place] [address]     Dear Colleague,    Thank you for referring your patient, Sheryl Trotter, to the [facility] NEUROLOGY CLINIC [place] at [facility]. Please see a copy of my visit note below.    [facility] Neurology Follow Up Visit    Sheryl Trotter MRN# [id number]   Age: 83 year old YOB: 1940     Brief history of symptoms: The patient was initially seen in neurologic consultation on 10/11/2021 and most recently 10/20/2023 for evaluation of polyneuruopathy. Please see the comprehensive neurologic consultation notes from those dates in the Epic records for details.     The patient's symptoms of both neuropathy and RLS were well controlled at our last visit, and she was to trial a higher dose of lyrica in the PM to see if her remaining neuropathy symptoms of paresthesia could be reduced.    Today, the patient reports that she has had poor sleep recently.  She tells me that she has had restless nights, often waking up from pain in her legs.  She also notes that prior to sleep, in the evening while sitting and reading or while laying in bed, her RLS symptoms are worsened.  She takes her Lyrica right before sleep.  She is not on an iron supplement.      Physical Exam:   General: Seated comfortably in no acute distress.  HEENT: Neck supple with normal range of motion.   Skin: No rashes  Neurologic:     Mental Status: Fully alert, attentive and oriented. Speech clear and fluent, no paraphasic errors.     Cranial Nerves: EOMI with normal smooth pursuit. Facial movements symmetric. Hearing not formally tested but intact to conversation.  No dysarthria.     Motor: No tremors or other abnormal movements observed.          Assessment and Plan:   Assessment:  RLS    The patient has worsened RLS symptoms at times, but has other times when the symptoms are improved.  There is no clear worsening  trigger in her life. She does take her medications at bedtime, which could be altered to the effective dose is peaking at bedtime instead of being delayed. She is not on iron supplementation, and her prior iron and iron saturation were low.     Plan:  Ferrous sulfate (65 mg elemental iron) once daily  Lyrica to be taken an hour earlier  Discuss symptoms in a few weeks, if not improved, we will increase the ropinrole to 0.5 mg QHS    Follow up in Neurology clinic in 6 weeks or so, or earlier as needed should new concerns arise.      Total time today (30 min) in this patient encounter was spent on pre-charting, counseling and/or coordination of care.       Again, thank you for allowing me to participate in the care of your patient.      Sincerely,    Cholo Khanna, DO

## 2024-03-07 NOTE — PROGRESS NOTES
[facility] Neurology Follow Up Visit    Sheryl Trotter MRN# [id number]   Age: 83 year old YOB: 1940     Brief history of symptoms: The patient was initially seen in neurologic consultation on 10/11/2021 and most recently 10/20/2023 for evaluation of polyneuruopathy. Please see the comprehensive neurologic consultation notes from those dates in the Epic records for details.     The patient's symptoms of both neuropathy and RLS were well controlled at our last visit, and she was to trial a higher dose of lyrica in the PM to see if her remaining neuropathy symptoms of paresthesia could be reduced.    Today, the patient reports that she has had poor sleep recently.  She tells me that she has had restless nights, often waking up from pain in her legs.  She also notes that prior to sleep, in the evening while sitting and reading or while laying in bed, her RLS symptoms are worsened.  She takes her Lyrica right before sleep.  She is not on an iron supplement.      Physical Exam:   General: Seated comfortably in no acute distress.  HEENT: Neck supple with normal range of motion.   Skin: No rashes  Neurologic:     Mental Status: Fully alert, attentive and oriented. Speech clear and fluent, no paraphasic errors.     Cranial Nerves: EOMI with normal smooth pursuit. Facial movements symmetric. Hearing not formally tested but intact to conversation.  No dysarthria.     Motor: No tremors or other abnormal movements observed.          Assessment and Plan:   Assessment:  RLS    The patient has worsened RLS symptoms at times, but has other times when the symptoms are improved.  There is no clear worsening trigger in her life. She does take her medications at bedtime, which could be altered to the effective dose is peaking at bedtime instead of being delayed. She is not on iron supplementation, and her prior iron and iron saturation were low.     Plan:  Ferrous sulfate (65 mg elemental iron) once daily  Lyrica to be taken an  hour earlier  Discuss symptoms in a few weeks, if not improved, we will increase the ropinrole to 0.5 mg QHS    Follow up in Neurology clinic in 6 weeks or so, or earlier as needed should new concerns arise.    NISA Khanna D.O.   of Neurology    Total time today (30 min) in this patient encounter was spent on pre-charting, counseling and/or coordination of care.

## 2024-03-19 DIAGNOSIS — G25.81 RESTLESS LEGS SYNDROME (RLS): ICD-10-CM

## 2024-03-19 RX ORDER — FERROUS SULFATE 325(65) MG
325 TABLET ORAL
Qty: 30 TABLET | Refills: 3 | Status: SHIPPED | OUTPATIENT
Start: 2024-03-19 | End: 2024-09-18

## 2024-05-09 ENCOUNTER — OFFICE VISIT (OUTPATIENT)
Dept: INTERNAL MEDICINE | Facility: CLINIC | Age: 84
End: 2024-05-09
Payer: MEDICARE

## 2024-05-09 VITALS
DIASTOLIC BLOOD PRESSURE: 66 MMHG | TEMPERATURE: 98.6 F | SYSTOLIC BLOOD PRESSURE: 132 MMHG | HEART RATE: 73 BPM | OXYGEN SATURATION: 97 % | HEIGHT: 66 IN | WEIGHT: 184 LBS | RESPIRATION RATE: 16 BRPM | BODY MASS INDEX: 29.57 KG/M2

## 2024-05-09 DIAGNOSIS — H90.6 MIXED CONDUCTIVE AND SENSORINEURAL HEARING LOSS OF BOTH EARS: ICD-10-CM

## 2024-05-09 DIAGNOSIS — R29.818 SUSPECTED SLEEP APNEA: ICD-10-CM

## 2024-05-09 DIAGNOSIS — H61.21 IMPACTED CERUMEN OF RIGHT EAR: Primary | ICD-10-CM

## 2024-05-09 PROCEDURE — 99213 OFFICE O/P EST LOW 20 MIN: CPT | Performed by: INTERNAL MEDICINE

## 2024-05-09 NOTE — PATIENT INSTRUCTIONS
Acute symptom visit, sooner needed per years addressed today    Wax partially occluding the right tympanic canal but really not that bad, we will wash that out today, and then going forward I suggested that Chrissy use over-the-counter wax dissolving eardrop, for example Debrox or Murine, maybe once a week to keep the ears clear    The left side look totally clear today May 9, 2024.    The reason she came in is because she went to get her hearing checked at [facility], and they told her that her right ear was clogged.    She would like to see [facility] audiology, so enter that request.    We also briefly discussed the fact that    Chrissy is still experiencing daytime fatigue, and pains that are attributable to her peripheral neuropathy and shoulder rotator cuff trouble    I urged her to move forward with the sleep clinic consultation because I suspect she has obstructive sleep apnea.  She is very hesitant about using a CPAP machine, but maybe the sleep consultant can convince her.

## 2024-05-09 NOTE — PROGRESS NOTES
"  Subjective   Chrissy is a 83 year old, presenting for the following health issues:  Cerumen Impaction (Right ear)      5/9/2024     9:36 AM   Additional Questions   Roomed by Anyi     History of Present Illness       Reason for visit:  Removal of ear wax in right ear    She eats 4 or more servings of fruits and vegetables daily.She consumes 0 sweetened beverage(s) daily.She exercises with enough effort to increase her heart rate 9 or less minutes per day.  She exercises with enough effort to increase her heart rate 3 or less days per week.   She is taking medications regularly.         Objective    /66 (BP Location: Right arm, Patient Position: Sitting, Cuff Size: Adult Regular)   Pulse 73   Temp 98.6  F (37  C)   Resp 16   Ht 1.676 m (5' 6\")   Wt 83.5 kg (184 lb)   LMP  (LMP Unknown)   SpO2 97%   BMI 29.70 kg/m    Body mass index is 29.7 kg/m .  Physical Exam     ASSESSMENT AND PLAN    Acute symptom visit, sooner needed per years addressed today    Wax partially occluding the right tympanic canal but really not that bad, we will wash that out today, and then going forward I suggested that Chrissy use over-the-counter wax dissolving eardrop, for example Debrox or Murine, maybe once a week to keep the ears clear    The left side look totally clear today May 9, 2024.    The reason she came in is because she went to get her hearing [facility] at [facility], and they told her that her right ear was clogged.    She would like to see [facility] audiology, so enter that request.    We also briefly discussed the fact that    Chrissy is still experiencing daytime fatigue, and pains that are attributable to her peripheral neuropathy and shoulder rotator cuff trouble    I urged her to move forward with the sleep clinic consultation because I suspect she has obstructive sleep apnea.  She is very hesitant about using a CPAP machine, but maybe the sleep consultant can convince her.      Signed Electronically by: RACHAEL PIÑA" MD NUVIA

## 2024-06-24 ENCOUNTER — TRANSFERRED RECORDS (OUTPATIENT)
Dept: HEALTH INFORMATION MANAGEMENT | Facility: CLINIC | Age: 84
End: 2024-06-24
Payer: MEDICARE

## 2024-08-02 ENCOUNTER — TRANSFERRED RECORDS (OUTPATIENT)
Dept: HEALTH INFORMATION MANAGEMENT | Facility: CLINIC | Age: 84
End: 2024-08-02
Payer: MEDICARE

## 2024-08-21 DIAGNOSIS — I10 ESSENTIAL HYPERTENSION: ICD-10-CM

## 2024-08-21 RX ORDER — LISINOPRIL 20 MG/1
TABLET ORAL
Qty: 90 TABLET | Refills: 3 | Status: SHIPPED | OUTPATIENT
Start: 2024-08-21

## 2024-08-23 ENCOUNTER — TRANSFERRED RECORDS (OUTPATIENT)
Dept: HEALTH INFORMATION MANAGEMENT | Facility: CLINIC | Age: 84
End: 2024-08-23
Payer: MEDICARE

## 2024-08-28 ENCOUNTER — MYC MEDICAL ADVICE (OUTPATIENT)
Dept: NEUROLOGY | Facility: CLINIC | Age: 84
End: 2024-08-28
Payer: MEDICARE

## 2024-08-28 DIAGNOSIS — G62.89 OTHER POLYNEUROPATHY: ICD-10-CM

## 2024-08-28 RX ORDER — PREGABALIN 150 MG/1
300 CAPSULE ORAL 2 TIMES DAILY
Qty: 120 CAPSULE | Refills: 5 | Status: SHIPPED | OUTPATIENT
Start: 2024-08-28

## 2024-09-18 DIAGNOSIS — G25.81 RESTLESS LEGS SYNDROME (RLS): ICD-10-CM

## 2024-09-18 DIAGNOSIS — I10 ESSENTIAL HYPERTENSION: ICD-10-CM

## 2024-09-18 RX ORDER — HYDROCHLOROTHIAZIDE 12.5 MG/1
12.5 TABLET ORAL DAILY
Qty: 90 TABLET | Refills: 3 | OUTPATIENT
Start: 2024-09-18

## 2024-09-18 RX ORDER — FERROUS SULFATE 325(65) MG
325 TABLET ORAL
Qty: 30 TABLET | Refills: 3 | Status: SHIPPED | OUTPATIENT
Start: 2024-09-18

## 2024-09-18 NOTE — CONFIDENTIAL NOTE
RX Authorization    Medication:  ferrous sulfate oral tab 325 (65 fe) mg    Date last refill ordered:  06/24/2024    Quantity ordered:  30    # refills: 3    Date of last clinic visit with ordering provider:  03/07/2024    Date of next clinic visit with ordering provider:  10/21/2024    All pertinent protocol data (lab date/result):    Include pertinent information from patients message:

## 2024-09-23 ENCOUNTER — HOSPITAL ENCOUNTER (OUTPATIENT)
Dept: MAMMOGRAPHY | Facility: CLINIC | Age: 84
Discharge: HOME | End: 2024-09-23
Attending: INTERNAL MEDICINE | Admitting: INTERNAL MEDICINE
Payer: MEDICARE

## 2024-09-23 DIAGNOSIS — Z12.31 VISIT FOR SCREENING MAMMOGRAM: ICD-10-CM

## 2024-09-23 PROCEDURE — 77063 BREAST TOMOSYNTHESIS BI: CPT

## 2024-09-28 ENCOUNTER — HEALTH MAINTENANCE LETTER (OUTPATIENT)
Age: 84
End: 2024-09-28

## 2024-10-08 ENCOUNTER — TRANSFERRED RECORDS (OUTPATIENT)
Dept: HEALTH INFORMATION MANAGEMENT | Facility: CLINIC | Age: 84
End: 2024-10-08
Payer: MEDICARE

## 2024-10-21 ENCOUNTER — OFFICE VISIT (OUTPATIENT)
Dept: NEUROLOGY | Facility: CLINIC | Age: 84
End: 2024-10-21
Payer: MEDICARE

## 2024-10-21 VITALS — SYSTOLIC BLOOD PRESSURE: 143 MMHG | DIASTOLIC BLOOD PRESSURE: 83 MMHG | HEART RATE: 87 BPM

## 2024-10-21 DIAGNOSIS — G25.81 RESTLESS LEGS SYNDROME (RLS): ICD-10-CM

## 2024-10-21 DIAGNOSIS — G62.89 OTHER POLYNEUROPATHY: ICD-10-CM

## 2024-10-21 PROCEDURE — 99214 OFFICE O/P EST MOD 30 MIN: CPT | Performed by: PSYCHIATRY & NEUROLOGY

## 2024-10-21 PROCEDURE — G2211 COMPLEX E/M VISIT ADD ON: HCPCS | Performed by: PSYCHIATRY & NEUROLOGY

## 2024-10-21 RX ORDER — FERROUS SULFATE 325(65) MG
325 TABLET ORAL
Qty: 30 TABLET | Refills: 3 | Status: SHIPPED | OUTPATIENT
Start: 2024-10-21

## 2024-10-21 RX ORDER — ROPINIROLE 0.25 MG/1
0.5 TABLET, FILM COATED ORAL AT BEDTIME
Qty: 90 TABLET | Refills: 3 | Status: SHIPPED | OUTPATIENT
Start: 2024-10-21

## 2024-10-21 RX ORDER — ROPINIROLE 0.25 MG/1
0.25 TABLET, FILM COATED ORAL AT BEDTIME
Qty: 90 TABLET | Refills: 3 | Status: SHIPPED | OUTPATIENT
Start: 2024-10-21 | End: 2024-10-21

## 2024-10-21 RX ORDER — PREGABALIN 150 MG/1
300 CAPSULE ORAL 2 TIMES DAILY
Qty: 120 CAPSULE | Refills: 5 | Status: SHIPPED | OUTPATIENT
Start: 2024-10-21

## 2024-10-21 NOTE — LETTER
10/21/2024      Sheryl Trotter  [address]  [place] [address]      Dear Colleague,    Thank you for referring your patient, Shreyl Trotter, to the [facility] NEUROLOGY CLINIC [facility]. Please see a copy of my visit note below.    [facility] Neurology Follow Up Visit    Sheryl Trotter MRN# [id number]   Age: 84 year old YOB: 1940     Brief history of symptoms: The patient was initially seen in neurologic consultation on 10/11/2021 and most recently 3/7/2024 for evaluation of polyneuropathy and RLS. Please see the comprehensive neurologic consultation notes from those dates in the Epic records for details.     The patient was tolerating Lyrica for treatment of her RLS and neuropathy at our last visit. She was to trial ferrous sulfate supplement, taking lyrica an hour earlier, and considering ropinrole for additional RLS management.    Today, the patient still has pain and discomfort following a fall on 7/4/2024 (went out on uneven side-walk in the dark to look at fireworks and tripped and fell).  She has subsequently had SI joint injections on the left with her orthopedics provider, which have been helpful.  Her RLS is still occurring, even when taking pregabalin 300 BID, ropinrole 0.25 mg at bedtime, and ferrous sulfate supplementation.  She had an incident where she missed her two doses of pregabalin she had worsened nausea and movements.     Physical Exam:   Vitals: BP (!) 143/83 (BP Location: Right arm, Patient Position: Sitting)   Pulse 87   LMP  (LMP Unknown)    General: Seated comfortably in no acute distress.  Neurologic:     Mental Status: Fully alert, attentive and oriented. Speech clear and fluent, no paraphasic errors.     Cranial Nerves: EOMI with normal smooth pursuit. Facial movements symmetric. Hearing not formally tested but intact to conversation.  No dysarthria.     Motor: No tremors or other abnormal movements observed.      DTR: 2+ on biceps,  brachioradialis, triceps. Patellar is 2+ symmetric, but achilles is absent b/l.     Sensory:Positive Romberg. Vibration is about 1-2 seconds at great toes, and 2-5 seconds in MM with repeated attempts.     Coordination: Finger-nose-finger without dysmetria.     Gait: Stands easily. Tandem walking is poor and with notable sensorimotor ataxia with steps.         Assessment and Plan:   Assessment:  RLS, continued  Neuropathy, idiopathic - distal symmetric    The patient has RLS symptoms still breaking through, and is on maximum dosing of lyrica as well as begning dosing of Ropinirole.  I think increasing her Ropinirole is okay for now, with caution to avoid side-effects by slowly increasing the dosing.  For her neuropathy, she is tolerating Lyrica and doesn't have breakthrough pain, but does have notable sensorimotor ataxia and a fall in the summer of 2024.  I think a gait evaluation with PT is needed, for consideration of cane or ambulation devices and fall risk.     Plan:  - Lyrica 300 mg BID (For both neuropathy and RLS)  - Increase ropinirole 0.5 mg at bedtime (take 1 hours prior to bed)  - Continue with ferrous sulfate supplementation  - PT for gait/walker evaluation    Follow up in Neurology clinic in 6 months (video), or earlier as needed should new concerns arise.    NISA Khanna D.O.   of Neurology    Total time today (35 min) in this patient encounter was spent on pre-charting, counseling and/or coordination of care. The longitudinal plan of care for the diagnosis(es)/condition(s) as documented were addressed during this visit. Due to the added complexity in care, I will continue to support Chrissy in the subsequent management and with ongoing continuity of care.        Again, thank you for allowing me to participate in the care of your patient.        Sincerely,        Cholo Khanna, DO

## 2024-10-21 NOTE — PROGRESS NOTES
[facility] Neurology Follow Up Visit    Sheryl Trotter MRN# [id number]   Age: 84 year old YOB: 1940     Brief history of symptoms: The patient was initially seen in neurologic consultation on 10/11/2021 and most recently 3/7/2024 for evaluation of polyneuropathy and RLS. Please see the comprehensive neurologic consultation notes from those dates in the Epic records for details.     The patient was tolerating Lyrica for treatment of her RLS and neuropathy at our last visit. She was to trial ferrous sulfate supplement, taking lyrica an hour earlier, and considering ropinrole for additional RLS management.    Today, the patient still has pain and discomfort following a fall on 7/4/2024 (went out on uneven side-walk in the dark to look at fireworks and tripped and fell).  She has subsequently had SI joint injections on the left with her orthopedics provider, which have been helpful.  Her RLS is still occurring, even when taking pregabalin 300 BID, ropinrole 0.25 mg at bedtime, and ferrous sulfate supplementation.  She had an incident where she missed her two doses of pregabalin she had worsened nausea and movements.     Physical Exam:   Vitals: BP (!) 143/83 (BP Location: Right arm, Patient Position: Sitting)   Pulse 87   LMP  (LMP Unknown)    General: Seated comfortably in no acute distress.  Neurologic:     Mental Status: Fully alert, attentive and oriented. Speech clear and fluent, no paraphasic errors.     Cranial Nerves: EOMI with normal smooth pursuit. Facial movements symmetric. Hearing not formally tested but intact to conversation.  No dysarthria.     Motor: No tremors or other abnormal movements observed.      DTR: 2+ on biceps, brachioradialis, triceps. Patellar is 2+ symmetric, but achilles is absent b/l.     Sensory:Positive Romberg. Vibration is about 1-2 seconds at great toes, and 2-5 seconds in MM with repeated attempts.     Coordination: Finger-nose-finger without dysmetria.     Gait: Stands  easily. Tandem walking is poor and with notable sensorimotor ataxia with steps.         Assessment and Plan:   Assessment:  RLS, continued  Neuropathy, idiopathic - distal symmetric    The patient has RLS symptoms still breaking through, and is on maximum dosing of lyrica as well as begning dosing of Ropinirole.  I think increasing her Ropinirole is okay for now, with caution to avoid side-effects by slowly increasing the dosing.  For her neuropathy, she is tolerating Lyrica and doesn't have breakthrough pain, but does have notable sensorimotor ataxia and a fall in the summer of 2024.  I think a gait evaluation with PT is needed, for consideration of cane or ambulation devices and fall risk.     Plan:  - Lyrica 300 mg BID (For both neuropathy and RLS)  - Increase ropinirole 0.5 mg at bedtime (take 1 hours prior to bed)  - Continue with ferrous sulfate supplementation  - PT for gait/walker evaluation    Follow up in Neurology clinic in 6 months (video), or earlier as needed should new concerns arise.    NISA Khanna D.O.   of Neurology    Total time today (35 min) in this patient encounter was spent on pre-charting, counseling and/or coordination of care. The longitudinal plan of care for the diagnosis(es)/condition(s) as documented were addressed during this visit. Due to the added complexity in care, I will continue to support Chrissy in the subsequent management and with ongoing continuity of care.

## 2024-10-21 NOTE — NURSING NOTE
Chief Complaint   Patient presents with    Follow Up     Return       Rebecca Julian MA on 10/21/2024 at 10:50 AM

## 2024-10-21 NOTE — PATIENT INSTRUCTIONS
For RLS and neuropathy:  - Lyrica 300 mg twice  - increase ropinirole to 0.5 mg at bedtime      For imbalance:  - PT for walker/cane

## 2024-10-26 DIAGNOSIS — I10 ESSENTIAL HYPERTENSION: ICD-10-CM

## 2024-10-28 RX ORDER — HYDROCHLOROTHIAZIDE 12.5 MG/1
12.5 TABLET ORAL DAILY
Qty: 90 TABLET | Refills: 3 | Status: SHIPPED | OUTPATIENT
Start: 2024-10-28

## 2025-01-18 ENCOUNTER — OFFICE VISIT (OUTPATIENT)
Dept: URGENT CARE | Facility: URGENT CARE | Age: 85
End: 2025-01-18
Payer: MEDICARE

## 2025-01-18 VITALS
DIASTOLIC BLOOD PRESSURE: 79 MMHG | TEMPERATURE: 97.9 F | RESPIRATION RATE: 16 BRPM | SYSTOLIC BLOOD PRESSURE: 142 MMHG | HEART RATE: 65 BPM | OXYGEN SATURATION: 97 % | WEIGHT: 178 LBS | BODY MASS INDEX: 28.73 KG/M2

## 2025-01-18 DIAGNOSIS — L60.0 ONYCHOCRYPTOSIS: ICD-10-CM

## 2025-01-18 DIAGNOSIS — L03.031 PARONYCHIA OF TOE, RIGHT: Primary | ICD-10-CM

## 2025-01-18 PROCEDURE — 99213 OFFICE O/P EST LOW 20 MIN: CPT | Performed by: PHYSICIAN ASSISTANT

## 2025-01-18 RX ORDER — CEFADROXIL 500 MG/1
500 CAPSULE ORAL 2 TIMES DAILY
Qty: 20 CAPSULE | Refills: 0 | Status: SHIPPED | OUTPATIENT
Start: 2025-01-18 | End: 2025-01-28

## 2025-01-18 NOTE — PATIENT INSTRUCTIONS
Follow-up with podiatry for treatment of the ingrown toenail    Hot packs to the area 3 times per day 20 minutes on each hour.  Do not fall asleep with the hot packs on the skin.  Take the antibiotic as written  Symptoms should not be getting worse over the next 24 hours after taking the antibiotic.  Should have start of improvement after 48 hours on the antibiotic  Use of probiotics to help prevent diarrhea.  Separate dosing of the antibiotic from the probiotic by 2 hours or they are not in the stomach at the same time.  Use of over-the-counter yogurt for helping with stomach upset and diarrhea.  Return to clinic if not getting good resolution or if new symptoms or concerns arise.

## 2025-01-18 NOTE — PROGRESS NOTES
Patient presents with:  Toe Pain: Right big toe.      Clinical Decision Making:  Patient is treated for paronychia of the right hallux with Duricef and is referred to podiatry for definitive evaluation and treatment of onychocryptosis of the tibial border of the right hallux nail.       ICD-10-CM    1. Paronychia of toe, right  L03.031 cefadroxil (DURICEF) 500 MG capsule      2. Onychocryptosis  L60.0 Orthopedic  Referral          Patient Instructions   Follow-up with podiatry for treatment of the ingrown toenail    Hot packs to the area 3 times per day 20 minutes on each hour.  Do not fall asleep with the hot packs on the skin.  Take the antibiotic as written  Symptoms should not be getting worse over the next 24 hours after taking the antibiotic.  Should have start of improvement after 48 hours on the antibiotic  Use of probiotics to help prevent diarrhea.  Separate dosing of the antibiotic from the probiotic by 2 hours or they are not in the stomach at the same time.  Use of over-the-counter yogurt for helping with stomach upset and diarrhea.  Return to clinic if not getting good resolution or if new symptoms or concerns arise.      HPI:  Sheryl Trotter is a 84 year old female who presents today for pain in the tibial portion of the right hallux nail.  States the pain has been keeping her awake at night.  There is been redness swelling and discomfort on the right hallux.  She has not had drainage or involvement of any other digits of the right foot.  No treatment was tried for this at home.    History obtained from chart review and the patient.    Problem List:  2023-06: Status post lumbar surgery  2022-11: Infection due to 2019 novel coronavirus  2022-04: Spinal stenosis of lumbar region with neurogenic claudication  2021-08: KASANDRA (obstructive sleep apnea)  2021-08: Other polyneuropathy  2019-02: Chondrocalcinosis  2019-01: Polymyalgia  2019-01: Primary osteoarthritis involving multiple joints  2018-10:  Bilateral shoulder pain  2018-03: Hip dislocation, left (H)  2018-02: Status post left hip replacement  2017-09: Polyarthralgia  2015-02: Osteoarthritis of the Knee  Osteoporosis  Hyperlipidemia  Essential hypertension  Primary insomnia  Trochanteric Bursitis  Lumbar Spondylosis  Lumbar Disc Degeneration  Headache  Polymyalgia rheumatica      Past Medical History:   Diagnosis Date    Arthritis     AVN (avascular necrosis of bone) (H)     let hip    Chondrocalcinosis 2019    DDD (degenerative disc disease), lumbar     Degeneration of lumbar intervertebral disc     Dislocation of hip (H)     left    GERD (gastroesophageal reflux disease)     HLD (hyperlipidemia)     Hypertension     Idiopathic progressive polyneuropathy 2021    Infection due to 2019 novel coronavirus 11/09/2022    Insomnia     Lumbar spondylosis     Osteoarthritis of right knee, unspecified osteoarthritis type     Osteoporosis     Polyarthralgia     Polymyalgia rheumatica 2019    PONV (postoperative nausea and vomiting)     Primary osteoarthritis involving multiple joints     Shoulder pain, bilateral     Sleep apnea     cpap    Spinal stenosis     Trochanteric bursitis of both hips        Social History     Tobacco Use    Smoking status: Former     Types: Cigarettes     Passive exposure: Never    Smokeless tobacco: Never    Tobacco comments:     smoking in college-social   Substance Use Topics    Alcohol use: Yes     Alcohol/week: 3.3 standard drinks of alcohol     Types: 3 Standard drinks or equivalent per week     Comment: 2 glases /week       Review of Systems  As above in HPI otherwise negative.    Vitals:    01/18/25 1115   BP: (!) 142/79   Pulse: 65   Resp: 16   Temp: 97.9  F (36.6  C)   SpO2: 97%   Weight: 80.7 kg (178 lb)       General: Patient is resting comfortably no acute distress is afebrile  HEENT: Head is normocephalic atraumatic   eyes are PERRL EOMI sclera anicteric   Skin: Without rash non-diaphoretic  Musculoskeletal  The right  hallux has pain over the medial border.  There is tenderness to palpation but no discharge and there is erythema and edema consistent with paronychia.    Physical Exam      At the end of the encounter, I discussed results, diagnosis, medications. Discussed red flags for immediate return to clinic/ER, as well as indications for follow up if no improvement. Patient understood and agreed to plan. Patient was stable for discharge.

## 2025-01-20 ENCOUNTER — PATIENT OUTREACH (OUTPATIENT)
Dept: CARE COORDINATION | Facility: CLINIC | Age: 85
End: 2025-01-20
Payer: MEDICARE

## 2025-02-18 ENCOUNTER — TELEPHONE (OUTPATIENT)
Dept: INTERNAL MEDICINE | Facility: CLINIC | Age: 85
End: 2025-02-18
Payer: MEDICARE

## 2025-02-18 ENCOUNTER — MYC MEDICAL ADVICE (OUTPATIENT)
Dept: INTERNAL MEDICINE | Facility: CLINIC | Age: 85
End: 2025-02-18
Payer: MEDICARE

## 2025-02-18 NOTE — TELEPHONE ENCOUNTER
Patient Quality Outreach    Patient is due for the following:   Physical Annual Wellness Visit    Action(s) Taken:   Patient to call back and schedule an AWV    Type of outreach:    Sent [employer] message.    Questions for provider review:    None           Joy C Steinert, CMA

## 2025-02-18 NOTE — LETTER
CC:  Follow up     HPI:  89yo w/ recurrent UTI, overactive bladder (OAB) and stress urinary incontinence (JONAH).  last seen 8/2019, here for repeat botox        Underwent botox on 7/18/16, 1/2017, 9/2017, 5/2018, 1/2019, 8/2019  Had incomplete emptying at baseline.  Currently self cathing q4 but if doesn't then will start leaking, is wearing a pad again        Also has history of recurrent UTI:  Has required IV antibiotics based on susceptibilities and patient allergies for UTI in the past  On d mannose, probiotics, trimethoprim (TMP) 100mg (since 7/2017), premarin cream  No symptoms on current regiment     Urodynamics Jan 2016: DO w/ leak throughout, terminal Detrusor overactivity with leak/void Stress urinary inc with cough at capacity, MUCP 69  Voiding dysfunction/Incomplete Bladder emptying     Had bladder suspension, followed by a sling (likely TOT) by Dr Alexis López 2002. She then had Interstim placed and subsequently removed 2' infection. Has h/o rectal prolapse s/p correction 2012 and vaginal prolapse which is asymptomatic.       UTI      1/6/17 negative  1/19/17 pansensitive E Coli, treated with augmentin  1/31/2017      >100 e coli  3/2/2017        >100k enterobacter IV ABXs  Took TMP 3-6/2017 6/17/2017      >100 raoutella  6/26/2017      >100 raoutella  7/6/2017        >100 raoutella  Restarted TMP 7/2017 8/31/2017     >100k raoutella  3/2018           >100k enterobacter asburiae/cloacae > given ceftin  5/2018           >100k psuedomonas IV cefepime  8/2018          >100 e coli > treated cephlosporin  9/2018          10-50k e coli > no symptoms, not treated  1/2019          >100k e coli no symptoms, treated as upcoming botox  2/2019        >100k e coli symptomatic  7/2019        >100k  E coli symptomatic  8/2019        >100k e coli, no symptoms treated for upcoming botox   3/2020 >100k e coli, no symptoms treated for upcoming botox        Vitals:    03/20/20 1349   BP: (!) 121/98   Pulse: (!) 118  February 18, 2025      Sheryl Trotter  [address]  [place] [address]      Your healthcare team cares about your health. To provide you with the best care, we have reviewed your chart and based on our findings, we see that you are due to:     PREVENTATIVE VISIT: Annual Medicare Wellness:Schedule an Annual Medicare Wellness Exam. Please call your [facility] clinic to set up your appointment.    If you have already completed these items, please contact the clinic via phone or Mychart so your care team can review and update your records.  Thank you for choosing [facility] for your healthcare needs. For any questions, concerns, or to schedule an appointment please contact the clinic.     Healthy Regards,    Your [facility] Care Team         Patient consent obtained, 100U botox in 10mL of NS, cystoscope placed, 12 degree scope, UOs identified, needle set for 3mm, 10 injections of 1.0cc were placed throughout the bladder, taking care to avoid the trigone. The needle was flushed w/ 1cc during the last injection. Patient tolerated the procedure well     Assessment: Recurrent UTI , incomplete emptying 2' voiding dysfunction, asymptomatic prolapse, overactive bladder wet, stress urinary incontinence      Plan:      1. Recurrent UTI   Cont estrogen vaginal cream three times a week, Dmannose, probiotics  Established with Dr Chavarria as well  Cont trimethoprim (TMP) 100mg daily, reviewed risk of resistance, feel much less symptomatic w/ TMP, understands and wants to continue       2. OAB  Repeat botox today, warning signs reviewed, f/u 6-7 montsbotox 7/2016, 1/2017, 9/8/2017, 5/2018, 1/2019, 8/2019     3. JONAH    less bothersome than urge incontinence and only at capacity, another sling would not fix the most bothersome concern/issue for her     4. Voiding dysfunction, incomplete emptying  Incomplete emptying is stable, comfortable cathing      5. Asymptomatic stage 2 anterior/posterior prolapse, stable      f/u in 7 months

## 2025-03-10 DIAGNOSIS — G25.81 RESTLESS LEGS SYNDROME (RLS): ICD-10-CM

## 2025-03-10 RX ORDER — FERROUS SULFATE 325(65) MG
TABLET ORAL
Qty: 90 TABLET | Refills: 3 | OUTPATIENT
Start: 2025-03-10

## 2025-03-17 ENCOUNTER — OFFICE VISIT (OUTPATIENT)
Dept: AUDIOLOGY | Facility: CLINIC | Age: 85
End: 2025-03-17
Payer: MEDICARE

## 2025-03-17 DIAGNOSIS — H93.8X2 SENSATION OF FULLNESS IN LEFT EAR: ICD-10-CM

## 2025-03-17 DIAGNOSIS — H90.6 MIXED CONDUCTIVE AND SENSORINEURAL HEARING LOSS OF BOTH EARS: ICD-10-CM

## 2025-03-17 DIAGNOSIS — H90.3 SENSORINEURAL HEARING LOSS (SNHL) OF BOTH EARS: Primary | ICD-10-CM

## 2025-03-17 PROCEDURE — 92557 COMPREHENSIVE HEARING TEST: CPT | Performed by: AUDIOLOGIST

## 2025-03-17 PROCEDURE — 92550 TYMPANOMETRY & REFLEX THRESH: CPT | Mod: 52 | Performed by: AUDIOLOGIST

## 2025-03-18 PROBLEM — H90.3 SENSORINEURAL HEARING LOSS (SNHL) OF BOTH EARS: Status: ACTIVE | Noted: 2025-03-18

## 2025-03-18 NOTE — PROGRESS NOTES
AUDIOLOGY REPORT    SUBJECTIVE:  Sheryl Trotter is a 84 year old female who was seen in the Audiology Clinic at the [facility] for audiologic evaluation, referred by Chema Thompson MD. The patient reported decreased hearing ability, worse in the left ear than in the right ear, gradually over the past year. She has more difficulty understanding those with foreign accents and those with a fast rate of speech. She endorsed occasional left-sided aural fullness, but denied tinnitus, dizziness/vertigo, otalgia, otorrhea, recent illness, or history of noise exposure.    OBJECTIVE:  Abuse Screening:  Do you feel unsafe at home or work/school? No  Do you feel threatened by someone? No  Does anyone try to keep you from having contact with others, or doing things outside of your home? No  Physical signs of abuse present? No     Fall Risk Screen:  1. Have you fallen two or more times in the past year? No  2. Have you fallen and had an injury in the past year? Yes (primary care aware/referred for gait assessment)      Otoscopic exam indicates ears are clear of cerumen, bilaterally.     Pure Tone Thresholds assessed using conventional audiometry with good  reliability from 250-8000 Hz bilaterally using insert earphones and circumaural headphones.     RIGHT:  normal sloping to moderate sensorineural hearing loss    LEFT:    normal sloping to moderate sensorineural hearing loss    Tympanogram:    RIGHT: normal eardrum mobility    LEFT:   normal eardrum mobility    Reflexes for 1000 Hz (reported by stimulus ear):  RIGHT: Ipsilateral is absent at frequencies tested  RIGHT: Contralateral could not be assessed due to equipment issues  LEFT:   Ipsilateral is absent at frequencies tested  LEFT:   Contralateral could not be assessed due to equipment issues    Speech Reception Threshold:    RIGHT: 25 dB HL    LEFT:   30 dB HL  Word Recognition Score:     RIGHT: 100% at 65 dB HL using NU-6 recorded word list.    LEFT:    100% at 70 dB HL using NU-6 recorded word list.      ASSESSMENT:     ICD-10-CM    1. Sensorineural hearing loss (SNHL) of both ears  H90.3       2. Mixed conductive and sensorineural hearing loss of both ears  H90.6 Adult Audiology  Referral      3. Sensation of fullness in left ear  H93.8X2           Today s results were discussed with the patient in detail. Appropriate communication strategies were discussed at length, as were reasonable expectations regarding hearing at a distance, in noisy environments, or when attention is diverted elsewhere.    Ms. Trotter is a potential binaural amplification candidate. She was encouraged to check with her health insurance carrier to determine her benefits for amplification and where any benefits should be utilized. She was provided with two copies of today's audiogram and written information on the [email or web address] hearing aid program.    PLAN:  Ms. Trotter should return in 1-2 years for hearing evaluation to monitor the hearing loss and to keep any amplification programming current. Wear hearing protection consistently in noise to preserve residual hearing sensitivity and to minimize the effects of noise on tinnitus. Ms. Trotter expressed verbal understanding of this information and plan. Please call this clinic with questions regarding these results or recommendations.        Brigette Fairchild., [facility]  [place] Licensed Audiologist [id number]

## 2025-04-03 ENCOUNTER — LAB (OUTPATIENT)
Dept: LAB | Facility: CLINIC | Age: 85
End: 2025-04-03
Payer: MEDICARE

## 2025-04-03 ENCOUNTER — VIRTUAL VISIT (OUTPATIENT)
Dept: NEUROLOGY | Facility: CLINIC | Age: 85
End: 2025-04-03
Payer: MEDICARE

## 2025-04-03 VITALS — WEIGHT: 175 LBS | BODY MASS INDEX: 27.47 KG/M2 | HEIGHT: 67 IN

## 2025-04-03 DIAGNOSIS — G62.89 OTHER POLYNEUROPATHY: ICD-10-CM

## 2025-04-03 DIAGNOSIS — R79.9 ABNORMAL FINDING OF BLOOD CHEMISTRY, UNSPECIFIED: ICD-10-CM

## 2025-04-03 DIAGNOSIS — G25.81 RESTLESS LEGS SYNDROME (RLS): ICD-10-CM

## 2025-04-03 LAB
ALBUMIN SERPL BCG-MCNC: 4.3 G/DL (ref 3.5–5.2)
ALP SERPL-CCNC: 79 U/L (ref 40–150)
ALT SERPL W P-5'-P-CCNC: 16 U/L (ref 0–50)
ANION GAP SERPL CALCULATED.3IONS-SCNC: 11 MMOL/L (ref 7–15)
AST SERPL W P-5'-P-CCNC: 21 U/L (ref 0–45)
BASOPHILS # BLD AUTO: 0 10E3/UL (ref 0–0.2)
BASOPHILS NFR BLD AUTO: 0 %
BILIRUB SERPL-MCNC: 0.4 MG/DL
BUN SERPL-MCNC: 20.2 MG/DL (ref 8–23)
CALCIUM SERPL-MCNC: 10.3 MG/DL (ref 8.8–10.4)
CHLORIDE SERPL-SCNC: 101 MMOL/L (ref 98–107)
CREAT SERPL-MCNC: 0.62 MG/DL (ref 0.51–0.95)
CRP SERPL-MCNC: 5.84 MG/L
EGFRCR SERPLBLD CKD-EPI 2021: 88 ML/MIN/1.73M2
EOSINOPHIL # BLD AUTO: 0 10E3/UL (ref 0–0.7)
EOSINOPHIL NFR BLD AUTO: 0 %
ERYTHROCYTE [DISTWIDTH] IN BLOOD BY AUTOMATED COUNT: 13.2 % (ref 10–15)
ERYTHROCYTE [SEDIMENTATION RATE] IN BLOOD BY WESTERGREN METHOD: 11 MM/HR (ref 0–30)
FERRITIN SERPL-MCNC: 171 NG/ML (ref 11–328)
GLUCOSE SERPL-MCNC: 103 MG/DL (ref 70–99)
HCO3 SERPL-SCNC: 26 MMOL/L (ref 22–29)
HCT VFR BLD AUTO: 43.7 % (ref 35–47)
HGB BLD-MCNC: 14.4 G/DL (ref 11.7–15.7)
IMM GRANULOCYTES # BLD: 0 10E3/UL
IMM GRANULOCYTES NFR BLD: 0 %
IRON BINDING CAPACITY (ROCHE): 323 UG/DL (ref 240–430)
IRON SATN MFR SERPL: 29 % (ref 15–46)
IRON SERPL-MCNC: 94 UG/DL (ref 37–145)
LYMPHOCYTES # BLD AUTO: 1.3 10E3/UL (ref 0.8–5.3)
LYMPHOCYTES NFR BLD AUTO: 12 %
MCH RBC QN AUTO: 28.8 PG (ref 26.5–33)
MCHC RBC AUTO-ENTMCNC: 33 G/DL (ref 31.5–36.5)
MCV RBC AUTO: 87 FL (ref 78–100)
MONOCYTES # BLD AUTO: 1.1 10E3/UL (ref 0–1.3)
MONOCYTES NFR BLD AUTO: 11 %
NEUTROPHILS # BLD AUTO: 8.2 10E3/UL (ref 1.6–8.3)
NEUTROPHILS NFR BLD AUTO: 77 %
PLATELET # BLD AUTO: 215 10E3/UL (ref 150–450)
POTASSIUM SERPL-SCNC: 4.1 MMOL/L (ref 3.4–5.3)
PROT SERPL-MCNC: 7.2 G/DL (ref 6.4–8.3)
RBC # BLD AUTO: 5 10E6/UL (ref 3.8–5.2)
SODIUM SERPL-SCNC: 138 MMOL/L (ref 135–145)
VIT B12 SERPL-MCNC: 570 PG/ML (ref 232–1245)
WBC # BLD AUTO: 10.7 10E3/UL (ref 4–11)

## 2025-04-03 RX ORDER — PREGABALIN 150 MG/1
300 CAPSULE ORAL 2 TIMES DAILY
Qty: 120 CAPSULE | Refills: 5 | Status: SHIPPED | OUTPATIENT
Start: 2025-04-03

## 2025-04-03 RX ORDER — ROPINIROLE 0.25 MG/1
0.75 TABLET, FILM COATED ORAL AT BEDTIME
Qty: 120 TABLET | Refills: 8 | Status: SHIPPED | OUTPATIENT
Start: 2025-04-03

## 2025-04-03 ASSESSMENT — PAIN SCALES - GENERAL: PAINLEVEL_OUTOF10: MILD PAIN (2)

## 2025-04-03 NOTE — PROGRESS NOTES
[facility] Neurology Follow Up Visit    Sheryl Trotter MRN# [id number]   Age: 84 year old YOB: 1940            Assessment and Plan:   Assessment:  RLS  Peripheral neuropathy, idiopathic    The patient is having worsened symptoms, but is maxed out on her lyrica.  I think ropinrole could increase slowly over time given the lack of side-effects noted so far.  Otherwise, it may be time to revisit her iron deficiency noted in the past, as this may be playing a role in her progressive symptoms of RLS.    Plan:  - Iron and iron binding, ferritin, CBC, CRP and ESR, B12  - Lyrica 300/300  - Ropinirole 0.75 mg at bedtime     Follow up in Neurology clinic in 3-6 months, or earlier as needed should new concerns arise.    NISA Khanna D.O.   of Neurology    Total time today (30 min) in this patient encounter was spent on pre-charting, counseling and/or coordination of care. The longitudinal plan of care for the diagnosis(es)/condition(s) as documented were addressed during this visit. Due to the added complexity in care, I will continue to support Chrissy in the subsequent management and with ongoing continuity of care.       Prior History and Update:     The patient was initially seen in neurologic consultation on 10/11/2021 and most recently 10/21/2024 for evaluation of RLS and polyneuropathy. Please see the comprehensive neurologic consultation notes from those dates in the Epic records for details.     At prior visits, the patient was having some improvement of RLS with a combination of Lyrica, ferrous sulfate supplementation, and was to consider ropinirole pending recovery from a fall on 74/2024.  She was to use Lyrica 300 mg BID, and Ropinrole 0.5 mg at bedtime.    Today, the patient is still doing SI injections for pain in her sacrum, which are helpful.  Her RLS is still present.  She is having an increase in symptoms at night.  She would prefer not to increase any medications if possible.   She recently tried to keep on twice daily gabapentin (9pm, 8am), and this has led to her note stronger RLS events at night.     Physical Exam:   General: Seated comfortably in no acute distress.  Neurologic:     Mental Status: Fully alert, attentive and oriented. Speech clear and fluent, no paraphasic errors.     Cranial Nerves: EOMI with normal smooth pursuit. Facial movements symmetric. Hearing not formally tested but intact to conversation.  No dysarthria.     Motor: No tremors or other abnormal movements observed.

## 2025-04-03 NOTE — PROGRESS NOTES
Virtual Visit Details    Type of service:  Video Visit   Video Start Time:  0900  Video End Time:9:25 AM    Originating Location (pt. Location): Home    Distant Location (provider location):  On-site  Platform used for Video Visit: [employer]

## 2025-04-03 NOTE — PATIENT INSTRUCTIONS
Increase ropinrole 0.25 mg pills:   - take 3 pills nightly     Stay the same on Lyrica:   - 300 mg twice daily    Obtain serum studies at your convenience.

## 2025-04-03 NOTE — NURSING NOTE
Current patient location: [address] [place] [id number]    Is the patient currently in the state of [place]? YES    Visit mode: VIDEO    If the visit is dropped, the patient can be reconnected by:VIDEO VISIT: Send to e-mail at: [email or web address]    Will anyone else be joining the visit? NO  (If patient encounters technical issues they should call [phone])    Are changes needed to the allergy or medication list? Pt stated no changes to allergies and Pt stated no med changes    Are refills needed on medications prescribed by this physician? NO    Rooming Documentation:  Not applicable    Reason for visit: KAL Will VVF

## 2025-04-03 NOTE — LETTER
4/3/2025       RE: Sheryl Trotter  [address]  [place] [facility] [address]     Dear Colleague,    Thank you for referring your patient, Sheryl Trotter, to the [facility]UROLOG[facility] [place] at [facility]. Please see a copy of my visit note below.    [facility] Neurology Follow Up Visit    Sheryl Trotter MRN# [id number]   Age: 84 year old YOB: 1940            Assessment and Plan:   Assessment:  RLS  Peripheral neuropathy, idiopathic    The patient is having worsened symptoms, but is maxed out on her lyrica.  I think ropinrole could increase slowly over time given the lack of side-effects noted so far.  Otherwise, it may be time to revisit her iron deficiency noted in the past, as this may be playing a role in her progressive symptoms of RLS.    Plan:  - Iron and iron binding, ferritin, CBC, CRP and ESR, B12  - Lyrica 300/300  - Ropinirole 0.75 mg at bedtime     Follow up in Neurology clinic in 3-6 months, or earlier as needed should new concerns arise.    NISA Khanna D.O.   of Neurology    Total time today (30 min) in this patient encounter was spent on pre-charting, counseling and/or coordination of care. The longitudinal plan of care for the diagnosis(es)/condition(s) as documented were addressed during this visit. Due to the added complexity in care, I will continue to support Chrissy in the subsequent management and with ongoing continuity of care.       Prior History and Update:     The patient was initially seen in neurologic consultation on 10/11/2021 and most recently 10/21/2024 for evaluation of RLS and polyneuropathy. Please see the comprehensive neurologic consultation notes from those dates in the Epic records for details.     At prior visits, the patient was having some improvement of RLS with a combination of Lyrica, ferrous sulfate supplementation, and was to consider ropinirole pending recovery from a  fall on 74/2024.  She was to use Lyrica 300 mg BID, and Ropinrole 0.5 mg at bedtime.    Today, the patient is still doing SI injections for pain in her sacrum, which are helpful.  Her RLS is still present.  She is having an increase in symptoms at night.  She would prefer not to increase any medications if possible.  She recently tried to keep on twice daily gabapentin (9pm, 8am), and this has led to her note stronger RLS events at night.     Physical Exam:   General: Seated comfortably in no acute distress.  Neurologic:     Mental Status: Fully alert, attentive and oriented. Speech clear and fluent, no paraphasic errors.     Cranial Nerves: EOMI with normal smooth pursuit. Facial movements symmetric. Hearing not formally tested but intact to conversation.  No dysarthria.     Motor: No tremors or other abnormal movements observed.       Virtual Visit Details    Type of service:  Video Visit   Video Start Time:  0900  Video End Time:9:25 AM    Originating Location (pt. Location): Home    Distant Location (provider location):  On-site  Platform used for Video Visit: AmWell      Again, thank you for allowing me to participate in the care of your patient.      Sincerely,    Cholo Khanna, DO

## 2025-04-09 ENCOUNTER — TELEPHONE (OUTPATIENT)
Dept: NEUROLOGY | Facility: CLINIC | Age: 85
End: 2025-04-09
Payer: MEDICARE

## 2025-04-09 NOTE — TELEPHONE ENCOUNTER
Left Voicemail (1st Attempt) and Sent Mychart (1st Attempt) for the patient to call back and schedule the following:    Appointment type: Return Neurology-Virtual  Provider: Clemencia  Return date: around 9/3/25  Specialty phone number: [phone]  Additional appointment(s) needed: NA  Additonal Notes: 5 month follow up    Myrtle Waters on 4/9/2025 at 5:20 PM

## 2025-04-14 DIAGNOSIS — G25.81 RESTLESS LEGS SYNDROME (RLS): ICD-10-CM

## 2025-04-14 RX ORDER — FERROUS SULFATE 325(65) MG
TABLET ORAL
Qty: 90 TABLET | Refills: 3 | Status: SHIPPED | OUTPATIENT
Start: 2025-04-14

## 2025-05-05 DIAGNOSIS — M81.0 AGE-RELATED OSTEOPOROSIS WITHOUT CURRENT PATHOLOGICAL FRACTURE: ICD-10-CM

## 2025-05-05 RX ORDER — ALENDRONATE SODIUM 70 MG/1
TABLET ORAL
Qty: 12 TABLET | Refills: 0 | Status: SHIPPED | OUTPATIENT
Start: 2025-05-05

## 2025-05-14 DIAGNOSIS — I10 ESSENTIAL HYPERTENSION: ICD-10-CM

## 2025-05-14 RX ORDER — LISINOPRIL 20 MG/1
20 TABLET ORAL DAILY
Qty: 90 TABLET | Refills: 3 | OUTPATIENT
Start: 2025-05-14

## 2025-05-21 ENCOUNTER — OFFICE VISIT (OUTPATIENT)
Dept: INTERNAL MEDICINE | Facility: CLINIC | Age: 85
End: 2025-05-21
Payer: MEDICARE

## 2025-05-21 VITALS
TEMPERATURE: 97.7 F | RESPIRATION RATE: 16 BRPM | OXYGEN SATURATION: 97 % | HEIGHT: 66 IN | SYSTOLIC BLOOD PRESSURE: 136 MMHG | BODY MASS INDEX: 28.93 KG/M2 | HEART RATE: 67 BPM | DIASTOLIC BLOOD PRESSURE: 74 MMHG | WEIGHT: 180 LBS

## 2025-05-21 DIAGNOSIS — G47.33 OSA (OBSTRUCTIVE SLEEP APNEA): ICD-10-CM

## 2025-05-21 DIAGNOSIS — I10 ESSENTIAL HYPERTENSION: ICD-10-CM

## 2025-05-21 DIAGNOSIS — E78.2 MIXED HYPERLIPIDEMIA: ICD-10-CM

## 2025-05-21 DIAGNOSIS — M81.0 AGE-RELATED OSTEOPOROSIS WITHOUT CURRENT PATHOLOGICAL FRACTURE: ICD-10-CM

## 2025-05-21 DIAGNOSIS — Z23 PNEUMOCOCCAL VACCINATION ADMINISTERED AT CURRENT VISIT: ICD-10-CM

## 2025-05-21 DIAGNOSIS — F51.01 PRIMARY INSOMNIA: ICD-10-CM

## 2025-05-21 DIAGNOSIS — Z00.00 ROUTINE GENERAL MEDICAL EXAMINATION AT A HEALTH CARE FACILITY: Primary | ICD-10-CM

## 2025-05-21 LAB
ERYTHROCYTE [DISTWIDTH] IN BLOOD BY AUTOMATED COUNT: 13.4 % (ref 10–15)
EST. AVERAGE GLUCOSE BLD GHB EST-MCNC: 111 MG/DL
HBA1C MFR BLD: 5.5 % (ref 0–5.6)
HCT VFR BLD AUTO: 41.5 % (ref 35–47)
HGB BLD-MCNC: 13.7 G/DL (ref 11.7–15.7)
MCH RBC QN AUTO: 28.3 PG (ref 26.5–33)
MCHC RBC AUTO-ENTMCNC: 33 G/DL (ref 31.5–36.5)
MCV RBC AUTO: 86 FL (ref 78–100)
PLATELET # BLD AUTO: 211 10E3/UL (ref 150–450)
RBC # BLD AUTO: 4.84 10E6/UL (ref 3.8–5.2)
WBC # BLD AUTO: 8 10E3/UL (ref 4–11)

## 2025-05-21 PROCEDURE — 84443 ASSAY THYROID STIM HORMONE: CPT | Performed by: INTERNAL MEDICINE

## 2025-05-21 PROCEDURE — 80061 LIPID PANEL: CPT | Performed by: INTERNAL MEDICINE

## 2025-05-21 PROCEDURE — G0439 PPPS, SUBSEQ VISIT: HCPCS | Performed by: INTERNAL MEDICINE

## 2025-05-21 PROCEDURE — 80053 COMPREHEN METABOLIC PANEL: CPT | Performed by: INTERNAL MEDICINE

## 2025-05-21 PROCEDURE — 90677 PCV20 VACCINE IM: CPT | Performed by: INTERNAL MEDICINE

## 2025-05-21 PROCEDURE — 83036 HEMOGLOBIN GLYCOSYLATED A1C: CPT | Performed by: INTERNAL MEDICINE

## 2025-05-21 PROCEDURE — 85027 COMPLETE CBC AUTOMATED: CPT | Performed by: INTERNAL MEDICINE

## 2025-05-21 PROCEDURE — G0009 ADMIN PNEUMOCOCCAL VACCINE: HCPCS | Performed by: INTERNAL MEDICINE

## 2025-05-21 PROCEDURE — 99213 OFFICE O/P EST LOW 20 MIN: CPT | Mod: 25 | Performed by: INTERNAL MEDICINE

## 2025-05-21 PROCEDURE — 3044F HG A1C LEVEL LT 7.0%: CPT | Performed by: INTERNAL MEDICINE

## 2025-05-21 PROCEDURE — 36415 COLL VENOUS BLD VENIPUNCTURE: CPT | Performed by: INTERNAL MEDICINE

## 2025-05-21 PROCEDURE — 3078F DIAST BP <80 MM HG: CPT | Performed by: INTERNAL MEDICINE

## 2025-05-21 PROCEDURE — 3075F SYST BP GE 130 - 139MM HG: CPT | Performed by: INTERNAL MEDICINE

## 2025-05-21 SDOH — HEALTH STABILITY: PHYSICAL HEALTH: ON AVERAGE, HOW MANY DAYS PER WEEK DO YOU ENGAGE IN MODERATE TO STRENUOUS EXERCISE (LIKE A BRISK WALK)?: 0 DAYS

## 2025-05-21 ASSESSMENT — SOCIAL DETERMINANTS OF HEALTH (SDOH): HOW OFTEN DO YOU GET TOGETHER WITH FRIENDS OR RELATIVES?: PATIENT DECLINED

## 2025-05-21 NOTE — PATIENT INSTRUCTIONS
Annual preventive visit, overall Chrissy is doing okay, but she has been carrying a heavy burden in the last couple months, because her  Virgil has had a slow recovery after major spine surgery.  He spent 3 weeks in a TCU, arriving home about a week ago, and Chrissy is Virgil's primary caregiver.  Virgil experiences cognitive decline, which got worse when he was hospitalized for surgery, because he became acutely delirious.    Chrissy has had less time and energy to focus on physical activity and diet.  She still needs to lose weight.  Fortunately her blood pressure is okay.    Chrissy continues to take alendronate for osteoporosis.  Lets plan to do a bone density measurement in 2026.    I reviewed her medications and they look quite reasonable.  She takes ropinirole and pregabalin for restless leg syndrome.  She ended up deciding against adding depression or anxiety pills right now, and I think she made a mcdaniel decision, because adding more central nervous system acting medication raises the risk for unpredictable side effects.    I encouraged Chrissy to focus on getting restful sleep, eat nutritious food in reasonable quantities, and stay as physically active as possible also consider revisiting the sleep apnea issue and perhaps doing another attempt at CPAP.    Routine lab work this afternoon comprehensive metabolic panel, blood cell counts, TSH for thyroid, lipid profile, hemoglobin A1c test for diabetes    Depressed mood and stressors  As above    Insomnia, KASANDRA is currently untreated  She stopped CPAP because not tolerated.  I hope you will consider revisiting the possibility of giving CPAP another try, perhaps with different equipment, because it would be a shame if because of untreated sleep apnea she were to experience things like fatigue, worse restless legs, etc.    sleep study in 2017, which showed sleep apnea. Stopped CPAP, a year and a half ago, as she wasn't sleeping and it wasn't positive experience.     Left SI Joint  pain  Has had 2 injections: mid 2024, early 2025  Going PT seems to hep=lp    Transforaminal lumbar interbody fusion L4-5  Posterior lateral fusion on the left at L3-4 and L4-5  Complete laminectomy at L3-4 and L4-5  Surgery June 20, 2023 at [facility]     DATE OF SERVICE: 06/20/2023  PREOPERATIVE DIAGNOSES:   1.  Severe spinal stenosis at L3-4 and L4-5  2.  L3-4 and L4-5 spondylolisthesis   3.  L3-4 and L4-5 degenerative disc disease  4.  Failure of conservative measures     POSTOPERATIVE DIAGNOSES:   1.  Severe spinal stenosis at L3-4 and L4-5  2.  L3-4 and L4-5 spondylolisthesis   3.  L3-4 and L4-5 degenerative disc disease  4.  Failure of conservative measures       PROCEDURE:   1. Right-sided transforaminal/transfacet decompression of the exiting L4 and traversing L5 nerve roots.   2. Transforaminal lumbar interbody fusion L4-5 with autograft bone, [employer] interbody long 9  3. Right-sided transforaminal/transfacet decompression of the exiting L3 and traversing L4 nerve roots.   4. Transforaminal lumbar interbody fusion L3-4 with autograft bone, [employer] interbody long 9  5. Posterior lateral fusion on the left at L3-4 and L4-5.   6. Segmental pedicle screw fixation in the pedicles of L3, L4, and L5 bilaterally  7. Complete laminectomy at L3-4 and L4-5  8. O arm with intra operative navigation     Had 1 day of vertigo May 14 2025    Right arm/shoulder pain, which Dr. Roman diagnosed as bicipital tendinitis    Bilateral leg edema, better after hydrochlorothiazide was added, and also use of compression stockings     Urinary bladder urgency, with history of hysterectomy done in her mid 50s  She may have some pelvic floor laxity.  I am not able to do a formal pelvic exam to assess that, but if she wants to pursue more pelvic floor precision diagnosis, she should see her gynecologist.     In the meantime, I suggested that Chrissy consider  - Scheduled voiding, emptying the bladder every hour  while awake, so that it does not get too full and trigger urgency  - Also she can do pelvic floor exercises such as Kegels.     There is the option of using medication for bladder urgency such as oxybutynin, but that can have anticholinergic side effects.  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    February 2024 started Bisphosphonate could be once a week oral alendronate, or      Started in response to   EXAM: DX HIP/PELVIS/SPINE, DX WRIST/HEEL/RADIUS  LOCATION: [facility]  DATE: 2/7/2024    INDICATION: BMD screening, follow-up.  DEMOGRAPHICS: Age- 83 years. Gender- Female. Menopausal status- Postmenopausal.  COMPARISON: 12/13/2021   DXA RESULTS  -Lumbar Spine: L1-L2: BMD: 1.127 g/cm2. T-score: -0.3. Z-score: 1.5. Degenerative change may artifactually increase BMD.  -RIGHT Hip Total: BMD: 0.786 g/cm2. T-score: -1.8. Z-score: 0.3.  -RIGHT Hip Femoral neck: BMD: 0.751 g/cm2. T-score: -2.1. Z-score: 0.2.  -RIGHT Radius 33%: BMD: 0.578 g/cm2. T-score: -3.4. Z-score: -0.5.     TBS RESULTS  -Lumbar Spine L1-L2: TBS: 1.270. TBS T-score: -2.3.TBS Z-score: 0.9.     INTERVAL CHANGE  -There has been a 3.4% decrease in lumbar spine BMD.  -There has been a 6.4% decrease in right hip BMD.  IMPRESSION: OSTEOPOROSIS      Essential hypertension  Continue lisinopril 20 mg once a day  November 29, 2023, add hydrochlorothiazide 12.5 mg once a day     I checked her [employer] home blood pressure machine against a manual reading on the right upper arm, and the readings are pretty close,  within 10 mmHg.     I told her that target blood pressure is 120/80 at rest and seated position measured on the right upper arm.  I reminded her that healthy diet particular less sodium, also regular exercise, and weight control will help with blood pressure, as well as treating sleep apnea     BP Readings from Last 6 Encounters:   05/21/25 136/74   02/14/25 118/80   01/18/25 (!) 142/79   10/21/24 (!) 143/83   05/09/24 132/66   02/05/24 120/76     Ganglion cyst dorsum right hand, which has become more uncomfortable, therefore we will have her see orthopedic hand     Left knee osteoarthritis improved with Euflexxa injection.     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     Hyperlipidemia, mildly elevated LDL, very generous HDL which is favorable  Lipid profile July 24, 2019 with total cholesterol 265, triglycerides 43, LDL bad cholesterol modestly elevated 141, but with a very generous level of the HDL good cholesterol 115  10-  Hemoglobin A1C <=5.6 % 5.3       Gastroesophageal reflux, uses Tums intermittently      Constipation that she has noticed since starting nortriptyline for neuropathy and restless legs-- no longer takes nortriptylene     Overweight with body mass index of 29.  She has been less physically active because of her lumbar spine issues.  She does water aerobics which is excellent.     Wt Readings from Last 5 Encounters:   05/21/25 81.6 kg (180 lb)   04/03/25 79.4 kg (175 lb)   02/14/25 80.7 kg (178 lb)   01/18/25 80.7 kg (178 lb)   05/09/24 83.5 kg (184 lb)     Menopause, status post complete hysterectomy  Had complete hysterectomy at age in her 50s, has not needed to get Pap smears anymore  BILATERAL FULL FIELD DIGITAL SCREENING MAMMOGRAM WITH TOMOSYNTHESIS  Performed on:  9/23/24  Compared to: 09/19/2023, 09/02/2022, and 08/09/2021     She recalls a colonoscopy that was done approximately at age 72     Chrissy might consider getting a tetanus booster at the community pharmacy  Will give a Prevnar 20 vaccine May 21, 2025  Immunization History   Administered Date(s) Administered    COVID-19 12+ (MODERNA) 10/23/2023, 09/25/2024    COVID-19 Bivalent 12+ (Pfizer) 09/16/2022    COVID-19 MONOVALENT 12+ (Pfizer) 03/03/2021, 03/24/2021, 10/29/2021    COVID-19 Monovalent 18+ (Moderna) 04/21/2022    DT (PEDS <7y) 02/01/1997    Flu, Unspecified 11/01/2007, 11/19/2008    HepA, Unspecified 11/01/2007, 12/04/2008    HepB, Unspecified 11/01/2007, 01/10/2008, 06/17/2008    Hepatitis A (VAQTA)(ADULT 19+) 11/01/2007, 12/04/2008    Hepatitis B, Adult (Energix-B/Recombivax HB) 11/01/2007, 01/10/2008, 06/17/2008    Influenza (H1N1) 01/18/2010    Influenza (High Dose) Trivalent,PF (Fluzone) 11/21/2016, 10/16/2017, 10/09/2018, 11/19/2018, 10/07/2021, 10/06/2022, 10/23/2023, 10/09/2024    Influenza (IIV3) PF 11/01/2007, 11/19/2008, 10/17/2013    Influenza (prior to 2024) 09/23/2010    Influenza Vaccine 18-64 (Flublok) 10/02/2019, 10/07/2020    Influenza Vaccine 65+ (Fluzone HD) 10/07/2021, 10/06/2022, 10/23/2023    Influenza Vaccine, 6+MO IM (QUADRIVALENT W/PRESERVATIVES) 10/17/2013    Influenza, Split Virus, Trivalent, Pf (Fluzone\Fluarix) 09/23/2010    Pneumo Conj 13-V (2010&after) 10/13/2015    Pneumococcal 23 valent 10/03/2007    Poliovirus, inactivated (IPV) 11/29/2007    TDAP (Adacel,Boostrix) 11/01/2007    Td,adult,historic,unspecified 11/01/2007    Typhoid IM 11/29/2007    Zoster recombinant adjuvanted (Shingrix) 04/02/2019, 07/16/2019    Zoster vaccine, live 11/19/2008

## 2025-05-21 NOTE — PROGRESS NOTES
Preventive Care Visit  [facility]  RACHAEL PEDERSEN MD, Internal Medicine  May 21, 2025      Zulay Lowe is a 84 year old, presenting for the following:  Physical        5/21/2025     2:31 PM   Additional Questions   Roomed by Anyi VIZCAINO  Counseling  Appropriate preventive services were addressed with this patient via screening, questionnaire, or discussion as appropriate for fall prevention, nutrition, physical activity, Tobacco-use cessation, social engagement, weight loss and cognition.  Checklist reviewing preventive services available has been given to the patient.  Reviewed patient's diet, addressing concerns and/or questions.   If at risk for lack of exercise, the patient was provided with information to increase physical activity for the benefit of well-being.   If at risk for social isolation, the patient was provided with information about the benefit of social connection.   The patient was reminded to see the dentist every 6 months.   If at risk for psychosocial distress, the patient was provided with information to reduce risk.     Advance Care Planning  Patient states has Health Care Directive and will send to [facility] [employer].        5/21/2025   General Health   How would you rate your overall physical health? Good   Feel stress (tense, anxious, or unable to sleep) Only a little   (!) STRESS CONCERN      5/21/2025   Nutrition   Diet: Regular (no restrictions)         5/21/2025   Exercise   Days per week of moderate/strenous exercise 0 days   (!) EXERCISE CONCERN      5/21/2025   Social Factors   Frequency of gathering with friends or relatives Patient declined   Worry food won't last until get money to buy more No   Food not last or not have enough money for food? No   Do you have housing? (Housing is defined as stable permanent housing and does not include staying outside in a car, in a tent, in an abandoned building, in an overnight shelter, or couch-surfing.) Yes   Are  you worried about losing your housing? No   Lack of transportation? No   Unable to get utilities (heat,electricity)? No         5/21/2025   Fall Risk   Fallen 2 or more times in the past year? No   Trouble with walking or balance? Yes   Gait Speed Test Interpretation Less than or equal to 5.00 seconds - PASS         5/21/2025   Activities of Daily Living- Home Safety   Needs help with the following daily activites None of the above   Safety concerns in the home None of the above         5/21/2025   Dental   Dentist two times every year? Yes         5/21/2025   Hearing Screening   Hearing concerns? (!) I FEEL THAT PEOPLE ARE MUMBLING OR NOT SPEAKING CLEARLY.    (!) I NEED TO ASK PEOPLE TO SPEAK UP OR REPEAT THEMSELVES.    (!) TROUBLE UNDERSTANDING SOFT OR WHISPERED SPEECH.       Multiple values from one day are sorted in reverse-chronological order         5/21/2025   Driving Risk Screening   Patient/family members have concerns about driving No         5/21/2025   General Alertness/Fatigue Screening   Have you been more tired than usual lately? (!) YES         5/21/2025   Urinary Incontinence Screening   Bothered by leaking urine in past 6 months No         Today's PHQ-2 Score:       5/21/2025     2:16 PM   PHQ-2 ( 1999 Pfizer)   Q1: Little interest or pleasure in doing things 0   Q2: Feeling down, depressed or hopeless 1   PHQ-2 Score 1    Q1: Little interest or pleasure in doing things Not at all   Q2: Feeling down, depressed or hopeless Several days   PHQ-2 Score 1       Patient-reported           5/21/2025   Substance Use   Alcohol more than 3/day or more than 7/wk No   Do you have a current opioid prescription? No   How severe/bad is pain from 1 to 10? 5/10   Do you use any other substances recreationally? No     Social History     Tobacco Use    Smoking status: Former     Types: Cigarettes     Passive exposure: Never    Smokeless tobacco: Never    Tobacco comments:     smoking in college-social   Vaping Use     Vaping status: Never Used   Substance Use Topics    Alcohol use: Yes     Alcohol/week: 3.3 standard drinks of alcohol     Types: 3 Standard drinks or equivalent per week     Comment: 2 glases /week    Drug use: No           9/19/2023   LAST FHS-7 RESULTS   1st degree relative breast or ovarian cancer Yes   Any relative bilateral breast cancer No   Any male have breast cancer No   Any ONE woman have BOTH breast AND ovarian cancer No   Any woman with breast cancer before 50yrs No   2 or more relatives with breast AND/OR ovarian cancer No   2 or more relatives with breast AND/OR bowel cancer Yes     Current providers sharing in care for this patient include:  Patient Care Team:  Chema Thompson MD as PCP - General (Internal Medicine)  Denita Cottrell MD as Physician (Internal Medicine)  Cholo Khanna DO as Assigned Neuroscience Provider  Chema Thompson MD as Assigned PCP  Taryn Higginbotham NP as Nurse Practitioner  Melonie Thomspon AuD as Audiologist (Audiology)  Elizabeth Brown DPM, Podiatry/Foot and Ankle Surgery as Assigned Musculoskeletal Provider    The following health maintenance items are reviewed in Epic and correct as of today:  Health Maintenance   Topic Date Due    ANNUAL REVIEW OF HM ORDERS  Never done    RSV VACCINE (1 - 1-dose 75+ series) Never done    DTAP/TDAP/TD IMMUNIZATION (2 - Td or Tdap) 11/01/2017    MEDICARE ANNUAL WELLNESS VISIT  07/24/2020    LIPID  07/24/2020    ADVANCE CARE PLANNING  07/24/2024    COVID-19 Vaccine (8 - 2024-25 season) 03/25/2025    BMP  04/03/2026    FALL RISK ASSESSMENT  05/21/2026    DEXA  02/07/2039    PHQ-2 (once per calendar year)  Completed    INFLUENZA VACCINE  Completed    Pneumococcal Vaccine: 50+ Years  Completed    ZOSTER IMMUNIZATION  Completed    HPV IMMUNIZATION  Aged Out    MENINGITIS IMMUNIZATION  Aged Out       Review of Systems  Constitutional, HEENT, cardiovascular, pulmonary, GI, , musculoskeletal, neuro, skin, endocrine and  "psych systems are negative, except as otherwise noted.     Objective    Exam  /74 (BP Location: Right arm, Patient Position: Sitting, Cuff Size: Adult Regular)   Pulse 67   Temp 97.7  F (36.5  C)   Resp 16   Ht 1.664 m (5' 5.5\")   Wt 81.6 kg (180 lb)   LMP  (LMP Unknown)   SpO2 97%   BMI 29.50 kg/m     Estimated body mass index is 29.5 kg/m  as calculated from the following:    Height as of this encounter: 1.664 m (5' 5.5\").    Weight as of this encounter: 81.6 kg (180 lb).    Physical Exam    General: Alert, in no distress  Skin: No significant lesion seen.  Eyes/nose/throat: Eyes without scleral icterus, eye movements normal, pupils equal and reactive, oropharynx clear, ears with normal TM's  MSK: Neck with good ROM  Lymphatic: Neck without adenopathy or masses  Endocrine: Thyroid with no nodules to palpation  Pulm: Lungs clear to auscultation bilaterally  Cardiac: Heart with regular rate and rhythm, no murmur or gallop  GI: Abdomen soft, nontender. No palpable enlargement of liver or spleen  MSK: Extremities no tenderness or edema  Neuro: Moves all extremities, without focal weakness  Psych: Alert, normal mental status. Normal affect and speech        5/21/2025   Mini Cog   Clock Draw Score 2 Normal   3 Item Recall 3 objects recalled   Mini Cog Total Score 5         ASSESSMENT PLAN    Annual preventive visit, overall Chrissy is doing okay, but she has been carrying a heavy burden in the last couple months, because her  Virgil has had a slow recovery after major spine surgery.  He spent 3 weeks in a TCU, arriving home about a week ago, and Chrissy is Virgil's primary caregiver.  Virgil experiences cognitive decline, which got worse when he was hospitalized for surgery, because he became acutely delirious.    Chrissy has had less time and energy to focus on physical activity and diet.  She still needs to lose weight.  Fortunately her blood pressure is okay.    Chrissy continues to take alendronate for osteoporosis.  " Lets plan to do a bone density measurement in 2026.    I reviewed her medications and they look quite reasonable.  She takes ropinirole and pregabalin for restless leg syndrome.  She ended up deciding against adding depression or anxiety pills right now, and I think she made a mcdaniel decision, because adding more central nervous system acting medication raises the risk for unpredictable side effects.    I encouraged Chrissy to focus on getting restful sleep, eat nutritious food in reasonable quantities, and stay as physically active as possible also consider revisiting the sleep apnea issue and perhaps doing another attempt at CPAP.    Routine lab work this afternoon comprehensive metabolic panel, blood cell counts, TSH for thyroid, lipid profile, hemoglobin A1c test for diabetes    Depressed mood and stressors  As above    Insomnia, KASANDRA is currently untreated  She stopped CPAP because not tolerated.  I hope you will consider revisiting the possibility of giving CPAP another try, perhaps with different equipment, because it would be a shame if because of untreated sleep apnea she were to experience things like fatigue, worse restless legs, etc.    sleep study in 2017, which showed sleep apnea. Stopped CPAP, a year and a half ago, as she wasn't sleeping and it wasn't positive experience.     Left SI Joint pain  Has had 2 injections: mid 2024, early 2025  Going PT seems to hep=lp    Transforaminal lumbar interbody fusion L4-5  Posterior lateral fusion on the left at L3-4 and L4-5  Complete laminectomy at L3-4 and L4-5  Surgery June 20, 2023 at [facility]     DATE OF SERVICE: 06/20/2023  PREOPERATIVE DIAGNOSES:   1.  Severe spinal stenosis at L3-4 and L4-5  2.  L3-4 and L4-5 spondylolisthesis   3.  L3-4 and L4-5 degenerative disc disease  4.  Failure of conservative measures     POSTOPERATIVE DIAGNOSES:   1.  Severe spinal stenosis at L3-4 and L4-5  2.  L3-4 and L4-5 spondylolisthesis   3.  L3-4 and L4-5 degenerative disc  disease  4.  Failure of conservative measures       PROCEDURE:   1. Right-sided transforaminal/transfacet decompression of the exiting L4 and traversing L5 nerve roots.   2. Transforaminal lumbar interbody fusion L4-5 with autograft bone, Medtronic Catalyft interbody long 9  3. Right-sided transforaminal/transfacet decompression of the exiting L3 and traversing L4 nerve roots.   4. Transforaminal lumbar interbody fusion L3-4 with autograft bone, Medtronic Catalyft interbody long 9  5. Posterior lateral fusion on the left at L3-4 and L4-5.   6. Segmental pedicle screw fixation in the pedicles of L3, L4, and L5 bilaterally  7. Complete laminectomy at L3-4 and L4-5  8. O arm with intra operative navigation     Had 1 day of vertigo May 14 2025    Right arm/shoulder pain, which Dr. Roman diagnosed as bicipital tendinitis    Bilateral leg edema, better after hydrochlorothiazide was added, and also use of compression stockings     Urinary bladder urgency, with history of hysterectomy done in her mid 50s  She may have some pelvic floor laxity.  I am not able to do a formal pelvic exam to assess that, but if she wants to pursue more pelvic floor precision diagnosis, she should see her gynecologist.     In the meantime, I suggested that Chrissy consider  - Scheduled voiding, emptying the bladder every hour while awake, so that it does not get too full and trigger urgency  - Also she can do pelvic floor exercises such as Kegels.     There is the option of using medication for bladder urgency such as oxybutynin, but that can have anticholinergic side effects.  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    February 2024 started Bisphosphonate could be once a week oral alendronate, or      Started in response to   EXAM: DX HIP/PELVIS/SPINE, DX WRIST/HEEL/RADIUS  LOCATION: [facility]  DATE: 2/7/2024    INDICATION: BMD screening, follow-up.  DEMOGRAPHICS: Age- 83 years. Gender- Female. Menopausal status- Postmenopausal.  COMPARISON: 12/13/2021   DXA RESULTS  -Lumbar Spine: L1-L2: BMD: 1.127 g/cm2. T-score: -0.3. Z-score: 1.5. Degenerative change may artifactually increase BMD.  -RIGHT Hip Total: BMD: 0.786 g/cm2. T-score: -1.8. Z-score: 0.3.  -RIGHT Hip Femoral neck: BMD: 0.751 g/cm2. T-score: -2.1. Z-score: 0.2.  -RIGHT Radius 33%: BMD: 0.578 g/cm2. T-score: -3.4. Z-score: -0.5.     TBS RESULTS  -Lumbar Spine L1-L2: TBS: 1.270. TBS T-score: -2.3.TBS Z-score: 0.9.     INTERVAL CHANGE  -There has been a 3.4% decrease in lumbar spine BMD.  -There has been a 6.4% decrease in right hip BMD.  IMPRESSION: OSTEOPOROSIS      Essential hypertension  Continue lisinopril 20 mg once a day  November 29, 2023, add hydrochlorothiazide 12.5 mg once a day     I checked her Om[employer] home blood pressure machine against a manual reading on the right upper arm, and the readings are pretty close, within 10 mmHg.     I told her that target blood pressure is 120/80 at rest and seated position measured on the right upper arm.  I reminded her that healthy diet particular less sodium, also regular exercise, and weight control will help with blood pressure, as well as treating sleep apnea     BP Readings from Last 6 Encounters:   05/21/25 136/74   02/14/25 118/80   01/18/25 (!) 142/79   10/21/24 (!) 143/83   05/09/24 132/66   02/05/24 120/76     Ganglion cyst dorsum right hand, which has become more uncomfortable, therefore we will have her see orthopedic hand     Left knee osteoarthritis improved with Euflexxa  injection.     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     Hyperlipidemia, mildly elevated LDL, very generous HDL which is favorable  Lipid profile July 24, 2019 with total cholesterol 265, triglycerides 43, LDL bad cholesterol modestly elevated 141, but with a very generous level of the HDL good cholesterol 115  10-  Hemoglobin A1C <=5.6 % 5.3       Gastroesophageal reflux, uses Tums intermittently      Constipation that she has noticed since starting nortriptyline for neuropathy and restless legs-- no longer takes nortriptylene     Overweight with body mass index of 29.  She has been less physically active because of her lumbar spine issues.  She does water aerobics which is excellent.     Wt Readings from Last 5 Encounters:   05/21/25 81.6 kg (180 lb)   04/03/25 79.4 kg (175 lb)   02/14/25 80.7 kg (178 lb)   01/18/25 80.7 kg (178 lb)   05/09/24 83.5 kg (184 lb)     Menopause, status post complete hysterectomy  Had complete hysterectomy at age in her 50s, has not needed to get Pap smears anymore  BILATERAL FULL FIELD DIGITAL SCREENING MAMMOGRAM WITH TOMOSYNTHESIS  Performed on: 9/23/24  Compared to: 09/19/2023, 09/02/2022, and 08/09/2021     She recalls a colonoscopy that was done approximately at age 72     Chrissy might consider getting a tetanus booster at the community pharmacy  Will give a Prevnar 20 vaccine May 21, 2025  Immunization History   Administered Date(s) Administered    COVID-19 12+ (MODERNA) 10/23/2023, 09/25/2024    COVID-19 Bivalent 12+ (Pfizer) 09/16/2022    COVID-19 MONOVALENT 12+ (Pfizer) 03/03/2021, 03/24/2021, 10/29/2021    COVID-19 Monovalent 18+ (Moderna) 04/21/2022    DT (PEDS <7y) 02/01/1997    Flu, Unspecified 11/01/2007, 11/19/2008    HepA, Unspecified 11/01/2007,  12/04/2008    HepB, Unspecified 11/01/2007, 01/10/2008, 06/17/2008    Hepatitis A (VAQTA)(ADULT 19+) 11/01/2007, 12/04/2008    Hepatitis B, Adult (Energix-B/Recombivax HB) 11/01/2007, 01/10/2008, 06/17/2008    Influenza (H1N1) 01/18/2010    Influenza (High Dose) Trivalent,PF (Fluzone) 11/21/2016, 10/16/2017, 10/09/2018, 11/19/2018, 10/07/2021, 10/06/2022, 10/23/2023, 10/09/2024    Influenza (IIV3) PF 11/01/2007, 11/19/2008, 10/17/2013    Influenza (prior to 2024) 09/23/2010    Influenza Vaccine 18-64 (Flublok) 10/02/2019, 10/07/2020    Influenza Vaccine 65+ (Fluzone HD) 10/07/2021, 10/06/2022, 10/23/2023    Influenza Vaccine, 6+MO IM (QUADRIVALENT W/PRESERVATIVES) 10/17/2013    Influenza, Split Virus, Trivalent, Pf (Fluzone\Fluarix) 09/23/2010    Pneumo Conj 13-V (2010&after) 10/13/2015    Pneumococcal 23 valent 10/03/2007    Poliovirus, inactivated (IPV) 11/29/2007    TDAP (Adacel,Boostrix) 11/01/2007    Td,adult,historic,unspecified 11/01/2007    Typhoid IM 11/29/2007    Zoster recombinant adjuvanted (Shingrix) 04/02/2019, 07/16/2019    Zoster vaccine, live 11/19/2008         Signed Electronically by: RACHAEL PEDERSEN MD

## 2025-05-22 ENCOUNTER — RESULTS FOLLOW-UP (OUTPATIENT)
Dept: INTERNAL MEDICINE | Facility: CLINIC | Age: 85
End: 2025-05-22

## 2025-05-22 LAB
ALBUMIN SERPL BCG-MCNC: 4 G/DL (ref 3.5–5.2)
ALP SERPL-CCNC: 75 U/L (ref 40–150)
ALT SERPL W P-5'-P-CCNC: 18 U/L (ref 0–50)
ANION GAP SERPL CALCULATED.3IONS-SCNC: 7 MMOL/L (ref 7–15)
AST SERPL W P-5'-P-CCNC: 22 U/L (ref 0–45)
BILIRUB SERPL-MCNC: 0.3 MG/DL
BUN SERPL-MCNC: 16.1 MG/DL (ref 8–23)
CALCIUM SERPL-MCNC: 9.7 MG/DL (ref 8.8–10.4)
CHLORIDE SERPL-SCNC: 97 MMOL/L (ref 98–107)
CHOLEST SERPL-MCNC: 246 MG/DL
CREAT SERPL-MCNC: 0.69 MG/DL (ref 0.51–0.95)
EGFRCR SERPLBLD CKD-EPI 2021: 86 ML/MIN/1.73M2
FASTING STATUS PATIENT QL REPORTED: NO
FASTING STATUS PATIENT QL REPORTED: NO
GLUCOSE SERPL-MCNC: 109 MG/DL (ref 70–99)
HCO3 SERPL-SCNC: 29 MMOL/L (ref 22–29)
HDLC SERPL-MCNC: 106 MG/DL
LDLC SERPL CALC-MCNC: 127 MG/DL
NONHDLC SERPL-MCNC: 140 MG/DL
POTASSIUM SERPL-SCNC: 4.2 MMOL/L (ref 3.4–5.3)
PROT SERPL-MCNC: 6.6 G/DL (ref 6.4–8.3)
SODIUM SERPL-SCNC: 133 MMOL/L (ref 135–145)
TRIGL SERPL-MCNC: 66 MG/DL
TSH SERPL DL<=0.005 MIU/L-ACNC: 1.08 UIU/ML (ref 0.3–4.2)

## 2025-07-08 ENCOUNTER — TRANSFERRED RECORDS (OUTPATIENT)
Dept: HEALTH INFORMATION MANAGEMENT | Facility: CLINIC | Age: 85
End: 2025-07-08
Payer: MEDICARE

## 2025-07-17 ENCOUNTER — VIRTUAL VISIT (OUTPATIENT)
Dept: NEUROLOGY | Facility: CLINIC | Age: 85
End: 2025-07-17
Payer: MEDICARE

## 2025-07-17 VITALS — WEIGHT: 174 LBS | HEIGHT: 66 IN | BODY MASS INDEX: 27.97 KG/M2

## 2025-07-17 DIAGNOSIS — G25.81 RESTLESS LEGS SYNDROME (RLS): ICD-10-CM

## 2025-07-17 DIAGNOSIS — G62.89 OTHER POLYNEUROPATHY: ICD-10-CM

## 2025-07-17 RX ORDER — ROPINIROLE 0.25 MG/1
TABLET, FILM COATED ORAL
Qty: 120 TABLET | Refills: 8 | Status: SHIPPED | OUTPATIENT
Start: 2025-07-17

## 2025-07-17 RX ORDER — PREGABALIN 150 MG/1
300 CAPSULE ORAL 2 TIMES DAILY
Qty: 120 CAPSULE | Refills: 5 | Status: SHIPPED | OUTPATIENT
Start: 2025-07-17

## 2025-07-17 ASSESSMENT — PAIN SCALES - GENERAL: PAINLEVEL_OUTOF10: NO PAIN (0)

## 2025-07-17 NOTE — LETTER
7/17/2025       RE: Sheryl Trotter  [address]  [place] [address]     Dear Colleague,    Thank you for referring your patient, Sheryl Trotter, to the [facility]UR[facility] [place] at [facility]. Please see a copy of my visit note below.    [facility] Neurology Follow Up Visit    Sheryl Trotter MRN# [id number]   Age: 85 year old YOB: 1940            Assessment and Plan:   Assessment:  Distal symmetric polyneuropathy  RLS    The patient's AM wake-up doesn't sound consistent with RLS or periodic limb movements, and given her complicated back surgical history I do want to have her obtain an EMG to look for signs of radiculopathy over something like progression in previously reported mild neuropathy.  If testing is negative, then it is possible a focus could be made on both RLS additions (which at this time would be limited to changing from rorpinrole to another dopamine agonist, or adding on opiates like tramadol which would have a plethora of side-effects).  For now, symptomatic treatment for refractory nights of RLS could involve increase rorpinrole.       Plan:  - EMG b/l legs  - Ropinrole 0.75 mg at bedtime, PRN use of 0.25 mg for refractory symptoms  - Lyrica 300 mg BID (maximum dosing at this time)    Follow up in Neurology clinic in 3-4 months (in-person) or earlier as needed should new concerns arise.    NISA Khanna D.O.   of Neurology    Total time today (31 min) in this patient encounter was spent on pre-charting, counseling and/or coordination of care. The longitudinal plan of care for the diagnosis(es)/condition(s) as documented were addressed during this visit. Due to the added complexity in care, I will continue to support Chrissy in the subsequent management and with ongoing continuity of care.       Prior History and Update:   The patient was initially seen in neurologic consultation on  "10/11/2021 and most recently 4/3/2025 for evaluation of RLS and polyneuropathy. Please see the comprehensive neurologic consultation notes from those dates in the Epic records for details.     Prior pertinent history:  - 6/2023 lumbar procedure:  \"1. Right-sided transforaminal/transfacet decompression of the exiting L4 and traversing L5 nerve roots.   2. Transforaminal lumbar interbody fusion L4-5 with autograft bone, Medtronic Catalyft interbody long 9  3. Right-sided transforaminal/transfacet decompression of the exiting L3 and traversing L4 nerve roots.   4. Transforaminal lumbar interbody fusion L3-4 with autograft bone, Medtronic Catalyft interbody long 9  5. Posterior lateral fusion on the left at L3-4 and L4-5.   6. Segmental pedicle screw fixation in the pedicles of L3, L4, and L5 bilaterally  7. Complete laminectomy at L3-4 and L4-5\"    Prior treatment for RLS included Lyrica, ferrous sulfate supplementation, and Ropinrole.  - EMG 8/30/2019:  \"1.  Borderline bilateral sensory amplitudes.  This is normal for the patient's age is very likely normal finding.  I cannot, however, completely exclude a very mild sensory or sensorimotor peripheral polyneuropathy.   2 There is no electrodiagnostic evidence of lumbosacral radiculopathy, lumbosacral plexopathy, or focal neuropathy in the bilateral lower extremities.  Specifically, there is no electrodiagnostic evidence of tibial neuropathy at the tarsal tunnel in the bilateral lower extremities.\"  - TSH, B6, Homocysteine, Ferritin, Iron and iron binding, Vitamin E, ANCA, Immunofixation all negative 10/11/2021   - Lyrica 300 mg BID (for both RLS and neuropathy)  - Ropinrole 0.5 mg QHS    At our last visit, the patient was to take Ropinrole at 0.75 mg nightly while staying on lyrica 300 mg BID. Testing for serum studies as causes of neuropathy or RLS were to be done.     Interval history:   - Abnormal tests on 4/3/2025: CRP was 5.84 (down from 17.4 a year prior)  - " Normal tests on 4/3/2025: B12, ESR, CMP, CBC, ferritin, iron binding and iron.    Today, the patient reports that she was getting anxious and waking up early in the AM (4-5AM) with difficulty in feeling her legs. She feels that her RLS is happening earlier in the evening, even when taking Lyrica at 8AM and 9PM, as well as ropinrole at 9PM.  About 2-3 times per month, the RLS keeps her from sleeping, often making her have to get up and walk around to quell the symptoms.  She still has cold/tingling/numbness in her feet through the day, but she doesn't really let if affect her in the day with distraction and being active.     Physical Exam:   General: Seated comfortably in no acute distress.  Neurologic:     Mental Status: Fully alert, attentive and oriented. Speech clear and fluent, no paraphasic errors.     Cranial Nerves: EOMI with normal smooth pursuit. Facial movements symmetric. Hearing not formally tested but intact to conversation.  No dysarthria.     Motor: No tremors or other abnormal movements observed.       Virtual Visit Details    Type of service:  Video Visit   Video Start Time: 0930  Video End Time:9:57 AM    Originating Location (pt. Location): Home    Distant Location (provider location):  On-site  Platform used for Video Visit: AmWell      Again, thank you for allowing me to participate in the care of your patient.      Sincerely,    Cholo Khanna, DO

## 2025-07-17 NOTE — PROGRESS NOTES
Virtual Visit Details    Type of service:  Video Visit   Video Start Time: 0930  Video End Time:9:57 AM    Originating Location (pt. Location): Home    Distant Location (provider location):  On-site  Platform used for Video Visit: EdCaliber

## 2025-07-17 NOTE — NURSING NOTE
Current patient location: [address] [place] [id number]    Is the patient currently in the state of [place]? YES    Visit mode: VIDEO    If the visit is dropped, the patient can be reconnected by:VIDEO VISIT: Text to cell phone:   Telephone Information:   Mobile [phone]       Will anyone else be joining the visit? NO  (If patient encounters technical issues they should call [phone]    Are changes needed to the allergy or medication list? No    Are refills needed on medications prescribed by this physician? NO    Rooming Documentation:  Not applicable    Reason for visit: KAL Will VVF

## 2025-07-17 NOTE — PROGRESS NOTES
"[facility] Neurology Follow Up Visit    Sheryl Trotter MRN# [id number]   Age: 85 year old YOB: 1940            Assessment and Plan:   Assessment:  Distal symmetric polyneuropathy  RLS    The patient's AM wake-up doesn't sound consistent with RLS or periodic limb movements, and given her complicated back surgical history I do want to have her obtain an EMG to look for signs of radiculopathy over something like progression in previously reported mild neuropathy.  If testing is negative, then it is possible a focus could be made on both RLS additions (which at this time would be limited to changing from rorpinrole to another dopamine agonist, or adding on opiates like tramadol which would have a plethora of side-effects).  For now, symptomatic treatment for refractory nights of RLS could involve increase rorpinrole.       Plan:  - EMG b/l legs  - Ropinrole 0.75 mg at bedtime, PRN use of 0.25 mg for refractory symptoms  - Lyrica 300 mg BID (maximum dosing at this time)    Follow up in Neurology clinic in 3-4 months (in-person) or earlier as needed should new concerns arise.    NISA Khanna D.O.   of Neurology    Total time today (31 min) in this patient encounter was spent on pre-charting, counseling and/or coordination of care. The longitudinal plan of care for the diagnosis(es)/condition(s) as documented were addressed during this visit. Due to the added complexity in care, I will continue to support Chrissy in the subsequent management and with ongoing continuity of care.       Prior History and Update:   The patient was initially seen in neurologic consultation on 10/11/2021 and most recently 4/3/2025 for evaluation of RLS and polyneuropathy. Please see the comprehensive neurologic consultation notes from those dates in the Epic records for details.     Prior pertinent history:  - 6/2023 lumbar procedure:  \"1. Right-sided transforaminal/transfacet decompression of the exiting L4 and " "traversing L5 nerve roots.   2. Transforaminal lumbar interbody fusion L4-5 with autograft bone, [employer] interbody long 9  3. Right-sided transforaminal/transfacet decompression of the exiting L3 and traversing L4 nerve roots.   4. Transforaminal lumbar interbody fusion L3-4 with autograft bone, [employer] interbody long 9  5. Posterior lateral fusion on the left at L3-4 and L4-5.   6. Segmental pedicle screw fixation in the pedicles of L3, L4, and L5 bilaterally  7. Complete laminectomy at L3-4 and L4-5\"    Prior treatment for RLS included Lyrica, ferrous sulfate supplementation, and Ropinrole.  - EMG 8/30/2019:  \"1.  Borderline bilateral sensory amplitudes.  This is normal for the patient's age is very likely normal finding.  I cannot, however, completely exclude a very mild sensory or sensorimotor peripheral polyneuropathy.   2 There is no electrodiagnostic evidence of lumbosacral radiculopathy, lumbosacral plexopathy, or focal neuropathy in the bilateral lower extremities.  Specifically, there is no electrodiagnostic evidence of tibial neuropathy at the tarsal tunnel in the bilateral lower extremities.\"  - TSH, B6, Homocysteine, Ferritin, Iron and iron binding, Vitamin E, ANCA, Immunofixation all negative 10/11/2021   - Lyrica 300 mg BID (for both RLS and neuropathy)  - Ropinrole 0.5 mg QHS    At our last visit, the patient was to take Ropinrole at 0.75 mg nightly while staying on lyrica 300 mg BID. Testing for serum studies as causes of neuropathy or RLS were to be done.     Interval history:   - Abnormal tests on 4/3/2025: CRP was 5.84 (down from 17.4 a year prior)  - Normal tests on 4/3/2025: B12, ESR, CMP, CBC, ferritin, iron binding and iron.    Today, the patient reports that she was getting anxious and waking up early in the AM (4-5AM) with difficulty in feeling her legs. She feels that her RLS is happening earlier in the evening, even when taking Lyrica at 8AM and 9PM, as well as " ropinrole at 9PM.  About 2-3 times per month, the RLS keeps her from sleeping, often making her have to get up and walk around to quell the symptoms.  She still has cold/tingling/numbness in her feet through the day, but she doesn't really let if affect her in the day with distraction and being active.     Physical Exam:   General: Seated comfortably in no acute distress.  Neurologic:     Mental Status: Fully alert, attentive and oriented. Speech clear and fluent, no paraphasic errors.     Cranial Nerves: EOMI with normal smooth pursuit. Facial movements symmetric. Hearing not formally tested but intact to conversation.  No dysarthria.     Motor: No tremors or other abnormal movements observed.

## 2025-09-03 ENCOUNTER — MYC MEDICAL ADVICE (OUTPATIENT)
Dept: FAMILY MEDICINE | Facility: CLINIC | Age: 85
End: 2025-09-03
Payer: MEDICARE

## (undated) DEVICE — SOL NACL 0.9% IRRIG 1000ML BOTTLE 2F7124

## (undated) DEVICE — MIDAS REX DISSECTING TOOL  14MH30

## (undated) DEVICE — RX SURGIFLO HEMOSTATIC MATRIX W/THROMBIN 8ML 2994

## (undated) DEVICE — GLOVE BIOGEL PI ULTRATOUCH G SZ 7.5 42175

## (undated) DEVICE — SYR BULB IRRIG DOVER 60 ML LATEX FREE 67000

## (undated) DEVICE — DRSG BIOPATCH GERMICIDAL SPLIT SPONGE 4MM MED 4150

## (undated) DEVICE — ESU ELEC BLADE 2.75" COATED/INSULATED E1455

## (undated) DEVICE — SUTURE VICRYL+ 2-0 CT-2 27" UND VCP269H

## (undated) DEVICE — CATH TRAY FOLEY SURESTEP 16FR DRAIN BAG STATOCK A899916

## (undated) DEVICE — CUSHION INSERT LG PRONE VIEW JACKSON TABLE

## (undated) DEVICE — DECANTER VIAL 2006S

## (undated) DEVICE — SU STRATAFIX PDS PLUS 1 CT-1 18" SXPP1A404

## (undated) DEVICE — DRSG TEGADERM 2 3/8X2 3/4" 1624W

## (undated) DEVICE — GOWN IMPERVIOUS BREATHABLE SMART XLG 89045

## (undated) DEVICE — CUSTOM PACK LUMBAR FUSION SNE5BLFHEA

## (undated) DEVICE — Device

## (undated) DEVICE — POSITIONER ARM CRADLE LAMINECTOMY DISP

## (undated) DEVICE — DRAPE TOWEL IMPERVIOUS X2 7553

## (undated) DEVICE — SOL WATER IRRIG 1000ML BOTTLE 2F7114

## (undated) DEVICE — SPONGE RAY-TEC 4X8" 7318

## (undated) DEVICE — CELL SAVER

## (undated) DEVICE — GLOVE BIOGEL PI SZ 8.0 40880

## (undated) DEVICE — SU MONOCRYL 3-0 PS-2 18" UND MCP497G

## (undated) DEVICE — GLOVE BIOGEL PI INDICATOR 8.0 LF 41680

## (undated) DEVICE — ESU PENCIL SMOKE EVAC W/ROCKER SWITCH 0703-047-000

## (undated) DEVICE — MARKER 4 SPHERE PASSIVE NDI 8801074

## (undated) DEVICE — DRAPE STERI TOWEL LG 1010

## (undated) DEVICE — SU DERMABOND ADVANCED .7ML DNX12

## (undated) DEVICE — PLATE GROUNDING ADULT W/CORD 9165L

## (undated) DEVICE — DRESSING MEPILEX BORDER POST-OP 4X8

## (undated) DEVICE — DRAPE O ARM TUBE 9732722

## (undated) DEVICE — KIT DRAIN CLOSED WOUND SUCTION MED 400ML RESVR

## (undated) DEVICE — MARKER SPHERES PASSIVE MEDT PACK 1 8801071

## (undated) DEVICE — DRSG PRIMAPORE 03 1/8X6" 66000318

## (undated) RX ORDER — BUPIVACAINE HYDROCHLORIDE AND EPINEPHRINE 2.5; 5 MG/ML; UG/ML
INJECTION, SOLUTION EPIDURAL; INFILTRATION; INTRACAUDAL; PERINEURAL
Status: DISPENSED
Start: 2023-06-20

## (undated) RX ORDER — ONDANSETRON 2 MG/ML
INJECTION INTRAMUSCULAR; INTRAVENOUS
Status: DISPENSED
Start: 2023-06-20

## (undated) RX ORDER — FENTANYL CITRATE 50 UG/ML
INJECTION, SOLUTION INTRAMUSCULAR; INTRAVENOUS
Status: DISPENSED
Start: 2023-06-20

## (undated) RX ORDER — CEFAZOLIN SODIUM 1 G/3ML
INJECTION, POWDER, FOR SOLUTION INTRAMUSCULAR; INTRAVENOUS
Status: DISPENSED
Start: 2023-06-20

## (undated) RX ORDER — DEXAMETHASONE SODIUM PHOSPHATE 10 MG/ML
INJECTION, SOLUTION INTRAMUSCULAR; INTRAVENOUS
Status: DISPENSED
Start: 2023-06-20

## (undated) RX ORDER — GLYCOPYRROLATE 0.2 MG/ML
INJECTION, SOLUTION INTRAMUSCULAR; INTRAVENOUS
Status: DISPENSED
Start: 2023-06-20

## (undated) RX ORDER — VANCOMYCIN HYDROCHLORIDE 1 G/20ML
INJECTION, POWDER, LYOPHILIZED, FOR SOLUTION INTRAVENOUS
Status: DISPENSED
Start: 2023-06-20

## (undated) RX ORDER — PROPOFOL 10 MG/ML
INJECTION, EMULSION INTRAVENOUS
Status: DISPENSED
Start: 2023-06-20

## (undated) RX ORDER — LIDOCAINE HYDROCHLORIDE 10 MG/ML
INJECTION, SOLUTION EPIDURAL; INFILTRATION; INTRACAUDAL; PERINEURAL
Status: DISPENSED
Start: 2023-06-20